# Patient Record
Sex: MALE | Race: WHITE | Employment: OTHER | URBAN - METROPOLITAN AREA
[De-identification: names, ages, dates, MRNs, and addresses within clinical notes are randomized per-mention and may not be internally consistent; named-entity substitution may affect disease eponyms.]

---

## 2017-01-05 ENCOUNTER — OFFICE VISIT (OUTPATIENT)
Dept: PAIN MANAGEMENT | Age: 62
End: 2017-01-05

## 2017-01-05 VITALS
HEART RATE: 95 BPM | WEIGHT: 290 LBS | SYSTOLIC BLOOD PRESSURE: 126 MMHG | BODY MASS INDEX: 37.23 KG/M2 | DIASTOLIC BLOOD PRESSURE: 80 MMHG

## 2017-01-05 DIAGNOSIS — M22.41 CHONDROMALACIA OF PATELLOFEMORAL JOINT, RIGHT: ICD-10-CM

## 2017-01-05 DIAGNOSIS — F51.01 PRIMARY INSOMNIA: ICD-10-CM

## 2017-01-05 DIAGNOSIS — M96.1 FAILED BACK SURGICAL SYNDROME: ICD-10-CM

## 2017-01-05 DIAGNOSIS — G89.4 CHRONIC PAIN SYNDROME: ICD-10-CM

## 2017-01-05 PROCEDURE — 99213 OFFICE O/P EST LOW 20 MIN: CPT | Performed by: INTERNAL MEDICINE

## 2017-01-05 RX ORDER — OXYCODONE HYDROCHLORIDE 15 MG/1
1 TABLET, FILM COATED, EXTENDED RELEASE ORAL 2 TIMES DAILY
Qty: 56 TABLET | Refills: 0 | Status: SHIPPED | OUTPATIENT
Start: 2017-01-05 | End: 2017-02-02 | Stop reason: SDUPTHER

## 2017-01-09 ENCOUNTER — TELEPHONE (OUTPATIENT)
Dept: PAIN MANAGEMENT | Age: 62
End: 2017-01-09

## 2017-02-02 ENCOUNTER — OFFICE VISIT (OUTPATIENT)
Dept: PAIN MANAGEMENT | Age: 62
End: 2017-02-02

## 2017-02-02 VITALS
WEIGHT: 290 LBS | BODY MASS INDEX: 37.23 KG/M2 | HEART RATE: 99 BPM | DIASTOLIC BLOOD PRESSURE: 77 MMHG | SYSTOLIC BLOOD PRESSURE: 131 MMHG

## 2017-02-02 DIAGNOSIS — M96.1 FAILED BACK SURGICAL SYNDROME: ICD-10-CM

## 2017-02-02 DIAGNOSIS — M22.41 CHONDROMALACIA OF PATELLOFEMORAL JOINT, RIGHT: ICD-10-CM

## 2017-02-02 DIAGNOSIS — G89.4 CHRONIC PAIN SYNDROME: ICD-10-CM

## 2017-02-02 PROCEDURE — 1036F TOBACCO NON-USER: CPT | Performed by: INTERNAL MEDICINE

## 2017-02-02 PROCEDURE — 99213 OFFICE O/P EST LOW 20 MIN: CPT | Performed by: INTERNAL MEDICINE

## 2017-02-02 PROCEDURE — G8419 CALC BMI OUT NRM PARAM NOF/U: HCPCS | Performed by: INTERNAL MEDICINE

## 2017-02-02 PROCEDURE — G8427 DOCREV CUR MEDS BY ELIG CLIN: HCPCS | Performed by: INTERNAL MEDICINE

## 2017-02-02 PROCEDURE — 3017F COLORECTAL CA SCREEN DOC REV: CPT | Performed by: INTERNAL MEDICINE

## 2017-02-02 PROCEDURE — G8484 FLU IMMUNIZE NO ADMIN: HCPCS | Performed by: INTERNAL MEDICINE

## 2017-02-02 RX ORDER — OXYCODONE HYDROCHLORIDE 15 MG/1
1 TABLET, FILM COATED, EXTENDED RELEASE ORAL 2 TIMES DAILY
Qty: 56 TABLET | Refills: 0 | Status: SHIPPED | OUTPATIENT
Start: 2017-02-02 | End: 2017-03-02 | Stop reason: SDUPTHER

## 2017-02-02 RX ORDER — ESCITALOPRAM OXALATE 20 MG/1
TABLET ORAL
Qty: 90 TABLET | Refills: 0 | Status: SHIPPED | OUTPATIENT
Start: 2017-02-02 | End: 2017-05-25 | Stop reason: SDUPTHER

## 2017-02-02 RX ORDER — METHOCARBAMOL 750 MG/1
750 TABLET, FILM COATED ORAL 2 TIMES DAILY
Qty: 180 TABLET | Refills: 0 | Status: SHIPPED | OUTPATIENT
Start: 2017-02-02 | End: 2017-04-27 | Stop reason: SDUPTHER

## 2017-02-02 RX ORDER — LIDOCAINE 50 MG/G
2 PATCH TOPICAL DAILY
Qty: 60 PATCH | Refills: 0 | Status: SHIPPED | OUTPATIENT
Start: 2017-02-02 | End: 2017-03-30 | Stop reason: SDUPTHER

## 2017-02-06 ENCOUNTER — TELEPHONE (OUTPATIENT)
Dept: PAIN MANAGEMENT | Age: 62
End: 2017-02-06

## 2017-03-02 ENCOUNTER — OFFICE VISIT (OUTPATIENT)
Dept: PAIN MANAGEMENT | Age: 62
End: 2017-03-02

## 2017-03-02 VITALS
SYSTOLIC BLOOD PRESSURE: 119 MMHG | WEIGHT: 295 LBS | DIASTOLIC BLOOD PRESSURE: 77 MMHG | HEART RATE: 101 BPM | BODY MASS INDEX: 37.88 KG/M2

## 2017-03-02 DIAGNOSIS — G89.4 CHRONIC PAIN SYNDROME: ICD-10-CM

## 2017-03-02 DIAGNOSIS — M22.41 CHONDROMALACIA OF PATELLOFEMORAL JOINT, RIGHT: ICD-10-CM

## 2017-03-02 DIAGNOSIS — M96.1 FAILED BACK SURGICAL SYNDROME: ICD-10-CM

## 2017-03-02 PROCEDURE — 99213 OFFICE O/P EST LOW 20 MIN: CPT | Performed by: INTERNAL MEDICINE

## 2017-03-02 PROCEDURE — G8427 DOCREV CUR MEDS BY ELIG CLIN: HCPCS | Performed by: INTERNAL MEDICINE

## 2017-03-02 PROCEDURE — 3017F COLORECTAL CA SCREEN DOC REV: CPT | Performed by: INTERNAL MEDICINE

## 2017-03-02 PROCEDURE — 1036F TOBACCO NON-USER: CPT | Performed by: INTERNAL MEDICINE

## 2017-03-02 PROCEDURE — G8419 CALC BMI OUT NRM PARAM NOF/U: HCPCS | Performed by: INTERNAL MEDICINE

## 2017-03-02 PROCEDURE — G8484 FLU IMMUNIZE NO ADMIN: HCPCS | Performed by: INTERNAL MEDICINE

## 2017-03-02 RX ORDER — OXYCODONE HYDROCHLORIDE 15 MG/1
1 TABLET, FILM COATED, EXTENDED RELEASE ORAL 2 TIMES DAILY
Qty: 56 TABLET | Refills: 0 | Status: SHIPPED | OUTPATIENT
Start: 2017-03-02 | End: 2017-03-30 | Stop reason: SDUPTHER

## 2017-03-08 ENCOUNTER — HOSPITAL ENCOUNTER (OUTPATIENT)
Dept: OTHER | Age: 62
Discharge: HOME | End: 2017-03-08
Attending: NURSE PRACTITIONER | Admitting: NURSE PRACTITIONER

## 2017-03-08 ENCOUNTER — HOSPITAL ENCOUNTER (OUTPATIENT)
Dept: ENDOSCOPY | Age: 62
Discharge: HOME | End: 2017-03-08
Attending: INTERNAL MEDICINE | Admitting: INTERNAL MEDICINE

## 2017-03-08 LAB
A/G RATIO: 1.6 (ref 1.1–2.2)
ALBUMIN SERPL-MCNC: 4.2 G/DL (ref 3.4–5)
ALP BLD-CCNC: 49 U/L (ref 40–129)
ALT SERPL-CCNC: 33 U/L (ref 10–40)
ANION GAP SERPL CALCULATED.3IONS-SCNC: 16 MMOL/L (ref 3–16)
AST SERPL-CCNC: 37 U/L (ref 15–37)
BASOPHILS ABSOLUTE: 0.1 K/UL (ref 0–0.2)
BASOPHILS RELATIVE PERCENT: 0.5 %
BILIRUB SERPL-MCNC: 0.7 MG/DL (ref 0–1)
BILIRUBIN DIRECT: <0.2 MG/DL (ref 0–0.3)
BUN BLDV-MCNC: 15 MG/DL (ref 7–20)
CALCIUM SERPL-MCNC: 9.7 MG/DL (ref 8.3–10.6)
CHLORIDE BLD-SCNC: 98 MMOL/L (ref 99–110)
CHOLESTEROL, TOTAL: 243 MG/DL (ref 0–199)
CO2: 24 MMOL/L (ref 21–32)
CREAT SERPL-MCNC: 0.8 MG/DL (ref 0.8–1.3)
EOSINOPHILS ABSOLUTE: 0.2 K/UL (ref 0–0.6)
EOSINOPHILS RELATIVE PERCENT: 2.4 %
GFR AFRICAN AMERICAN: >60
GFR NON-AFRICAN AMERICAN: >60
GLOBULIN: 2.7 G/DL
GLUCOSE BLD-MCNC: 162 MG/DL (ref 70–99)
HCT VFR BLD CALC: 45.9 % (ref 40.5–52.5)
HDLC SERPL-MCNC: 27 MG/DL (ref 40–60)
HEMOGLOBIN: 15 G/DL (ref 13.5–17.5)
LDL CHOLESTEROL CALCULATED: ABNORMAL MG/DL
LDL CHOLESTEROL DIRECT: 158 MG/DL
LYMPHOCYTES ABSOLUTE: 2.3 K/UL (ref 1–5.1)
LYMPHOCYTES RELATIVE PERCENT: 23 %
MAGNESIUM: 1.8 MG/DL (ref 1.8–2.4)
MCH RBC QN AUTO: 31.5 PG (ref 26–34)
MCHC RBC AUTO-ENTMCNC: 32.8 G/DL (ref 31–36)
MCV RBC AUTO: 96.2 FL (ref 80–100)
MONOCYTES ABSOLUTE: 0.8 K/UL (ref 0–1.3)
MONOCYTES RELATIVE PERCENT: 7.9 %
NEUTROPHILS ABSOLUTE: 6.7 K/UL (ref 1.7–7.7)
NEUTROPHILS RELATIVE PERCENT: 66.2 %
PDW BLD-RTO: 13.7 % (ref 12.4–15.4)
PLATELET # BLD: 254 K/UL (ref 135–450)
PMV BLD AUTO: 8.8 FL (ref 5–10.5)
POTASSIUM SERPL-SCNC: 3.5 MMOL/L (ref 3.5–5.1)
RBC # BLD: 4.77 M/UL (ref 4.2–5.9)
SODIUM BLD-SCNC: 138 MMOL/L (ref 136–145)
T4 FREE: 1.2 NG/DL (ref 0.9–1.8)
TOTAL PROTEIN: 6.9 G/DL (ref 6.4–8.2)
TRIGL SERPL-MCNC: 445 MG/DL (ref 0–150)
TSH SERPL DL<=0.05 MIU/L-ACNC: 2.16 UIU/ML (ref 0.27–4.2)
VITAMIN B-12: 1422 PG/ML (ref 211–911)
VITAMIN D 25-HYDROXY: 23.2 NG/ML
VLDLC SERPL CALC-MCNC: ABNORMAL MG/DL
WBC # BLD: 10.1 K/UL (ref 4–11)

## 2017-03-08 RX ORDER — SODIUM CHLORIDE 9 MG/ML
INJECTION, SOLUTION INTRAVENOUS CONTINUOUS
Status: DISCONTINUED | OUTPATIENT
Start: 2017-03-08 | End: 2017-03-09 | Stop reason: HOSPADM

## 2017-03-08 RX ORDER — SODIUM CHLORIDE 0.9 % (FLUSH) 0.9 %
10 SYRINGE (ML) INJECTION EVERY 12 HOURS SCHEDULED
Status: DISCONTINUED | OUTPATIENT
Start: 2017-03-08 | End: 2017-03-09 | Stop reason: HOSPADM

## 2017-03-08 RX ORDER — SODIUM CHLORIDE 0.9 % (FLUSH) 0.9 %
10 SYRINGE (ML) INJECTION PRN
Status: DISCONTINUED | OUTPATIENT
Start: 2017-03-08 | End: 2017-03-09 | Stop reason: HOSPADM

## 2017-03-08 RX ORDER — LIDOCAINE HYDROCHLORIDE 10 MG/ML
1 INJECTION, SOLUTION EPIDURAL; INFILTRATION; INTRACAUDAL; PERINEURAL
Status: ACTIVE | OUTPATIENT
Start: 2017-03-08 | End: 2017-03-08

## 2017-03-09 LAB
PROSTATE SPECIFIC ANTIGEN FREE: 0.2 NG/ML
PROSTATE SPECIFIC ANTIGEN PERCENT FREE: 22 %
PROSTATE SPECIFIC ANTIGEN: 0.9 NG/ML (ref 0–4)

## 2017-03-30 ENCOUNTER — OFFICE VISIT (OUTPATIENT)
Dept: PAIN MANAGEMENT | Age: 62
End: 2017-03-30

## 2017-03-30 VITALS
WEIGHT: 312 LBS | SYSTOLIC BLOOD PRESSURE: 125 MMHG | BODY MASS INDEX: 39 KG/M2 | HEART RATE: 105 BPM | DIASTOLIC BLOOD PRESSURE: 76 MMHG

## 2017-03-30 DIAGNOSIS — M96.1 FAILED BACK SURGICAL SYNDROME: ICD-10-CM

## 2017-03-30 DIAGNOSIS — M22.41 CHONDROMALACIA OF PATELLOFEMORAL JOINT, RIGHT: ICD-10-CM

## 2017-03-30 DIAGNOSIS — S13.9XXA CERVICAL SPRAIN, INITIAL ENCOUNTER: ICD-10-CM

## 2017-03-30 DIAGNOSIS — G89.4 CHRONIC PAIN SYNDROME: ICD-10-CM

## 2017-03-30 PROCEDURE — G8427 DOCREV CUR MEDS BY ELIG CLIN: HCPCS | Performed by: INTERNAL MEDICINE

## 2017-03-30 PROCEDURE — 99213 OFFICE O/P EST LOW 20 MIN: CPT | Performed by: INTERNAL MEDICINE

## 2017-03-30 PROCEDURE — 1036F TOBACCO NON-USER: CPT | Performed by: INTERNAL MEDICINE

## 2017-03-30 PROCEDURE — 3017F COLORECTAL CA SCREEN DOC REV: CPT | Performed by: INTERNAL MEDICINE

## 2017-03-30 PROCEDURE — G8484 FLU IMMUNIZE NO ADMIN: HCPCS | Performed by: INTERNAL MEDICINE

## 2017-03-30 PROCEDURE — G8419 CALC BMI OUT NRM PARAM NOF/U: HCPCS | Performed by: INTERNAL MEDICINE

## 2017-03-30 RX ORDER — LIDOCAINE 50 MG/G
2 PATCH TOPICAL DAILY
Qty: 60 PATCH | Refills: 0 | Status: SHIPPED | OUTPATIENT
Start: 2017-03-30 | End: 2017-03-30

## 2017-03-30 RX ORDER — OXYCODONE HYDROCHLORIDE 15 MG/1
1 TABLET, FILM COATED, EXTENDED RELEASE ORAL 2 TIMES DAILY
Qty: 56 TABLET | Refills: 0 | Status: SHIPPED | OUTPATIENT
Start: 2017-03-30 | End: 2017-04-27 | Stop reason: SDUPTHER

## 2017-04-18 ENCOUNTER — HOSPITAL ENCOUNTER (OUTPATIENT)
Dept: OTHER | Age: 62
Discharge: HOME | End: 2017-04-18
Attending: NURSE PRACTITIONER | Admitting: NURSE PRACTITIONER

## 2017-04-18 DIAGNOSIS — R05.9 COUGH: ICD-10-CM

## 2017-04-18 LAB
ALBUMIN SERPL-MCNC: 4.5 G/DL (ref 3.4–5)
ALP BLD-CCNC: 54 U/L (ref 40–129)
ALT SERPL-CCNC: 30 U/L (ref 10–40)
ANION GAP SERPL CALCULATED.3IONS-SCNC: 18 MMOL/L (ref 3–16)
AST SERPL-CCNC: 30 U/L (ref 15–37)
BASOPHILS ABSOLUTE: 0 K/UL (ref 0–0.2)
BASOPHILS RELATIVE PERCENT: 0.3 %
BILIRUB SERPL-MCNC: 0.5 MG/DL (ref 0–1)
BILIRUBIN DIRECT: <0.2 MG/DL (ref 0–0.3)
BILIRUBIN, INDIRECT: NORMAL MG/DL (ref 0–1)
BUN BLDV-MCNC: 21 MG/DL (ref 7–20)
CALCIUM SERPL-MCNC: 10.2 MG/DL (ref 8.3–10.6)
CHLORIDE BLD-SCNC: 95 MMOL/L (ref 99–110)
CO2: 23 MMOL/L (ref 21–32)
CREAT SERPL-MCNC: 0.8 MG/DL (ref 0.8–1.3)
EOSINOPHILS ABSOLUTE: 0.1 K/UL (ref 0–0.6)
EOSINOPHILS RELATIVE PERCENT: 0.5 %
GFR AFRICAN AMERICAN: >60
GFR NON-AFRICAN AMERICAN: >60
GLUCOSE BLD-MCNC: 152 MG/DL (ref 70–99)
HCT VFR BLD CALC: 48.4 % (ref 40.5–52.5)
HEMOGLOBIN: 16.1 G/DL (ref 13.5–17.5)
LYMPHOCYTES ABSOLUTE: 3.2 K/UL (ref 1–5.1)
LYMPHOCYTES RELATIVE PERCENT: 23.9 %
MCH RBC QN AUTO: 31.9 PG (ref 26–34)
MCHC RBC AUTO-ENTMCNC: 33.3 G/DL (ref 31–36)
MCV RBC AUTO: 95.7 FL (ref 80–100)
MONOCYTES ABSOLUTE: 0.9 K/UL (ref 0–1.3)
MONOCYTES RELATIVE PERCENT: 6.6 %
NEUTROPHILS ABSOLUTE: 9.3 K/UL (ref 1.7–7.7)
NEUTROPHILS RELATIVE PERCENT: 68.7 %
PDW BLD-RTO: 13.6 % (ref 12.4–15.4)
PLATELET # BLD: 306 K/UL (ref 135–450)
PMV BLD AUTO: 9.3 FL (ref 5–10.5)
POTASSIUM SERPL-SCNC: 3.4 MMOL/L (ref 3.5–5.1)
RBC # BLD: 5.06 M/UL (ref 4.2–5.9)
SODIUM BLD-SCNC: 136 MMOL/L (ref 136–145)
TOTAL PROTEIN: 7.4 G/DL (ref 6.4–8.2)
WBC # BLD: 13.5 K/UL (ref 4–11)

## 2017-04-20 LAB
CHLAMYDIA PNEUMONIAE IGG ANTIBODY: NORMAL
CHLAMYDIA PNEUMONIAE IGM ANTIBODY: NORMAL
CHLAMYDIA PSITTACI IGG ANTIBODY: NORMAL
CHLAMYDIA PSITTACI IGM ANTIBODY: NORMAL
CHLAMYDIA TRACHOMATIS IGG ANTIBODY: NORMAL
CHLAMYDIA TRACHOMATIS IGM ANTIBODY: NORMAL
MYCOPLASMA PNEUMONIAE IGG: 0.55 U/L
MYCOPLASMA PNEUMONIAE IGM: 0.02 U/L

## 2017-04-21 LAB
Lab: NORMAL
MISCELLANEOUS LAB TEST ORDER: NORMAL

## 2017-04-27 ENCOUNTER — OFFICE VISIT (OUTPATIENT)
Dept: PAIN MANAGEMENT | Age: 62
End: 2017-04-27

## 2017-04-27 VITALS
DIASTOLIC BLOOD PRESSURE: 66 MMHG | BODY MASS INDEX: 39 KG/M2 | SYSTOLIC BLOOD PRESSURE: 99 MMHG | WEIGHT: 312 LBS | HEART RATE: 89 BPM

## 2017-04-27 DIAGNOSIS — M96.1 FAILED BACK SURGICAL SYNDROME: ICD-10-CM

## 2017-04-27 DIAGNOSIS — G89.4 CHRONIC PAIN SYNDROME: ICD-10-CM

## 2017-04-27 DIAGNOSIS — F51.01 PRIMARY INSOMNIA: ICD-10-CM

## 2017-04-27 DIAGNOSIS — M22.41 CHONDROMALACIA OF PATELLOFEMORAL JOINT, RIGHT: ICD-10-CM

## 2017-04-27 PROCEDURE — G8427 DOCREV CUR MEDS BY ELIG CLIN: HCPCS | Performed by: INTERNAL MEDICINE

## 2017-04-27 PROCEDURE — 99213 OFFICE O/P EST LOW 20 MIN: CPT | Performed by: INTERNAL MEDICINE

## 2017-04-27 PROCEDURE — 1036F TOBACCO NON-USER: CPT | Performed by: INTERNAL MEDICINE

## 2017-04-27 PROCEDURE — G8419 CALC BMI OUT NRM PARAM NOF/U: HCPCS | Performed by: INTERNAL MEDICINE

## 2017-04-27 PROCEDURE — 3017F COLORECTAL CA SCREEN DOC REV: CPT | Performed by: INTERNAL MEDICINE

## 2017-04-27 RX ORDER — METHOCARBAMOL 750 MG/1
750 TABLET, FILM COATED ORAL 2 TIMES DAILY
Qty: 180 TABLET | Refills: 0 | Status: SHIPPED | OUTPATIENT
Start: 2017-04-27 | End: 2017-05-25 | Stop reason: SDUPTHER

## 2017-04-27 RX ORDER — OXYCODONE HYDROCHLORIDE 15 MG/1
1 TABLET, FILM COATED, EXTENDED RELEASE ORAL 2 TIMES DAILY
Qty: 56 TABLET | Refills: 0 | Status: SHIPPED | OUTPATIENT
Start: 2017-04-27 | End: 2017-05-25 | Stop reason: SDUPTHER

## 2017-05-25 ENCOUNTER — OFFICE VISIT (OUTPATIENT)
Dept: PAIN MANAGEMENT | Age: 62
End: 2017-05-25

## 2017-05-25 VITALS
WEIGHT: 312 LBS | DIASTOLIC BLOOD PRESSURE: 85 MMHG | HEART RATE: 89 BPM | SYSTOLIC BLOOD PRESSURE: 151 MMHG | BODY MASS INDEX: 39 KG/M2

## 2017-05-25 DIAGNOSIS — M22.41 CHONDROMALACIA OF PATELLOFEMORAL JOINT, RIGHT: ICD-10-CM

## 2017-05-25 DIAGNOSIS — G89.4 CHRONIC PAIN SYNDROME: ICD-10-CM

## 2017-05-25 DIAGNOSIS — M96.1 FAILED BACK SURGICAL SYNDROME: ICD-10-CM

## 2017-05-25 PROCEDURE — G8427 DOCREV CUR MEDS BY ELIG CLIN: HCPCS | Performed by: INTERNAL MEDICINE

## 2017-05-25 PROCEDURE — 3017F COLORECTAL CA SCREEN DOC REV: CPT | Performed by: INTERNAL MEDICINE

## 2017-05-25 PROCEDURE — 99213 OFFICE O/P EST LOW 20 MIN: CPT | Performed by: INTERNAL MEDICINE

## 2017-05-25 PROCEDURE — G8419 CALC BMI OUT NRM PARAM NOF/U: HCPCS | Performed by: INTERNAL MEDICINE

## 2017-05-25 PROCEDURE — 1036F TOBACCO NON-USER: CPT | Performed by: INTERNAL MEDICINE

## 2017-05-25 PROCEDURE — 20553 NJX 1/MLT TRIGGER POINTS 3/>: CPT | Performed by: INTERNAL MEDICINE

## 2017-05-25 RX ORDER — OXYCODONE HYDROCHLORIDE 15 MG/1
1 TABLET, FILM COATED, EXTENDED RELEASE ORAL 2 TIMES DAILY
Qty: 60 TABLET | Refills: 0 | Status: SHIPPED | OUTPATIENT
Start: 2017-05-25 | End: 2017-06-26 | Stop reason: SDUPTHER

## 2017-05-25 RX ORDER — ESCITALOPRAM OXALATE 20 MG/1
TABLET ORAL
Qty: 90 TABLET | Refills: 0 | Status: SHIPPED | OUTPATIENT
Start: 2017-05-25 | End: 2017-05-25 | Stop reason: CLARIF

## 2017-05-25 RX ORDER — TRIAMCINOLONE ACETONIDE 40 MG/ML
40 INJECTION, SUSPENSION INTRA-ARTICULAR; INTRAMUSCULAR ONCE
Status: COMPLETED | OUTPATIENT
Start: 2017-05-25 | End: 2017-05-25

## 2017-05-25 RX ORDER — METHOCARBAMOL 750 MG/1
750 TABLET, FILM COATED ORAL 2 TIMES DAILY
Qty: 180 TABLET | Refills: 0 | Status: SHIPPED | OUTPATIENT
Start: 2017-05-25 | End: 2017-06-26 | Stop reason: SDUPTHER

## 2017-05-25 RX ADMIN — TRIAMCINOLONE ACETONIDE 40 MG: 40 INJECTION, SUSPENSION INTRA-ARTICULAR; INTRAMUSCULAR at 12:03

## 2017-05-25 RX ADMIN — TRIAMCINOLONE ACETONIDE 40 MG: 40 INJECTION, SUSPENSION INTRA-ARTICULAR; INTRAMUSCULAR at 09:29

## 2017-05-30 ENCOUNTER — HOSPITAL ENCOUNTER (OUTPATIENT)
Dept: OTHER | Age: 62
Discharge: HOME | End: 2017-05-30
Attending: FAMILY MEDICINE | Admitting: FAMILY MEDICINE

## 2017-05-30 LAB
ALBUMIN SERPL-MCNC: 3.9 G/DL (ref 3.4–5)
ALP BLD-CCNC: 39 U/L (ref 40–129)
ALT SERPL-CCNC: 27 U/L (ref 10–40)
ANION GAP SERPL CALCULATED.3IONS-SCNC: 12 MMOL/L (ref 3–16)
AST SERPL-CCNC: 32 U/L (ref 15–37)
BASOPHILS ABSOLUTE: 0 K/UL (ref 0–0.2)
BASOPHILS RELATIVE PERCENT: 0.4 %
BILIRUB SERPL-MCNC: 0.4 MG/DL (ref 0–1)
BILIRUBIN DIRECT: <0.2 MG/DL (ref 0–0.3)
BILIRUBIN, INDIRECT: ABNORMAL MG/DL (ref 0–1)
BUN BLDV-MCNC: 22 MG/DL (ref 7–20)
CALCIUM SERPL-MCNC: 9.4 MG/DL (ref 8.3–10.6)
CHLORIDE BLD-SCNC: 101 MMOL/L (ref 99–110)
CHOLESTEROL, TOTAL: 254 MG/DL (ref 0–199)
CO2: 30 MMOL/L (ref 21–32)
CREAT SERPL-MCNC: 0.8 MG/DL (ref 0.8–1.3)
EOSINOPHILS ABSOLUTE: 0.3 K/UL (ref 0–0.6)
EOSINOPHILS RELATIVE PERCENT: 3.2 %
GFR AFRICAN AMERICAN: >60
GFR NON-AFRICAN AMERICAN: >60
GLUCOSE BLD-MCNC: 143 MG/DL (ref 70–99)
HCT VFR BLD CALC: 43.8 % (ref 40.5–52.5)
HDLC SERPL-MCNC: 33 MG/DL (ref 40–60)
HEMOGLOBIN: 14.4 G/DL (ref 13.5–17.5)
LDL CHOLESTEROL CALCULATED: 161 MG/DL
LYMPHOCYTES ABSOLUTE: 2.3 K/UL (ref 1–5.1)
LYMPHOCYTES RELATIVE PERCENT: 27.8 %
MCH RBC QN AUTO: 31.8 PG (ref 26–34)
MCHC RBC AUTO-ENTMCNC: 33 G/DL (ref 31–36)
MCV RBC AUTO: 96.3 FL (ref 80–100)
MONOCYTES ABSOLUTE: 0.7 K/UL (ref 0–1.3)
MONOCYTES RELATIVE PERCENT: 8 %
NEUTROPHILS ABSOLUTE: 5.1 K/UL (ref 1.7–7.7)
NEUTROPHILS RELATIVE PERCENT: 60.6 %
PDW BLD-RTO: 13.8 % (ref 12.4–15.4)
PLATELET # BLD: 231 K/UL (ref 135–450)
PMV BLD AUTO: 9.2 FL (ref 5–10.5)
POTASSIUM SERPL-SCNC: 3.9 MMOL/L (ref 3.5–5.1)
RBC # BLD: 4.54 M/UL (ref 4.2–5.9)
SODIUM BLD-SCNC: 143 MMOL/L (ref 136–145)
TOTAL PROTEIN: 7.1 G/DL (ref 6.4–8.2)
TRIGL SERPL-MCNC: 299 MG/DL (ref 0–150)
VLDLC SERPL CALC-MCNC: 60 MG/DL
WBC # BLD: 8.3 K/UL (ref 4–11)

## 2017-06-26 ENCOUNTER — HOSPITAL ENCOUNTER (OUTPATIENT)
Dept: OTHER | Age: 62
Discharge: HOME | End: 2017-06-26
Attending: INTERNAL MEDICINE | Admitting: INTERNAL MEDICINE

## 2017-06-26 ENCOUNTER — OFFICE VISIT (OUTPATIENT)
Dept: PAIN MANAGEMENT | Age: 62
End: 2017-06-26

## 2017-06-26 VITALS
BODY MASS INDEX: 37.5 KG/M2 | WEIGHT: 300 LBS | SYSTOLIC BLOOD PRESSURE: 134 MMHG | DIASTOLIC BLOOD PRESSURE: 84 MMHG | HEART RATE: 114 BPM

## 2017-06-26 DIAGNOSIS — F51.01 PRIMARY INSOMNIA: ICD-10-CM

## 2017-06-26 DIAGNOSIS — M54.12 CERVICAL RADICULITIS: ICD-10-CM

## 2017-06-26 DIAGNOSIS — G89.4 CHRONIC PAIN SYNDROME: ICD-10-CM

## 2017-06-26 DIAGNOSIS — M79.7 FIBROMYALGIA: ICD-10-CM

## 2017-06-26 DIAGNOSIS — M96.1 FAILED BACK SURGICAL SYNDROME: ICD-10-CM

## 2017-06-26 PROCEDURE — G8427 DOCREV CUR MEDS BY ELIG CLIN: HCPCS | Performed by: INTERNAL MEDICINE

## 2017-06-26 PROCEDURE — 99214 OFFICE O/P EST MOD 30 MIN: CPT | Performed by: INTERNAL MEDICINE

## 2017-06-26 PROCEDURE — G8419 CALC BMI OUT NRM PARAM NOF/U: HCPCS | Performed by: INTERNAL MEDICINE

## 2017-06-26 PROCEDURE — 3017F COLORECTAL CA SCREEN DOC REV: CPT | Performed by: INTERNAL MEDICINE

## 2017-06-26 PROCEDURE — 1036F TOBACCO NON-USER: CPT | Performed by: INTERNAL MEDICINE

## 2017-06-26 RX ORDER — METHYLPREDNISOLONE 4 MG/1
TABLET ORAL
Qty: 1 KIT | Refills: 0 | Status: SHIPPED | OUTPATIENT
Start: 2017-06-26 | End: 2017-07-24

## 2017-06-26 RX ORDER — GABAPENTIN 400 MG/1
400 CAPSULE ORAL 4 TIMES DAILY
Qty: 90 CAPSULE | Refills: 0 | Status: SHIPPED | OUTPATIENT
Start: 2017-06-26 | End: 2017-07-24

## 2017-06-26 RX ORDER — METHOCARBAMOL 750 MG/1
750 TABLET, FILM COATED ORAL 2 TIMES DAILY
Qty: 180 TABLET | Refills: 0 | Status: SHIPPED | OUTPATIENT
Start: 2017-06-26 | End: 2017-07-24 | Stop reason: SDUPTHER

## 2017-06-26 RX ORDER — OXYCODONE HYDROCHLORIDE 15 MG/1
1 TABLET, FILM COATED, EXTENDED RELEASE ORAL 2 TIMES DAILY
Qty: 60 TABLET | Refills: 0 | Status: SHIPPED | OUTPATIENT
Start: 2017-06-26 | End: 2017-07-24 | Stop reason: SDUPTHER

## 2017-07-24 ENCOUNTER — OFFICE VISIT (OUTPATIENT)
Dept: PAIN MANAGEMENT | Age: 62
End: 2017-07-24

## 2017-07-24 VITALS
HEART RATE: 100 BPM | WEIGHT: 300 LBS | DIASTOLIC BLOOD PRESSURE: 78 MMHG | SYSTOLIC BLOOD PRESSURE: 128 MMHG | BODY MASS INDEX: 37.5 KG/M2

## 2017-07-24 DIAGNOSIS — M54.12 CERVICAL RADICULITIS: ICD-10-CM

## 2017-07-24 DIAGNOSIS — M96.1 FAILED BACK SURGICAL SYNDROME: ICD-10-CM

## 2017-07-24 DIAGNOSIS — M79.7 FIBROMYALGIA: ICD-10-CM

## 2017-07-24 DIAGNOSIS — G89.4 CHRONIC PAIN SYNDROME: ICD-10-CM

## 2017-07-24 PROCEDURE — 1036F TOBACCO NON-USER: CPT | Performed by: INTERNAL MEDICINE

## 2017-07-24 PROCEDURE — G8419 CALC BMI OUT NRM PARAM NOF/U: HCPCS | Performed by: INTERNAL MEDICINE

## 2017-07-24 PROCEDURE — 3017F COLORECTAL CA SCREEN DOC REV: CPT | Performed by: INTERNAL MEDICINE

## 2017-07-24 PROCEDURE — G8427 DOCREV CUR MEDS BY ELIG CLIN: HCPCS | Performed by: INTERNAL MEDICINE

## 2017-07-24 PROCEDURE — 99213 OFFICE O/P EST LOW 20 MIN: CPT | Performed by: INTERNAL MEDICINE

## 2017-07-24 RX ORDER — OXYCODONE HYDROCHLORIDE 15 MG/1
1 TABLET, FILM COATED, EXTENDED RELEASE ORAL 2 TIMES DAILY
Qty: 60 TABLET | Refills: 0 | Status: SHIPPED | OUTPATIENT
Start: 2017-07-24 | End: 2017-08-25 | Stop reason: SDUPTHER

## 2017-07-24 RX ORDER — METHOCARBAMOL 750 MG/1
750 TABLET, FILM COATED ORAL 2 TIMES DAILY
Qty: 180 TABLET | Refills: 0 | Status: SHIPPED | OUTPATIENT
Start: 2017-07-24 | End: 2017-08-25 | Stop reason: SDUPTHER

## 2017-08-25 ENCOUNTER — OFFICE VISIT (OUTPATIENT)
Dept: PAIN MANAGEMENT | Age: 62
End: 2017-08-25

## 2017-08-25 VITALS
SYSTOLIC BLOOD PRESSURE: 123 MMHG | DIASTOLIC BLOOD PRESSURE: 85 MMHG | BODY MASS INDEX: 37.32 KG/M2 | WEIGHT: 298.6 LBS | HEART RATE: 112 BPM

## 2017-08-25 DIAGNOSIS — M79.7 FIBROMYALGIA: ICD-10-CM

## 2017-08-25 DIAGNOSIS — M96.1 FAILED BACK SURGICAL SYNDROME: ICD-10-CM

## 2017-08-25 DIAGNOSIS — G89.4 CHRONIC PAIN SYNDROME: ICD-10-CM

## 2017-08-25 DIAGNOSIS — M54.12 CERVICAL RADICULITIS: ICD-10-CM

## 2017-08-25 PROCEDURE — 3017F COLORECTAL CA SCREEN DOC REV: CPT | Performed by: INTERNAL MEDICINE

## 2017-08-25 PROCEDURE — G8419 CALC BMI OUT NRM PARAM NOF/U: HCPCS | Performed by: INTERNAL MEDICINE

## 2017-08-25 PROCEDURE — 1036F TOBACCO NON-USER: CPT | Performed by: INTERNAL MEDICINE

## 2017-08-25 PROCEDURE — G8427 DOCREV CUR MEDS BY ELIG CLIN: HCPCS | Performed by: INTERNAL MEDICINE

## 2017-08-25 PROCEDURE — 99213 OFFICE O/P EST LOW 20 MIN: CPT | Performed by: INTERNAL MEDICINE

## 2017-08-25 RX ORDER — OXYCODONE HYDROCHLORIDE 15 MG/1
1 TABLET, FILM COATED, EXTENDED RELEASE ORAL 2 TIMES DAILY
Qty: 56 TABLET | Refills: 0 | Status: SHIPPED | OUTPATIENT
Start: 2017-08-25 | End: 2017-09-22 | Stop reason: SDUPTHER

## 2017-08-25 RX ORDER — METHOCARBAMOL 750 MG/1
750 TABLET, FILM COATED ORAL 2 TIMES DAILY
Qty: 180 TABLET | Refills: 0 | Status: SHIPPED | OUTPATIENT
Start: 2017-08-25 | End: 2017-09-22 | Stop reason: SDUPTHER

## 2017-08-30 LAB
6-ACETYLMORPHINE: NOT DETECTED
7-AMINOCLONAZEPAM: NOT DETECTED
ALPHA-OH-ALPRAZOLAM: NOT DETECTED
ALPRAZOLAM: NOT DETECTED
AMPHETAMINE: NOT DETECTED
BARBITURATES: NOT DETECTED
BENZOYLECGONINE: NOT DETECTED
BUPRENORPHINE: NOT DETECTED
CARISOPRODOL: NOT DETECTED
CLONAZEPAM: NOT DETECTED
CODEINE: NOT DETECTED
CREATININE URINE: 291.8 MG/DL (ref 20–400)
DIAZEPAM: NOT DETECTED
DRUGS EXPECTED: NORMAL
EER PAIN MGT DRUG PANEL, HIGH RES/EMIT U: NORMAL
ETHYL GLUCURONIDE: PRESENT
FENTANYL: NOT DETECTED
HYDROCODONE: NOT DETECTED
HYDROMORPHONE: NOT DETECTED
LORAZEPAM: NOT DETECTED
MARIJUANA METABOLITE: NOT DETECTED
MDA: NOT DETECTED
MDEA: NOT DETECTED
MDMA URINE: NOT DETECTED
MEPERIDINE: NOT DETECTED
METHADONE: NOT DETECTED
METHAMPHETAMINE: NOT DETECTED
METHYLPHENIDATE: NOT DETECTED
MIDAZOLAM: NOT DETECTED
MORPHINE: NOT DETECTED
NORBUPRENORPHINE, FREE: NOT DETECTED
NORDIAZEPAM: NOT DETECTED
NORFENTANYL: NOT DETECTED
NORHYDROCODONE, URINE: NOT DETECTED
NOROXYCODONE: PRESENT
NOROXYMORPHONE, URINE: NOT DETECTED
OXAZEPAM: NOT DETECTED
OXYCODONE: PRESENT
OXYMORPHONE: PRESENT
PAIN MANAGEMENT DRUG PANEL: NORMAL
PAIN MANAGEMENT DRUG PANEL: NORMAL
PCP: NOT DETECTED
PHENTERMINE: NOT DETECTED
PROPOXYPHENE: NOT DETECTED
TAPENTADOL, URINE: PRESENT
TAPENTADOL-O-SULFATE, URINE: PRESENT
TEMAZEPAM: NOT DETECTED
TRAMADOL: NOT DETECTED
ZOLPIDEM: NOT DETECTED

## 2017-09-22 ENCOUNTER — OFFICE VISIT (OUTPATIENT)
Dept: PAIN MANAGEMENT | Age: 62
End: 2017-09-22

## 2017-09-22 VITALS
SYSTOLIC BLOOD PRESSURE: 114 MMHG | WEIGHT: 296 LBS | DIASTOLIC BLOOD PRESSURE: 81 MMHG | HEART RATE: 116 BPM | BODY MASS INDEX: 37 KG/M2

## 2017-09-22 DIAGNOSIS — M96.1 FAILED BACK SURGICAL SYNDROME: ICD-10-CM

## 2017-09-22 DIAGNOSIS — G89.4 CHRONIC PAIN SYNDROME: ICD-10-CM

## 2017-09-22 DIAGNOSIS — M54.12 CERVICAL RADICULITIS: ICD-10-CM

## 2017-09-22 DIAGNOSIS — M79.7 FIBROMYALGIA: ICD-10-CM

## 2017-09-22 PROCEDURE — G8419 CALC BMI OUT NRM PARAM NOF/U: HCPCS | Performed by: INTERNAL MEDICINE

## 2017-09-22 PROCEDURE — 99213 OFFICE O/P EST LOW 20 MIN: CPT | Performed by: INTERNAL MEDICINE

## 2017-09-22 PROCEDURE — G8427 DOCREV CUR MEDS BY ELIG CLIN: HCPCS | Performed by: INTERNAL MEDICINE

## 2017-09-22 PROCEDURE — 3017F COLORECTAL CA SCREEN DOC REV: CPT | Performed by: INTERNAL MEDICINE

## 2017-09-22 PROCEDURE — 1036F TOBACCO NON-USER: CPT | Performed by: INTERNAL MEDICINE

## 2017-09-22 RX ORDER — ESCITALOPRAM OXALATE 20 MG/1
TABLET ORAL
Qty: 90 TABLET | Refills: 0 | Status: SHIPPED | OUTPATIENT
Start: 2017-09-22 | End: 2017-10-20 | Stop reason: SDUPTHER

## 2017-09-22 RX ORDER — OXYCODONE HYDROCHLORIDE 15 MG/1
1 TABLET, FILM COATED, EXTENDED RELEASE ORAL 2 TIMES DAILY
Qty: 58 TABLET | Refills: 0 | Status: SHIPPED | OUTPATIENT
Start: 2017-09-22 | End: 2017-10-20 | Stop reason: SDUPTHER

## 2017-09-22 RX ORDER — METHOCARBAMOL 750 MG/1
750 TABLET, FILM COATED ORAL 2 TIMES DAILY
Qty: 180 TABLET | Refills: 0 | Status: SHIPPED | OUTPATIENT
Start: 2017-09-22 | End: 2017-10-20 | Stop reason: SDUPTHER

## 2017-10-02 ENCOUNTER — HOSPITAL ENCOUNTER (OUTPATIENT)
Dept: OTHER | Age: 62
Discharge: HOME | End: 2017-10-02
Attending: NURSE PRACTITIONER | Admitting: NURSE PRACTITIONER

## 2017-10-02 LAB
ALBUMIN SERPL-MCNC: 4.1 G/DL (ref 3.4–5)
ALP BLD-CCNC: 50 U/L (ref 40–129)
ALT SERPL-CCNC: 31 U/L (ref 10–40)
ANION GAP SERPL CALCULATED.3IONS-SCNC: 19 MMOL/L (ref 3–16)
AST SERPL-CCNC: 41 U/L (ref 15–37)
BASOPHILS ABSOLUTE: 0.2 K/UL (ref 0–0.2)
BASOPHILS RELATIVE PERCENT: 2.3 %
BILIRUB SERPL-MCNC: 0.6 MG/DL (ref 0–1)
BILIRUBIN DIRECT: <0.2 MG/DL (ref 0–0.3)
BILIRUBIN, INDIRECT: ABNORMAL MG/DL (ref 0–1)
BUN BLDV-MCNC: 26 MG/DL (ref 7–20)
CALCIUM SERPL-MCNC: 10 MG/DL (ref 8.3–10.6)
CHLORIDE BLD-SCNC: 95 MMOL/L (ref 99–110)
CHOLESTEROL, TOTAL: 260 MG/DL (ref 0–199)
CO2: 27 MMOL/L (ref 21–32)
CREAT SERPL-MCNC: 1 MG/DL (ref 0.8–1.3)
EKG ATRIAL RATE: 100 BPM
EKG DIAGNOSIS: NORMAL
EKG P AXIS: 61 DEGREES
EKG P-R INTERVAL: 176 MS
EKG Q-T INTERVAL: 376 MS
EKG QRS DURATION: 92 MS
EKG QTC CALCULATION (BAZETT): 485 MS
EKG R AXIS: 57 DEGREES
EKG T AXIS: 61 DEGREES
EKG VENTRICULAR RATE: 100 BPM
EOSINOPHILS ABSOLUTE: 0.1 K/UL (ref 0–0.6)
EOSINOPHILS RELATIVE PERCENT: 1.7 %
ESTIMATED AVERAGE GLUCOSE: 157.1 MG/DL
FOLATE: >20 NG/ML (ref 4.78–24.2)
GFR AFRICAN AMERICAN: >60
GFR NON-AFRICAN AMERICAN: >60
GLUCOSE BLD-MCNC: 156 MG/DL (ref 70–99)
HBA1C MFR BLD: 7.1 %
HCT VFR BLD CALC: 47.8 % (ref 40.5–52.5)
HDLC SERPL-MCNC: 42 MG/DL (ref 40–60)
HEMOGLOBIN: 16.5 G/DL (ref 13.5–17.5)
LDL CHOLESTEROL CALCULATED: 166 MG/DL
LYMPHOCYTES ABSOLUTE: 2.2 K/UL (ref 1–5.1)
LYMPHOCYTES RELATIVE PERCENT: 26.1 %
MAGNESIUM: 1.9 MG/DL (ref 1.8–2.4)
MCH RBC QN AUTO: 33.1 PG (ref 26–34)
MCHC RBC AUTO-ENTMCNC: 34.5 G/DL (ref 31–36)
MCV RBC AUTO: 95.9 FL (ref 80–100)
MONOCYTES ABSOLUTE: 0.7 K/UL (ref 0–1.3)
MONOCYTES RELATIVE PERCENT: 8.3 %
NEUTROPHILS ABSOLUTE: 5.2 K/UL (ref 1.7–7.7)
NEUTROPHILS RELATIVE PERCENT: 61.6 %
PDW BLD-RTO: 14.8 % (ref 12.4–15.4)
PLATELET # BLD: 260 K/UL (ref 135–450)
PMV BLD AUTO: 8.9 FL (ref 5–10.5)
POTASSIUM SERPL-SCNC: 2.9 MMOL/L (ref 3.5–5.1)
RBC # BLD: 4.98 M/UL (ref 4.2–5.9)
SODIUM BLD-SCNC: 141 MMOL/L (ref 136–145)
T4 FREE: 1.2 NG/DL (ref 0.9–1.8)
TOTAL PROTEIN: 7.1 G/DL (ref 6.4–8.2)
TRIGL SERPL-MCNC: 259 MG/DL (ref 0–150)
TSH SERPL DL<=0.05 MIU/L-ACNC: 2.33 UIU/ML (ref 0.27–4.2)
VITAMIN B-12: 587 PG/ML (ref 211–911)
VLDLC SERPL CALC-MCNC: 52 MG/DL
WBC # BLD: 8.4 K/UL (ref 4–11)

## 2017-10-02 PROCEDURE — 93010 ELECTROCARDIOGRAM REPORT: CPT | Performed by: INTERNAL MEDICINE

## 2017-10-03 ENCOUNTER — HOSPITAL ENCOUNTER (OUTPATIENT)
Dept: OTHER | Age: 62
Discharge: HOME | End: 2017-10-03
Attending: PHYSICIAN ASSISTANT | Admitting: PHYSICIAN ASSISTANT

## 2017-10-03 LAB
ANION GAP SERPL CALCULATED.3IONS-SCNC: 15 MMOL/L (ref 3–16)
BUN BLDV-MCNC: 33 MG/DL (ref 7–20)
CALCIUM SERPL-MCNC: 9.6 MG/DL (ref 8.3–10.6)
CHLORIDE BLD-SCNC: 100 MMOL/L (ref 99–110)
CO2: 24 MMOL/L (ref 21–32)
CREAT SERPL-MCNC: 1 MG/DL (ref 0.8–1.3)
GFR AFRICAN AMERICAN: >60
GFR NON-AFRICAN AMERICAN: >60
GLUCOSE BLD-MCNC: 127 MG/DL (ref 70–99)
POTASSIUM SERPL-SCNC: 3.2 MMOL/L (ref 3.5–5.1)
SODIUM BLD-SCNC: 139 MMOL/L (ref 136–145)

## 2017-10-20 ENCOUNTER — OFFICE VISIT (OUTPATIENT)
Dept: PAIN MANAGEMENT | Age: 62
End: 2017-10-20

## 2017-10-20 ENCOUNTER — HOSPITAL ENCOUNTER (OUTPATIENT)
Dept: OTHER | Age: 62
Discharge: HOME | End: 2017-10-20
Attending: NURSE PRACTITIONER | Admitting: NURSE PRACTITIONER

## 2017-10-20 VITALS
SYSTOLIC BLOOD PRESSURE: 118 MMHG | BODY MASS INDEX: 36 KG/M2 | DIASTOLIC BLOOD PRESSURE: 76 MMHG | HEART RATE: 106 BPM | WEIGHT: 288 LBS

## 2017-10-20 DIAGNOSIS — G89.4 CHRONIC PAIN SYNDROME: ICD-10-CM

## 2017-10-20 DIAGNOSIS — M96.1 FAILED BACK SURGICAL SYNDROME: ICD-10-CM

## 2017-10-20 DIAGNOSIS — M79.7 FIBROMYALGIA: ICD-10-CM

## 2017-10-20 DIAGNOSIS — M54.12 CERVICAL RADICULITIS: ICD-10-CM

## 2017-10-20 LAB
ANION GAP SERPL CALCULATED.3IONS-SCNC: 16 MMOL/L (ref 3–16)
BUN BLDV-MCNC: 23 MG/DL (ref 7–20)
CALCIUM SERPL-MCNC: 10.4 MG/DL (ref 8.3–10.6)
CHLORIDE BLD-SCNC: 95 MMOL/L (ref 99–110)
CO2: 26 MMOL/L (ref 21–32)
CREAT SERPL-MCNC: 1.3 MG/DL (ref 0.8–1.3)
CREATININE URINE: 220.3 MG/DL (ref 39–259)
GFR AFRICAN AMERICAN: >60
GFR NON-AFRICAN AMERICAN: 56
GLUCOSE BLD-MCNC: 122 MG/DL (ref 70–99)
MICROALBUMIN UR-MCNC: 4.1 MG/DL
MICROALBUMIN/CREAT UR-RTO: 18.6 MG/G (ref 0–30)
POTASSIUM SERPL-SCNC: 5.4 MMOL/L (ref 3.5–5.1)
SODIUM BLD-SCNC: 137 MMOL/L (ref 136–145)

## 2017-10-20 PROCEDURE — G8484 FLU IMMUNIZE NO ADMIN: HCPCS | Performed by: INTERNAL MEDICINE

## 2017-10-20 PROCEDURE — G8417 CALC BMI ABV UP PARAM F/U: HCPCS | Performed by: INTERNAL MEDICINE

## 2017-10-20 PROCEDURE — 20553 NJX 1/MLT TRIGGER POINTS 3/>: CPT | Performed by: INTERNAL MEDICINE

## 2017-10-20 PROCEDURE — 99213 OFFICE O/P EST LOW 20 MIN: CPT | Performed by: INTERNAL MEDICINE

## 2017-10-20 PROCEDURE — 4004F PT TOBACCO SCREEN RCVD TLK: CPT | Performed by: INTERNAL MEDICINE

## 2017-10-20 PROCEDURE — 3017F COLORECTAL CA SCREEN DOC REV: CPT | Performed by: INTERNAL MEDICINE

## 2017-10-20 PROCEDURE — G8427 DOCREV CUR MEDS BY ELIG CLIN: HCPCS | Performed by: INTERNAL MEDICINE

## 2017-10-20 RX ORDER — METHOCARBAMOL 750 MG/1
750 TABLET, FILM COATED ORAL 2 TIMES DAILY
Qty: 180 TABLET | Refills: 0 | Status: SHIPPED | OUTPATIENT
Start: 2017-10-20 | End: 2017-11-20 | Stop reason: SDUPTHER

## 2017-10-20 RX ORDER — LIDOCAINE 50 MG/G
1 PATCH TOPICAL DAILY
Qty: 60 PATCH | Refills: 0 | Status: SHIPPED | OUTPATIENT
Start: 2017-10-20 | End: 2017-11-20 | Stop reason: SDUPTHER

## 2017-10-20 RX ORDER — OXYCODONE HYDROCHLORIDE 15 MG/1
1 TABLET, FILM COATED, EXTENDED RELEASE ORAL 2 TIMES DAILY
Qty: 60 TABLET | Refills: 0 | Status: SHIPPED | OUTPATIENT
Start: 2017-10-20 | End: 2017-11-20 | Stop reason: SDUPTHER

## 2017-10-20 RX ORDER — POTASSIUM CITRATE 10 MEQ/1
TABLET, EXTENDED RELEASE ORAL
COMMUNITY
End: 2018-03-05

## 2017-10-20 RX ORDER — OXYCODONE HYDROCHLORIDE 15 MG/1
1 TABLET, FILM COATED, EXTENDED RELEASE ORAL 2 TIMES DAILY
Qty: 56 TABLET | Refills: 0 | Status: SHIPPED | OUTPATIENT
Start: 2017-10-20 | End: 2017-10-20 | Stop reason: CLARIF

## 2017-10-20 RX ORDER — ESCITALOPRAM OXALATE 20 MG/1
TABLET ORAL
Qty: 90 TABLET | Refills: 0 | Status: SHIPPED | OUTPATIENT
Start: 2017-10-20 | End: 2017-11-20 | Stop reason: SDUPTHER

## 2017-10-20 RX ORDER — TRIAMCINOLONE ACETONIDE 40 MG/ML
40 INJECTION, SUSPENSION INTRA-ARTICULAR; INTRAMUSCULAR ONCE
Status: COMPLETED | OUTPATIENT
Start: 2017-10-20 | End: 2017-10-20

## 2017-10-20 RX ADMIN — TRIAMCINOLONE ACETONIDE 40 MG: 40 INJECTION, SUSPENSION INTRA-ARTICULAR; INTRAMUSCULAR at 09:05

## 2017-10-20 NOTE — PROGRESS NOTES
Hasmukh Morocho  1955  [id number]    HISTORY OF PRESENT ILLNESS:  Mr. Saima Ennis is a 59 y.o. male returns for a follow up visit for multiple medical problems. His current presenting problems are   1. Fibromyalgia    2. Chronic pain syndrome    3. Failed back surgical syndrome    4. Cervical radiculitis    . As per information/history obtained from the PADT(patient assessment and documentation tool) - He complains of pain in the neck, upper back, mid back and lower back with radiation to the arms Left, elbows Left, buttocks, hips Left, upper leg Left, knees Left, lower leg Left, ankles Left and feet Left He rates the pain 7/10 and describes it as sharp, aching. Pain is made worse by: movement, walking, standing, sitting, bending, lifting. Current treatment regimen has helped relieve about 30% of the pain. He denies side effects from the current pain regimen. Patient reports that since the last follow up visit the physical functioning is unchanged, family/social relationships are unchanged, mood is unchanged and sleep patterns are unchanged, and that the overall functioning is unchanged. Patient denies neurological bowel or bladder. Patient denies misusing/abusing his narcotic pain medications or using any illegal drugs. There are No indicators for possible drug abuse, addiction or diversion problems. Upon obtaining the medical history from Mr. Saima Ennis regarding today's office visit for his presenting problems, Patient complains his pain has been worse,but tolerable with the medications. He says he was diagnosed with Type 2 diabetes, and is on medication for it. He states he is having increase pain in the upper back on left side, and is back with vengeance. He mentions the pain is going into the left upper arm and forearm.  He reports he has had Xrays and injections in the past. He states his Potassium was low     ALLERGIES: Patients list of allergies were reviewed     MEDICATIONS: Mr. Saima Ennis list of medications were reviewed. His current medications are   Outpatient Medications Prior to Visit   Medication Sig Dispense Refill    tapentadol (NUCYNTA) 75 MG TABS Take 1 tablet by mouth every 6 hours  Max 2 per day. 56 tablet 0    oxyCODONE (OXYCONTIN) 15 MG T12A extended release tablet Take 1 tablet by mouth 2 times daily . 58 tablet 0    methocarbamol (ROBAXIN) 750 MG tablet Take 1 tablet by mouth 2 times daily  tablet 0    escitalopram (LEXAPRO) 20 MG tablet TAKE ONE TABLET BY MOUTH ONCE DAILY 90 tablet 0    amLODIPine (NORVASC) 10 MG tablet Take 10 mg by mouth daily      atorvastatin (LIPITOR) 40 MG tablet Take 40 mg by mouth daily      lisinopril-hydrochlorothiazide (PRINZIDE;ZESTORETIC) 10-12.5 MG per tablet Take 1 tablet by mouth daily      pantoprazole (PROTONIX) 20 MG tablet Take 20 mg by mouth daily      clobetasol (TEMOVATE) 0.05 % cream Apply topically 2 times daily to affected areas as needed for psoriasis. 60 g 3    fenofibrate (TRIGLIDE) 160 MG tablet Take 160 mg by mouth daily.  Multiple Vitamin (MULTIVITAMIN PO) Take 1 tablet by mouth daily.  cetirizine (ZYRTEC) 10 MG tablet Take 10 mg by mouth daily.  potassium chloride (KLOR-CON M) 10 MEQ extended release tablet Take 1 tablet by mouth daily for 7 days 7 tablet 0     No facility-administered medications prior to visit. SOCIAL/FAMILY/PAST MEDICAL HISTORY: Mr. Jennifer Galicia, family and past medical history was reviewed. REVIEW OF SYSTEMS:    Respiratory: Negative for apnea, chest tightness and shortness of breath or change in baseline breathing. Gastrointestinal: Negative for nausea, vomiting, abdominal pain, diarrhea, constipation, blood in stool and abdominal distention. PHYSICAL EXAM:   Nursing note and vitals reviewed. /76 (Site: Left Arm, Position: Sitting)   Pulse 106   Wt 288 lb (130.6 kg)   BMI 36.00 kg/m²   Constitutional: He appears well-developed and well-nourished.  No acute methocarbamol (ROBAXIN) 750 MG tablet Take 1 tablet by mouth 2 times daily  tablet 0    escitalopram (LEXAPRO) 20 MG tablet TAKE ONE TABLET BY MOUTH ONCE DAILY 90 tablet 0    amLODIPine (NORVASC) 10 MG tablet Take 10 mg by mouth daily      atorvastatin (LIPITOR) 40 MG tablet Take 40 mg by mouth daily      lisinopril-hydrochlorothiazide (PRINZIDE;ZESTORETIC) 10-12.5 MG per tablet Take 1 tablet by mouth daily      pantoprazole (PROTONIX) 20 MG tablet Take 20 mg by mouth daily      clobetasol (TEMOVATE) 0.05 % cream Apply topically 2 times daily to affected areas as needed for psoriasis. 60 g 3    fenofibrate (TRIGLIDE) 160 MG tablet Take 160 mg by mouth daily.  Multiple Vitamin (MULTIVITAMIN PO) Take 1 tablet by mouth daily.  cetirizine (ZYRTEC) 10 MG tablet Take 10 mg by mouth daily.  potassium chloride (KLOR-CON M) 10 MEQ extended release tablet Take 1 tablet by mouth daily for 7 days 7 tablet 0     No current facility-administered medications for this visit. I will continue his current medication regimen  which is part of the above treatment schedule. It has been helping with Mr. Osbaldo Lim chronic  medical problems which for this visit include:   Diagnoses of Fibromyalgia, Chronic pain syndrome, Failed back surgical syndrome, and Cervical radiculitis were pertinent to this visit. Risks and benefits of the medications and other alternative treatments  including no treatment were discussed with the patient. The common side effects of these medications were also explained to the patient. Informed verbal consent was obtained. Goals of current treatment regimen include improvement in pain, restoration of functioning- with focus on improvement in physical performance, general activity, work or disability,emotional distress, health care utilization and  decreased medication consumption.  Will continue to monitor progress towards achieving/maintaining therapeutic goals with special

## 2017-10-30 ENCOUNTER — HOSPITAL ENCOUNTER (OUTPATIENT)
Dept: DIABETES SERVICES | Age: 62
Discharge: HOME | End: 2017-10-31
Attending: NURSE PRACTITIONER | Admitting: NURSE PRACTITIONER

## 2017-10-30 DIAGNOSIS — E11.65 TYPE 2 DIABETES MELLITUS WITH HYPERGLYCEMIA (HCC): ICD-10-CM

## 2017-10-30 ASSESSMENT — PATIENT HEALTH QUESTIONNAIRE - PHQ9
1. LITTLE INTEREST OR PLEASURE IN DOING THINGS: 0
2. FEELING DOWN, DEPRESSED OR HOPELESS: 0
SUM OF ALL RESPONSES TO PHQ9 QUESTIONS 1 & 2: 0
SUM OF ALL RESPONSES TO PHQ QUESTIONS 1-9: 0

## 2017-10-30 NOTE — PROGRESS NOTES
Individual Comprehensive DSMT Assessment:  Health Literacy:   [x] adequate   [] limited  Pre-Test Score: 6/8  Sleep Screen:Patient reports using C-PAP or Bi-PAP daily.   PHQ9: 0      Initial instruction included:  [x] carb counting  [] activity/exercise  [x] label reading  [x] monitoring   [] medications  [] hypoglycemia prevention/treatment  [] insulin management  [] other:    Education and Support Plan: Return for group classes    Referring Provider: Ester Cho MD

## 2017-11-01 ENCOUNTER — TELEPHONE (OUTPATIENT)
Dept: PAIN MANAGEMENT | Age: 62
End: 2017-11-01

## 2017-11-01 ENCOUNTER — HOSPITAL ENCOUNTER (OUTPATIENT)
Dept: OTHER | Age: 62
Discharge: HOME | End: 2017-11-30
Attending: NURSE PRACTITIONER | Admitting: NURSE PRACTITIONER

## 2017-11-01 DIAGNOSIS — G89.4 CHRONIC PAIN SYNDROME: ICD-10-CM

## 2017-11-01 NOTE — TELEPHONE ENCOUNTER
Per RSM ok for patient to start PT, was ordered. Called patient to advise, phone was breaking up, no message left.

## 2017-11-01 NOTE — TELEPHONE ENCOUNTER
Pt calling, states injection he got in the OV did not help. He is asking for an order for PT do at [facility] and/or a referral for chiropractics.  pls advise

## 2017-11-02 NOTE — TELEPHONE ENCOUNTER
Called patient to advise him per [facility]SM okay to give referral. Patient did not answer, left a voice message for patient to call back

## 2017-11-07 ENCOUNTER — HOSPITAL ENCOUNTER (OUTPATIENT)
Dept: DIABETES SERVICES | Age: 62
Discharge: HOME | End: 2017-11-07
Admitting: NURSE PRACTITIONER

## 2017-11-07 DIAGNOSIS — E11.65 TYPE 2 DIABETES MELLITUS WITH HYPERGLYCEMIA (HCC): ICD-10-CM

## 2017-11-08 NOTE — PROGRESS NOTES
Patient attended RD1 Group Class.   Reviewed and patient demonstrated understanding of the following:  Appropriate timing of meals and snacks  Food sources of carbohydrate  Using Nutrition Facts Labels  Serving sizes  Carb Counting  MyPlate  Meal planning, including individualized meal pattern  Guidelines of alcohol use    Goal setting    REFERRING PROVIDER: uLly Montgomery MD      Total participants in Group:11

## 2017-11-20 ENCOUNTER — CLINICAL DOCUMENTATION (OUTPATIENT)
Dept: PHARMACY | Facility: CLINIC | Age: 62
End: 2017-11-20

## 2017-11-20 ENCOUNTER — HOSPITAL ENCOUNTER (OUTPATIENT)
Dept: OTHER | Age: 62
Discharge: HOME | End: 2017-11-20
Attending: NURSE PRACTITIONER | Admitting: NURSE PRACTITIONER

## 2017-11-20 ENCOUNTER — OFFICE VISIT (OUTPATIENT)
Dept: PAIN MANAGEMENT | Age: 62
End: 2017-11-20

## 2017-11-20 VITALS
WEIGHT: 284.9 LBS | SYSTOLIC BLOOD PRESSURE: 112 MMHG | BODY MASS INDEX: 35.61 KG/M2 | DIASTOLIC BLOOD PRESSURE: 73 MMHG | HEART RATE: 99 BPM

## 2017-11-20 DIAGNOSIS — M54.12 CERVICAL RADICULITIS: ICD-10-CM

## 2017-11-20 DIAGNOSIS — M79.7 FIBROMYALGIA: ICD-10-CM

## 2017-11-20 DIAGNOSIS — G89.4 CHRONIC PAIN SYNDROME: ICD-10-CM

## 2017-11-20 DIAGNOSIS — M22.41 CHONDROMALACIA OF PATELLOFEMORAL JOINT, RIGHT: ICD-10-CM

## 2017-11-20 DIAGNOSIS — M96.1 FAILED BACK SURGICAL SYNDROME: ICD-10-CM

## 2017-11-20 LAB
ALBUMIN SERPL-MCNC: 4.1 G/DL (ref 3.4–5)
ALP BLD-CCNC: 36 U/L (ref 40–129)
ALT SERPL-CCNC: 19 U/L (ref 10–40)
ANION GAP SERPL CALCULATED.3IONS-SCNC: 16 MMOL/L (ref 3–16)
AST SERPL-CCNC: 25 U/L (ref 15–37)
BASOPHILS ABSOLUTE: 0 K/UL (ref 0–0.2)
BASOPHILS RELATIVE PERCENT: 0.5 %
BILIRUB SERPL-MCNC: 0.5 MG/DL (ref 0–1)
BILIRUBIN DIRECT: <0.2 MG/DL (ref 0–0.3)
BILIRUBIN, INDIRECT: ABNORMAL MG/DL (ref 0–1)
BUN BLDV-MCNC: 24 MG/DL (ref 7–20)
CALCIUM SERPL-MCNC: 9.7 MG/DL (ref 8.3–10.6)
CHLORIDE BLD-SCNC: 98 MMOL/L (ref 99–110)
CHOLESTEROL, TOTAL: 200 MG/DL (ref 0–199)
CO2: 27 MMOL/L (ref 21–32)
CREAT SERPL-MCNC: 1.1 MG/DL (ref 0.8–1.3)
EOSINOPHILS ABSOLUTE: 0.3 K/UL (ref 0–0.6)
EOSINOPHILS RELATIVE PERCENT: 2.6 %
ESTIMATED AVERAGE GLUCOSE: 131.2 MG/DL
FOLATE: >20 NG/ML (ref 4.78–24.2)
GFR AFRICAN AMERICAN: >60
GFR NON-AFRICAN AMERICAN: >60
GLUCOSE BLD-MCNC: 114 MG/DL (ref 70–99)
HBA1C MFR BLD: 6.2 %
HCT VFR BLD CALC: 45.4 % (ref 40.5–52.5)
HDLC SERPL-MCNC: 38 MG/DL (ref 40–60)
HEMOGLOBIN: 15 G/DL (ref 13.5–17.5)
LDL CHOLESTEROL CALCULATED: 119 MG/DL
LYMPHOCYTES ABSOLUTE: 3.1 K/UL (ref 1–5.1)
LYMPHOCYTES RELATIVE PERCENT: 31.5 %
MAGNESIUM: 1.9 MG/DL (ref 1.8–2.4)
MCH RBC QN AUTO: 32.8 PG (ref 26–34)
MCHC RBC AUTO-ENTMCNC: 33 G/DL (ref 31–36)
MCV RBC AUTO: 99.3 FL (ref 80–100)
MONOCYTES ABSOLUTE: 0.8 K/UL (ref 0–1.3)
MONOCYTES RELATIVE PERCENT: 7.9 %
NEUTROPHILS ABSOLUTE: 5.6 K/UL (ref 1.7–7.7)
NEUTROPHILS RELATIVE PERCENT: 57.5 %
PDW BLD-RTO: 13.8 % (ref 12.4–15.4)
PLATELET # BLD: 307 K/UL (ref 135–450)
PMV BLD AUTO: 9.6 FL (ref 5–10.5)
POTASSIUM SERPL-SCNC: 4 MMOL/L (ref 3.5–5.1)
RBC # BLD: 4.57 M/UL (ref 4.2–5.9)
SODIUM BLD-SCNC: 141 MMOL/L (ref 136–145)
TOTAL PROTEIN: 6.8 G/DL (ref 6.4–8.2)
TRIGL SERPL-MCNC: 215 MG/DL (ref 0–150)
VITAMIN B-12: 316 PG/ML (ref 211–911)
VLDLC SERPL CALC-MCNC: 43 MG/DL
WBC # BLD: 9.7 K/UL (ref 4–11)

## 2017-11-20 PROCEDURE — 4004F PT TOBACCO SCREEN RCVD TLK: CPT | Performed by: INTERNAL MEDICINE

## 2017-11-20 PROCEDURE — G8417 CALC BMI ABV UP PARAM F/U: HCPCS | Performed by: INTERNAL MEDICINE

## 2017-11-20 PROCEDURE — G8427 DOCREV CUR MEDS BY ELIG CLIN: HCPCS | Performed by: INTERNAL MEDICINE

## 2017-11-20 PROCEDURE — G8484 FLU IMMUNIZE NO ADMIN: HCPCS | Performed by: INTERNAL MEDICINE

## 2017-11-20 PROCEDURE — 99213 OFFICE O/P EST LOW 20 MIN: CPT | Performed by: INTERNAL MEDICINE

## 2017-11-20 PROCEDURE — 3017F COLORECTAL CA SCREEN DOC REV: CPT | Performed by: INTERNAL MEDICINE

## 2017-11-20 RX ORDER — METHOCARBAMOL 750 MG/1
750 TABLET, FILM COATED ORAL 2 TIMES DAILY
Qty: 180 TABLET | Refills: 0 | Status: SHIPPED | OUTPATIENT
Start: 2017-11-20 | End: 2018-01-15 | Stop reason: SDUPTHER

## 2017-11-20 RX ORDER — OXYCODONE HYDROCHLORIDE 15 MG/1
1 TABLET, FILM COATED, EXTENDED RELEASE ORAL 2 TIMES DAILY
Qty: 56 TABLET | Refills: 0 | Status: SHIPPED | OUTPATIENT
Start: 2017-11-20 | End: 2017-12-18 | Stop reason: SDUPTHER

## 2017-11-20 RX ORDER — LIDOCAINE 50 MG/G
1 PATCH TOPICAL DAILY
Qty: 60 PATCH | Refills: 0 | Status: SHIPPED | OUTPATIENT
Start: 2017-11-20 | End: 2018-02-12 | Stop reason: SDUPTHER

## 2017-11-20 RX ORDER — ESCITALOPRAM OXALATE 20 MG/1
TABLET ORAL
Qty: 90 TABLET | Refills: 0 | Status: SHIPPED | OUTPATIENT
Start: 2017-11-20 | End: 2017-12-23 | Stop reason: SDUPTHER

## 2017-11-20 NOTE — PROGRESS NOTES
Pharmacy Pop Care Documentation:      The application for Karishma Pierre for enrollment into the diabetes management program has been reviewed and accepted on 11/20/17.     Hulen Cockayne

## 2017-11-20 NOTE — LETTER
? Visit with your physician (Second yearly visit)  ? Second A1c  ? Flu vaccination   ? Requirements if A1c is greater than 8 percent:  ? Engage with a [facility] ([facility]) diabetes educator at one of our hospital locations or an ambulatory care coordinator by phone, if your A1c is greater than 8 percent. We will be reviewing your [employer] account and sending you reminders for needed actions. Please remember if you dont have a [address], you will need to submit documentation to [email or web address] or by fax to [phone]. As a reminder, if programs requirements are not met, your benefit may be terminated and you will not be eligible to participate in the program next year. Thank you for participating in the program and taking steps to improve your health.

## 2017-11-20 NOTE — PROGRESS NOTES
Pharmacy Pop Care Documentation:   Patient application received. Patient missing First provider visit for DM and MyChart account creation. Letter sent.

## 2017-11-20 NOTE — PROGRESS NOTES
facility-administered medications prior to visit. SOCIAL/FAMILY/PAST MEDICAL HISTORY: Mr. Ramses Forde, family and past medical history was reviewed. REVIEW OF SYSTEMS:    Respiratory: Negative for apnea, chest tightness and shortness of breath or change in baseline breathing. Gastrointestinal: Negative for nausea, vomiting, abdominal pain, diarrhea, constipation, blood in stool and abdominal distention. PHYSICAL EXAM:   Nursing note and vitals reviewed. /73 (Site: Left Arm, Position: Sitting)   Pulse 99   Wt 284 lb 14.4 oz (129.2 kg)   BMI 35.61 kg/m²   Constitutional: He appears well-developed and well-nourished. No acute distress. Skin: Skin is warm and dry, good turgor. No rash noted. He is not diaphoretic. Cardiovascular: Normal rate, regular rhythm, normal heart sounds, and does not have murmur. Skipped beats    Pulmonary/Chest: Effort normal. No respiratory distress. He does not have wheezes in the lung fields. He has no rales. decrease air exchange  Neurological/Psychiatric:He is alert and oriented to person, place, and time. Coordination is  normal. His mood isAppropriate and affect is Flat/blunted and Anxious . IMPRESSION:   1. Failed back surgical syndrome    2. Chronic pain syndrome    3. Cervical radiculitis    4. Fibromyalgia        PLAN:  Informed verbal consent was obtained  -Continue with current regimen  -ROM/Stretching exercises advised  -He was advised weight reduction, diet changes- 800-1200 melanie diet, diet diary, exercising, nutritional  consult increased physical activity as tolerated  -[place] record was obtained and reviewed  for the last one year and no indicators of drug misuse  were found. Any other controlled substance prescriptions  seen on the record have been accounted for, I am aware of the patient receiving these medications. Alexicaro Old  [place] record will be rechecked as part of office protocol.   -Had labs done today, will be seeing his PCP, monitor potassium leve  -Having skipped heart beats   Current Outpatient Prescriptions   Medication Sig Dispense Refill    metFORMIN (GLUCOPHAGE) 500 MG tablet Take 500 mg by mouth 2 times daily (with meals)      potassium citrate (UROCIT-K) 10 MEQ (1080 MG) extended release tablet Take by mouth 3 times daily (with meals)      methocarbamol (ROBAXIN) 750 MG tablet Take 1 tablet by mouth 2 times daily  tablet 0    escitalopram (LEXAPRO) 20 MG tablet TAKE ONE TABLET BY MOUTH ONCE DAILY 90 tablet 0    lidocaine (LIDODERM) 5 % Place 1 patch onto the skin daily 12 hours on, 12 hours off. 60 patch 0    tapentadol (NUCYNTA) 75 MG TABS Take 1 tablet by mouth every 6 hours  Max 2 per day. 60 tablet 0    oxyCODONE (OXYCONTIN) 15 MG T12A extended release tablet Take 1 tablet by mouth 2 times daily . 60 tablet 0    amLODIPine (NORVASC) 10 MG tablet Take 10 mg by mouth daily      atorvastatin (LIPITOR) 40 MG tablet Take 40 mg by mouth daily      lisinopril-hydrochlorothiazide (PRINZIDE;ZESTORETIC) 10-12.5 MG per tablet Take 1 tablet by mouth daily      pantoprazole (PROTONIX) 20 MG tablet Take 20 mg by mouth daily      clobetasol (TEMOVATE) 0.05 % cream Apply topically 2 times daily to affected areas as needed for psoriasis. 60 g 3    fenofibrate (TRIGLIDE) 160 MG tablet Take 160 mg by mouth daily.  Multiple Vitamin (MULTIVITAMIN PO) Take 1 tablet by mouth daily.  cetirizine (ZYRTEC) 10 MG tablet Take 10 mg by mouth daily.  potassium chloride (KLOR-CON M) 10 MEQ extended release tablet Take 1 tablet by mouth daily for 7 days 7 tablet 0     No current facility-administered medications for this visit. I will continue his current medication regimen  which is part of the above treatment schedule.  It has been helping with Mr. Shanel Frazier chronic  medical problems which for this visit include:   Diagnoses of Failed back surgical syndrome, Chronic pain syndrome, Cervical radiculitis, and Fibromyalgia were pertinent to this visit. Risks and benefits of the medications and other alternative treatments  including no treatment were discussed with the patient. The common side effects of these medications were also explained to the patient. Informed verbal consent was obtained. Goals of current treatment regimen include improvement in pain, restoration of functioning- with focus on improvement in physical performance, general activity, work or disability,emotional distress, health care utilization and  decreased medication consumption. Will continue to monitor progress towards achieving/maintaining therapeutic goals with special emphasis on  1. Improvement in perceived interfernce  of pain with ADL's. Ability to do home exercises independently. Ability to do household chores indoor and/or outdoor work and social and leisure activities. Improve psychosocial and physical functioning. - he is not showing any significant progress/or showing regression  towards this goal and reassessment and adjustment of goals/treatment have been made. He was advised against drinking alcohol with the narcotic pain medicines, advised against driving or handling machinery while adjusting the dose of medicines or if having cognitive  issues related to the current medications. Risk of overdose and death, if medicines not taken as prescribed, were also discussed. If the patient develops new symptoms or if the symptoms worsen, the patient should call the office. While transcribing every attempt was made to maintain the accuracy of the note in terms of it's contents,there may have been some errors made inadvertently. Thank you for allowing me to participate in the care of this patient. Alvaro De La Torre MD.    Cc: Asha Mitchell MD    I, Val Lisa, scribing for in the presence  of Dr. Alvaro De La Torre. 11/20/17  9:19 AM  Ruslan Meyer Assistant  I, Dr. Alvaro De La Torre, personally performed the services described in

## 2017-12-01 ENCOUNTER — HOSPITAL ENCOUNTER (OUTPATIENT)
Dept: OTHER | Age: 62
Discharge: HOME | End: 2017-12-31
Attending: NURSE PRACTITIONER | Admitting: NURSE PRACTITIONER

## 2017-12-18 ENCOUNTER — OFFICE VISIT (OUTPATIENT)
Dept: PAIN MANAGEMENT | Age: 62
End: 2017-12-18

## 2017-12-18 VITALS
HEART RATE: 96 BPM | WEIGHT: 284 LBS | BODY MASS INDEX: 35.5 KG/M2 | DIASTOLIC BLOOD PRESSURE: 85 MMHG | SYSTOLIC BLOOD PRESSURE: 136 MMHG

## 2017-12-18 DIAGNOSIS — M96.1 FAILED BACK SURGICAL SYNDROME: ICD-10-CM

## 2017-12-18 DIAGNOSIS — M79.7 FIBROMYALGIA: ICD-10-CM

## 2017-12-18 DIAGNOSIS — F51.01 PRIMARY INSOMNIA: ICD-10-CM

## 2017-12-18 DIAGNOSIS — F33.9 RECURRENT MAJOR DEPRESSIVE DISORDER, REMISSION STATUS UNSPECIFIED (HCC): ICD-10-CM

## 2017-12-18 DIAGNOSIS — M50.30 DDD (DEGENERATIVE DISC DISEASE), CERVICAL: ICD-10-CM

## 2017-12-18 DIAGNOSIS — G89.4 CHRONIC PAIN SYNDROME: ICD-10-CM

## 2017-12-18 DIAGNOSIS — M54.12 CERVICAL RADICULITIS: ICD-10-CM

## 2017-12-18 PROCEDURE — 3017F COLORECTAL CA SCREEN DOC REV: CPT | Performed by: INTERNAL MEDICINE

## 2017-12-18 PROCEDURE — G8427 DOCREV CUR MEDS BY ELIG CLIN: HCPCS | Performed by: INTERNAL MEDICINE

## 2017-12-18 PROCEDURE — 99214 OFFICE O/P EST MOD 30 MIN: CPT | Performed by: INTERNAL MEDICINE

## 2017-12-18 PROCEDURE — G8484 FLU IMMUNIZE NO ADMIN: HCPCS | Performed by: INTERNAL MEDICINE

## 2017-12-18 PROCEDURE — 4004F PT TOBACCO SCREEN RCVD TLK: CPT | Performed by: INTERNAL MEDICINE

## 2017-12-18 PROCEDURE — G8417 CALC BMI ABV UP PARAM F/U: HCPCS | Performed by: INTERNAL MEDICINE

## 2017-12-18 RX ORDER — OXYCODONE HYDROCHLORIDE 15 MG/1
1 TABLET, FILM COATED, EXTENDED RELEASE ORAL 2 TIMES DAILY
Qty: 56 TABLET | Refills: 0 | Status: SHIPPED | OUTPATIENT
Start: 2017-12-18 | End: 2018-01-15 | Stop reason: SDUPTHER

## 2017-12-18 NOTE — PROGRESS NOTES
Kiley Pepper  1955  [id number]    HISTORY OF PRESENT ILLNESS:  Mr. Yusef Babb is a 57 y.o. male returns for a follow up visit for multiple medical problems. His current presenting problems are   1. Cervical radiculitis    2. DDD (degenerative disc disease), cervical    3. Chronic pain syndrome    4. Failed back surgical syndrome    5. Fibromyalgia    6. Recurrent major depressive disorder, remission status unspecified ([facility]    7. Primary insomnia    . As per information/history obtained from the PADT(patient assessment and documentation tool) - He complains of pain in the upper back, mid back and lower back with radiation to the buttocks, hips Bilateral, knees Right, lower leg Right and feet Bilateral He rates the pain 6/10 and describes it as sharp, aching, burning, numbness. Pain is made worse by: movement, walking, standing, sitting, bending, lifting. Current treatment regimen has helped relieve about 40% of the pain. He denies side effects from the current pain regimen. Patient reports that since the last follow up visit the physical functioning is unchanged, family/social relationships are unchanged, mood is unchanged and sleep patterns are unchanged, and that the overall functioning is unchanged. Patient denies neurological bowel or bladder. Patient denies misusing/abusing his narcotic pain medications or using any illegal drugs. There are No indicators for possible drug abuse, addiction or diversion problems. Upon obtaining the medical history from Mr. Yusef Babb regarding today's office visit for his presenting problems, Patient states he has increased pain in the neck going into the left upper back and left arm, gets worse at times. He states he has been having an tingling and aching pain, going on for a few months on and off. Mr. Yusef Babb mentions he has been seeing a Chiropractor, but not much help. Sleep is poor,  poor sleep latency, averages 2-3 hours of sleep at night.  Intermittent, non restorative. Does not feel rested in AM. Complains of feeling sleepy  during the day. He mentions using the CPAP. Patient's  subjective report of his mood is fair. he describes occasional symptoms of depression, occasional  irritability and some mood swings. Describes his mood as being neutral and reports some pleasure in his daily activities. Reports  fair  appetite, energy and concentration. Able to function well in different aspects of his daily activities. Denies suicidal or homicidal ideation. Denies any complaints of increased tension, does   Worry sometimes and occasional  irritability  he denies any c/o increased anxiety, No c/o panic attacks or symptoms of PTSD. He mentions his back pain is about the same going into the hips. Patient reports using all of his medications as before. ALLERGIES/PAST MED/FAM/SOC HISTORY: Mr. Imani Bedolla allergies, past medical, family and social history were reviewed in the chart and pertinent information also listed below.   Social History     Social History    Marital status:      Spouse name: N/A    Number of children: N/A    Years of education: N/A     Occupational History    disability      Social History Main Topics    Smoking status: Current Every Day Smoker     Packs/day: 1.00     Years: 15.00     Last attempt to quit: 3/6/2013    Smokeless tobacco: Never Used    Alcohol use 0.6 oz/week     1 Standard drinks or equivalent per week      Comment: less than weekly    Drug use: No    Sexual activity: No     Other Topics Concern    Not on file     Social History Narrative    No narrative on file      Mr. Imani Bedolla current medications are   Outpatient Medications Prior to Visit   Medication Sig Dispense Refill    methocarbamol (ROBAXIN) 750 MG tablet Take 1 tablet by mouth 2 times daily  tablet 0    escitalopram (LEXAPRO) 20 MG tablet TAKE ONE TABLET BY MOUTH ONCE DAILY 90 tablet 0    lidocaine (LIDODERM) 5 % Place 1 patch onto the skin daily 12 hours on, 12 hours off. 60 patch 0    diclofenac sodium 1 % GEL Apply to the affected area TID 5 Tube 0    metFORMIN (GLUCOPHAGE) 500 MG tablet Take 500 mg by mouth 2 times daily (with meals)      potassium citrate (UROCIT-K) 10 MEQ (1080 MG) extended release tablet Take by mouth 3 times daily (with meals)      amLODIPine (NORVASC) 10 MG tablet Take 10 mg by mouth daily      atorvastatin (LIPITOR) 40 MG tablet Take 40 mg by mouth daily      lisinopril-hydrochlorothiazide (PRINZIDE;ZESTORETIC) 10-12.5 MG per tablet Take 1 tablet by mouth daily      pantoprazole (PROTONIX) 20 MG tablet Take 20 mg by mouth daily      clobetasol (TEMOVATE) 0.05 % cream Apply topically 2 times daily to affected areas as needed for psoriasis. 60 g 3    fenofibrate (TRIGLIDE) 160 MG tablet Take 160 mg by mouth daily.  Multiple Vitamin (MULTIVITAMIN PO) Take 1 tablet by mouth daily.  cetirizine (ZYRTEC) 10 MG tablet Take 10 mg by mouth daily.  tapentadol (NUCYNTA) 75 MG TABS Take 1 tablet by mouth every 6 hours  Max 2 per day. 56 tablet 0    oxyCODONE (OXYCONTIN) 15 MG T12A extended release tablet Take 1 tablet by mouth 2 times daily . 56 tablet 0    potassium chloride (KLOR-CON M) 10 MEQ extended release tablet Take 1 tablet by mouth daily for 7 days 7 tablet 0     No facility-administered medications prior to visit. REVIEW OF SYSTEMS:   Constitutional: Negative for fatigue and unexpected weight change. Eyes: Negative for visual disturbance. Respiratory: Negative for shortness of breath. Cardiovascular: Negative for chest pain, palpitations  Gastrointestinal: Negative for blood in stool, abdominal distention, nausea, vomiting, abdominal pain, diarrhea,constipation. Skin: Negative for color change or any abnormal bruising.    Neurological: Negative for speech difficulty, weakness and light-headedness, dizziness, tremors, sleepiness  Psychiatric/Behavioral: Negative for suicidal ideas, hallucinations, behavioral problems, self-injury, decreased concentration/cognition, agitation, confusion. PHYSICAL EXAM:   Nursing note and vitals reviewed. /85 (Site: Left Arm, Position: Sitting)   Pulse 96   Wt 284 lb (128.8 kg)   BMI 35.50 kg/m²   General Appearance: Patient is well nourished, well developed, well groomed and in no acute distress. Skin: Skin is warm and dry, good turgor . No rash or lesions noted. He is not diaphoretic. Pulmonary/Chest: Effort normal. No respiratory distress or use of accessory muscles. Auscultation revealing normal air entry. He does not have wheezes in the lung fields. He has no rales. Cardiovascular: Normal rate, regular rhythm, normal heart sounds, and does not have murmur. Exam reveals no gallop and no friction rub. Abdominal: Soft, bowel sounds are normal.  On inspection of abdomen is obese no distension and no mass. Musculoskeletal / Extremities: Range of motion is normal. Gait is normal, assistive devices use: none. He exhibits edema: none, and no tenderness. Neurological/Psychiatric:He is alert and oriented to person, place, and time. Coordination is  normal.   Judgement and Insight is normal  His mood is Appropriate and affect is Flat/blunted . His behavior is normal.   Thought content normal.    C spine: + taut bands with TPI's DTR's + 3 sensory normal -Parker's sign     IMPRESSION:     1. Cervical radiculitis  - WORSENING: treatment plan changed as below   2. Chronic pain syndrome  - WORSENING: treatment plan changed as below   3. Failed back surgical syndrome - STABLE: Continue current treatment plan   4. Fibromyalgia - STABLE: Continue current treatment plan   5. Recurrent major depressive disorder, remission status unspecified ([facility] - STABLE: Continue current treatment plan   6. Primary insomnia - STABLE: Continue current treatment plan   7.  DDD (degenerative disc disease), cervical  - WORSENING: treatment plan changed as below       PLAN:

## 2017-12-23 RX ORDER — ESCITALOPRAM OXALATE 20 MG/1
TABLET ORAL
Qty: 30 TABLET | Refills: 0
Start: 2017-12-23 | End: 2018-02-12 | Stop reason: SDUPTHER

## 2017-12-27 ENCOUNTER — HOSPITAL ENCOUNTER (OUTPATIENT)
Dept: MRI IMAGING | Age: 62
Discharge: HOME | End: 2017-12-27
Attending: INTERNAL MEDICINE | Admitting: INTERNAL MEDICINE

## 2017-12-27 DIAGNOSIS — G89.4 CHRONIC PAIN SYNDROME: ICD-10-CM

## 2017-12-27 DIAGNOSIS — M50.30 DDD (DEGENERATIVE DISC DISEASE), CERVICAL: ICD-10-CM

## 2017-12-27 DIAGNOSIS — M54.12 CERVICAL RADICULITIS: ICD-10-CM

## 2017-12-27 DIAGNOSIS — M54.12 RADICULOPATHY OF CERVICAL REGION: ICD-10-CM

## 2018-01-15 ENCOUNTER — OFFICE VISIT (OUTPATIENT)
Dept: PAIN MANAGEMENT | Age: 63
End: 2018-01-15

## 2018-01-15 VITALS
HEART RATE: 99 BPM | SYSTOLIC BLOOD PRESSURE: 136 MMHG | DIASTOLIC BLOOD PRESSURE: 82 MMHG | BODY MASS INDEX: 35.49 KG/M2 | WEIGHT: 283.9 LBS

## 2018-01-15 DIAGNOSIS — M96.1 FAILED BACK SURGICAL SYNDROME: ICD-10-CM

## 2018-01-15 DIAGNOSIS — M54.12 CERVICAL RADICULITIS: ICD-10-CM

## 2018-01-15 DIAGNOSIS — M50.30 DDD (DEGENERATIVE DISC DISEASE), CERVICAL: ICD-10-CM

## 2018-01-15 DIAGNOSIS — G89.4 CHRONIC PAIN SYNDROME: ICD-10-CM

## 2018-01-15 DIAGNOSIS — M79.7 FIBROMYALGIA: ICD-10-CM

## 2018-01-15 PROCEDURE — G8427 DOCREV CUR MEDS BY ELIG CLIN: HCPCS | Performed by: INTERNAL MEDICINE

## 2018-01-15 PROCEDURE — 4004F PT TOBACCO SCREEN RCVD TLK: CPT | Performed by: INTERNAL MEDICINE

## 2018-01-15 PROCEDURE — G8484 FLU IMMUNIZE NO ADMIN: HCPCS | Performed by: INTERNAL MEDICINE

## 2018-01-15 PROCEDURE — 3017F COLORECTAL CA SCREEN DOC REV: CPT | Performed by: INTERNAL MEDICINE

## 2018-01-15 PROCEDURE — 99213 OFFICE O/P EST LOW 20 MIN: CPT | Performed by: INTERNAL MEDICINE

## 2018-01-15 PROCEDURE — G8417 CALC BMI ABV UP PARAM F/U: HCPCS | Performed by: INTERNAL MEDICINE

## 2018-01-15 RX ORDER — METHOCARBAMOL 750 MG/1
750 TABLET, FILM COATED ORAL 2 TIMES DAILY
Qty: 180 TABLET | Refills: 0 | Status: SHIPPED | OUTPATIENT
Start: 2018-01-15 | End: 2018-04-09 | Stop reason: SDUPTHER

## 2018-01-15 RX ORDER — OXYCODONE HYDROCHLORIDE 15 MG/1
1 TABLET, FILM COATED, EXTENDED RELEASE ORAL 2 TIMES DAILY
Qty: 56 TABLET | Refills: 0 | Status: SHIPPED | OUTPATIENT
Start: 2018-01-15 | End: 2018-02-12 | Stop reason: SDUPTHER

## 2018-01-15 NOTE — PROGRESS NOTES
pleasure in his daily activities. Reports  fair  appetite, energy and concentration. Able to function well in different aspects of his daily activities. Denies suicidal or homicidal ideation. Denies any complaints of increased tension, does   Worry sometimes and occasional  irritability  he denies any c/o increased anxiety, No c/o panic attacks or symptoms of PTSD. He mentions his blood sugar has been less than 150 mostly. He denies any constipation symptoms. He states he is staying active at home. He denies any weight gain. ALLERGIES: Patients list of allergies were reviewed     MEDICATIONS: Mr. Yolanda Oliver list of medications were reviewed. His current medications are   Outpatient Medications Prior to Visit   Medication Sig Dispense Refill    escitalopram (LEXAPRO) 20 MG tablet TAKE ONE TABLET BY MOUTH ONCE DAILY 30 tablet 0    oxyCODONE (OXYCONTIN) 15 MG T12A extended release tablet Take 1 tablet by mouth 2 times daily . 56 tablet 0    tapentadol (NUCYNTA) 75 MG TABS Take 1 tablet by mouth every 6 hours  Max 2 per day. 56 tablet 0    methocarbamol (ROBAXIN) 750 MG tablet Take 1 tablet by mouth 2 times daily  tablet 0    lidocaine (LIDODERM) 5 % Place 1 patch onto the skin daily 12 hours on, 12 hours off. 60 patch 0    diclofenac sodium 1 % GEL Apply to the affected area TID 5 Tube 0    potassium citrate (UROCIT-K) 10 MEQ (1080 MG) extended release tablet Take by mouth 3 times daily (with meals)      amLODIPine (NORVASC) 10 MG tablet Take 10 mg by mouth daily      atorvastatin (LIPITOR) 40 MG tablet Take 40 mg by mouth daily      lisinopril-hydrochlorothiazide (PRINZIDE;ZESTORETIC) 10-12.5 MG per tablet Take 1 tablet by mouth daily      pantoprazole (PROTONIX) 20 MG tablet Take 20 mg by mouth daily      clobetasol (TEMOVATE) 0.05 % cream Apply topically 2 times daily to affected areas as needed for psoriasis. 60 g 3    fenofibrate (TRIGLIDE) 160 MG tablet Take 160 mg by mouth daily.         relax regularly and enjoy meals   - Adv Biofeedback, relaxation and meditation techniques. Referral to psychologist for CBT was also discussed with patient   -He was advised weight reduction, diet changes- 800-1200 melanie diet, diet diary, exercising, nutritional  consult increased physical activity as tolerated   Current Outpatient Prescriptions   Medication Sig Dispense Refill    escitalopram (LEXAPRO) 20 MG tablet TAKE ONE TABLET BY MOUTH ONCE DAILY 30 tablet 0    oxyCODONE (OXYCONTIN) 15 MG T12A extended release tablet Take 1 tablet by mouth 2 times daily . 56 tablet 0    tapentadol (NUCYNTA) 75 MG TABS Take 1 tablet by mouth every 6 hours  Max 2 per day. 56 tablet 0    methocarbamol (ROBAXIN) 750 MG tablet Take 1 tablet by mouth 2 times daily  tablet 0    lidocaine (LIDODERM) 5 % Place 1 patch onto the skin daily 12 hours on, 12 hours off. 60 patch 0    diclofenac sodium 1 % GEL Apply to the affected area TID 5 Tube 0    potassium citrate (UROCIT-K) 10 MEQ (1080 MG) extended release tablet Take by mouth 3 times daily (with meals)      amLODIPine (NORVASC) 10 MG tablet Take 10 mg by mouth daily      atorvastatin (LIPITOR) 40 MG tablet Take 40 mg by mouth daily      lisinopril-hydrochlorothiazide (PRINZIDE;ZESTORETIC) 10-12.5 MG per tablet Take 1 tablet by mouth daily      pantoprazole (PROTONIX) 20 MG tablet Take 20 mg by mouth daily      clobetasol (TEMOVATE) 0.05 % cream Apply topically 2 times daily to affected areas as needed for psoriasis. 60 g 3    fenofibrate (TRIGLIDE) 160 MG tablet Take 160 mg by mouth daily.  Multiple Vitamin (MULTIVITAMIN PO) Take 1 tablet by mouth daily.  cetirizine (ZYRTEC) 10 MG tablet Take 10 mg by mouth daily.  potassium chloride (KLOR-CON M) 10 MEQ extended release tablet Take 1 tablet by mouth daily for 7 days 7 tablet 0     No current facility-administered medications for this visit.       I will continue his current medication regimen which is part of the above treatment schedule. It has been helping with Mr. Eliz Shoemaker chronic  medical problems which for this visit include:   Diagnoses of Chronic pain syndrome, Failed back surgical syndrome, Fibromyalgia, Recurrent major depressive disorder, remission status unspecified ([address] Primary insomnia, Gastroesophageal reflux disease without esophagitis, Cervical radiculitis, and DDD (degenerative disc disease), cervical were pertinent to this visit. Risks and benefits of the medications and other alternative treatments  including no treatment were discussed with the patient. The common side effects of these medications were also explained to the patient. Informed verbal consent was obtained. Goals of current treatment regimen include improvement in pain, restoration of functioning- with focus on improvement in physical performance, general activity, work or disability,emotional distress, health care utilization and  decreased medication consumption. Will continue to monitor progress towards achieving/maintaining therapeutic goals with special emphasis on  1. Improvement in perceived interfernce  of pain with ADL's. Ability to do home exercises independently. Ability to do household chores indoor and/or outdoor work and social and leisure activities. Improve psychosocial and physical functioning. - he is showing progression towards this treatment goal with the current regimen. He was advised against drinking alcohol with the narcotic pain medicines, advised against driving or handling machinery while adjusting the dose of medicines or if having cognitive  issues related to the current medications. Risk of overdose and death, if medicines not taken as prescribed, were also discussed. If the patient develops new symptoms or if the symptoms worsen, the patient should call the office.     While transcribing every attempt was made to maintain the accuracy of the note in terms of it's contents,there may have been some errors made inadvertently. Thank you for allowing me to participate in the care of this patient.     Cherilynn Holstein, MD.    Cc: Dg Treviño MD    I, Gabo Elizondo, am scribing for and in the presence of Dr. Cherilynn Holstein.   01/15/18  9:15 AM  Gabo Elizondo MA     I, Dr. Cherilynn Holstein, personally performed the services described in this documentation as scribed by   Gabo Elizondo MA in my presence and it is both accurate and complete

## 2018-02-01 ENCOUNTER — TELEPHONE (OUTPATIENT)
Dept: PHARMACY | Facility: CLINIC | Age: 63
End: 2018-02-01

## 2018-02-12 ENCOUNTER — OFFICE VISIT (OUTPATIENT)
Dept: PAIN MANAGEMENT | Age: 63
End: 2018-02-12

## 2018-02-12 VITALS
SYSTOLIC BLOOD PRESSURE: 132 MMHG | BODY MASS INDEX: 34.75 KG/M2 | HEART RATE: 114 BPM | WEIGHT: 278 LBS | DIASTOLIC BLOOD PRESSURE: 83 MMHG

## 2018-02-12 DIAGNOSIS — M22.41 CHONDROMALACIA OF PATELLOFEMORAL JOINT, RIGHT: ICD-10-CM

## 2018-02-12 DIAGNOSIS — F51.01 PRIMARY INSOMNIA: ICD-10-CM

## 2018-02-12 DIAGNOSIS — M54.12 CERVICAL RADICULITIS: ICD-10-CM

## 2018-02-12 DIAGNOSIS — M96.1 FAILED BACK SURGICAL SYNDROME: ICD-10-CM

## 2018-02-12 DIAGNOSIS — M50.30 DDD (DEGENERATIVE DISC DISEASE), CERVICAL: ICD-10-CM

## 2018-02-12 DIAGNOSIS — F33.1 MODERATE EPISODE OF RECURRENT MAJOR DEPRESSIVE DISORDER (HCC): ICD-10-CM

## 2018-02-12 DIAGNOSIS — G89.4 CHRONIC PAIN SYNDROME: ICD-10-CM

## 2018-02-12 DIAGNOSIS — M79.7 FIBROMYALGIA: ICD-10-CM

## 2018-02-12 DIAGNOSIS — F41.1 GAD (GENERALIZED ANXIETY DISORDER): ICD-10-CM

## 2018-02-12 PROCEDURE — G8484 FLU IMMUNIZE NO ADMIN: HCPCS | Performed by: INTERNAL MEDICINE

## 2018-02-12 PROCEDURE — G8427 DOCREV CUR MEDS BY ELIG CLIN: HCPCS | Performed by: INTERNAL MEDICINE

## 2018-02-12 PROCEDURE — 4004F PT TOBACCO SCREEN RCVD TLK: CPT | Performed by: INTERNAL MEDICINE

## 2018-02-12 PROCEDURE — 99214 OFFICE O/P EST MOD 30 MIN: CPT | Performed by: INTERNAL MEDICINE

## 2018-02-12 PROCEDURE — G8417 CALC BMI ABV UP PARAM F/U: HCPCS | Performed by: INTERNAL MEDICINE

## 2018-02-12 PROCEDURE — 3017F COLORECTAL CA SCREEN DOC REV: CPT | Performed by: INTERNAL MEDICINE

## 2018-02-12 RX ORDER — OXYCODONE HYDROCHLORIDE 15 MG/1
1 TABLET, FILM COATED, EXTENDED RELEASE ORAL 2 TIMES DAILY
Qty: 56 TABLET | Refills: 0 | Status: SHIPPED | OUTPATIENT
Start: 2018-02-12 | End: 2018-03-12 | Stop reason: SDUPTHER

## 2018-02-12 RX ORDER — HYDROXYZINE HYDROCHLORIDE 25 MG/1
25 TABLET, FILM COATED ORAL 2 TIMES DAILY PRN
Qty: 60 TABLET | Refills: 0 | Status: SHIPPED | OUTPATIENT
Start: 2018-02-12 | End: 2018-02-12 | Stop reason: CLARIF

## 2018-02-12 RX ORDER — LIDOCAINE 50 MG/G
1 PATCH TOPICAL DAILY
Qty: 60 PATCH | Refills: 0
Start: 2018-02-12 | End: 2018-05-04 | Stop reason: SDUPTHER

## 2018-02-12 RX ORDER — ESCITALOPRAM OXALATE 20 MG/1
TABLET ORAL
Qty: 30 TABLET | Refills: 0
Start: 2018-02-12 | End: 2018-07-02 | Stop reason: SDUPTHER

## 2018-02-12 RX ORDER — HYDROXYZINE HYDROCHLORIDE 25 MG/1
25 TABLET, FILM COATED ORAL 2 TIMES DAILY PRN
Qty: 60 TABLET | Refills: 0 | Status: SHIPPED | OUTPATIENT
Start: 2018-02-12 | End: 2018-03-14

## 2018-02-12 NOTE — PROGRESS NOTES
Wesley Barth  1955  [id number]    HISTORY OF PRESENT ILLNESS:  Mr. Concepcion Mohan is a 57 y.o. male returns for a follow up visit for multiple medical problems. His current presenting problems are   1. Moderate episode of recurrent major depressive disorder ([address]    2. ISMAEL (generalized anxiety disorder)    3. Chronic pain syndrome    4. Failed back surgical syndrome    5. Fibromyalgia    6. Primary insomnia    7. Cervical radiculitis    8. DDD (degenerative disc disease), cervical    .    As per information/history obtained from the PADT(patient assessment and documentation tool) - He complains of pain in the abdomen, upper back, mid back and lower back with radiation to the buttocks, hips Bilateral, upper leg Right, knees Right and feet Right He rates the pain 6/10 and describes it as sharp, aching, burning. Pain is made worse by: movement, walking, standing, sitting, bending, lifting. Current treatment regimen has helped relieve about 40% of the pain. He denies side effects from the current pain regimen. Patient reports that since the last follow up visit the physical functioning is unchanged, family/social relationships are unchanged, mood is worse and sleep patterns are unchanged, and that the overall functioning is unchanged. Patient denies neurological bowel or bladder. Patient denies misusing/abusing his narcotic pain medications or using any illegal drugs. There are No indicators for possible drug abuse, addiction or diversion problems. Upon obtaining the medical history from Mr. Concepcion Mohan regarding today's office visit for his presenting problems, He states that his moods have been depressed, tearful, labile with c/o loss of energy, having occasional crying spells. Denies being suicidal or homicidal. He complains of excessive worry, agitation, labile mood,nervousness, restlessness and difficulty with concentration. Patient states he has been having increased anxiety and mood issues.  He complains of some -Adv Biofeedback, relaxation and meditation techniques. Referral to psychologist for CBT was also discussed with patient  -Continue with Lexapro 20 mg   -Start Atarax 25 mg BID PRN for anxiety   -If symptoms continue may consider starting another SSRI/SNRI  -He was advised weight reduction, diet changes- 800-1200 melanie diet, diet diary, exercising, nutritional  consult increased physical activity as tolerated  -He was advised to increase fluids ( 5-7  glasses of fluid daily), limit caffeine, avoid cheese products, increase dietary fiber, increase activity and exercise as tolerated and relax regularly and enjoy meals  -Continue with Voltaren gel and Lidoderm patch  -he was advised proper sleep hygiene-told to avoid:use of caffeine or other stimulants after noon, alcohol use near bedtime, long or frequent naps during the day, erratic sleep schedule, heavy meals near bedtime, vigorous exercise near bedtime and use of electronic devices near bedtime    Nimesh Anguiano was seen today for follow-up. Diagnoses and all orders for this visit:    Moderate episode of recurrent major depressive disorder (HCC)  -     escitalopram (LEXAPRO) 20 MG tablet; TAKE ONE TABLET BY MOUTH ONCE DAILY  -     hydrOXYzine (ATARAX) 25 MG tablet; Take 1 tablet by mouth 2 times daily as needed for Anxiety    ISMAEL (generalized anxiety disorder)  -     Discontinue: hydrOXYzine (ATARAX) 25 MG tablet; Take 1 tablet by mouth 2 times daily as needed for Anxiety  -     hydrOXYzine (ATARAX) 25 MG tablet; Take 1 tablet by mouth 2 times daily as needed for Anxiety    Chronic pain syndrome  -     tapentadol (NUCYNTA) 75 MG TABS; Take 1 tablet by mouth every 6 hours for 28 days Max 2 per day. -     oxyCODONE (OXYCONTIN) 15 MG T12A extended release tablet; Take 1 tablet by mouth 2 times daily for 28 days.   -     escitalopram (LEXAPRO) 20 MG tablet; TAKE ONE TABLET BY MOUTH ONCE DAILY  -     lidocaine (LIDODERM) 5 %; Place 1 patch onto the skin daily 12 hours on, 12

## 2018-02-26 ENCOUNTER — NURSE TRIAGE (OUTPATIENT)
Dept: OTHER | Facility: CLINIC | Age: 63
End: 2018-02-26

## 2018-03-05 ENCOUNTER — NURSE TRIAGE (OUTPATIENT)
Dept: OTHER | Facility: CLINIC | Age: 63
End: 2018-03-05

## 2018-03-05 ENCOUNTER — TELEPHONE (OUTPATIENT)
Dept: PHARMACY | Facility: CLINIC | Age: 63
End: 2018-03-05

## 2018-03-05 DIAGNOSIS — Z71.89 ENCOUNTER FOR MEDICATION REVIEW AND COUNSELING: Primary | ICD-10-CM

## 2018-03-05 RX ORDER — POTASSIUM CHLORIDE 750 MG/1
10 TABLET, EXTENDED RELEASE ORAL 2 TIMES DAILY
Qty: 1 TABLET | Refills: 0 | COMMUNITY
Start: 2018-03-05 | End: 2019-01-07

## 2018-03-05 NOTE — TELEPHONE ENCOUNTER
[facility]) Employee Diabetes Program  =================================================================  Stewart Powell is a 57 y.o. male enrolled in the [address] [place] [address] with pt's wife per request (OK per HIPAA)    Medications:  Medication Sig    tapentadol (NUCYNTA) 75 MG TABS Take 1 tablet by mouth every 6 hours for 28 days Max 2 per day.  oxyCODONE (OXYCONTIN) 15 MG T12A extended release tablet Take 1 tablet by mouth 2 times daily for 28 days.  escitalopram (LEXAPRO) 20 MG tablet TAKE ONE TABLET BY MOUTH ONCE DAILY    lidocaine (LIDODERM) 5 % Place 1 patch onto the skin daily 12 hours on, 12 hours off.  diclofenac sodium 1 % GEL Apply to the affected area TID    hydrOXYzine (ATARAX) 25 MG tablet Take 1 tablet by mouth 2 times daily as needed for Anxiety    methocarbamol (ROBAXIN) 750 MG tablet Take 1 tablet by mouth 2 times daily PRN    SITagliptin Phosphate (JANUVIA PO) Take by mouth    potassium citrate (UROCIT-K) 10 MEQ (1080 MG) extended release tablet Take by mouth 3 times daily (with meals)    potassium chloride (KLOR-CON M) 10 MEQ extended release tablet Take 1 tablet by mouth daily for 7 days    amLODIPine (NORVASC) 10 MG tablet Take 10 mg by mouth daily    atorvastatin (LIPITOR) 40 MG tablet Take 40 mg by mouth daily    lisinopril-hydrochlorothiazide (PRINZIDE;ZESTORETIC) 10-12.5 MG per tablet Take 1 tablet by mouth daily    pantoprazole (PROTONIX) 20 MG tablet Take 20 mg by mouth daily    clobetasol (TEMOVATE) 0.05 % cream Apply topically 2 times daily to affected areas as needed for psoriasis.  fenofibrate (TRIGLIDE) 160 MG tablet Take 160 mg by mouth daily.  Multiple Vitamin (MULTIVITAMIN PO) Take 1 tablet by mouth daily.  cetirizine (ZYRTEC) 10 MG tablet Take 10 mg by mouth daily. Potassium chloride ER 10mEq 1 tab BID    Testing supplies/frequency: [employer]  Pharmacy: [facility]     Allergies:   Allergies   Allergen Reactions    before, will have office send records   [] Influenza vaccination for 9478-7840  [] Medication adherence over 70%    Formulary Medication Review:  Non-formulary or medications with cost-effective alternatives: none reported/identified. Current medications eligible for copay waiver, up to $600, through [facility]) (mail order) Pharmacy:  - atorvastatin, lisinopril-hydrochlorothiazide, Januvia  - [employer] meter and supplies       Other Considerations:  - Glycemic Goal: <7.0%. Is at blood glucose goal..   - Blood Pressure Goal: BP less than 140/90 mmHg due to history of DM: Is at blood pressure goal   - Lipids: Patient has a 10-yr ASCVD risk of >7.5% with DM and is therefore a candidate for high-intensity statin therapy based on updated guidelines. Is on high intensity statin. - Smoking status: current every day smoker, unable to discuss further today    PLAN:  - DM program gaps identified:   · Initial requirements: OV with provider for DM (1st) and A1c (1st)   · Ongoing requirements: OV with provider for DM (2nd), ACC/diabetes educator visit (if A1c over 8%), A1c (2nd), Lipid panel, urine protein, Pneumococcal vaccination: up to date, Influenza vaccination for [id number] and Medication adherence over 70%  - Follow up: PCP for identified gaps or as scheduled below  - Upcoming appointments:   Future Appointments  Date Time Provider Amanda Nicole   3/12/2018 8:40 AM MD PEDRO Galeana KW SULAIMAN Witt, PharmD, R [facility] Pharmacist  Direct: [phone]  Dept: [phone] (toll free [phone], option 7)     CLINICAL PHARMACY CONSULT: MED RECONCILIATION/REVIEW ADDENDUM    For Pharmacy Admin Tracking Only    PHSO: Yes  Total # of Interventions Recommended: 2  - Updated Order #: 1 Updated Order Reason(s):  Other  Total Interventions Accepted: 1  Time Spent (min): 30

## 2018-03-07 ENCOUNTER — HOSPITAL ENCOUNTER (OUTPATIENT)
Dept: PSYCHIATRY | Age: 63
Discharge: HOME | End: 2018-03-31
Attending: PREVENTIVE MEDICINE | Admitting: PREVENTIVE MEDICINE

## 2018-03-07 PROBLEM — Z71.6 TOBACCO ABUSE COUNSELING: Status: ACTIVE | Noted: 2018-03-07

## 2018-03-07 PROBLEM — Z92.89 S/P ALCOHOL DETOXIFICATION: Status: ACTIVE | Noted: 2018-03-07

## 2018-03-12 ENCOUNTER — OFFICE VISIT (OUTPATIENT)
Dept: PAIN MANAGEMENT | Age: 63
End: 2018-03-12

## 2018-03-12 VITALS
HEART RATE: 112 BPM | WEIGHT: 280 LBS | BODY MASS INDEX: 35 KG/M2 | SYSTOLIC BLOOD PRESSURE: 130 MMHG | DIASTOLIC BLOOD PRESSURE: 83 MMHG

## 2018-03-12 DIAGNOSIS — F41.1 GAD (GENERALIZED ANXIETY DISORDER): ICD-10-CM

## 2018-03-12 DIAGNOSIS — K21.9 GASTROESOPHAGEAL REFLUX DISEASE WITHOUT ESOPHAGITIS: ICD-10-CM

## 2018-03-12 DIAGNOSIS — M79.7 FIBROMYALGIA: ICD-10-CM

## 2018-03-12 DIAGNOSIS — M22.41 CHONDROMALACIA OF PATELLOFEMORAL JOINT, RIGHT: ICD-10-CM

## 2018-03-12 DIAGNOSIS — M96.1 FAILED BACK SURGICAL SYNDROME: ICD-10-CM

## 2018-03-12 DIAGNOSIS — M54.12 CERVICAL RADICULITIS: ICD-10-CM

## 2018-03-12 DIAGNOSIS — F51.01 PRIMARY INSOMNIA: ICD-10-CM

## 2018-03-12 DIAGNOSIS — F33.1 MODERATE EPISODE OF RECURRENT MAJOR DEPRESSIVE DISORDER (HCC): ICD-10-CM

## 2018-03-12 DIAGNOSIS — G89.4 CHRONIC PAIN SYNDROME: ICD-10-CM

## 2018-03-12 DIAGNOSIS — M50.30 DDD (DEGENERATIVE DISC DISEASE), CERVICAL: ICD-10-CM

## 2018-03-12 PROCEDURE — 3017F COLORECTAL CA SCREEN DOC REV: CPT | Performed by: INTERNAL MEDICINE

## 2018-03-12 PROCEDURE — G8428 CUR MEDS NOT DOCUMENT: HCPCS | Performed by: INTERNAL MEDICINE

## 2018-03-12 PROCEDURE — G8484 FLU IMMUNIZE NO ADMIN: HCPCS | Performed by: INTERNAL MEDICINE

## 2018-03-12 PROCEDURE — 4004F PT TOBACCO SCREEN RCVD TLK: CPT | Performed by: INTERNAL MEDICINE

## 2018-03-12 PROCEDURE — G8417 CALC BMI ABV UP PARAM F/U: HCPCS | Performed by: INTERNAL MEDICINE

## 2018-03-12 PROCEDURE — 99214 OFFICE O/P EST MOD 30 MIN: CPT | Performed by: INTERNAL MEDICINE

## 2018-03-12 RX ORDER — BUPROPION HYDROCHLORIDE 150 MG/1
150 TABLET ORAL EVERY MORNING
Qty: 30 TABLET | Refills: 0 | Status: SHIPPED | OUTPATIENT
Start: 2018-03-12 | End: 2018-04-09 | Stop reason: SDUPTHER

## 2018-03-12 RX ORDER — OXYCODONE HYDROCHLORIDE 15 MG/1
1 TABLET, FILM COATED, EXTENDED RELEASE ORAL 2 TIMES DAILY
Qty: 56 TABLET | Refills: 0 | Status: SHIPPED | OUTPATIENT
Start: 2018-03-12 | End: 2018-04-09 | Stop reason: SDUPTHER

## 2018-03-12 RX ORDER — QUETIAPINE FUMARATE 25 MG/1
25-50 TABLET, FILM COATED ORAL NIGHTLY
Qty: 60 TABLET | Refills: 0 | Status: SHIPPED | OUTPATIENT
Start: 2018-03-12 | End: 2018-04-09 | Stop reason: ALTCHOICE

## 2018-03-12 NOTE — PROGRESS NOTES
for counseling also. He complains of some social stressors. Sleep is poor,  poor sleep latency, averages 2-3 hours of sleep at night. Intermittent, non restorative. Does not feel rested in AM. Complains of feeling sleepy  during the day. He mentions he is on Ativan taper for detox. Patient denies misusing OxyContin or Nucynta. He denies any weight gain. He states his pain is worse in the back, back is worse than the legs. ALLERGIES/PAST MED/FAM/SOC HISTORY: Mr. Ese Severino allergies, past medical, family and social history were reviewed in the chart and also listed below. Social History     Social History    Marital status:      Spouse name: N/A    Number of children: N/A    Years of education: N/A     Occupational History    disability      Social History Main Topics    Smoking status: Current Every Day Smoker     Packs/day: 0.50     Years: 15.00    Smokeless tobacco: Never Used    Alcohol use 0.6 oz/week     1 Standard drinks or equivalent per week      Comment: less than weekly    Drug use: No    Sexual activity: No     Other Topics Concern    Not on file     Social History Narrative    No narrative on file       Mr. Ese Severino current medications are   Outpatient Medications Prior to Visit   Medication Sig Dispense Refill    potassium chloride (KLOR-CON M) 10 MEQ extended release tablet Take 1 tablet by mouth 2 times daily 1 tablet 0    tapentadol (NUCYNTA) 75 MG TABS Take 1 tablet by mouth every 6 hours for 28 days Max 2 per day. 56 tablet 0    oxyCODONE (OXYCONTIN) 15 MG T12A extended release tablet Take 1 tablet by mouth 2 times daily for 28 days. 56 tablet 0    escitalopram (LEXAPRO) 20 MG tablet TAKE ONE TABLET BY MOUTH ONCE DAILY 30 tablet 0    lidocaine (LIDODERM) 5 % Place 1 patch onto the skin daily 12 hours on, 12 hours off.  60 patch 0    diclofenac sodium 1 % GEL Apply to the affected area TID 5 Tube 0    hydrOXYzine (ATARAX) 25 MG tablet Take 1 tablet by mouth 2 times daily as

## 2018-03-29 ENCOUNTER — HOSPITAL ENCOUNTER (OUTPATIENT)
Dept: OTHER | Age: 63
Discharge: HOME | End: 2018-03-29
Attending: FAMILY MEDICINE | Admitting: FAMILY MEDICINE

## 2018-03-29 LAB
ALBUMIN SERPL-MCNC: 4.1 G/DL (ref 3.4–5)
ALP BLD-CCNC: 49 U/L (ref 40–129)
ALT SERPL-CCNC: 18 U/L (ref 10–40)
ANION GAP SERPL CALCULATED.3IONS-SCNC: 11 MMOL/L (ref 3–16)
AST SERPL-CCNC: 17 U/L (ref 15–37)
BASOPHILS ABSOLUTE: 0.1 K/UL (ref 0–0.2)
BASOPHILS RELATIVE PERCENT: 0.7 %
BILIRUB SERPL-MCNC: 0.4 MG/DL (ref 0–1)
BILIRUBIN DIRECT: <0.2 MG/DL (ref 0–0.3)
BILIRUBIN, INDIRECT: NORMAL MG/DL (ref 0–1)
BUN BLDV-MCNC: 21 MG/DL (ref 7–20)
CALCIUM SERPL-MCNC: 9.6 MG/DL (ref 8.3–10.6)
CHLORIDE BLD-SCNC: 99 MMOL/L (ref 99–110)
CHOLESTEROL, TOTAL: 172 MG/DL (ref 0–199)
CO2: 28 MMOL/L (ref 21–32)
CREAT SERPL-MCNC: 1 MG/DL (ref 0.8–1.3)
EOSINOPHILS ABSOLUTE: 0.3 K/UL (ref 0–0.6)
EOSINOPHILS RELATIVE PERCENT: 3.1 %
ESTIMATED AVERAGE GLUCOSE: 125.5 MG/DL
FOLATE: >20 NG/ML (ref 4.78–24.2)
GFR AFRICAN AMERICAN: >60
GFR NON-AFRICAN AMERICAN: >60
GLUCOSE BLD-MCNC: 97 MG/DL (ref 70–99)
HBA1C MFR BLD: 6 %
HCT VFR BLD CALC: 44.2 % (ref 40.5–52.5)
HDLC SERPL-MCNC: 28 MG/DL (ref 40–60)
HEMOGLOBIN: 14.7 G/DL (ref 13.5–17.5)
LDL CHOLESTEROL CALCULATED: 106 MG/DL
LYMPHOCYTES ABSOLUTE: 3.1 K/UL (ref 1–5.1)
LYMPHOCYTES RELATIVE PERCENT: 29.4 %
MAGNESIUM: 2.1 MG/DL (ref 1.8–2.4)
MCH RBC QN AUTO: 32.1 PG (ref 26–34)
MCHC RBC AUTO-ENTMCNC: 33.2 G/DL (ref 31–36)
MCV RBC AUTO: 96.8 FL (ref 80–100)
MONOCYTES ABSOLUTE: 0.8 K/UL (ref 0–1.3)
MONOCYTES RELATIVE PERCENT: 7.3 %
NEUTROPHILS ABSOLUTE: 6.3 K/UL (ref 1.7–7.7)
NEUTROPHILS RELATIVE PERCENT: 59.5 %
PDW BLD-RTO: 13.6 % (ref 12.4–15.4)
PLATELET # BLD: 327 K/UL (ref 135–450)
PMV BLD AUTO: 9.9 FL (ref 5–10.5)
POTASSIUM SERPL-SCNC: 3.7 MMOL/L (ref 3.5–5.1)
RBC # BLD: 4.57 M/UL (ref 4.2–5.9)
SODIUM BLD-SCNC: 138 MMOL/L (ref 136–145)
T4 FREE: 1 NG/DL (ref 0.9–1.8)
TOTAL PROTEIN: 6.8 G/DL (ref 6.4–8.2)
TRIGL SERPL-MCNC: 189 MG/DL (ref 0–150)
TSH SERPL DL<=0.05 MIU/L-ACNC: 2.12 UIU/ML (ref 0.27–4.2)
VITAMIN B-12: 585 PG/ML (ref 211–911)
VLDLC SERPL CALC-MCNC: 38 MG/DL
WBC # BLD: 10.6 K/UL (ref 4–11)

## 2018-04-01 ENCOUNTER — HOSPITAL ENCOUNTER (OUTPATIENT)
Dept: OTHER | Age: 63
Discharge: HOME | End: 2018-04-30
Attending: PREVENTIVE MEDICINE | Admitting: PREVENTIVE MEDICINE

## 2018-04-02 LAB
PROSTATE SPECIFIC ANTIGEN FREE: 0.1 UG/L
PROSTATE SPECIFIC ANTIGEN PERCENT FREE: 20 %
PROSTATE SPECIFIC ANTIGEN: 0.5 UG/L (ref 0–4)

## 2018-04-09 ENCOUNTER — OFFICE VISIT (OUTPATIENT)
Dept: PAIN MANAGEMENT | Age: 63
End: 2018-04-09

## 2018-04-09 VITALS
BODY MASS INDEX: 34.25 KG/M2 | DIASTOLIC BLOOD PRESSURE: 74 MMHG | WEIGHT: 274 LBS | SYSTOLIC BLOOD PRESSURE: 111 MMHG | HEART RATE: 96 BPM

## 2018-04-09 DIAGNOSIS — M79.7 FIBROMYALGIA: ICD-10-CM

## 2018-04-09 DIAGNOSIS — F33.1 MODERATE EPISODE OF RECURRENT MAJOR DEPRESSIVE DISORDER (HCC): ICD-10-CM

## 2018-04-09 DIAGNOSIS — K21.9 GASTROESOPHAGEAL REFLUX DISEASE WITHOUT ESOPHAGITIS: ICD-10-CM

## 2018-04-09 DIAGNOSIS — M22.41 CHONDROMALACIA OF PATELLOFEMORAL JOINT, RIGHT: ICD-10-CM

## 2018-04-09 DIAGNOSIS — M54.12 CERVICAL RADICULITIS: ICD-10-CM

## 2018-04-09 DIAGNOSIS — F51.01 PRIMARY INSOMNIA: ICD-10-CM

## 2018-04-09 DIAGNOSIS — M96.1 FAILED BACK SURGICAL SYNDROME: ICD-10-CM

## 2018-04-09 DIAGNOSIS — F41.1 GAD (GENERALIZED ANXIETY DISORDER): ICD-10-CM

## 2018-04-09 DIAGNOSIS — M50.30 DDD (DEGENERATIVE DISC DISEASE), CERVICAL: ICD-10-CM

## 2018-04-09 DIAGNOSIS — G89.4 CHRONIC PAIN SYNDROME: ICD-10-CM

## 2018-04-09 PROCEDURE — 99214 OFFICE O/P EST MOD 30 MIN: CPT | Performed by: INTERNAL MEDICINE

## 2018-04-09 PROCEDURE — G8427 DOCREV CUR MEDS BY ELIG CLIN: HCPCS | Performed by: INTERNAL MEDICINE

## 2018-04-09 PROCEDURE — 3017F COLORECTAL CA SCREEN DOC REV: CPT | Performed by: INTERNAL MEDICINE

## 2018-04-09 PROCEDURE — 4004F PT TOBACCO SCREEN RCVD TLK: CPT | Performed by: INTERNAL MEDICINE

## 2018-04-09 PROCEDURE — G8417 CALC BMI ABV UP PARAM F/U: HCPCS | Performed by: INTERNAL MEDICINE

## 2018-04-09 RX ORDER — METHOCARBAMOL 750 MG/1
750 TABLET, FILM COATED ORAL 2 TIMES DAILY
Qty: 180 TABLET | Refills: 0 | Status: SHIPPED | OUTPATIENT
Start: 2018-04-09 | End: 2018-05-04 | Stop reason: SDUPTHER

## 2018-04-09 RX ORDER — QUETIAPINE FUMARATE 50 MG/1
50-100 TABLET, EXTENDED RELEASE ORAL NIGHTLY
Qty: 60 TABLET | Refills: 0 | Status: SHIPPED | OUTPATIENT
Start: 2018-04-09 | End: 2018-05-04 | Stop reason: SDUPTHER

## 2018-04-09 RX ORDER — BUPROPION HYDROCHLORIDE 150 MG/1
150 TABLET ORAL EVERY MORNING
Qty: 30 TABLET | Refills: 0 | Status: SHIPPED | OUTPATIENT
Start: 2018-04-09 | End: 2018-05-04 | Stop reason: SDUPTHER

## 2018-04-09 RX ORDER — OXYCODONE HYDROCHLORIDE 15 MG/1
1 TABLET, FILM COATED, EXTENDED RELEASE ORAL 2 TIMES DAILY
Qty: 56 TABLET | Refills: 0 | Status: SHIPPED | OUTPATIENT
Start: 2018-04-09 | End: 2018-05-04 | Stop reason: SDUPTHER

## 2018-04-30 DIAGNOSIS — F33.1 MODERATE EPISODE OF RECURRENT MAJOR DEPRESSIVE DISORDER (HCC): ICD-10-CM

## 2018-04-30 DIAGNOSIS — F41.1 GAD (GENERALIZED ANXIETY DISORDER): ICD-10-CM

## 2018-04-30 DIAGNOSIS — G89.4 CHRONIC PAIN SYNDROME: ICD-10-CM

## 2018-05-02 RX ORDER — BUPROPION HYDROCHLORIDE 150 MG/1
TABLET ORAL
Qty: 30 TABLET | Refills: 0 | OUTPATIENT
Start: 2018-05-02

## 2018-05-04 ENCOUNTER — OFFICE VISIT (OUTPATIENT)
Dept: PAIN MANAGEMENT | Age: 63
End: 2018-05-04

## 2018-05-04 VITALS
DIASTOLIC BLOOD PRESSURE: 65 MMHG | HEART RATE: 84 BPM | BODY MASS INDEX: 34.25 KG/M2 | WEIGHT: 274 LBS | SYSTOLIC BLOOD PRESSURE: 114 MMHG

## 2018-05-04 DIAGNOSIS — M96.1 FAILED BACK SURGICAL SYNDROME: ICD-10-CM

## 2018-05-04 DIAGNOSIS — M79.7 FIBROMYALGIA: ICD-10-CM

## 2018-05-04 DIAGNOSIS — M22.41 CHONDROMALACIA OF PATELLOFEMORAL JOINT, RIGHT: ICD-10-CM

## 2018-05-04 DIAGNOSIS — F33.1 MODERATE EPISODE OF RECURRENT MAJOR DEPRESSIVE DISORDER (HCC): ICD-10-CM

## 2018-05-04 DIAGNOSIS — M50.30 DDD (DEGENERATIVE DISC DISEASE), CERVICAL: ICD-10-CM

## 2018-05-04 DIAGNOSIS — M54.12 CERVICAL RADICULITIS: ICD-10-CM

## 2018-05-04 DIAGNOSIS — F41.1 GAD (GENERALIZED ANXIETY DISORDER): ICD-10-CM

## 2018-05-04 DIAGNOSIS — G89.4 CHRONIC PAIN SYNDROME: ICD-10-CM

## 2018-05-04 DIAGNOSIS — F51.01 PRIMARY INSOMNIA: ICD-10-CM

## 2018-05-04 PROCEDURE — G8427 DOCREV CUR MEDS BY ELIG CLIN: HCPCS | Performed by: INTERNAL MEDICINE

## 2018-05-04 PROCEDURE — 99213 OFFICE O/P EST LOW 20 MIN: CPT | Performed by: INTERNAL MEDICINE

## 2018-05-04 PROCEDURE — G8417 CALC BMI ABV UP PARAM F/U: HCPCS | Performed by: INTERNAL MEDICINE

## 2018-05-04 PROCEDURE — 3017F COLORECTAL CA SCREEN DOC REV: CPT | Performed by: INTERNAL MEDICINE

## 2018-05-04 PROCEDURE — 4004F PT TOBACCO SCREEN RCVD TLK: CPT | Performed by: INTERNAL MEDICINE

## 2018-05-04 RX ORDER — QUETIAPINE FUMARATE 50 MG/1
50-100 TABLET, EXTENDED RELEASE ORAL NIGHTLY
Qty: 180 TABLET | Refills: 0 | Status: SHIPPED | OUTPATIENT
Start: 2018-05-04 | End: 2018-07-30 | Stop reason: SDUPTHER

## 2018-05-04 RX ORDER — LIDOCAINE 50 MG/G
1 PATCH TOPICAL DAILY
Qty: 60 PATCH | Refills: 0 | Status: SHIPPED | OUTPATIENT
Start: 2018-05-04 | End: 2018-07-30

## 2018-05-04 RX ORDER — OXYCODONE HYDROCHLORIDE 15 MG/1
1 TABLET, FILM COATED, EXTENDED RELEASE ORAL 2 TIMES DAILY
Qty: 56 TABLET | Refills: 0 | Status: SHIPPED | OUTPATIENT
Start: 2018-05-04 | End: 2018-06-01 | Stop reason: SDUPTHER

## 2018-05-04 RX ORDER — BUPROPION HYDROCHLORIDE 150 MG/1
150 TABLET ORAL EVERY MORNING
Qty: 90 TABLET | Refills: 0 | Status: SHIPPED | OUTPATIENT
Start: 2018-05-04 | End: 2018-08-20 | Stop reason: SDUPTHER

## 2018-05-04 RX ORDER — METHOCARBAMOL 750 MG/1
750 TABLET, FILM COATED ORAL 2 TIMES DAILY
Qty: 180 TABLET | Refills: 0 | Status: SHIPPED | OUTPATIENT
Start: 2018-05-04 | End: 2018-07-02 | Stop reason: SDUPTHER

## 2018-06-01 ENCOUNTER — OFFICE VISIT (OUTPATIENT)
Dept: PAIN MANAGEMENT | Age: 63
End: 2018-06-01

## 2018-06-01 VITALS — DIASTOLIC BLOOD PRESSURE: 66 MMHG | SYSTOLIC BLOOD PRESSURE: 102 MMHG | HEART RATE: 87 BPM

## 2018-06-01 DIAGNOSIS — M50.30 DDD (DEGENERATIVE DISC DISEASE), CERVICAL: ICD-10-CM

## 2018-06-01 DIAGNOSIS — M79.7 FIBROMYALGIA: ICD-10-CM

## 2018-06-01 DIAGNOSIS — G89.4 CHRONIC PAIN SYNDROME: ICD-10-CM

## 2018-06-01 DIAGNOSIS — M96.1 FAILED BACK SURGICAL SYNDROME: ICD-10-CM

## 2018-06-01 DIAGNOSIS — M22.41 CHONDROMALACIA OF PATELLOFEMORAL JOINT, RIGHT: ICD-10-CM

## 2018-06-01 DIAGNOSIS — M54.12 CERVICAL RADICULITIS: ICD-10-CM

## 2018-06-01 PROCEDURE — G8417 CALC BMI ABV UP PARAM F/U: HCPCS | Performed by: INTERNAL MEDICINE

## 2018-06-01 PROCEDURE — 4004F PT TOBACCO SCREEN RCVD TLK: CPT | Performed by: INTERNAL MEDICINE

## 2018-06-01 PROCEDURE — 99213 OFFICE O/P EST LOW 20 MIN: CPT | Performed by: INTERNAL MEDICINE

## 2018-06-01 PROCEDURE — 3017F COLORECTAL CA SCREEN DOC REV: CPT | Performed by: INTERNAL MEDICINE

## 2018-06-01 PROCEDURE — G8427 DOCREV CUR MEDS BY ELIG CLIN: HCPCS | Performed by: INTERNAL MEDICINE

## 2018-06-01 RX ORDER — OXYCODONE HYDROCHLORIDE 15 MG/1
1 TABLET, FILM COATED, EXTENDED RELEASE ORAL 2 TIMES DAILY
Qty: 60 TABLET | Refills: 0 | Status: SHIPPED | OUTPATIENT
Start: 2018-06-01 | End: 2018-07-02 | Stop reason: SDUPTHER

## 2018-06-13 DIAGNOSIS — G89.4 CHRONIC PAIN SYNDROME: ICD-10-CM

## 2018-06-13 DIAGNOSIS — F41.1 GAD (GENERALIZED ANXIETY DISORDER): ICD-10-CM

## 2018-06-14 RX ORDER — HYDROXYZINE HYDROCHLORIDE 25 MG/1
TABLET, FILM COATED ORAL
Qty: 60 TABLET | Refills: 0 | Status: SHIPPED | OUTPATIENT
Start: 2018-06-14 | End: 2018-07-24 | Stop reason: SDUPTHER

## 2018-06-25 ENCOUNTER — TELEPHONE (OUTPATIENT)
Dept: PHARMACY | Facility: CLINIC | Age: 63
End: 2018-06-25

## 2018-06-26 ENCOUNTER — CLINICAL DOCUMENTATION (OUTPATIENT)
Dept: PHARMACY | Facility: CLINIC | Age: 63
End: 2018-06-26

## 2018-07-02 ENCOUNTER — OFFICE VISIT (OUTPATIENT)
Dept: PAIN MANAGEMENT | Age: 63
End: 2018-07-02

## 2018-07-02 VITALS — DIASTOLIC BLOOD PRESSURE: 77 MMHG | HEART RATE: 89 BPM | SYSTOLIC BLOOD PRESSURE: 121 MMHG

## 2018-07-02 DIAGNOSIS — F33.1 MODERATE EPISODE OF RECURRENT MAJOR DEPRESSIVE DISORDER (HCC): ICD-10-CM

## 2018-07-02 DIAGNOSIS — M54.12 CERVICAL RADICULITIS: ICD-10-CM

## 2018-07-02 DIAGNOSIS — M79.7 FIBROMYALGIA: ICD-10-CM

## 2018-07-02 DIAGNOSIS — M96.1 FAILED BACK SURGICAL SYNDROME: ICD-10-CM

## 2018-07-02 DIAGNOSIS — G89.4 CHRONIC PAIN SYNDROME: ICD-10-CM

## 2018-07-02 DIAGNOSIS — F41.1 GAD (GENERALIZED ANXIETY DISORDER): ICD-10-CM

## 2018-07-02 DIAGNOSIS — M22.41 CHONDROMALACIA OF PATELLOFEMORAL JOINT, RIGHT: ICD-10-CM

## 2018-07-02 DIAGNOSIS — M50.30 DDD (DEGENERATIVE DISC DISEASE), CERVICAL: ICD-10-CM

## 2018-07-02 PROCEDURE — 3017F COLORECTAL CA SCREEN DOC REV: CPT | Performed by: INTERNAL MEDICINE

## 2018-07-02 PROCEDURE — 99213 OFFICE O/P EST LOW 20 MIN: CPT | Performed by: INTERNAL MEDICINE

## 2018-07-02 PROCEDURE — 4004F PT TOBACCO SCREEN RCVD TLK: CPT | Performed by: INTERNAL MEDICINE

## 2018-07-02 PROCEDURE — G8427 DOCREV CUR MEDS BY ELIG CLIN: HCPCS | Performed by: INTERNAL MEDICINE

## 2018-07-02 PROCEDURE — G8417 CALC BMI ABV UP PARAM F/U: HCPCS | Performed by: INTERNAL MEDICINE

## 2018-07-02 RX ORDER — OXYCODONE HYDROCHLORIDE 15 MG/1
1 TABLET, FILM COATED, EXTENDED RELEASE ORAL 2 TIMES DAILY
Qty: 56 TABLET | Refills: 0 | Status: SHIPPED | OUTPATIENT
Start: 2018-07-02 | End: 2018-07-30 | Stop reason: SDUPTHER

## 2018-07-02 RX ORDER — ESCITALOPRAM OXALATE 20 MG/1
TABLET ORAL
Qty: 30 TABLET | Refills: 0 | Status: SHIPPED | OUTPATIENT
Start: 2018-07-02 | End: 2018-08-28 | Stop reason: SDUPTHER

## 2018-07-02 RX ORDER — METHOCARBAMOL 750 MG/1
750 TABLET, FILM COATED ORAL 2 TIMES DAILY
Qty: 180 TABLET | Refills: 0 | Status: SHIPPED | OUTPATIENT
Start: 2018-07-02 | End: 2018-09-25 | Stop reason: SDUPTHER

## 2018-07-02 NOTE — PROGRESS NOTES
allergies were reviewed     MEDICATIONS: Mr. Esteban Mistry list of medications were reviewed. His current medications are   Outpatient Medications Prior to Visit   Medication Sig Dispense Refill    hydrOXYzine (ATARAX) 25 MG tablet TAKE 1 TABLET BY MOUTH TWO TIMES A DAY AS NEEDED FOR ANXIETY 60 tablet 0    buPROPion (WELLBUTRIN XL) 150 MG extended release tablet Take 1 tablet by mouth every morning 90 tablet 0    diclofenac sodium 1 % GEL Apply to the affected area TID 5 Tube 0    methocarbamol (ROBAXIN) 750 MG tablet Take 1 tablet by mouth 2 times daily  tablet 0    QUEtiapine (SEROQUEL XR) 50 MG extended release tablet Take 1-2 tablets by mouth nightly 180 tablet 0    lidocaine (LIDODERM) 5 % Place 1 patch onto the skin daily 12 hours on, 12 hours off. 60 patch 0    amLODIPine (NORVASC) 10 MG tablet Take 10 mg by mouth daily      atorvastatin (LIPITOR) 80 MG tablet Take 80 mg by mouth daily      lisinopril-hydrochlorothiazide (PRINZIDE;ZESTORETIC) 10-12.5 MG per tablet Take 1 tablet by mouth daily      fenofibrate 160 MG tablet Take 160 mg by mouth daily      potassium chloride (KLOR-CON M) 10 MEQ extended release tablet Take 10 mEq by mouth 2 times daily      SITagliptin (JANUVIA) 100 MG tablet Take 100 mg by mouth daily      pantoprazole (PROTONIX) 40 MG injection Infuse 40 mg intravenously daily      cetirizine (ZYRTEC) 10 MG tablet Take 10 mg by mouth daily      lidocaine (LIDODERM) 5 % Place 2 patches onto the skin daily 12 hours on, 12 hours off.       diclofenac sodium 1 % GEL Apply 2 g topically 3 times daily      potassium chloride (KLOR-CON M) 10 MEQ extended release tablet Take 1 tablet by mouth 2 times daily 1 tablet 0    escitalopram (LEXAPRO) 20 MG tablet TAKE ONE TABLET BY MOUTH ONCE DAILY 30 tablet 0    SITagliptin (JANUVIA) 100 MG tablet Take 1 tablet by mouth daily       amLODIPine (NORVASC) 10 MG tablet Take 10 mg by mouth daily      atorvastatin (LIPITOR) 80 MG tablet Take 80 30 tablet 0    SITagliptin (JANUVIA) 100 MG tablet Take 1 tablet by mouth daily       amLODIPine (NORVASC) 10 MG tablet Take 10 mg by mouth daily      atorvastatin (LIPITOR) 80 MG tablet Take 80 mg by mouth daily       lisinopril-hydrochlorothiazide (PRINZIDE;ZESTORETIC) 10-12.5 MG per tablet Take 1 tablet by mouth daily      pantoprazole (PROTONIX) 40 MG tablet Take 40 mg by mouth daily       clobetasol (TEMOVATE) 0.05 % cream Apply topically 2 times daily to affected areas as needed for psoriasis. 60 g 3    fenofibrate (TRIGLIDE) 160 MG tablet Take 160 mg by mouth daily.  Multiple Vitamin (MULTIVITAMIN PO) Take 1 tablet by mouth daily.  cetirizine (ZYRTEC) 10 MG tablet Take 10 mg by mouth daily. No current facility-administered medications for this visit. I will continue his current medication regimen  which is part of the above treatment schedule. It has been helping with Mr. Chandana Stinson chronic  medical problems which for this visit include:   Diagnoses of Chronic pain syndrome, Failed back surgical syndrome, Fibromyalgia, and DDD (degenerative disc disease), cervical were pertinent to this visit. Risks and benefits of the medications and other alternative treatments  including no treatment were discussed with the patient. The common side effects of these medications were also explained to the patient. Informed verbal consent was obtained. Goals of current treatment regimen include improvement in pain, restoration of functioning- with focus on improvement in physical performance, general activity, work or disability,emotional distress, health care utilization and  decreased medication consumption. Will continue to monitor progress towards achieving/maintaining therapeutic goals with special emphasis on  1. Improvement in perceived interfernce  of pain with ADL's. Ability to do home exercises independently.  Ability to do household chores indoor and/or outdoor work and social and

## 2018-07-24 DIAGNOSIS — F41.1 GAD (GENERALIZED ANXIETY DISORDER): ICD-10-CM

## 2018-07-24 DIAGNOSIS — G89.4 CHRONIC PAIN SYNDROME: ICD-10-CM

## 2018-07-26 RX ORDER — HYDROXYZINE HYDROCHLORIDE 25 MG/1
TABLET, FILM COATED ORAL
Qty: 60 TABLET | Refills: 0 | Status: SHIPPED | OUTPATIENT
Start: 2018-07-26 | End: 2018-08-28

## 2018-07-30 ENCOUNTER — OFFICE VISIT (OUTPATIENT)
Dept: PAIN MANAGEMENT | Age: 63
End: 2018-07-30

## 2018-07-30 VITALS
HEART RATE: 105 BPM | WEIGHT: 275 LBS | DIASTOLIC BLOOD PRESSURE: 84 MMHG | BODY MASS INDEX: 35.31 KG/M2 | SYSTOLIC BLOOD PRESSURE: 134 MMHG

## 2018-07-30 DIAGNOSIS — M50.30 DDD (DEGENERATIVE DISC DISEASE), CERVICAL: ICD-10-CM

## 2018-07-30 DIAGNOSIS — M22.41 CHONDROMALACIA OF PATELLOFEMORAL JOINT, RIGHT: ICD-10-CM

## 2018-07-30 DIAGNOSIS — M96.1 FAILED BACK SURGICAL SYNDROME: ICD-10-CM

## 2018-07-30 DIAGNOSIS — M54.12 CERVICAL RADICULITIS: ICD-10-CM

## 2018-07-30 DIAGNOSIS — F51.01 PRIMARY INSOMNIA: ICD-10-CM

## 2018-07-30 DIAGNOSIS — G89.4 CHRONIC PAIN SYNDROME: ICD-10-CM

## 2018-07-30 DIAGNOSIS — M79.7 FIBROMYALGIA: ICD-10-CM

## 2018-07-30 PROCEDURE — 3017F COLORECTAL CA SCREEN DOC REV: CPT | Performed by: INTERNAL MEDICINE

## 2018-07-30 PROCEDURE — 4004F PT TOBACCO SCREEN RCVD TLK: CPT | Performed by: INTERNAL MEDICINE

## 2018-07-30 PROCEDURE — G8427 DOCREV CUR MEDS BY ELIG CLIN: HCPCS | Performed by: INTERNAL MEDICINE

## 2018-07-30 PROCEDURE — 99213 OFFICE O/P EST LOW 20 MIN: CPT | Performed by: INTERNAL MEDICINE

## 2018-07-30 PROCEDURE — G8417 CALC BMI ABV UP PARAM F/U: HCPCS | Performed by: INTERNAL MEDICINE

## 2018-07-30 RX ORDER — QUETIAPINE FUMARATE 50 MG/1
50-100 TABLET, EXTENDED RELEASE ORAL NIGHTLY
Qty: 180 TABLET | Refills: 1 | Status: SHIPPED | OUTPATIENT
Start: 2018-07-30 | End: 2018-09-25 | Stop reason: SDUPTHER

## 2018-07-30 RX ORDER — LEVOFLOXACIN 500 MG/1
500 TABLET, FILM COATED ORAL DAILY
COMMUNITY
End: 2019-01-07

## 2018-07-30 RX ORDER — OXYCODONE HYDROCHLORIDE 15 MG/1
1 TABLET, FILM COATED, EXTENDED RELEASE ORAL 2 TIMES DAILY
Qty: 60 TABLET | Refills: 0 | Status: SHIPPED | OUTPATIENT
Start: 2018-07-30 | End: 2018-08-28 | Stop reason: SDUPTHER

## 2018-07-30 NOTE — PROGRESS NOTES
Little Milder  1955  [id number]    HISTORY OF PRESENT ILLNESS:  Mr. Tori Carrel is a 57 y.o. male returns for a follow up visit for multiple medical problems. His current presenting problems are   1. Chronic pain syndrome    2. Failed back surgical syndrome    3. Fibromyalgia    4. Moderate episode of recurrent major depressive disorder ([facility] [address]    5. Primary insomnia    6. Gastroesophageal reflux disease without esophagitis    7. Cervical radiculitis    8. DDD (degenerative disc disease), cervical    9. ISMAEL (generalized anxiety disorder)    . As per information/history obtained from the PADT(patient assessment and documentation tool) - He complains of pain in the neck, upper back, mid back and lower back with radiation to the shoulders Bilateral, buttocks, hips Bilateral, upper leg Bilateral, knees Bilateral, lower leg Bilateral, ankles Bilateral and feet Bilateral He rates the pain 6/10 and describes it as aching, numbness, pins and needles. Pain is made worse by: movement, walking, standing, sitting, bending, lifting. Current treatment regimen has helped relieve about 40% of the pain. He denies side effects from the current pain regimen. Patient reports that since the last follow up visit the physical functioning is unchanged, family/social relationships are unchanged, mood is unchanged and sleep patterns are unchanged, and that the overall functioning is unchanged. Patient denies neurological bowel or bladder. Patient denies misusing/abusing his narcotic pain medications or using any illegal drugs. There are No indicators for possible drug abuse, addiction or diversion problems. Upon obtaining the medical history from Mr. Tori Carrel regarding today's office visit for his presenting problems, Patient complains he has been sick with sinuses. He says he has been complaint with his medication. He mentions his pain has been about the same, has good and bad days. He states he is on Antibiotics at this time.  He reports he is managing his ADLs, and house chores. ALLERGIES: Patients list of allergies were reviewed     MEDICATIONS: Mr. Aldo Simpson list of medications were reviewed. His current medications are   Outpatient Medications Prior to Visit   Medication Sig Dispense Refill    hydrOXYzine (ATARAX) 25 MG tablet TAKE ONE TABLET BY MOUTH TWICE A DAY AS NEEDED FOR ANXIETY 60 tablet 0    methocarbamol (ROBAXIN) 750 MG tablet Take 1 tablet by mouth 2 times daily  tablet 0    escitalopram (LEXAPRO) 20 MG tablet TAKE ONE TABLET BY MOUTH ONCE DAILY 30 tablet 0    oxyCODONE (OXYCONTIN) 15 MG T12A extended release tablet Take 1 tablet by mouth 2 times daily for 28 days. . 56 tablet 0    tapentadol (NUCYNTA) 75 MG TABS Take 1 tablet by mouth every 6 hours for 28 days. Max 2 per day. 56 tablet 0    buPROPion (WELLBUTRIN XL) 150 MG extended release tablet Take 1 tablet by mouth every morning 90 tablet 0    QUEtiapine (SEROQUEL XR) 50 MG extended release tablet Take 1-2 tablets by mouth nightly 180 tablet 0    lisinopril-hydrochlorothiazide (PRINZIDE;ZESTORETIC) 10-12.5 MG per tablet Take 1 tablet by mouth daily      fenofibrate 160 MG tablet Take 160 mg by mouth daily      potassium chloride (KLOR-CON M) 10 MEQ extended release tablet Take 10 mEq by mouth 2 times daily      SITagliptin (JANUVIA) 100 MG tablet Take 100 mg by mouth daily      pantoprazole (PROTONIX) 40 MG injection Infuse 40 mg intravenously daily      lidocaine (LIDODERM) 5 % Place 2 patches onto the skin daily 12 hours on, 12 hours off.       diclofenac sodium 1 % GEL Apply 2 g topically 3 times daily      potassium chloride (KLOR-CON M) 10 MEQ extended release tablet Take 1 tablet by mouth 2 times daily 1 tablet 0    SITagliptin (JANUVIA) 100 MG tablet Take 1 tablet by mouth daily       amLODIPine (NORVASC) 10 MG tablet Take 10 mg by mouth daily      atorvastatin (LIPITOR) 80 MG tablet Take 80 mg by mouth daily       Take 1 tablet by mouth daily      fenofibrate 160 MG tablet Take 160 mg by mouth daily      potassium chloride (KLOR-CON M) 10 MEQ extended release tablet Take 10 mEq by mouth 2 times daily      SITagliptin (JANUVIA) 100 MG tablet Take 100 mg by mouth daily      pantoprazole (PROTONIX) 40 MG injection Infuse 40 mg intravenously daily      lidocaine (LIDODERM) 5 % Place 2 patches onto the skin daily 12 hours on, 12 hours off.  diclofenac sodium 1 % GEL Apply 2 g topically 3 times daily      potassium chloride (KLOR-CON M) 10 MEQ extended release tablet Take 1 tablet by mouth 2 times daily 1 tablet 0    SITagliptin (JANUVIA) 100 MG tablet Take 1 tablet by mouth daily       amLODIPine (NORVASC) 10 MG tablet Take 10 mg by mouth daily      atorvastatin (LIPITOR) 80 MG tablet Take 80 mg by mouth daily       lisinopril-hydrochlorothiazide (PRINZIDE;ZESTORETIC) 10-12.5 MG per tablet Take 1 tablet by mouth daily      pantoprazole (PROTONIX) 40 MG tablet Take 40 mg by mouth daily       clobetasol (TEMOVATE) 0.05 % cream Apply topically 2 times daily to affected areas as needed for psoriasis. 60 g 3    fenofibrate (TRIGLIDE) 160 MG tablet Take 160 mg by mouth daily.  Multiple Vitamin (MULTIVITAMIN PO) Take 1 tablet by mouth daily.  cetirizine (ZYRTEC) 10 MG tablet Take 10 mg by mouth daily. No current facility-administered medications for this visit. I will continue his current medication regimen  which is part of the above treatment schedule. It has been helping with Mr. Janelle Draper chronic  medical problems which for this visit include:   Diagnoses of Chronic pain syndrome, Failed back surgical syndrome, Fibromyalgia, Moderate episode of recurrent major depressive disorder ([facility] [address] Primary insomnia, Gastroesophageal reflux disease without esophagitis, Cervical radiculitis, DDD (degenerative disc disease), cervical, and ISMAEL (generalized anxiety disorder) were pertinent to this visit. Risks and benefits of the medications and other alternative treatments  including no treatment were discussed with the patient. The common side effects of these medications were also explained to the patient. Informed verbal consent was obtained. Goals of current treatment regimen include improvement in pain, restoration of functioning- with focus on improvement in physical performance, general activity, work or disability,emotional distress, health care utilization and  decreased medication consumption. Will continue to monitor progress towards achieving/maintaining therapeutic goals with special emphasis on  1. Improvement in perceived interfernce  of pain with ADL's. Ability to do home exercises independently. Ability to do household chores indoor and/or outdoor work and social and leisure activities. Improve psychosocial and physical functioning. - he is showing progression towards this treatment goal with the current regimen. He was advised against drinking alcohol with the narcotic pain medicines, advised against driving or handling machinery while adjusting the dose of medicines or if having cognitive  issues related to the current medications. Risk of overdose and death, if medicines not taken as prescribed, were also discussed. If the patient develops new symptoms or if the symptoms worsen, the patient should call the office. While transcribing every attempt was made to maintain the accuracy of the note in terms of it's contents,there may have been some errors made inadvertently. Thank you for allowing me to participate in the care of this patient.     Volodymyr Barron MD.    Cc: MD TRELL Bhatia, Eugenia Rai, am scribing for and in the presence of Dr. Volodymyr Barron.   07/30/18  9:16 AM  PRICE Luna, Dr. Volodymyr Barron, personally performed the services described in this documentation as scribed by   Eugenia Rai MA in my presence and it is both accurate and complete

## 2018-08-20 DIAGNOSIS — G89.4 CHRONIC PAIN SYNDROME: ICD-10-CM

## 2018-08-20 DIAGNOSIS — F33.1 MODERATE EPISODE OF RECURRENT MAJOR DEPRESSIVE DISORDER (HCC): ICD-10-CM

## 2018-08-20 DIAGNOSIS — F41.1 GAD (GENERALIZED ANXIETY DISORDER): ICD-10-CM

## 2018-08-20 RX ORDER — BUPROPION HYDROCHLORIDE 150 MG/1
150 TABLET ORAL EVERY MORNING
Qty: 90 TABLET | Refills: 0 | Status: SHIPPED | OUTPATIENT
Start: 2018-08-20 | End: 2018-08-28 | Stop reason: SDUPTHER

## 2018-08-28 ENCOUNTER — OFFICE VISIT (OUTPATIENT)
Dept: PAIN MANAGEMENT | Age: 63
End: 2018-08-28

## 2018-08-28 VITALS
RESPIRATION RATE: 16 BRPM | SYSTOLIC BLOOD PRESSURE: 130 MMHG | WEIGHT: 289 LBS | BODY MASS INDEX: 37.09 KG/M2 | OXYGEN SATURATION: 97 % | HEIGHT: 74 IN | DIASTOLIC BLOOD PRESSURE: 79 MMHG | HEART RATE: 85 BPM

## 2018-08-28 DIAGNOSIS — M22.41 CHONDROMALACIA OF PATELLOFEMORAL JOINT, RIGHT: ICD-10-CM

## 2018-08-28 DIAGNOSIS — M79.7 FIBROMYALGIA: ICD-10-CM

## 2018-08-28 DIAGNOSIS — M50.30 DDD (DEGENERATIVE DISC DISEASE), CERVICAL: ICD-10-CM

## 2018-08-28 DIAGNOSIS — G89.4 CHRONIC PAIN SYNDROME: ICD-10-CM

## 2018-08-28 DIAGNOSIS — M96.1 FAILED BACK SURGICAL SYNDROME: ICD-10-CM

## 2018-08-28 DIAGNOSIS — F41.1 GAD (GENERALIZED ANXIETY DISORDER): ICD-10-CM

## 2018-08-28 DIAGNOSIS — F33.1 MODERATE EPISODE OF RECURRENT MAJOR DEPRESSIVE DISORDER (HCC): ICD-10-CM

## 2018-08-28 DIAGNOSIS — M54.12 CERVICAL RADICULITIS: ICD-10-CM

## 2018-08-28 PROCEDURE — G8427 DOCREV CUR MEDS BY ELIG CLIN: HCPCS | Performed by: INTERNAL MEDICINE

## 2018-08-28 PROCEDURE — 3017F COLORECTAL CA SCREEN DOC REV: CPT | Performed by: INTERNAL MEDICINE

## 2018-08-28 PROCEDURE — 99213 OFFICE O/P EST LOW 20 MIN: CPT | Performed by: INTERNAL MEDICINE

## 2018-08-28 PROCEDURE — G8417 CALC BMI ABV UP PARAM F/U: HCPCS | Performed by: INTERNAL MEDICINE

## 2018-08-28 PROCEDURE — 4004F PT TOBACCO SCREEN RCVD TLK: CPT | Performed by: INTERNAL MEDICINE

## 2018-08-28 RX ORDER — BUPROPION HYDROCHLORIDE 150 MG/1
150 TABLET ORAL EVERY MORNING
Qty: 90 TABLET | Refills: 0 | Status: SHIPPED | OUTPATIENT
Start: 2018-08-28 | End: 2018-09-25 | Stop reason: SDUPTHER

## 2018-08-28 RX ORDER — OXYCODONE HYDROCHLORIDE 15 MG/1
1 TABLET, FILM COATED, EXTENDED RELEASE ORAL 2 TIMES DAILY
Qty: 56 TABLET | Refills: 0 | Status: SHIPPED | OUTPATIENT
Start: 2018-08-28 | End: 2018-09-25 | Stop reason: SDUPTHER

## 2018-08-28 RX ORDER — ESCITALOPRAM OXALATE 20 MG/1
TABLET ORAL
Qty: 30 TABLET | Refills: 0 | Status: SHIPPED | OUTPATIENT
Start: 2018-08-28 | End: 2018-09-25 | Stop reason: SDUPTHER

## 2018-08-29 DIAGNOSIS — G89.4 CHRONIC PAIN SYNDROME: ICD-10-CM

## 2018-08-29 DIAGNOSIS — M96.1 FAILED BACK SURGICAL SYNDROME: ICD-10-CM

## 2018-08-29 DIAGNOSIS — M22.41 CHONDROMALACIA OF PATELLOFEMORAL JOINT, RIGHT: ICD-10-CM

## 2018-09-25 ENCOUNTER — OFFICE VISIT (OUTPATIENT)
Dept: PAIN MANAGEMENT | Age: 63
End: 2018-09-25
Payer: COMMERCIAL

## 2018-09-25 VITALS
WEIGHT: 289 LBS | HEART RATE: 83 BPM | SYSTOLIC BLOOD PRESSURE: 124 MMHG | DIASTOLIC BLOOD PRESSURE: 81 MMHG | BODY MASS INDEX: 37.11 KG/M2

## 2018-09-25 DIAGNOSIS — F41.1 GAD (GENERALIZED ANXIETY DISORDER): ICD-10-CM

## 2018-09-25 DIAGNOSIS — G89.4 CHRONIC PAIN SYNDROME: ICD-10-CM

## 2018-09-25 DIAGNOSIS — M96.1 FAILED BACK SURGICAL SYNDROME: ICD-10-CM

## 2018-09-25 DIAGNOSIS — F33.1 MODERATE EPISODE OF RECURRENT MAJOR DEPRESSIVE DISORDER (HCC): ICD-10-CM

## 2018-09-25 DIAGNOSIS — M54.12 CERVICAL RADICULITIS: ICD-10-CM

## 2018-09-25 DIAGNOSIS — M79.7 FIBROMYALGIA: ICD-10-CM

## 2018-09-25 DIAGNOSIS — M22.41 CHONDROMALACIA OF PATELLOFEMORAL JOINT, RIGHT: ICD-10-CM

## 2018-09-25 DIAGNOSIS — F51.01 PRIMARY INSOMNIA: ICD-10-CM

## 2018-09-25 DIAGNOSIS — M50.30 DDD (DEGENERATIVE DISC DISEASE), CERVICAL: ICD-10-CM

## 2018-09-25 PROCEDURE — G8417 CALC BMI ABV UP PARAM F/U: HCPCS | Performed by: INTERNAL MEDICINE

## 2018-09-25 PROCEDURE — 3017F COLORECTAL CA SCREEN DOC REV: CPT | Performed by: INTERNAL MEDICINE

## 2018-09-25 PROCEDURE — 99213 OFFICE O/P EST LOW 20 MIN: CPT | Performed by: INTERNAL MEDICINE

## 2018-09-25 PROCEDURE — G8427 DOCREV CUR MEDS BY ELIG CLIN: HCPCS | Performed by: INTERNAL MEDICINE

## 2018-09-25 PROCEDURE — 4004F PT TOBACCO SCREEN RCVD TLK: CPT | Performed by: INTERNAL MEDICINE

## 2018-09-25 RX ORDER — BUPROPION HYDROCHLORIDE 150 MG/1
150 TABLET ORAL EVERY MORNING
Qty: 90 TABLET | Refills: 0 | Status: SHIPPED | OUTPATIENT
Start: 2018-09-25 | End: 2018-10-29 | Stop reason: SDUPTHER

## 2018-09-25 RX ORDER — OXYCODONE HYDROCHLORIDE 15 MG/1
1 TABLET, FILM COATED, EXTENDED RELEASE ORAL 2 TIMES DAILY
Qty: 62 TABLET | Refills: 0 | Status: SHIPPED | OUTPATIENT
Start: 2018-09-25 | End: 2018-10-29 | Stop reason: SDUPTHER

## 2018-09-25 RX ORDER — METHOCARBAMOL 750 MG/1
750 TABLET, FILM COATED ORAL 2 TIMES DAILY
Qty: 180 TABLET | Refills: 1 | Status: SHIPPED | OUTPATIENT
Start: 2018-09-25 | End: 2018-10-29 | Stop reason: SDUPTHER

## 2018-09-25 RX ORDER — QUETIAPINE FUMARATE 50 MG/1
50-100 TABLET, EXTENDED RELEASE ORAL NIGHTLY
Qty: 180 TABLET | Refills: 1 | Status: SHIPPED | OUTPATIENT
Start: 2018-09-25 | End: 2018-10-29 | Stop reason: SDUPTHER

## 2018-09-25 RX ORDER — ESCITALOPRAM OXALATE 20 MG/1
TABLET ORAL
Qty: 30 TABLET | Refills: 1 | Status: SHIPPED | OUTPATIENT
Start: 2018-09-25 | End: 2018-10-29 | Stop reason: SDUPTHER

## 2018-09-25 NOTE — PROGRESS NOTES
Anders list of medications were reviewed. His current medications are   Outpatient Medications Prior to Visit   Medication Sig Dispense Refill    diclofenac sodium 1 % GEL APPLY TO THE AFFECTED AREA(S) THREE TIMES A  g 0    oxyCODONE (OXYCONTIN) 15 MG T12A extended release tablet Take 1 tablet by mouth 2 times daily for 28 days. . 56 tablet 0    tapentadol (NUCYNTA) 75 MG TABS Take 1 tablet by mouth every 6 hours for 28 days. Max 2 per day. 56 tablet 0    buPROPion (WELLBUTRIN XL) 150 MG extended release tablet Take 1 tablet by mouth every morning 90 tablet 0    escitalopram (LEXAPRO) 20 MG tablet TAKE ONE TABLET BY MOUTH ONCE DAILY 30 tablet 0    levofloxacin (LEVAQUIN) 500 MG tablet Take 500 mg by mouth daily      QUEtiapine (SEROQUEL XR) 50 MG extended release tablet Take 1-2 tablets by mouth nightly 180 tablet 1    methocarbamol (ROBAXIN) 750 MG tablet Take 1 tablet by mouth 2 times daily  tablet 0    lisinopril-hydrochlorothiazide (PRINZIDE;ZESTORETIC) 10-12.5 MG per tablet Take 1 tablet by mouth daily      fenofibrate 160 MG tablet Take 160 mg by mouth daily      potassium chloride (KLOR-CON M) 10 MEQ extended release tablet Take 10 mEq by mouth 2 times daily      SITagliptin (JANUVIA) 100 MG tablet Take 100 mg by mouth daily      pantoprazole (PROTONIX) 40 MG injection Infuse 40 mg intravenously daily      lidocaine (LIDODERM) 5 % Place 2 patches onto the skin daily 12 hours on, 12 hours off.       diclofenac sodium 1 % GEL Apply 2 g topically 3 times daily      potassium chloride (KLOR-CON M) 10 MEQ extended release tablet Take 1 tablet by mouth 2 times daily 1 tablet 0    SITagliptin (JANUVIA) 100 MG tablet Take 1 tablet by mouth daily       amLODIPine (NORVASC) 10 MG tablet Take 10 mg by mouth daily      atorvastatin (LIPITOR) 80 MG tablet Take 80 mg by mouth daily       lisinopril-hydrochlorothiazide (PRINZIDE;ZESTORETIC) 10-12.5 MG per tablet Take 1 tablet by mouth daily  pantoprazole (PROTONIX) 40 MG tablet Take 40 mg by mouth daily       clobetasol (TEMOVATE) 0.05 % cream Apply topically 2 times daily to affected areas as needed for psoriasis. 60 g 3    fenofibrate (TRIGLIDE) 160 MG tablet Take 160 mg by mouth daily.  Multiple Vitamin (MULTIVITAMIN PO) Take 1 tablet by mouth daily.  cetirizine (ZYRTEC) 10 MG tablet Take 10 mg by mouth daily. No facility-administered medications prior to visit. SOCIAL/FAMILY/PAST MEDICAL HISTORY: Mr. Tesfaye Bedoya, family and past medical history was reviewed. REVIEW OF SYSTEMS:    Respiratory: Negative for apnea, chest tightness and shortness of breath or change in baseline breathing. Gastrointestinal: Negative for nausea, vomiting, abdominal pain, diarrhea, constipation, blood in stool and abdominal distention. PHYSICAL EXAM:   Nursing note and vitals reviewed. /81   Pulse 83   Wt 289 lb (131.1 kg)   BMI 37.11 kg/m²   Constitutional: He appears well-developed and well-nourished. No acute distress. Skin: Skin is warm and dry, good turgor. No rash noted. He is not diaphoretic. Cardiovascular: Normal rate, regular rhythm, normal heart sounds, and does not have murmur. Pulmonary/Chest: Effort normal. No respiratory distress. He does not have wheezes in the lung fields. He has no rales. Decreased air exchange. Neurological/Psychiatric:He is alert and oriented to person, place, and time. Coordination is  normal. His mood isAppropriate and affect is Neutral/Euthymic(normal) . IMPRESSION:   1. Chronic pain syndrome    2. Failed back surgical syndrome    3. Fibromyalgia    4. Cervical radiculitis    5. DDD (degenerative disc disease), cervical        PLAN:  Informed verbal consent was obtained  -[place] record was obtained and reviewed  for the last one year and no indicators of drug misuse  were found.  Any other controlled substance prescriptions  seen on the record have been accounted for, I am aware of the patient receiving these medications. Angel Cifuentes [place] record will be rechecked as part of office protocol.   -Continue with current regimen  -ROM/Stretching exercises as advised  -he was advised proper sleep hygiene-told to avoid:use of caffeine or other stimulants after noon, alcohol use near bedtime, long or frequent naps during the day, erratic sleep schedule, heavy meals near bedtime, vigorous exercise near bedtime and use of electronic devices near bedtime, continue with Seroquel  -Adv Biofeedback, relaxation and meditation techniques. Referral to psychologist for CBT was also discussed with patient  -continue with OxyContin along with Nucynta PRN  Current Outpatient Prescriptions   Medication Sig Dispense Refill    diclofenac sodium 1 % GEL APPLY TO THE AFFECTED AREA(S) THREE TIMES A  g 0    oxyCODONE (OXYCONTIN) 15 MG T12A extended release tablet Take 1 tablet by mouth 2 times daily for 28 days. . 56 tablet 0    tapentadol (NUCYNTA) 75 MG TABS Take 1 tablet by mouth every 6 hours for 28 days. Max 2 per day.  56 tablet 0    buPROPion (WELLBUTRIN XL) 150 MG extended release tablet Take 1 tablet by mouth every morning 90 tablet 0    escitalopram (LEXAPRO) 20 MG tablet TAKE ONE TABLET BY MOUTH ONCE DAILY 30 tablet 0    levofloxacin (LEVAQUIN) 500 MG tablet Take 500 mg by mouth daily      QUEtiapine (SEROQUEL XR) 50 MG extended release tablet Take 1-2 tablets by mouth nightly 180 tablet 1    methocarbamol (ROBAXIN) 750 MG tablet Take 1 tablet by mouth 2 times daily  tablet 0    lisinopril-hydrochlorothiazide (PRINZIDE;ZESTORETIC) 10-12.5 MG per tablet Take 1 tablet by mouth daily      fenofibrate 160 MG tablet Take 160 mg by mouth daily      potassium chloride (KLOR-CON M) 10 MEQ extended release tablet Take 10 mEq by mouth 2 times daily      SITagliptin (JANUVIA) 100 MG tablet Take 100 mg by mouth daily      pantoprazole (PROTONIX) 40 MG injection Infuse 40 mg intravenously

## 2018-10-29 ENCOUNTER — OFFICE VISIT (OUTPATIENT)
Dept: PAIN MANAGEMENT | Age: 63
End: 2018-10-29
Payer: COMMERCIAL

## 2018-10-29 VITALS
WEIGHT: 285 LBS | HEART RATE: 86 BPM | BODY MASS INDEX: 36.59 KG/M2 | SYSTOLIC BLOOD PRESSURE: 137 MMHG | DIASTOLIC BLOOD PRESSURE: 84 MMHG

## 2018-10-29 DIAGNOSIS — F33.1 MODERATE EPISODE OF RECURRENT MAJOR DEPRESSIVE DISORDER (HCC): ICD-10-CM

## 2018-10-29 DIAGNOSIS — M22.41 CHONDROMALACIA OF PATELLOFEMORAL JOINT, RIGHT: ICD-10-CM

## 2018-10-29 DIAGNOSIS — M79.7 FIBROMYALGIA: ICD-10-CM

## 2018-10-29 DIAGNOSIS — F51.01 PRIMARY INSOMNIA: ICD-10-CM

## 2018-10-29 DIAGNOSIS — M65.351 TRIGGER LITTLE FINGER OF RIGHT HAND: ICD-10-CM

## 2018-10-29 DIAGNOSIS — G89.4 CHRONIC PAIN SYNDROME: ICD-10-CM

## 2018-10-29 DIAGNOSIS — M54.12 CERVICAL RADICULITIS: ICD-10-CM

## 2018-10-29 DIAGNOSIS — M50.30 DDD (DEGENERATIVE DISC DISEASE), CERVICAL: ICD-10-CM

## 2018-10-29 DIAGNOSIS — M96.1 FAILED BACK SURGICAL SYNDROME: ICD-10-CM

## 2018-10-29 DIAGNOSIS — F41.1 GAD (GENERALIZED ANXIETY DISORDER): ICD-10-CM

## 2018-10-29 PROCEDURE — G8484 FLU IMMUNIZE NO ADMIN: HCPCS | Performed by: INTERNAL MEDICINE

## 2018-10-29 PROCEDURE — 3017F COLORECTAL CA SCREEN DOC REV: CPT | Performed by: INTERNAL MEDICINE

## 2018-10-29 PROCEDURE — 20550 NJX 1 TENDON SHEATH/LIGAMENT: CPT | Performed by: INTERNAL MEDICINE

## 2018-10-29 PROCEDURE — 4004F PT TOBACCO SCREEN RCVD TLK: CPT | Performed by: INTERNAL MEDICINE

## 2018-10-29 PROCEDURE — 99214 OFFICE O/P EST MOD 30 MIN: CPT | Performed by: INTERNAL MEDICINE

## 2018-10-29 PROCEDURE — G8427 DOCREV CUR MEDS BY ELIG CLIN: HCPCS | Performed by: INTERNAL MEDICINE

## 2018-10-29 PROCEDURE — G8417 CALC BMI ABV UP PARAM F/U: HCPCS | Performed by: INTERNAL MEDICINE

## 2018-10-29 RX ORDER — BUPROPION HYDROCHLORIDE 150 MG/1
150 TABLET ORAL EVERY MORNING
Qty: 90 TABLET | Refills: 0 | Status: SHIPPED | OUTPATIENT
Start: 2018-10-29 | End: 2018-12-21

## 2018-10-29 RX ORDER — QUETIAPINE FUMARATE 50 MG/1
50-100 TABLET, EXTENDED RELEASE ORAL NIGHTLY
Qty: 180 TABLET | Refills: 0 | Status: SHIPPED | OUTPATIENT
Start: 2018-10-29 | End: 2018-12-21 | Stop reason: SDUPTHER

## 2018-10-29 RX ORDER — ESCITALOPRAM OXALATE 20 MG/1
TABLET ORAL
Qty: 30 TABLET | Refills: 1 | Status: SHIPPED | OUTPATIENT
Start: 2018-10-29 | End: 2018-12-21 | Stop reason: SDUPTHER

## 2018-10-29 RX ORDER — METHOCARBAMOL 750 MG/1
750 TABLET, FILM COATED ORAL 2 TIMES DAILY
Qty: 180 TABLET | Refills: 0 | Status: SHIPPED | OUTPATIENT
Start: 2018-10-29 | End: 2018-12-21 | Stop reason: SDUPTHER

## 2018-10-29 RX ORDER — TRIAMCINOLONE ACETONIDE 40 MG/ML
40 INJECTION, SUSPENSION INTRA-ARTICULAR; INTRAMUSCULAR ONCE
Status: COMPLETED | OUTPATIENT
Start: 2018-10-29 | End: 2018-10-29

## 2018-10-29 RX ORDER — OXYCODONE HYDROCHLORIDE 15 MG/1
1 TABLET, FILM COATED, EXTENDED RELEASE ORAL 2 TIMES DAILY
Qty: 56 TABLET | Refills: 0 | Status: SHIPPED | OUTPATIENT
Start: 2018-10-29 | End: 2018-11-26 | Stop reason: SDUPTHER

## 2018-10-29 RX ADMIN — TRIAMCINOLONE ACETONIDE 40 MG: 40 INJECTION, SUSPENSION INTRA-ARTICULAR; INTRAMUSCULAR at 14:20

## 2018-10-29 NOTE — PROGRESS NOTES
 fenofibrate 160 MG tablet Take 160 mg by mouth daily      potassium chloride (KLOR-CON M) 10 MEQ extended release tablet Take 10 mEq by mouth 2 times daily      SITagliptin (JANUVIA) 100 MG tablet Take 100 mg by mouth daily      pantoprazole (PROTONIX) 40 MG injection Infuse 40 mg intravenously daily      lidocaine (LIDODERM) 5 % Place 2 patches onto the skin daily 12 hours on, 12 hours off.  diclofenac sodium 1 % GEL Apply 2 g topically 3 times daily      potassium chloride (KLOR-CON M) 10 MEQ extended release tablet Take 1 tablet by mouth 2 times daily 1 tablet 0    SITagliptin (JANUVIA) 100 MG tablet Take 1 tablet by mouth daily       amLODIPine (NORVASC) 10 MG tablet Take 10 mg by mouth daily      atorvastatin (LIPITOR) 80 MG tablet Take 80 mg by mouth daily       lisinopril-hydrochlorothiazide (PRINZIDE;ZESTORETIC) 10-12.5 MG per tablet Take 1 tablet by mouth daily      pantoprazole (PROTONIX) 40 MG tablet Take 40 mg by mouth daily       clobetasol (TEMOVATE) 0.05 % cream Apply topically 2 times daily to affected areas as needed for psoriasis. 60 g 3    fenofibrate (TRIGLIDE) 160 MG tablet Take 160 mg by mouth daily.  Multiple Vitamin (MULTIVITAMIN PO) Take 1 tablet by mouth daily.  cetirizine (ZYRTEC) 10 MG tablet Take 10 mg by mouth daily.  diclofenac sodium 1 % GEL APPLY TO THE AFFECTED AREA(S) THREE TIMES A DAY 5 Tube 1    oxyCODONE (OXYCONTIN) 15 MG T12A extended release tablet Take 1 tablet by mouth 2 times daily for 35 days. . 62 tablet 0    tapentadol (NUCYNTA) 75 MG TABS Take 1 tablet by mouth every 6 hours for 35 days. . 62 tablet 0    buPROPion (WELLBUTRIN XL) 150 MG extended release tablet Take 1 tablet by mouth every morning 90 tablet 0    escitalopram (LEXAPRO) 20 MG tablet TAKE ONE TABLET BY MOUTH ONCE DAILY 30 tablet 1    QUEtiapine (SEROQUEL XR) 50 MG extended release tablet Take 1-2 tablets by mouth nightly 180 tablet 1    methocarbamol (ROBAXIN) normal  His mood is Appropriate and affect is Flat/blunted and Anxious . His behavior is normal.   thought content normal.   Other: Trigger finger  Right hand little finger        IMPRESSION:     1. Trigger little finger of right hand - NEW PROBLEM: see treatment plan below    2. Chronic pain syndrome - STABLE: Continue current treatment plan    3. Failed back surgical syndrome. .- STABLE: Continue current treatment plan    4. Moderate episode of recurrent major depressive disorder ([address] - STABLE: Continue current treatment plan    5. Primary insomnia - STABLE: Continue current treatment plan    6. Fibromyalgia ,- STABLE: Continue current treatment plan    7. Cervical radiculitis - STABLE: Continue current treatment plan    8. DDD (degenerative disc disease), cervical - STABLE: Continue current treatment plan    9. ISMAEL (generalized anxiety disorder) - STABLE: Continue current treatment plan    10. Chondromalacia of patellofemoral joint, right .- STABLE: Continue current treatment plan       PLAN:  Informed verbal consent was obtained.  -Procedure:  right Small Finger Trigger Finger Injection  [first Injection]: After full discussion of the nature of this process and outlining a treatment plan with Mr. Willy Washington, we discussed the complications, limitations, expectations, alternatives, and risks of injection of the flexor tendon sheath. He understood this information well and verbally consented to this treatment. The skin of the symptomatic digit was prepped with Isopropyl Alcohol and under aseptic conditions the flexor tendon sheath was injected with a combination of 1/2 ml of 0.25% Bupivacaine without Epinephrine and 20 mg of Triamcinolone (40 mg/ml). Good filling of the flexor sheath was noted. A dry sterile bandage was applied and the patient tolerated the injection without difficulty. I advised the patient of the expected response, possible reactions and the instructions for care of the hand. (JANUVIA) 100 MG tablet Take 1 tablet by mouth daily       amLODIPine (NORVASC) 10 MG tablet Take 10 mg by mouth daily      atorvastatin (LIPITOR) 80 MG tablet Take 80 mg by mouth daily       lisinopril-hydrochlorothiazide (PRINZIDE;ZESTORETIC) 10-12.5 MG per tablet Take 1 tablet by mouth daily      pantoprazole (PROTONIX) 40 MG tablet Take 40 mg by mouth daily       clobetasol (TEMOVATE) 0.05 % cream Apply topically 2 times daily to affected areas as needed for psoriasis. 60 g 3    fenofibrate (TRIGLIDE) 160 MG tablet Take 160 mg by mouth daily.  Multiple Vitamin (MULTIVITAMIN PO) Take 1 tablet by mouth daily.  cetirizine (ZYRTEC) 10 MG tablet Take 10 mg by mouth daily. No current facility-administered medications for this visit. Goals of current treatment regimen include improvement in pain, restoration of functioning- with focus on improvement in physical performance, general activity, work or disability,emotional distress, health care utilization and  decreased medication consumption. Will continue to monitor progress towards achieving/maintaining therapeutic goals with special emphasis on  1. Improvement in perceived interfernce  of pain with ADL's. Ability to do home exercises independently. Ability to do household chores indoor and/or outdoor work and social and leisure activities. To increase flexibility/ROM, strength and endurance. Improve psychosocial and physical functioning.- he is not showing any significant progress/or showing regression  towards this goal and reassessment and adjustment of goals/treatment have been made. 2. Improving sleep to 6-7 hours a night. Improve mood/ anxiety and depression symptoms such as crying spells, low energy, problems with concentration, motivation.- he is showing progression towards this treatment goal with the current regimen. 3. Reduction of reliance on opioid analgesia/more appropriate opioid use. - he is showing progression

## 2018-11-26 ENCOUNTER — OFFICE VISIT (OUTPATIENT)
Dept: PAIN MANAGEMENT | Age: 63
End: 2018-11-26
Payer: COMMERCIAL

## 2018-11-26 VITALS
HEART RATE: 85 BPM | SYSTOLIC BLOOD PRESSURE: 152 MMHG | DIASTOLIC BLOOD PRESSURE: 91 MMHG | WEIGHT: 285 LBS | BODY MASS INDEX: 36.59 KG/M2

## 2018-11-26 DIAGNOSIS — M50.30 DDD (DEGENERATIVE DISC DISEASE), CERVICAL: ICD-10-CM

## 2018-11-26 DIAGNOSIS — M79.7 FIBROMYALGIA: ICD-10-CM

## 2018-11-26 DIAGNOSIS — G89.4 CHRONIC PAIN SYNDROME: ICD-10-CM

## 2018-11-26 DIAGNOSIS — M96.1 FAILED BACK SURGICAL SYNDROME: ICD-10-CM

## 2018-11-26 DIAGNOSIS — M54.12 CERVICAL RADICULITIS: ICD-10-CM

## 2018-11-26 PROCEDURE — 1123F ACP DISCUSS/DSCN MKR DOCD: CPT | Performed by: INTERNAL MEDICINE

## 2018-11-26 PROCEDURE — G8427 DOCREV CUR MEDS BY ELIG CLIN: HCPCS | Performed by: INTERNAL MEDICINE

## 2018-11-26 PROCEDURE — G8484 FLU IMMUNIZE NO ADMIN: HCPCS | Performed by: INTERNAL MEDICINE

## 2018-11-26 PROCEDURE — 99213 OFFICE O/P EST LOW 20 MIN: CPT | Performed by: INTERNAL MEDICINE

## 2018-11-26 PROCEDURE — 3017F COLORECTAL CA SCREEN DOC REV: CPT | Performed by: INTERNAL MEDICINE

## 2018-11-26 PROCEDURE — 1101F PT FALLS ASSESS-DOCD LE1/YR: CPT | Performed by: INTERNAL MEDICINE

## 2018-11-26 PROCEDURE — 4004F PT TOBACCO SCREEN RCVD TLK: CPT | Performed by: INTERNAL MEDICINE

## 2018-11-26 PROCEDURE — 4040F PNEUMOC VAC/ADMIN/RCVD: CPT | Performed by: INTERNAL MEDICINE

## 2018-11-26 PROCEDURE — G8417 CALC BMI ABV UP PARAM F/U: HCPCS | Performed by: INTERNAL MEDICINE

## 2018-11-26 RX ORDER — OXYCODONE HYDROCHLORIDE 15 MG/1
1 TABLET, FILM COATED, EXTENDED RELEASE ORAL 2 TIMES DAILY
Qty: 56 TABLET | Refills: 0 | Status: SHIPPED | OUTPATIENT
Start: 2018-11-26 | End: 2018-12-21 | Stop reason: SDUPTHER

## 2018-11-26 NOTE — PROGRESS NOTES
affected areas as needed for psoriasis. 60 g 3    fenofibrate (TRIGLIDE) 160 MG tablet Take 160 mg by mouth daily.  Multiple Vitamin (MULTIVITAMIN PO) Take 1 tablet by mouth daily.  cetirizine (ZYRTEC) 10 MG tablet Take 10 mg by mouth daily. No current facility-administered medications for this visit. I will continue his current medication regimen  which is part of the above treatment schedule. It has been helping with Mr. Henry Mcnamara chronic  medical problems which for this visit include:   Diagnoses of Chronic pain syndrome, Failed back surgical syndrome, Moderate episode of recurrent major depressive disorder ([facility] [address] Primary insomnia, Fibromyalgia, Cervical radiculitis, DDD (degenerative disc disease), cervical, ISMAEL (generalized anxiety disorder), and Trigger little finger of right hand were pertinent to this visit. Risks and benefits of the medications and other alternative treatments  including no treatment were discussed with the patient. The common side effects of these medications were also explained to the patient. Informed verbal consent was obtained. Goals of current treatment regimen include improvement in pain, restoration of functioning- with focus on improvement in physical performance, general activity, work or disability,emotional distress, health care utilization and  decreased medication consumption. Will continue to monitor progress towards achieving/maintaining therapeutic goals with special emphasis on  1. Improvement in perceived interfernce  of pain with ADL's. Ability to do home exercises independently. Ability to do household chores indoor and/or outdoor work and social and leisure activities. Improve psychosocial and physical functioning. - he is showing progression towards this treatment goal with the current regimen    He was advised against drinking alcohol with the narcotic pain medicines, advised against driving or handling machinery while adjusting the dose

## 2018-12-08 ENCOUNTER — TELEPHONE (OUTPATIENT)
Dept: PHARMACY | Facility: CLINIC | Age: 63
End: 2018-12-08

## 2018-12-11 ENCOUNTER — HOSPITAL ENCOUNTER (OUTPATIENT)
Age: 63
Discharge: HOME | End: 2018-12-11
Payer: COMMERCIAL

## 2018-12-11 LAB
ALBUMIN SERPL-MCNC: 4.3 G/DL (ref 3.4–5)
ALP BLD-CCNC: 57 U/L (ref 40–129)
ALT SERPL-CCNC: 14 U/L (ref 10–40)
ANION GAP SERPL CALCULATED.3IONS-SCNC: 12 MMOL/L (ref 3–16)
AST SERPL-CCNC: 14 U/L (ref 15–37)
BASOPHILS ABSOLUTE: 0.1 K/UL (ref 0–0.2)
BASOPHILS RELATIVE PERCENT: 1 %
BILIRUB SERPL-MCNC: 0.3 MG/DL (ref 0–1)
BILIRUBIN DIRECT: <0.2 MG/DL (ref 0–0.3)
BILIRUBIN, INDIRECT: ABNORMAL MG/DL (ref 0–1)
BUN BLDV-MCNC: 18 MG/DL (ref 7–20)
CALCIUM SERPL-MCNC: 10 MG/DL (ref 8.3–10.6)
CHLORIDE BLD-SCNC: 103 MMOL/L (ref 99–110)
CHOLESTEROL, TOTAL: 211 MG/DL (ref 0–199)
CO2: 27 MMOL/L (ref 21–32)
CREAT SERPL-MCNC: 1 MG/DL (ref 0.8–1.3)
CREATININE URINE: 161.8 MG/DL (ref 39–259)
EOSINOPHILS ABSOLUTE: 0.5 K/UL (ref 0–0.6)
EOSINOPHILS RELATIVE PERCENT: 5.7 %
ESTIMATED AVERAGE GLUCOSE: 145.6 MG/DL
FOLATE: 14.76 NG/ML (ref 4.78–24.2)
GFR AFRICAN AMERICAN: >60
GFR NON-AFRICAN AMERICAN: >60
GLUCOSE BLD-MCNC: 103 MG/DL (ref 70–99)
HBA1C MFR BLD: 6.7 %
HCT VFR BLD CALC: 45.4 % (ref 40.5–52.5)
HDLC SERPL-MCNC: 30 MG/DL (ref 40–60)
HEMOGLOBIN: 15.1 G/DL (ref 13.5–17.5)
LDL CHOLESTEROL CALCULATED: 153 MG/DL
LYMPHOCYTES ABSOLUTE: 2.5 K/UL (ref 1–5.1)
LYMPHOCYTES RELATIVE PERCENT: 27.3 %
MAGNESIUM: 1.9 MG/DL (ref 1.8–2.4)
MCH RBC QN AUTO: 30.3 PG (ref 26–34)
MCHC RBC AUTO-ENTMCNC: 33.2 G/DL (ref 31–36)
MCV RBC AUTO: 91.2 FL (ref 80–100)
MICROALBUMIN UR-MCNC: <1.2 MG/DL
MICROALBUMIN/CREAT UR-RTO: NORMAL MG/G (ref 0–30)
MONOCYTES ABSOLUTE: 0.8 K/UL (ref 0–1.3)
MONOCYTES RELATIVE PERCENT: 9.1 %
NEUTROPHILS ABSOLUTE: 5.2 K/UL (ref 1.7–7.7)
NEUTROPHILS RELATIVE PERCENT: 56.9 %
PDW BLD-RTO: 14.6 % (ref 12.4–15.4)
PLATELET # BLD: 249 K/UL (ref 135–450)
PMV BLD AUTO: 9.1 FL (ref 5–10.5)
POTASSIUM SERPL-SCNC: 4.5 MMOL/L (ref 3.5–5.1)
RBC # BLD: 4.97 M/UL (ref 4.2–5.9)
SODIUM BLD-SCNC: 142 MMOL/L (ref 136–145)
T4 FREE: 1 NG/DL (ref 0.9–1.8)
TOTAL PROTEIN: 6.8 G/DL (ref 6.4–8.2)
TRIGL SERPL-MCNC: 142 MG/DL (ref 0–150)
TSH SERPL DL<=0.05 MIU/L-ACNC: 1.95 UIU/ML (ref 0.27–4.2)
VITAMIN B-12: 404 PG/ML (ref 211–911)
VLDLC SERPL CALC-MCNC: 28 MG/DL
WBC # BLD: 9.1 K/UL (ref 4–11)

## 2018-12-11 PROCEDURE — 80061 LIPID PANEL: CPT

## 2018-12-11 PROCEDURE — 82607 VITAMIN B-12: CPT

## 2018-12-11 PROCEDURE — 83735 ASSAY OF MAGNESIUM: CPT

## 2018-12-11 PROCEDURE — 82043 UR ALBUMIN QUANTITATIVE: CPT

## 2018-12-11 PROCEDURE — 36415 COLL VENOUS BLD VENIPUNCTURE: CPT

## 2018-12-11 PROCEDURE — 80076 HEPATIC FUNCTION PANEL: CPT

## 2018-12-11 PROCEDURE — 84439 ASSAY OF FREE THYROXINE: CPT

## 2018-12-11 PROCEDURE — 83036 HEMOGLOBIN GLYCOSYLATED A1C: CPT

## 2018-12-11 PROCEDURE — 85025 COMPLETE CBC W/AUTO DIFF WBC: CPT

## 2018-12-11 PROCEDURE — 80048 BASIC METABOLIC PNL TOTAL CA: CPT

## 2018-12-11 PROCEDURE — 82570 ASSAY OF URINE CREATININE: CPT

## 2018-12-11 PROCEDURE — 84443 ASSAY THYROID STIM HORMONE: CPT

## 2018-12-11 PROCEDURE — 82746 ASSAY OF FOLIC ACID SERUM: CPT

## 2018-12-12 LAB — DIABETIC RETINOPATHY: NEGATIVE

## 2018-12-15 LAB — DIABETIC RETINOPATHY: NEGATIVE

## 2018-12-21 ENCOUNTER — OFFICE VISIT (OUTPATIENT)
Dept: PAIN MANAGEMENT | Age: 63
End: 2018-12-21
Payer: COMMERCIAL

## 2018-12-21 VITALS
HEART RATE: 86 BPM | WEIGHT: 285 LBS | DIASTOLIC BLOOD PRESSURE: 69 MMHG | SYSTOLIC BLOOD PRESSURE: 158 MMHG | BODY MASS INDEX: 36.59 KG/M2

## 2018-12-21 DIAGNOSIS — G89.4 CHRONIC PAIN SYNDROME: ICD-10-CM

## 2018-12-21 DIAGNOSIS — M54.12 CERVICAL RADICULITIS: ICD-10-CM

## 2018-12-21 DIAGNOSIS — M96.1 FAILED BACK SURGICAL SYNDROME: ICD-10-CM

## 2018-12-21 DIAGNOSIS — M50.30 DDD (DEGENERATIVE DISC DISEASE), CERVICAL: ICD-10-CM

## 2018-12-21 DIAGNOSIS — M79.7 FIBROMYALGIA: ICD-10-CM

## 2018-12-21 PROCEDURE — 3017F COLORECTAL CA SCREEN DOC REV: CPT | Performed by: INTERNAL MEDICINE

## 2018-12-21 PROCEDURE — 4040F PNEUMOC VAC/ADMIN/RCVD: CPT | Performed by: INTERNAL MEDICINE

## 2018-12-21 PROCEDURE — 1123F ACP DISCUSS/DSCN MKR DOCD: CPT | Performed by: INTERNAL MEDICINE

## 2018-12-21 PROCEDURE — 4004F PT TOBACCO SCREEN RCVD TLK: CPT | Performed by: INTERNAL MEDICINE

## 2018-12-21 PROCEDURE — G8484 FLU IMMUNIZE NO ADMIN: HCPCS | Performed by: INTERNAL MEDICINE

## 2018-12-21 PROCEDURE — G8427 DOCREV CUR MEDS BY ELIG CLIN: HCPCS | Performed by: INTERNAL MEDICINE

## 2018-12-21 PROCEDURE — G8417 CALC BMI ABV UP PARAM F/U: HCPCS | Performed by: INTERNAL MEDICINE

## 2018-12-21 PROCEDURE — 99213 OFFICE O/P EST LOW 20 MIN: CPT | Performed by: INTERNAL MEDICINE

## 2018-12-21 PROCEDURE — 1101F PT FALLS ASSESS-DOCD LE1/YR: CPT | Performed by: INTERNAL MEDICINE

## 2018-12-21 RX ORDER — OXYCODONE HYDROCHLORIDE 15 MG/1
1 TABLET, FILM COATED, EXTENDED RELEASE ORAL 2 TIMES DAILY
Qty: 56 TABLET | Refills: 0 | Status: SHIPPED | OUTPATIENT
Start: 2018-12-21 | End: 2019-01-18 | Stop reason: SDUPTHER

## 2018-12-21 RX ORDER — METHOCARBAMOL 750 MG/1
750 TABLET, FILM COATED ORAL 2 TIMES DAILY
Qty: 180 TABLET | Refills: 0 | Status: SHIPPED | OUTPATIENT
Start: 2018-12-21 | End: 2019-01-18 | Stop reason: SDUPTHER

## 2018-12-21 RX ORDER — ESCITALOPRAM OXALATE 20 MG/1
TABLET ORAL
Qty: 30 TABLET | Refills: 1 | Status: SHIPPED | OUTPATIENT
Start: 2018-12-21 | End: 2019-01-18 | Stop reason: SDUPTHER

## 2018-12-21 RX ORDER — QUETIAPINE FUMARATE 50 MG/1
50-100 TABLET, EXTENDED RELEASE ORAL NIGHTLY
Qty: 180 TABLET | Refills: 0 | Status: SHIPPED | OUTPATIENT
Start: 2018-12-21 | End: 2019-01-18 | Stop reason: SDUPTHER

## 2018-12-21 NOTE — PROGRESS NOTES
[facility]  1955  [id number]      HISTORY OF PRESENT ILLNESS:  Mr. Darylene Blanc is a 70 y.o. male returns for a follow up visit for pain management  He has a diagnosis of   1. Chronic pain syndrome    2. Failed back surgical syndrome    3. Moderate episode of recurrent major depressive disorder ([facility] [address]    4. Primary insomnia    5. Gastroesophageal reflux disease without esophagitis    6. Fibromyalgia    7. Cervical radiculitis    8. DDD (degenerative disc disease), cervical    9. ISMAEL (generalized anxiety disorder)    . He complains of pain in the both buttocks, both foot/feet: entire area, bilateral lower back, bilateral mid-back, neck and bilateral upper back He rates the pain 6/10 and describes it as aching, throbbing, numbness. Current treatment regimen has helped relieve about 40% of the pain. He denies any side effects from the current pain regimen. Patient reports that since the last follow up visit the physical functioning is unchanged, family/social relationships are unchanged, mood is unchanged sleep patterns are unchanged, and that the overall functioning is unchanged. Patient denies misusing/abusing his narcotic pain medications or using any illegal drugs. There are No indicators for possible drug abuse, addiction or diversion problems. Patient states he has been doing fair, pain comes/goes but his neck pain is better. Mr. Darylene Blanc says his back is still hurting. Patient has been complaint with the medications. He denies any weight gain. He says he has been staying active around the house. Patient denies any constipation symptoms. ALLERGIES: Patients list of allergies were reviewed     MEDICATIONS: Mr. Darylene Blanc list of medications were reviewed. His current medications are   Outpatient Medications Prior to Visit   Medication Sig Dispense Refill    diclofenac sodium 1 % GEL APPLY TO THE AFFECTED AREA(S) THREE TIMES A DAY 5 Tube 1    oxyCODONE (OXYCONTIN) 15 MG T12A extended release tablet Take 1 buPROPion (WELLBUTRIN XL) 150 MG extended release tablet Take 1 tablet by mouth every morning 90 tablet 0     No facility-administered medications prior to visit. SOCIAL/FAMILY/PAST MEDICAL HISTORY: Mr. Qian Sarabia, family and past medical history was reviewed. REVIEW OF SYSTEMS:    Respiratory: Negative for apnea, chest tightness and shortness of breath or change in baseline breathing. Gastrointestinal: Negative for nausea, vomiting, abdominal pain, diarrhea, constipation, blood in stool and abdominal distention. PHYSICAL EXAM:   Nursing note and vitals reviewed. BP (!) 158/69   Pulse 86   Wt 285 lb (129.3 kg)   BMI 36.59 kg/m²   Constitutional: He appears well-developed and well-nourished. No acute distress. Skin: Skin is warm and dry, good turgor. No rash noted. He is not diaphoretic. Cardiovascular: Normal rate, regular rhythm, normal heart sounds, and does not have murmur. Pulmonary/Chest: Effort normal. No respiratory distress. He does not have wheezes in the lung fields. He has no rales. Neurological/Psychiatric:He is alert and oriented to person, place, and time. Coordination is  normal. His mood isAppropriate and affect is Anxious . IMPRESSION:   1. Chronic pain syndrome    2. Failed back surgical syndrome    3. Fibromyalgia    4. Cervical radiculitis    5.  DDD (degenerative disc disease), cervical        PLAN:  Informed verbal consent was obtained  -He was advised weight reduction, diet changes- 800-1200 melanie diet, diet diary, exercising, nutritional  consult increased physical activity as tolerated  -continue with current opioid regimen   -he was advised proper sleep hygiene-told to avoid:use of caffeine or other stimulants after noon, alcohol use near bedtime, long or frequent naps during the day, erratic sleep schedule, heavy meals near bedtime, vigorous exercise near bedtime and use of electronic devices near bedtime   -walking/stretching exercises as advised

## 2019-01-07 ENCOUNTER — TELEPHONE (OUTPATIENT)
Dept: PHARMACY | Facility: CLINIC | Age: 64
End: 2019-01-07

## 2019-01-07 RX ORDER — TRAZODONE HYDROCHLORIDE 50 MG/1
50 TABLET ORAL NIGHTLY
COMMUNITY
End: 2020-08-25

## 2019-01-07 RX ORDER — CHOLECALCIFEROL (VITAMIN D3) 125 MCG
500 CAPSULE ORAL DAILY
COMMUNITY
End: 2019-04-11

## 2019-01-18 ENCOUNTER — OFFICE VISIT (OUTPATIENT)
Dept: PAIN MANAGEMENT | Age: 64
End: 2019-01-18
Payer: COMMERCIAL

## 2019-01-18 VITALS — HEART RATE: 92 BPM | SYSTOLIC BLOOD PRESSURE: 132 MMHG | DIASTOLIC BLOOD PRESSURE: 85 MMHG

## 2019-01-18 DIAGNOSIS — G89.4 CHRONIC PAIN SYNDROME: ICD-10-CM

## 2019-01-18 DIAGNOSIS — F41.1 GAD (GENERALIZED ANXIETY DISORDER): ICD-10-CM

## 2019-01-18 DIAGNOSIS — F33.1 MODERATE EPISODE OF RECURRENT MAJOR DEPRESSIVE DISORDER (HCC): ICD-10-CM

## 2019-01-18 DIAGNOSIS — M50.30 DDD (DEGENERATIVE DISC DISEASE), CERVICAL: ICD-10-CM

## 2019-01-18 DIAGNOSIS — F51.01 PRIMARY INSOMNIA: ICD-10-CM

## 2019-01-18 DIAGNOSIS — M96.1 FAILED BACK SURGICAL SYNDROME: ICD-10-CM

## 2019-01-18 DIAGNOSIS — Z91.89 AT RISK FOR RESPIRATORY DEPRESSION DUE TO OPIOID: ICD-10-CM

## 2019-01-18 DIAGNOSIS — M79.7 FIBROMYALGIA: ICD-10-CM

## 2019-01-18 DIAGNOSIS — M54.12 CERVICAL RADICULITIS: ICD-10-CM

## 2019-01-18 PROCEDURE — 3017F COLORECTAL CA SCREEN DOC REV: CPT | Performed by: INTERNAL MEDICINE

## 2019-01-18 PROCEDURE — G8427 DOCREV CUR MEDS BY ELIG CLIN: HCPCS | Performed by: INTERNAL MEDICINE

## 2019-01-18 PROCEDURE — 4040F PNEUMOC VAC/ADMIN/RCVD: CPT | Performed by: INTERNAL MEDICINE

## 2019-01-18 PROCEDURE — 99214 OFFICE O/P EST MOD 30 MIN: CPT | Performed by: INTERNAL MEDICINE

## 2019-01-18 PROCEDURE — 1101F PT FALLS ASSESS-DOCD LE1/YR: CPT | Performed by: INTERNAL MEDICINE

## 2019-01-18 PROCEDURE — 4004F PT TOBACCO SCREEN RCVD TLK: CPT | Performed by: INTERNAL MEDICINE

## 2019-01-18 PROCEDURE — G8482 FLU IMMUNIZE ORDER/ADMIN: HCPCS | Performed by: INTERNAL MEDICINE

## 2019-01-18 PROCEDURE — G8417 CALC BMI ABV UP PARAM F/U: HCPCS | Performed by: INTERNAL MEDICINE

## 2019-01-18 PROCEDURE — 1123F ACP DISCUSS/DSCN MKR DOCD: CPT | Performed by: INTERNAL MEDICINE

## 2019-01-18 RX ORDER — NALOXONE HYDROCHLORIDE 2 MG/.4ML
INJECTION, SOLUTION INTRAMUSCULAR; SUBCUTANEOUS
Qty: 1 PACKAGE | Refills: 0 | Status: SHIPPED | OUTPATIENT
Start: 2019-01-18 | End: 2019-04-11

## 2019-01-18 RX ORDER — QUETIAPINE FUMARATE 50 MG/1
50-100 TABLET, EXTENDED RELEASE ORAL NIGHTLY
Qty: 180 TABLET | Refills: 0 | Status: SHIPPED | OUTPATIENT
Start: 2019-01-18 | End: 2019-03-15

## 2019-01-18 RX ORDER — OXYCODONE HYDROCHLORIDE 15 MG/1
1 TABLET, FILM COATED, EXTENDED RELEASE ORAL 2 TIMES DAILY
Qty: 56 TABLET | Refills: 0 | Status: SHIPPED | OUTPATIENT
Start: 2019-01-18 | End: 2019-02-15 | Stop reason: SDUPTHER

## 2019-01-18 RX ORDER — ESCITALOPRAM OXALATE 20 MG/1
TABLET ORAL
Qty: 30 TABLET | Refills: 1 | Status: SHIPPED | OUTPATIENT
Start: 2019-01-18 | End: 2019-03-15 | Stop reason: SDUPTHER

## 2019-01-18 RX ORDER — METHOCARBAMOL 750 MG/1
750 TABLET, FILM COATED ORAL 2 TIMES DAILY
Qty: 180 TABLET | Refills: 0 | Status: SHIPPED | OUTPATIENT
Start: 2019-01-18 | End: 2019-03-15

## 2019-02-15 ENCOUNTER — OFFICE VISIT (OUTPATIENT)
Dept: PAIN MANAGEMENT | Age: 64
End: 2019-02-15
Payer: COMMERCIAL

## 2019-02-15 VITALS
DIASTOLIC BLOOD PRESSURE: 85 MMHG | BODY MASS INDEX: 39.29 KG/M2 | HEART RATE: 96 BPM | SYSTOLIC BLOOD PRESSURE: 137 MMHG | WEIGHT: 306 LBS

## 2019-02-15 DIAGNOSIS — M79.7 FIBROMYALGIA: ICD-10-CM

## 2019-02-15 DIAGNOSIS — G89.4 CHRONIC PAIN SYNDROME: ICD-10-CM

## 2019-02-15 DIAGNOSIS — M96.1 FAILED BACK SURGICAL SYNDROME: ICD-10-CM

## 2019-02-15 DIAGNOSIS — M50.30 DDD (DEGENERATIVE DISC DISEASE), CERVICAL: ICD-10-CM

## 2019-02-15 DIAGNOSIS — M54.12 CERVICAL RADICULITIS: ICD-10-CM

## 2019-02-15 PROCEDURE — G8427 DOCREV CUR MEDS BY ELIG CLIN: HCPCS | Performed by: INTERNAL MEDICINE

## 2019-02-15 PROCEDURE — 4040F PNEUMOC VAC/ADMIN/RCVD: CPT | Performed by: INTERNAL MEDICINE

## 2019-02-15 PROCEDURE — 1123F ACP DISCUSS/DSCN MKR DOCD: CPT | Performed by: INTERNAL MEDICINE

## 2019-02-15 PROCEDURE — 4004F PT TOBACCO SCREEN RCVD TLK: CPT | Performed by: INTERNAL MEDICINE

## 2019-02-15 PROCEDURE — G8417 CALC BMI ABV UP PARAM F/U: HCPCS | Performed by: INTERNAL MEDICINE

## 2019-02-15 PROCEDURE — 99213 OFFICE O/P EST LOW 20 MIN: CPT | Performed by: INTERNAL MEDICINE

## 2019-02-15 PROCEDURE — 1101F PT FALLS ASSESS-DOCD LE1/YR: CPT | Performed by: INTERNAL MEDICINE

## 2019-02-15 PROCEDURE — 3017F COLORECTAL CA SCREEN DOC REV: CPT | Performed by: INTERNAL MEDICINE

## 2019-02-15 PROCEDURE — G8482 FLU IMMUNIZE ORDER/ADMIN: HCPCS | Performed by: INTERNAL MEDICINE

## 2019-02-15 RX ORDER — OXYCODONE HYDROCHLORIDE 15 MG/1
1 TABLET, FILM COATED, EXTENDED RELEASE ORAL 2 TIMES DAILY
Qty: 56 TABLET | Refills: 0 | Status: SHIPPED | OUTPATIENT
Start: 2019-02-15 | End: 2019-03-15 | Stop reason: SDUPTHER

## 2019-03-15 ENCOUNTER — OFFICE VISIT (OUTPATIENT)
Dept: PAIN MANAGEMENT | Age: 64
End: 2019-03-15
Payer: COMMERCIAL

## 2019-03-15 VITALS
SYSTOLIC BLOOD PRESSURE: 134 MMHG | BODY MASS INDEX: 39.29 KG/M2 | WEIGHT: 306 LBS | DIASTOLIC BLOOD PRESSURE: 85 MMHG | HEART RATE: 88 BPM

## 2019-03-15 DIAGNOSIS — F41.1 GAD (GENERALIZED ANXIETY DISORDER): ICD-10-CM

## 2019-03-15 DIAGNOSIS — M50.30 DDD (DEGENERATIVE DISC DISEASE), CERVICAL: ICD-10-CM

## 2019-03-15 DIAGNOSIS — F51.01 PRIMARY INSOMNIA: ICD-10-CM

## 2019-03-15 DIAGNOSIS — M96.1 FAILED BACK SURGICAL SYNDROME: ICD-10-CM

## 2019-03-15 DIAGNOSIS — F33.1 MODERATE EPISODE OF RECURRENT MAJOR DEPRESSIVE DISORDER (HCC): ICD-10-CM

## 2019-03-15 DIAGNOSIS — K21.9 GASTROESOPHAGEAL REFLUX DISEASE WITHOUT ESOPHAGITIS: ICD-10-CM

## 2019-03-15 DIAGNOSIS — G89.4 CHRONIC PAIN SYNDROME: ICD-10-CM

## 2019-03-15 DIAGNOSIS — M79.7 FIBROMYALGIA: ICD-10-CM

## 2019-03-15 DIAGNOSIS — M54.12 CERVICAL RADICULITIS: ICD-10-CM

## 2019-03-15 PROCEDURE — G8427 DOCREV CUR MEDS BY ELIG CLIN: HCPCS | Performed by: INTERNAL MEDICINE

## 2019-03-15 PROCEDURE — G8482 FLU IMMUNIZE ORDER/ADMIN: HCPCS | Performed by: INTERNAL MEDICINE

## 2019-03-15 PROCEDURE — 3017F COLORECTAL CA SCREEN DOC REV: CPT | Performed by: INTERNAL MEDICINE

## 2019-03-15 PROCEDURE — G8417 CALC BMI ABV UP PARAM F/U: HCPCS | Performed by: INTERNAL MEDICINE

## 2019-03-15 PROCEDURE — 99214 OFFICE O/P EST MOD 30 MIN: CPT | Performed by: INTERNAL MEDICINE

## 2019-03-15 PROCEDURE — 1101F PT FALLS ASSESS-DOCD LE1/YR: CPT | Performed by: INTERNAL MEDICINE

## 2019-03-15 PROCEDURE — 20553 NJX 1/MLT TRIGGER POINTS 3/>: CPT | Performed by: INTERNAL MEDICINE

## 2019-03-15 PROCEDURE — 1123F ACP DISCUSS/DSCN MKR DOCD: CPT | Performed by: INTERNAL MEDICINE

## 2019-03-15 PROCEDURE — 4040F PNEUMOC VAC/ADMIN/RCVD: CPT | Performed by: INTERNAL MEDICINE

## 2019-03-15 PROCEDURE — 4004F PT TOBACCO SCREEN RCVD TLK: CPT | Performed by: INTERNAL MEDICINE

## 2019-03-15 RX ORDER — TIZANIDINE 4 MG/1
TABLET ORAL
Qty: 60 TABLET | Refills: 0 | Status: SHIPPED | OUTPATIENT
Start: 2019-03-15 | End: 2019-04-12 | Stop reason: SDUPTHER

## 2019-03-15 RX ORDER — CELECOXIB 200 MG/1
200 CAPSULE ORAL DAILY PRN
Qty: 15 CAPSULE | Refills: 1 | Status: SHIPPED | OUTPATIENT
Start: 2019-03-15 | End: 2019-04-11

## 2019-03-15 RX ORDER — TRIAMCINOLONE ACETONIDE 40 MG/ML
40 INJECTION, SUSPENSION INTRA-ARTICULAR; INTRAMUSCULAR ONCE
Status: COMPLETED | OUTPATIENT
Start: 2019-03-15 | End: 2019-03-15

## 2019-03-15 RX ORDER — ESCITALOPRAM OXALATE 20 MG/1
TABLET ORAL
Qty: 30 TABLET | Refills: 1 | Status: SHIPPED | OUTPATIENT
Start: 2019-03-15 | End: 2019-04-12 | Stop reason: SDUPTHER

## 2019-03-15 RX ORDER — OXYCODONE HYDROCHLORIDE 15 MG/1
1 TABLET, FILM COATED, EXTENDED RELEASE ORAL 2 TIMES DAILY
Qty: 56 TABLET | Refills: 0 | Status: SHIPPED | OUTPATIENT
Start: 2019-03-15 | End: 2019-04-12 | Stop reason: SDUPTHER

## 2019-03-15 RX ADMIN — TRIAMCINOLONE ACETONIDE 40 MG: 40 INJECTION, SUSPENSION INTRA-ARTICULAR; INTRAMUSCULAR at 09:11

## 2019-03-18 ENCOUNTER — TELEPHONE (OUTPATIENT)
Dept: PAIN MANAGEMENT | Age: 64
End: 2019-03-18

## 2019-03-18 DIAGNOSIS — M54.12 CERVICAL RADICULOPATHY: ICD-10-CM

## 2019-03-18 DIAGNOSIS — M50.30 DDD (DEGENERATIVE DISC DISEASE), CERVICAL: ICD-10-CM

## 2019-03-18 DIAGNOSIS — G89.4 CHRONIC PAIN SYNDROME: Primary | ICD-10-CM

## 2019-03-18 DIAGNOSIS — M54.12 CERVICAL RADICULITIS: ICD-10-CM

## 2019-03-24 ENCOUNTER — NURSE TRIAGE (OUTPATIENT)
Dept: OTHER | Facility: CLINIC | Age: 64
End: 2019-03-24

## 2019-03-24 ENCOUNTER — HOSPITAL ENCOUNTER (EMERGENCY)
Age: 64
Discharge: HOME | End: 2019-03-25
Attending: EMERGENCY MEDICINE
Payer: COMMERCIAL

## 2019-03-24 DIAGNOSIS — R73.9 HYPERGLYCEMIA: Primary | ICD-10-CM

## 2019-03-24 LAB
A/G RATIO: 1.4 (ref 1.1–2.2)
ALBUMIN SERPL-MCNC: 3.9 G/DL (ref 3.4–5)
ALP BLD-CCNC: 81 U/L (ref 40–129)
ALT SERPL-CCNC: 20 U/L (ref 10–40)
ANION GAP SERPL CALCULATED.3IONS-SCNC: 13 MMOL/L (ref 3–16)
AST SERPL-CCNC: 16 U/L (ref 15–37)
BASE EXCESS VENOUS: -1.1 MMOL/L (ref -3–3)
BASOPHILS ABSOLUTE: 0.1 K/UL (ref 0–0.2)
BASOPHILS RELATIVE PERCENT: 0.7 %
BETA-HYDROXYBUTYRATE: 0.5 MMOL/L (ref 0–0.27)
BILIRUB SERPL-MCNC: 0.4 MG/DL (ref 0–1)
BILIRUBIN URINE: NEGATIVE
BLOOD, URINE: NEGATIVE
BUN BLDV-MCNC: 53 MG/DL (ref 7–20)
CALCIUM SERPL-MCNC: 9.9 MG/DL (ref 8.3–10.6)
CARBOXYHEMOGLOBIN: 11.1 % (ref 0–1.5)
CHLORIDE BLD-SCNC: 93 MMOL/L (ref 99–110)
CLARITY: CLEAR
CO2: 21 MMOL/L (ref 21–32)
COLOR: YELLOW
CREAT SERPL-MCNC: 1.3 MG/DL (ref 0.8–1.3)
EOSINOPHILS ABSOLUTE: 0.2 K/UL (ref 0–0.6)
EOSINOPHILS RELATIVE PERCENT: 1.7 %
GFR AFRICAN AMERICAN: >60
GFR NON-AFRICAN AMERICAN: 55
GLOBULIN: 2.7 G/DL
GLUCOSE BLD-MCNC: 385 MG/DL (ref 70–99)
GLUCOSE BLD-MCNC: 461 MG/DL (ref 70–99)
GLUCOSE BLD-MCNC: 462 MG/DL (ref 70–99)
GLUCOSE URINE: >=1000 MG/DL
HCO3 VENOUS: 18.9 MMOL/L (ref 23–29)
HCT VFR BLD CALC: 45.1 % (ref 40.5–52.5)
HEMOGLOBIN: 14.9 G/DL (ref 13.5–17.5)
KETONES, URINE: NEGATIVE MG/DL
LACTIC ACID: 1 MMOL/L (ref 0.4–2)
LEUKOCYTE ESTERASE, URINE: NEGATIVE
LYMPHOCYTES ABSOLUTE: 2.9 K/UL (ref 1–5.1)
LYMPHOCYTES RELATIVE PERCENT: 24.5 %
MCH RBC QN AUTO: 30.1 PG (ref 26–34)
MCHC RBC AUTO-ENTMCNC: 33.1 G/DL (ref 31–36)
MCV RBC AUTO: 90.8 FL (ref 80–100)
METHEMOGLOBIN VENOUS: 0.1 %
MICROSCOPIC EXAMINATION: ABNORMAL
MONOCYTES ABSOLUTE: 1 K/UL (ref 0–1.3)
MONOCYTES RELATIVE PERCENT: 8.6 %
NEUTROPHILS ABSOLUTE: 7.7 K/UL (ref 1.7–7.7)
NEUTROPHILS RELATIVE PERCENT: 64.5 %
NITRITE, URINE: NEGATIVE
O2 CONTENT, VEN: 19 VOL %
O2 SAT, VEN: 100 %
O2 THERAPY: ABNORMAL
PCO2, VEN: 21.5 MMHG (ref 40–50)
PDW BLD-RTO: 13.8 % (ref 12.4–15.4)
PERFORMED ON: ABNORMAL
PERFORMED ON: ABNORMAL
PH UA: 5.5 (ref 5–8)
PH VENOUS: 7.55 (ref 7.35–7.45)
PLATELET # BLD: 237 K/UL (ref 135–450)
PMV BLD AUTO: 10.3 FL (ref 5–10.5)
PO2, VEN: 153 MMHG (ref 25–40)
POTASSIUM SERPL-SCNC: 4.4 MMOL/L (ref 3.5–5.1)
PROTEIN UA: NEGATIVE MG/DL
RBC # BLD: 4.97 M/UL (ref 4.2–5.9)
SODIUM BLD-SCNC: 127 MMOL/L (ref 136–145)
SPECIFIC GRAVITY UA: 1.03 (ref 1–1.03)
TCO2 CALC VENOUS: 44 MMOL/L
TOTAL PROTEIN: 6.6 G/DL (ref 6.4–8.2)
URINE REFLEX TO CULTURE: ABNORMAL
URINE TYPE: ABNORMAL
UROBILINOGEN, URINE: 0.2 E.U./DL
WBC # BLD: 12 K/UL (ref 4–11)

## 2019-03-24 PROCEDURE — 2580000003 HC RX 258: Performed by: PHYSICIAN ASSISTANT

## 2019-03-24 PROCEDURE — 82010 KETONE BODYS QUAN: CPT

## 2019-03-24 PROCEDURE — 85025 COMPLETE CBC W/AUTO DIFF WBC: CPT

## 2019-03-24 PROCEDURE — 80053 COMPREHEN METABOLIC PANEL: CPT

## 2019-03-24 PROCEDURE — 82803 BLOOD GASES ANY COMBINATION: CPT

## 2019-03-24 PROCEDURE — 99283 EMERGENCY DEPT VISIT LOW MDM: CPT

## 2019-03-24 PROCEDURE — 83605 ASSAY OF LACTIC ACID: CPT

## 2019-03-24 PROCEDURE — 2580000003 HC RX 258

## 2019-03-24 PROCEDURE — 81003 URINALYSIS AUTO W/O SCOPE: CPT

## 2019-03-24 PROCEDURE — 96361 HYDRATE IV INFUSION ADD-ON: CPT

## 2019-03-24 PROCEDURE — 96365 THER/PROPH/DIAG IV INF INIT: CPT

## 2019-03-24 RX ORDER — SODIUM CHLORIDE, SODIUM LACTATE, POTASSIUM CHLORIDE, AND CALCIUM CHLORIDE .6; .31; .03; .02 G/100ML; G/100ML; G/100ML; G/100ML
1000 INJECTION, SOLUTION INTRAVENOUS ONCE
Status: COMPLETED | OUTPATIENT
Start: 2019-03-24 | End: 2019-03-25

## 2019-03-24 RX ORDER — 0.9 % SODIUM CHLORIDE 0.9 %
1000 INTRAVENOUS SOLUTION INTRAVENOUS ONCE
Status: COMPLETED | OUTPATIENT
Start: 2019-03-24 | End: 2019-03-24

## 2019-03-24 RX ORDER — SODIUM CHLORIDE, SODIUM LACTATE, POTASSIUM CHLORIDE, CALCIUM CHLORIDE 600; 310; 30; 20 MG/100ML; MG/100ML; MG/100ML; MG/100ML
INJECTION, SOLUTION INTRAVENOUS
Status: COMPLETED
Start: 2019-03-24 | End: 2019-03-25

## 2019-03-24 RX ADMIN — SODIUM CHLORIDE, SODIUM LACTATE, POTASSIUM CHLORIDE, AND CALCIUM CHLORIDE 1000 ML: .6; .31; .03; .02 INJECTION, SOLUTION INTRAVENOUS at 23:21

## 2019-03-24 RX ADMIN — SODIUM CHLORIDE 1000 ML: 9 INJECTION, SOLUTION INTRAVENOUS at 22:12

## 2019-03-24 RX ADMIN — SODIUM CHLORIDE, POTASSIUM CHLORIDE, SODIUM LACTATE AND CALCIUM CHLORIDE 1000 ML: 600; 310; 30; 20 INJECTION, SOLUTION INTRAVENOUS at 23:21

## 2019-03-25 VITALS
WEIGHT: 286 LBS | OXYGEN SATURATION: 96 % | HEART RATE: 83 BPM | RESPIRATION RATE: 16 BRPM | DIASTOLIC BLOOD PRESSURE: 82 MMHG | TEMPERATURE: 98.2 F | BODY MASS INDEX: 36.7 KG/M2 | HEIGHT: 74 IN | SYSTOLIC BLOOD PRESSURE: 156 MMHG

## 2019-03-25 LAB
GLUCOSE BLD-MCNC: 354 MG/DL (ref 70–99)
PERFORMED ON: ABNORMAL

## 2019-03-25 PROCEDURE — 96366 THER/PROPH/DIAG IV INF ADDON: CPT

## 2019-03-28 ENCOUNTER — HOSPITAL ENCOUNTER (OUTPATIENT)
Age: 64
Discharge: HOME | End: 2019-03-28
Payer: COMMERCIAL

## 2019-03-28 PROCEDURE — 81256 HFE GENE: CPT

## 2019-04-02 LAB
C282Y HEMOCHROMATOSIS MUT: NEGATIVE
H63D HEMOCHROMATOSIS MUT: NORMAL
HEMOCHROMATOSIS GENE ANALYSIS: NORMAL
HFE PCR SPECIMEN: NORMAL
S65C HEMOCHROMATOSIS MUT: NEGATIVE

## 2019-04-11 ENCOUNTER — OFFICE VISIT (OUTPATIENT)
Dept: ENDOCRINOLOGY | Age: 64
End: 2019-04-11
Payer: COMMERCIAL

## 2019-04-11 VITALS
WEIGHT: 295 LBS | OXYGEN SATURATION: 96 % | HEIGHT: 74 IN | DIASTOLIC BLOOD PRESSURE: 68 MMHG | SYSTOLIC BLOOD PRESSURE: 110 MMHG | HEART RATE: 92 BPM | BODY MASS INDEX: 37.86 KG/M2

## 2019-04-11 DIAGNOSIS — Z79.4 UNCONTROLLED TYPE 2 DIABETES MELLITUS WITH HYPERGLYCEMIA, WITH LONG-TERM CURRENT USE OF INSULIN (HCC): Primary | ICD-10-CM

## 2019-04-11 DIAGNOSIS — E78.5 DYSLIPIDEMIA ASSOCIATED WITH TYPE 2 DIABETES MELLITUS (HCC): ICD-10-CM

## 2019-04-11 DIAGNOSIS — E66.01 MORBID OBESITY DUE TO EXCESS CALORIES (HCC): ICD-10-CM

## 2019-04-11 DIAGNOSIS — F17.200 SMOKER: ICD-10-CM

## 2019-04-11 DIAGNOSIS — E11.65 UNCONTROLLED TYPE 2 DIABETES MELLITUS WITH HYPERGLYCEMIA, WITH LONG-TERM CURRENT USE OF INSULIN (HCC): Primary | ICD-10-CM

## 2019-04-11 DIAGNOSIS — I10 ESSENTIAL HYPERTENSION: ICD-10-CM

## 2019-04-11 DIAGNOSIS — E11.69 DYSLIPIDEMIA ASSOCIATED WITH TYPE 2 DIABETES MELLITUS (HCC): ICD-10-CM

## 2019-04-11 PROCEDURE — G8417 CALC BMI ABV UP PARAM F/U: HCPCS | Performed by: INTERNAL MEDICINE

## 2019-04-11 PROCEDURE — 3046F HEMOGLOBIN A1C LEVEL >9.0%: CPT | Performed by: INTERNAL MEDICINE

## 2019-04-11 PROCEDURE — 3017F COLORECTAL CA SCREEN DOC REV: CPT | Performed by: INTERNAL MEDICINE

## 2019-04-11 PROCEDURE — 4040F PNEUMOC VAC/ADMIN/RCVD: CPT | Performed by: INTERNAL MEDICINE

## 2019-04-11 PROCEDURE — 2022F DILAT RTA XM EVC RTNOPTHY: CPT | Performed by: INTERNAL MEDICINE

## 2019-04-11 PROCEDURE — 99204 OFFICE O/P NEW MOD 45 MIN: CPT | Performed by: INTERNAL MEDICINE

## 2019-04-11 PROCEDURE — 4004F PT TOBACCO SCREEN RCVD TLK: CPT | Performed by: INTERNAL MEDICINE

## 2019-04-11 PROCEDURE — 1123F ACP DISCUSS/DSCN MKR DOCD: CPT | Performed by: INTERNAL MEDICINE

## 2019-04-11 PROCEDURE — G8427 DOCREV CUR MEDS BY ELIG CLIN: HCPCS | Performed by: INTERNAL MEDICINE

## 2019-04-11 NOTE — PROGRESS NOTES
Patient ID:   Lizzie Franks is a 70 y.o. male    Chief Complaint:   Lizzie Franks presents for an initial evaluation of Type 2 Diabetes Mellitus , Hyperlipidemia and hypertension. Subjective:   Type 2 Diabetes Mellitus diagnosed in 2017  On insulin since April 2019   Metformin caused muscle soreness     Januvia 100mg daily in am   Lantus 26 units daily at night (for last 10 days)    Checks blood sugars 3 times per day. Reviewed/Reported  AM: 144-306  Lunch:  Supper: 190-300  HS: 132-369    Hypoglycemias: None     Meals: Three, may be protein shake for breakfast. Dinner is bigger. Late night snack (cup of mixed nuts). Soda once a day, diet. No juices. Exercise: None because of back problems     Denies chest pain, exertional dyspnea. Family history of CAD: Dad at age 64. Mother had stroke in her 66's. Smokes 1/2 PPD. Counselled for smoking cessation. Currently not on ASA, recommend baby aspirin if okay with gastroenterologist or PCP      The following portions of the patient's history were reviewed and updated as appropriate:       Family History   Problem Relation Age of Onset    Stroke Mother     Heart Disease Father          Social History     Socioeconomic History    Marital status:      Spouse name: Not on file    Number of children: Not on file    Years of education: Not on file    Highest education level: Not on file   Occupational History    Occupation: disability   Social Needs    Financial resource strain: Not on file    Food insecurity:     Worry: Not on file     Inability: Not on file    Transportation needs:     Medical: Not on file     Non-medical: Not on file   Tobacco Use    Smoking status: Current Every Day Smoker     Packs/day: 0.50     Years: 15.00     Pack years: 7.50     Types: Cigarettes    Smokeless tobacco: Never Used   Substance and Sexual Activity    Alcohol use:  Yes     Alcohol/week: 0.6 oz     Types: 1 Standard drinks or equivalent per week Comment: less than weekly    Drug use: No     Comment: states only prescribed narcotics    Sexual activity: Never     Partners: Female   Lifestyle    Physical activity:     Days per week: Not on file     Minutes per session: Not on file    Stress: Not on file   Relationships    Social connections:     Talks on phone: Not on file     Gets together: Not on file     Attends Sikhism service: Not on file     Active member of club or organization: Not on file     Attends meetings of clubs or organizations: Not on file     Relationship status: Not on file    Intimate partner violence:     Fear of current or ex partner: Not on file     Emotionally abused: Not on file     Physically abused: Not on file     Forced sexual activity: Not on file   Other Topics Concern    Not on file   Social History Narrative    ** Merged History Encounter **            Past Medical History:   Diagnosis Date    Abdominal pain     Blood transfusion     30 yrs ago    Chronic back pain     Chronic pain syndrome     Depression     Depression     Diabetes mellitus ([facility] [address]     Failed back surgical syndrome     FH: colonic polyps     History of duodenal ulcer     Hyperlipidemia     Hypertension     Insomnia     Obesity     Psychiatric problem     depression and anxiety    Sleep apnea     USES C-PAP       Past Surgical History:   Procedure Laterality Date    BACK SURGERY       X 2    COLONOSCOPY      INGUINAL HERNIA REPAIR      RIGHT X 2    KNEE ARTHROSCOPY  04/25/2012    LEFT; PARTIAL MEDIAL MENISCECTOMY WITH CHONDROPLASTY    OTHER SURGICAL HISTORY  1963    fatty tumor removal under right arm    UPPER GASTROINTESTINAL ENDOSCOPY           Allergies   Allergen Reactions    Cymbalta [Duloxetine Hcl] Diarrhea    Cymbalta [Duloxetine Hcl] Diarrhea    Shellfish-Derived Products Nausea And Vomiting    Shellfish-Derived Products Nausea And Vomiting         Current Outpatient Medications:     insulin glargine (LANTUS SOLOSTAR) congestion, ear pain, rhinorrhea,  sore throat and trouble swallowing. Eyes: Negative for photophobia, redness, itching. Respiratory: Negative for cough, shortness of breath and sputum. Cardiovascular: Negative for chest pain, palpitations and leg swelling. Gastrointestinal: has heartburn. Negative for nausea, vomiting, abdominal pain, diarrhea, constipation. Endocrine: Negative for cold intolerance, heat intolerance, polydipsia, polyphagia and polyuria. Genitourinary: Negative for dysuria, urgency, frequency, hematuria and flank pain. Musculoskeletal: neck pain due to DJD , pain radiate to neck, arms. Negative for myalgias, back pain, arthralgias. Skin/Nail: Negative for rash, itching. Normal nails. Neurological: Negative for seizures, weakness, light-headedness, numbness and headaches. Hematological/ Lymph nodes: Negative for adenopathy. Does not bruise/bleed easily. Psychiatric/Behavioral: Negative for suicidal ideas, depression, anxiety, sleep disturbance and decreased concentration. Objective:   Physical Exam:  /68 (Site: Left Upper Arm, Position: Sitting, Cuff Size: Large Adult)   Pulse 92   Ht 6' 2\" (1.88 m)   Wt 295 lb (133.8 kg)   SpO2 96%   BMI 37.88 kg/m²   Constitutional: Patient is oriented to person, place, and time. Patient appears well-developed and well-nourished. HENT:    Head: Normocephalic and atraumatic. Eyes: Conjunctivae and EOM are normal. Pupils are equal, round, and reactive to light. Neck: Normal range of motion. Cardiovascular: Normal rate, regular rhythm and normal heart sounds. Pulmonary/Chest: Effort normal and breath sounds normal.   Abdominal: Soft. Bowel sounds are normal.   Musculoskeletal: Normal range of motion. Neurological: Patient is alert and oriented to person, place, and time. Patient has normal reflexes. Skin: Skin is warm and dry. Psychiatric: Patient has a normal mood and affect.  Patient behavior is normal. Lab Review:    Office Visit on 04/11/2019   Component Date Value Ref Range Status    Diabetic Retinopathy 12/12/2018 Negative   Final   Orders Only on 04/02/2019   Component Date Value Ref Range Status    Ferritin 04/02/2019 310.5* 15.0 - 150.0 ng/mL Final   Orders Only on 04/02/2019   Component Date Value Ref Range Status    Iron 04/02/2019 137  37 - 145 ug/dL Final    TIBC 04/02/2019 382  260 - 445 ug/dL Final    Iron Saturation 04/02/2019 36  15 - 50 % Final   Orders Only on 04/02/2019   Component Date Value Ref Range Status    Total Protein 04/02/2019 6.9  6.4 - 8.2 g/dL Final    Alb 04/02/2019 4.3  3.4 - 5.0 g/dL Final    Alkaline Phosphatase 04/02/2019 62  40 - 129 U/L Final    ALT 04/02/2019 24  10 - 40 U/L Final    AST 04/02/2019 20  15 - 37 U/L Final    Total Bilirubin 04/02/2019 0.5  0.0 - 1.0 mg/dL Final    Bilirubin, Direct 04/02/2019 <0.2  0.0 - 0.3 mg/dL Final    Bilirubin, Indirect 04/02/2019 see below  0.0 - 1.0 mg/dL Final   Orders Only on 04/02/2019   Component Date Value Ref Range Status    Sodium 04/02/2019 133* 136 - 145 mmol/L Final    Potassium 04/02/2019 4.3  3.5 - 5.1 mmol/L Final    Chloride 04/02/2019 97* 99 - 110 mmol/L Final    CO2 04/02/2019 22  21 - 32 mmol/L Final    Anion Gap 04/02/2019 14  3 - 16 Final    Glucose 04/02/2019 246* 70 - 99 mg/dL Final    BUN 04/02/2019 26* 7 - 20 mg/dL Final    CREATININE 04/02/2019 0.9  0.6 - 1.2 mg/dL Final    GFR Non- 04/02/2019 >60  >60 Final    GFR  04/02/2019 >60  >60 Final    Calcium 04/02/2019 10.1  8.3 - 10.6 mg/dL Final   Orders Only on 04/02/2019   Component Date Value Ref Range Status    WBC 04/02/2019 8.8  4.0 - 11.0 K/uL Final    RBC 04/02/2019 5.12  4.00 - 5.20 M/uL Final    Hemoglobin 04/02/2019 15.3  12.0 - 16.0 g/dL Final    Hematocrit 04/02/2019 45.6  36.0 - 48.0 % Final    MCV 04/02/2019 89.2  80.0 - 100.0 fL Final    MCH 04/02/2019 29.9  26.0 - 34.0 pg Final    MCHC 04/02/2019 33.5  31.0 - 36.0 g/dL Final    RDW 04/02/2019 14.1  12.4 - 15.4 % Final    Platelets 00/92/7618 253  135 - 450 K/uL Final    MPV 04/02/2019 10.5  5.0 - 10.5 fL Final    Neutrophils % 04/02/2019 54.9  % Final    Lymphocytes % 04/02/2019 31.3  % Final    Monocytes % 04/02/2019 10.2  % Final    Eosinophils % 04/02/2019 3.3  % Final    Basophils % 04/02/2019 0.3  % Final    Neutrophils # 04/02/2019 4.9  1.7 - 7.7 K/uL Final    Lymphocytes # 04/02/2019 2.8  1.0 - 5.1 K/uL Final    Monocytes # 04/02/2019 0.9  0.0 - 1.3 K/uL Final    Eosinophils # 04/02/2019 0.3  0.0 - 0.6 K/uL Final    Basophils # 04/02/2019 0.0  0.0 - 0.2 K/uL Final   Orders Only on 04/02/2019   Component Date Value Ref Range Status    MICROALBUMIN MCG/MIN 04/02/2019 17.5  0.0 - 20.0 mcg/min Final    Microalb, 24H Ur 04/02/2019 25.2  0.0 - 30.0 mg/day Final    24hr Urine Volume (ml) 04/02/2019 2100  mL Final   Hospital Outpatient Visit on 03/28/2019   Component Date Value Ref Range Status    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   Final    C282Y Hemochrom Mut 03/28/2019 Negative   Final    Hemochromatosis Gene 03/28/2019 See Note   Final    S65C Hemochrom Mut 03/28/2019 Negative   Final    HFE PCR Specimen 03/28/2019 Whole Blood   Final   Orders Only on 03/25/2019   Component Date Value Ref Range Status    Urine Culture, Routine 03/25/2019 No growth at 18-36 hours   Final   Orders Only on 03/25/2019   Component Date Value Ref Range Status    Total Protein 03/25/2019 7.0  6.4 - 8.2 g/dL Final    Alb 03/25/2019 4.4  3.4 - 5.0 g/dL Final    Alkaline Phosphatase 03/25/2019 75  40 - 129 U/L Final    ALT 03/25/2019 22  10 - 40 U/L Final    AST 03/25/2019 19  15 - 37 U/L Final    Total Bilirubin 03/25/2019 0.7  0.0 - 1.0 mg/dL Final    Bilirubin, Direct 03/25/2019 <0.2  0.0 - 0.3 mg/dL Final    Bilirubin, Indirect 03/25/2019 see below  0.0 - 1.0 mg/dL Final   There may be more visits with results that are not included. Assessment and Plan     Madelin Montiel was seen today for consultation and diabetes. Diagnoses and all orders for this visit:    Uncontrolled type 2 diabetes mellitus with hyperglycemia, with long-term current use of insulin (HCC)  -     Dulaglutide (TRULICITY) 8.81 KJ/2.7AH SOPN; Inject 0.75 mg into the skin once a week  -     Ambulatory referral to [facility] DIABETES FOOT EXAM    Dyslipidemia associated with type 2 diabetes mellitus (HCC)  -     Dulaglutide (TRULICITY) 3.62 FK/4.1IJ SOPN; Inject 0.75 mg into the skin once a week  -     Ambulatory referral to [facility] DIABETES FOOT EXAM    Essential hypertension  -     Dulaglutide (TRULICITY) 3.06 BC/6.8DK SOPN; Inject 0.75 mg into the skin once a week  -     Ambulatory referral to [facility] DIABETES FOOT EXAM    Morbid obesity due to excess calories (HCC)  -     Dulaglutide (TRULICITY) 1.60 VY/8.2MX SOPN; Inject 0.75 mg into the skin once a week  -     Ambulatory referral to [facility] DIABETES FOOT EXAM    Smoker          1: Type 2 DM complicated with hyperglycemia, neuropathy   Uncontrolled A1C 11.2 March 22nd 2019   A1C of <8 would be acceptable because of microvascular and macrovascular complications and recurrent hypoglycemias/ or history of severe hypoglycemias or seizure disorder. CDE referral done     No history of pancreatitis or family history of thyroid cancer  If tolerates then stop Januvia. Start Trulicity 5.20AB weekly     Lantus 26 units at bedtime   Use Treat to target basal insulin. If pre-breakfast sugar is above 130 on 2 consecutive days increase Lantus by 2 units. If pre-breakfast sugar is below 90 on one day decrease Lantus by 2 units. All instructions provided in written. Check Blood sugars 3 times per day. Log them along with insulin and send them every 2 weeks. Call for blood sugars less than 60 or more than 400.      Eye exam: Last exam in Dec 2018, denies retinopathy. Foot exam:  April 2019   Deformity/amputation: absent  Skin lesions/pre-ulcerative calluses: present   Edema: right- negative, left- negative  Sensory exam: Monofilament sensation: normal  Pulses: normal, Vibration (128 Hz): mildly decreased     Renal screen: 24 hour microalbumin normal April 2019     Smoker: Counselled for smoking cessation   TSH screen: March 2019     2: HTN   Controlled     3: Hyperlipidemia   LDL: 204 , HDL: 29 , TGs: 506 March 2019    Atorvastatin 80mg daily Fenofibrate 160mg daily   Denies missing doses     4: Morbid obesity   Need to work on diet, exercise and lose weight     RTC in 2 weeks with logs and see CDE   If he is tolerating Jardiance 10mg will go up on the dose   If tolerating Trulicity 5.66 weekly will go up on the dose     Then in 6-8 weeks with A1C, lipids, CMP    EDUCATION:   Greater than 50% of this follow-up visit was spent in general counseling regarding   obesity, diet, exercise, importance of adherence to insulin regime, recognition and treatment of hypo and hyperglycemia,  glucose logging, proper diabetes management, diabetic complications with poor management and the importance of glycemic control in order to avoid the complications of diabetes. Risks and potential complications of diabetes were reviewed with the patient. Diabetes health maintenance plan and follow-up were discussed and understood by the patient. We reviewed the importance of medication compliance and regular follow-up. Aggressive lifestyle modification was encouraged. Exercise Counselling: This patient is a candidate for regular physical exercise. Instructions to perform the following types of exercise:  Swimming or water aerobic exercise  Brisk walking  Playing tennis  Stationary bicycle or elliptical indoor  Low impact aerobic exercise    Instructions given to exercise for the following duration:  30 minutes a day for five-seven days per week.     Following instructions for being active throughout the day in addition to formal exercise:  Walk instead of drive whenever possible  Take the stairs instead of the elevator  Work in the garden  Park to the far end of the parking lot to add more walking steps to destination      Electronically signed by Kelsy Rahman MD on 4/11/2019 at 11:42 AM

## 2019-04-11 NOTE — PATIENT INSTRUCTIONS

## 2019-04-12 ENCOUNTER — OFFICE VISIT (OUTPATIENT)
Dept: PAIN MANAGEMENT | Age: 64
End: 2019-04-12
Payer: COMMERCIAL

## 2019-04-12 VITALS
WEIGHT: 286 LBS | BODY MASS INDEX: 36.72 KG/M2 | HEART RATE: 108 BPM | SYSTOLIC BLOOD PRESSURE: 116 MMHG | DIASTOLIC BLOOD PRESSURE: 74 MMHG

## 2019-04-12 DIAGNOSIS — M96.1 FAILED BACK SURGICAL SYNDROME: ICD-10-CM

## 2019-04-12 DIAGNOSIS — G89.4 CHRONIC PAIN SYNDROME: ICD-10-CM

## 2019-04-12 DIAGNOSIS — M50.30 DDD (DEGENERATIVE DISC DISEASE), CERVICAL: ICD-10-CM

## 2019-04-12 DIAGNOSIS — M54.12 CERVICAL RADICULITIS: ICD-10-CM

## 2019-04-12 DIAGNOSIS — M79.7 FIBROMYALGIA: ICD-10-CM

## 2019-04-12 DIAGNOSIS — M54.12 CERVICAL RADICULOPATHY: ICD-10-CM

## 2019-04-12 PROCEDURE — 1123F ACP DISCUSS/DSCN MKR DOCD: CPT | Performed by: INTERNAL MEDICINE

## 2019-04-12 PROCEDURE — 99213 OFFICE O/P EST LOW 20 MIN: CPT | Performed by: INTERNAL MEDICINE

## 2019-04-12 PROCEDURE — G8417 CALC BMI ABV UP PARAM F/U: HCPCS | Performed by: INTERNAL MEDICINE

## 2019-04-12 PROCEDURE — 4004F PT TOBACCO SCREEN RCVD TLK: CPT | Performed by: INTERNAL MEDICINE

## 2019-04-12 PROCEDURE — G8427 DOCREV CUR MEDS BY ELIG CLIN: HCPCS | Performed by: INTERNAL MEDICINE

## 2019-04-12 PROCEDURE — 4040F PNEUMOC VAC/ADMIN/RCVD: CPT | Performed by: INTERNAL MEDICINE

## 2019-04-12 PROCEDURE — 3017F COLORECTAL CA SCREEN DOC REV: CPT | Performed by: INTERNAL MEDICINE

## 2019-04-12 RX ORDER — ESCITALOPRAM OXALATE 20 MG/1
TABLET ORAL
Qty: 30 TABLET | Refills: 1 | Status: SHIPPED | OUTPATIENT
Start: 2019-04-12 | End: 2019-12-06 | Stop reason: ALTCHOICE

## 2019-04-12 RX ORDER — OXYCODONE HYDROCHLORIDE 15 MG/1
1 TABLET, FILM COATED, EXTENDED RELEASE ORAL 2 TIMES DAILY
Qty: 56 TABLET | Refills: 0 | Status: SHIPPED | OUTPATIENT
Start: 2019-04-12 | End: 2019-05-09 | Stop reason: SDUPTHER

## 2019-04-12 RX ORDER — TIZANIDINE 4 MG/1
TABLET ORAL
Qty: 60 TABLET | Refills: 0 | Status: SHIPPED | OUTPATIENT
Start: 2019-04-12 | End: 2019-05-09 | Stop reason: SDUPTHER

## 2019-04-12 NOTE — PROGRESS NOTES
Patrice Medel  1955  [id number]    HISTORY OF PRESENT ILLNESS:  Mr. Madhav Tejeda is a 70 y.o. male returns for a follow up visit for multiple medical problems. His current presenting problems are   1. Chronic pain syndrome    2. Cervical radiculopathy    3. Cervical radiculitis    4. DDD (degenerative disc disease), cervical    5. Fibromyalgia    6. Failed back surgical syndrome    7. ISMAEL (generalized anxiety disorder)    8. Moderate episode of recurrent major depressive disorder ([facility] [address]    9. Primary insomnia    10. Gastroesophageal reflux disease without esophagitis    . As per information/history obtained from the PADT(patient assessment and documentation tool) - He complains of pain in the neck, shoulders Left and arms Left with radiation to the mid back and lower back He rates the pain 7/10 and describes it as sharp, aching. Pain is made worse by: nothing, movement, walking, standing, sitting, bending, lifting. Current treatment regimen has helped relieve about 30% of the pain. He denies side effects from the current pain regimen. Patient reports that since the last follow up visit the physical functioning is worse, family/social relationships are unchanged, mood is worse and sleep patterns are unchanged, and that the overall functioning is unchanged. Patient denies neurological bowel or bladder. Patient denies misusing/abusing his narcotic pain medications or using any illegal drugs. There are No indicators for possible drug abuse, addiction or diversion problems. Upon obtaining the medical history from Mr. Madhav Tejeda regarding today's office visit for his presenting problems, patient states that pain has been worse. Mr. Madhav Tejeda reports that his neck and upper back has been hurting a lot. He states that he could not go for ZINA due to increased blood sugar. He reports that his neck has been hurting. Patient mentions that his weight has been stable and he has lost some weight with surgery.   He is seeing Him that if he wants to use MM then will taper off opioids this month and then start MM  [place] profile was reviewed, on the report there are some controlled substances prescribed by other physicians that I was not aware of. I will monitor him more closely. Chronic opioid treatment protocol was discussed with the patient again including taking medications only as prescribed , using one pharmacy and getting all controlled substances from one physician unless okayed with me. Proper safeguarding and disposal of medications were also discussed with the patient. [place] profile will be checked as part of the level II protocol. Current Outpatient Medications   Medication Sig Dispense Refill    empagliflozin (JARDIANCE) 10 MG tablet Lot # [id number] ex 10/2020 21 tablet 0    insulin glargine (LANTUS SOLOSTAR) 100 UNIT/ML injection pen Inject 26 Units into the skin nightly      Dulaglutide (TRULICITY) 8.94 OF/2.4QI SOPN Inject 0.75 mg into the skin once a week 4 pen 1    oxyCODONE (OXYCONTIN) 15 MG T12A extended release tablet Take 1 tablet by mouth 2 times daily for 28 days. 56 tablet 0    tapentadol (NUCYNTA) 75 MG TABS Take 1 tablet by mouth every 6 hours for 28 days. Max 2 per day 56 tablet 0    diclofenac sodium 1 % GEL APPLY TO THE AFFECTED AREA(S) THREE TIMES A DAY 5 Tube 1    escitalopram (LEXAPRO) 20 MG tablet TAKE ONE TABLET BY MOUTH ONCE DAILY 30 tablet 1    tiZANidine (ZANAFLEX) 4 MG tablet Take a 1/2 tablet in the morning and one tablet at night 60 tablet 0    traZODone (DESYREL) 50 MG tablet Take 50 mg by mouth nightly      fenofibrate 160 MG tablet Take 160 mg by mouth daily      lidocaine (LIDODERM) 5 % Place 2 patches onto the skin daily as needed 12 hours on, 12 hours off.        SITagliptin (JANUVIA) 100 MG tablet Take 1 tablet by mouth daily       amLODIPine (NORVASC) 10 MG tablet Take 5 mg by mouth daily       atorvastatin (LIPITOR) 80 MG tablet Take 80 mg by mouth daily       functioning. - he is not showing any significant progress/or showing regression  towards this goal and reassessment and adjustment of goals/treatment have been made. He was advised against drinking alcohol with the narcotic pain medicines, advised against driving or handling machinery while adjusting the dose of medicines or if having cognitive  issues related to the current medications. Risk of overdose and death, if medicines not taken as prescribed, were also discussed. If the patient develops new symptoms or if the symptoms worsen, the patient should call the office. While transcribing every attempt was made to maintain the accuracy of the note in terms of it's contents,there may have been some errors made inadvertently. Thank you for allowing me to participate in the care of this patient.     Pepe Perdue MD.    Cc: Jitendra Barcenas MD    I, Carlos Alberto Solares, am scribing for and in the presence of Dr. Pepe Perdue.   04/12/19  9:22 AM  Carlos Alberto Solares MA   I, Dr. Pepe Perdue, personally performed the services described in this documentation as scribed by    Carlos Alberto Solares MA in my presence and it is both accurate and complete

## 2019-04-15 ENCOUNTER — TELEPHONE (OUTPATIENT)
Dept: ENDOCRINOLOGY | Age: 64
End: 2019-04-15

## 2019-04-15 DIAGNOSIS — Z79.4 UNCONTROLLED TYPE 2 DIABETES MELLITUS WITH HYPERGLYCEMIA, WITH LONG-TERM CURRENT USE OF INSULIN (HCC): Primary | ICD-10-CM

## 2019-04-15 DIAGNOSIS — E11.65 UNCONTROLLED TYPE 2 DIABETES MELLITUS WITH HYPERGLYCEMIA, WITH LONG-TERM CURRENT USE OF INSULIN (HCC): Primary | ICD-10-CM

## 2019-04-15 NOTE — TELEPHONE ENCOUNTER
Dr. Eagle Allen: patient is currently enrolled in [address]   · Pt is able to get Lantus pen, pen needles, Jardiance, and Trulicity free through the [facility] DM program (as long as they are filled through [facility] mail order pharmacy ([facility]. [facility])  · OK to send Rxs for Lantus pen (pt states he is currently using 28 units) and pen needles so pt can utilize benefit? · Will await Rxs for Jardiance and Trulicity due to possible dose increases at upcoming appt     Orders for Lantus pen and pen needles pending for provider convenience, please review/modify & sign if in agreement.     Thank you!   Pebbles Schaefer, PharmD, [address] Pharmacist  Direct: [phone]  Dept: [phone] (toll free [phone], option 7)

## 2019-04-15 NOTE — TELEPHONE ENCOUNTER
[facility]) Employee Diabetes Program  =================================================================  Jaz Joy is a 70 y.o. male enrolled in the [address] [place] [address] Diabetes Program.    Patient identified as being non-adherent to oral diabetes medication(s) per report. Patient needs to have adherence over 70% to remain eligible in the program.    Also noted to have no claims for statin or ACE/ARB in 2019. · Per med list, Rxed atorvastatin 80 mg tab - 1 daily, lisinopril-HCTZ 10-12.5 mg tab - 1 daily  · Per [facility]: atorvastatin LF 8/20/18, 12/14/18 x 90 ds; lisinopril-HCTZ LF 8/2/18, 12/14/18 x 90 ds - is overdue for refills of both meds    Per 1/7/19 [facility] visit:  - Noted gap in refill hx (per [facility]) for atorvastatin, Januvia, lisinopril-HCTZ @Sept-Dec. Patient reports \"I was lazy in scheduling my provider f/u, so ran out of medication for refills\"    Current diabetes regimen & refill history per claims data:   PDC: 63%   Januvia 100 mg tab - 1 daily  o Refill history: 3/1/18, 4/13/18, 5/18/18, 6/25/18, 8/14/18, 9/23/18, 12/14/18, 2/8/19 x 30 ds  o Also filled 3/22/19 x 30 ds  o Appears has not been filled at appropriate times    Established care with endocrinology (Dr. Vinnie Moss) on 4/11/19. Is starting Trulicity 9.86 mg - if tolerates then stop Januvia. Is also on Lantus (no fill history in [place]). Also Jardiance 10 mg daily (no fill history in [place] - per med list received samples). Next OV with Dr. Vinnie Moss 4/23/19; CDE 4/24/19. Noted comment on potential to increase Jardiance and Trulicity if tolerating.     A1c results:  Component      Latest Ref Rng & Units 3/22/2019 12/11/2018 3/29/2018          11:34 AM  7:10 AM  6:30 AM   Hemoglobin A1C      See comment % 11.2 6.7 6.0     BP Readings from Last 3 Encounters:   04/12/19 116/74   04/11/19 110/68   03/25/19 (!) 156/82      Lab Results   Component Value Date    K 4.3 04/02/2019      Lab Results   Component Value Date    CREATININE 0.9 Lantus pen and pen needles so he can get for free through DM program  - PharmD will follow up with pt after 4/23 endo appt to see if can assist with getting Rxs for Trulicity and/or Jardiance (or if pt needs any further assistance with medications at that time)  - pt can use Prodigy lancets in [employer] lancing device    Pt states can call or text regarding Rxs sent, ok to LM with PHI.     Nate Garcia, PharmD, [facility] Pharmacist  Direct: [phone]  Dept: [phone] (toll free [phone], option 7)

## 2019-04-17 NOTE — TELEPHONE ENCOUNTER
Provider response noted, thank you! Appears Lantus and pen needles in process/sold. Trulicity still listed in adjudication - needing prior authorization. Working with pharmacy to get PA/override - per review of notes unable to tolerate metformin. Has high ASCVD risk so would benefit from SGLT2i and GLP1 over trial of TZD or sulfonylurea. Also noted very high A1c, also on Lantus. Appears is now in process. Lab Results   Component Value Date    LABA1C 11.2 03/22/2019     The 10-year ASCVD risk score (Alicia Mcgregor, et al., 2013) is: 49.5%    Values used to calculate the score:      Age: 70 years      Sex: Male      Is Non- : No      Diabetic: Yes      Tobacco smoker: Yes      Systolic Blood Pressure: 778 mmHg      Is BP treated: Yes      HDL Cholesterol: 29 mg/dL      Total Cholesterol: 300 mg/dL     Notified pt Rxs for Lantus and pen needles sent to pharmacy - appears already shipped out. Advised Trulicity currently in process - he should hear from pharmacy or receive within a few days. He will call back if any issues getting Trulicity.     Lovenia Meigs, PharmD, [facility] Pharmacist  Direct: [phone]  Dept: [phone] (toll free [phone], option 7)     For Pharmacy Admin Tracking Only    PHSO: Yes  Total # of Interventions Recommended: 3  - Increased Dose #: 1  - Refills Provided #: 2  Recommended intervention potential cost savings: 1  Total Interventions Accepted: 3  Time Spent (min): 30

## 2019-04-23 ENCOUNTER — OFFICE VISIT (OUTPATIENT)
Dept: ENDOCRINOLOGY | Age: 64
End: 2019-04-23
Payer: COMMERCIAL

## 2019-04-23 VITALS
BODY MASS INDEX: 35.43 KG/M2 | OXYGEN SATURATION: 96 % | HEIGHT: 75 IN | WEIGHT: 285 LBS | SYSTOLIC BLOOD PRESSURE: 107 MMHG | HEART RATE: 102 BPM | DIASTOLIC BLOOD PRESSURE: 76 MMHG

## 2019-04-23 DIAGNOSIS — E11.69 DYSLIPIDEMIA ASSOCIATED WITH TYPE 2 DIABETES MELLITUS (HCC): ICD-10-CM

## 2019-04-23 DIAGNOSIS — E66.01 MORBID OBESITY DUE TO EXCESS CALORIES (HCC): ICD-10-CM

## 2019-04-23 DIAGNOSIS — I10 ESSENTIAL HYPERTENSION: ICD-10-CM

## 2019-04-23 DIAGNOSIS — F17.200 SMOKER: ICD-10-CM

## 2019-04-23 DIAGNOSIS — E78.5 DYSLIPIDEMIA ASSOCIATED WITH TYPE 2 DIABETES MELLITUS (HCC): ICD-10-CM

## 2019-04-23 DIAGNOSIS — E11.65 UNCONTROLLED TYPE 2 DIABETES MELLITUS WITH HYPERGLYCEMIA, WITH LONG-TERM CURRENT USE OF INSULIN (HCC): Primary | ICD-10-CM

## 2019-04-23 DIAGNOSIS — Z79.4 UNCONTROLLED TYPE 2 DIABETES MELLITUS WITH HYPERGLYCEMIA, WITH LONG-TERM CURRENT USE OF INSULIN (HCC): Primary | ICD-10-CM

## 2019-04-23 PROCEDURE — 3046F HEMOGLOBIN A1C LEVEL >9.0%: CPT | Performed by: INTERNAL MEDICINE

## 2019-04-23 PROCEDURE — 2022F DILAT RTA XM EVC RTNOPTHY: CPT | Performed by: INTERNAL MEDICINE

## 2019-04-23 PROCEDURE — G8417 CALC BMI ABV UP PARAM F/U: HCPCS | Performed by: INTERNAL MEDICINE

## 2019-04-23 PROCEDURE — G8427 DOCREV CUR MEDS BY ELIG CLIN: HCPCS | Performed by: INTERNAL MEDICINE

## 2019-04-23 PROCEDURE — 1123F ACP DISCUSS/DSCN MKR DOCD: CPT | Performed by: INTERNAL MEDICINE

## 2019-04-23 PROCEDURE — 3017F COLORECTAL CA SCREEN DOC REV: CPT | Performed by: INTERNAL MEDICINE

## 2019-04-23 PROCEDURE — 99214 OFFICE O/P EST MOD 30 MIN: CPT | Performed by: INTERNAL MEDICINE

## 2019-04-23 PROCEDURE — 4040F PNEUMOC VAC/ADMIN/RCVD: CPT | Performed by: INTERNAL MEDICINE

## 2019-04-23 PROCEDURE — 4004F PT TOBACCO SCREEN RCVD TLK: CPT | Performed by: INTERNAL MEDICINE

## 2019-04-23 NOTE — PATIENT INSTRUCTIONS
rubber tips on it. If you use crutches, clean the bottoms of them regularly with an abrasive pad, such as steel wool. · Keep your house well lit, especially [place], and outside walkways. Use night-lights in areas such as hallways and bathrooms. Add extra light switches or use remote switches (such as switches that go on or off when you clap your hands) to make it easier to turn lights on if you have to get up during the night. · Install sturdy handrails on stairways. Put grab bars near your shower, bathtub, and toilet. · Store household items on low shelves so that you do not have to climb or reach high. Or use a reaching device that you can get at a medical supply store. If you have to climb for something, use a step stool with handrails, or ask someone to get it for you. · Keep a cordless phone and a flashlight with new batteries by your bed. If possible, put a phone in each of the main rooms of your house, or carry a cell phone in case you fall and cannot reach a phone. Or you can wear a device around your neck or wrist. You push a button that sends a signal for help. · Wear low-heeled shoes that fit well and give your feet good support. Use footwear with nonskid soles. Check the heels and soles of your shoes for wear. Repair or replace worn heels or soles. · Do not wear socks without shoes on wood floors. · Walk on the grass when the sidewalks are slippery. If you live in an area that gets snow and ice in the winter, sprinkle salt on slippery steps and sidewalks. Where can you learn more? Go to [email or web address] and sign in to your [employer] account. Enter A202 in the [employer] box to learn more about \"Diabetes and Preventing Falls: Care Instructions. \"     If you do not have an account, please click on the \"Sign Up Now\" link. Current as of: March 15, 2018  Content Version: 11.9  © 0709-4443 [employer].  Care instructions adapted under license by [facility] ([facility]). If you have questions about a medical condition or this instruction, always ask your healthcare professional. [place] [address] any warranty or liability for your use of this information.

## 2019-04-23 NOTE — PROGRESS NOTES
Patient ID:   Corazon Alexandra is a 70 y.o. male    Chief Complaint:   Corazon Alexandra presents for an evaluation of Type 2 Diabetes Mellitus , Hyperlipidemia and hypertension. Subjective:   Type 2 Diabetes Mellitus diagnosed in 2017  On insulin since April 2019   Metformin caused muscle soreness     Finished Januvia today     Lantus 26 units daily at night (for last 10 days)     Jardiance 10mg daily     Checks blood sugars 3 times per day. Reviewed/Reported  AM: 101-158  Lunch:  Supper:   HS: 113-252    Hypoglycemias: None     Meals: Three, may be protein shake for breakfast. Dinner is bigger. Late night snack (cup of mixed nuts). Soda once a day, diet. No juices. Exercise: None because of back problems     Denies chest pain, exertional dyspnea. Family history of CAD: Dad at age 64. Mother had stroke in her 66's. Smokes 1/2 PPD. Counselled for smoking cessation. Currently not on ASA, recommend baby aspirin if okay with gastroenterologist or PCP      The following portions of the patient's history were reviewed and updated as appropriate:       Family History   Problem Relation Age of Onset    Stroke Mother     Heart Disease Father          Social History     Socioeconomic History    Marital status:      Spouse name: Not on file    Number of children: Not on file    Years of education: Not on file    Highest education level: Not on file   Occupational History    Occupation: disability   Social Needs    Financial resource strain: Not on file    Food insecurity:     Worry: Not on file     Inability: Not on file    Transportation needs:     Medical: Not on file     Non-medical: Not on file   Tobacco Use    Smoking status: Current Every Day Smoker     Packs/day: 0.50     Years: 15.00     Pack years: 7.50     Types: Cigarettes    Smokeless tobacco: Never Used   Substance and Sexual Activity    Alcohol use:  Yes     Alcohol/week: 0.6 oz     Types: 1 Standard drinks or equivalent per week     Comment: less than weekly    Drug use: No     Comment: states only prescribed narcotics    Sexual activity: Never     Partners: Female   Lifestyle    Physical activity:     Days per week: Not on file     Minutes per session: Not on file    Stress: Not on file   Relationships    Social connections:     Talks on phone: Not on file     Gets together: Not on file     Attends Caodaism service: Not on file     Active member of club or organization: Not on file     Attends meetings of clubs or organizations: Not on file     Relationship status: Not on file    Intimate partner violence:     Fear of current or ex partner: Not on file     Emotionally abused: Not on file     Physically abused: Not on file     Forced sexual activity: Not on file   Other Topics Concern    Not on file   Social History Narrative    ** Merged History Encounter **            Past Medical History:   Diagnosis Date    Abdominal pain     Blood transfusion     30 yrs ago    Chronic back pain     Chronic pain syndrome     Depression     Depression     Diabetes mellitus ([facility] [address]     Failed back surgical syndrome     FH: colonic polyps     History of duodenal ulcer     Hyperlipidemia     Hypertension     Insomnia     Obesity     Psychiatric problem     depression and anxiety    Sleep apnea     USES C-PAP       Past Surgical History:   Procedure Laterality Date    BACK SURGERY       X 2    COLONOSCOPY      INGUINAL HERNIA REPAIR      RIGHT X 2    KNEE ARTHROSCOPY  04/25/2012    LEFT; PARTIAL MEDIAL MENISCECTOMY WITH CHONDROPLASTY    OTHER SURGICAL HISTORY  1963    fatty tumor removal under right arm    UPPER GASTROINTESTINAL ENDOSCOPY           Allergies   Allergen Reactions    Cymbalta [Duloxetine Hcl] Diarrhea    Cymbalta [Duloxetine Hcl] Diarrhea    Shellfish-Derived Products Nausea And Vomiting    Shellfish-Derived Products Nausea And Vomiting         Current Outpatient Medications:     empagliflozin (Rosemary Sarah) 25 MG tablet, Take 1 tablet by mouth daily, Disp: 90 tablet, Rfl: 1    insulin glargine (LANTUS SOLOSTAR) 100 UNIT/ML injection pen, Inject 28 Units into the skin nightly (Patient taking differently: Inject 36 Units into the skin nightly ), Disp: 10 pen, Rfl: 1    Insulin Pen Needle 32G X 4 MM MISC, Use to inject Lantus one time daily, Disp: 100 each, Rfl: 3    empagliflozin (JARDIANCE) 10 MG tablet, Lot # [id number] ex 10/2020 (Patient taking differently: Take 10 mg by mouth daily ), Disp: 21 tablet, Rfl: 0    oxyCODONE (OXYCONTIN) 15 MG T12A extended release tablet, Take 1 tablet by mouth 2 times daily for 28 days. , Disp: 56 tablet, Rfl: 0    tapentadol (NUCYNTA) 75 MG TABS, Take 1 tablet by mouth every 6 hours for 28 days.  Max 2 per day, Disp: 56 tablet, Rfl: 0    diclofenac sodium 1 % GEL, APPLY TO THE AFFECTED AREA(S) THREE TIMES A DAY, Disp: 5 Tube, Rfl: 1    escitalopram (LEXAPRO) 20 MG tablet, TAKE ONE TABLET BY MOUTH ONCE DAILY, Disp: 30 tablet, Rfl: 1    tiZANidine (ZANAFLEX) 4 MG tablet, Take a 1/2 tablet in the morning and one tablet at night, Disp: 60 tablet, Rfl: 0    traZODone (DESYREL) 50 MG tablet, Take 50 mg by mouth nightly, Disp: , Rfl:     fenofibrate 160 MG tablet, Take 160 mg by mouth daily, Disp: , Rfl:     lidocaine (LIDODERM) 5 %, Place 2 patches onto the skin daily as needed 12 hours on, 12 hours off. , Disp: , Rfl:     SITagliptin (JANUVIA) 100 MG tablet, Take 1 tablet by mouth daily , Disp: , Rfl:     amLODIPine (NORVASC) 10 MG tablet, Take 5 mg by mouth daily , Disp: , Rfl:     atorvastatin (LIPITOR) 80 MG tablet, Take 80 mg by mouth daily , Disp: , Rfl:     lisinopril-hydrochlorothiazide (PRINZIDE;ZESTORETIC) 10-12.5 MG per tablet, Take 1 tablet by mouth daily, Disp: , Rfl:     pantoprazole (PROTONIX) 40 MG tablet, Take 40 mg by mouth daily as needed , Disp: , Rfl:     clobetasol (TEMOVATE) 0.05 % cream, Apply topically 2 times daily to affected areas as needed for motion. Cardiovascular: Normal rate, regular rhythm and normal heart sounds. Pulmonary/Chest: Effort normal and breath sounds normal.   Abdominal: Soft. Bowel sounds are normal.   Musculoskeletal: Normal range of motion. Neurological: Patient is alert and oriented to person, place, and time. Patient has normal reflexes. Skin: Skin is warm and dry. Psychiatric: Patient has a normal mood and affect.  Patient behavior is normal.     Lab Review:    Office Visit on 04/11/2019   Component Date Value Ref Range Status    Diabetic Retinopathy 12/12/2018 Negative   Final   Orders Only on 04/02/2019   Component Date Value Ref Range Status    Ferritin 04/02/2019 310.5* 15.0 - 150.0 ng/mL Final   Orders Only on 04/02/2019   Component Date Value Ref Range Status    Iron 04/02/2019 137  37 - 145 ug/dL Final    TIBC 04/02/2019 382  260 - 445 ug/dL Final    Iron Saturation 04/02/2019 36  15 - 50 % Final   Orders Only on 04/02/2019   Component Date Value Ref Range Status    Total Protein 04/02/2019 6.9  6.4 - 8.2 g/dL Final    Alb 04/02/2019 4.3  3.4 - 5.0 g/dL Final    Alkaline Phosphatase 04/02/2019 62  40 - 129 U/L Final    ALT 04/02/2019 24  10 - 40 U/L Final    AST 04/02/2019 20  15 - 37 U/L Final    Total Bilirubin 04/02/2019 0.5  0.0 - 1.0 mg/dL Final    Bilirubin, Direct 04/02/2019 <0.2  0.0 - 0.3 mg/dL Final    Bilirubin, Indirect 04/02/2019 see below  0.0 - 1.0 mg/dL Final   Orders Only on 04/02/2019   Component Date Value Ref Range Status    Sodium 04/02/2019 133* 136 - 145 mmol/L Final    Potassium 04/02/2019 4.3  3.5 - 5.1 mmol/L Final    Chloride 04/02/2019 97* 99 - 110 mmol/L Final    CO2 04/02/2019 22  21 - 32 mmol/L Final    Anion Gap 04/02/2019 14  3 - 16 Final    Glucose 04/02/2019 246* 70 - 99 mg/dL Final    BUN 04/02/2019 26* 7 - 20 mg/dL Final    CREATININE 04/02/2019 0.9  0.6 - 1.2 mg/dL Final    GFR Non- 04/02/2019 >60  >60 Final    GFR  04/02/2019 >60  >60 Final    Calcium 04/02/2019 10.1  8.3 - 10.6 mg/dL Final   Orders Only on 04/02/2019   Component Date Value Ref Range Status    WBC 04/02/2019 8.8  4.0 - 11.0 K/uL Final    RBC 04/02/2019 5.12  4.00 - 5.20 M/uL Final    Hemoglobin 04/02/2019 15.3  12.0 - 16.0 g/dL Final    Hematocrit 04/02/2019 45.6  36.0 - 48.0 % Final    MCV 04/02/2019 89.2  80.0 - 100.0 fL Final    MCH 04/02/2019 29.9  26.0 - 34.0 pg Final    MCHC 04/02/2019 33.5  31.0 - 36.0 g/dL Final    RDW 04/02/2019 14.1  12.4 - 15.4 % Final    Platelets 57/98/6454 253  135 - 450 K/uL Final    MPV 04/02/2019 10.5  5.0 - 10.5 fL Final    Neutrophils % 04/02/2019 54.9  % Final    Lymphocytes % 04/02/2019 31.3  % Final    Monocytes % 04/02/2019 10.2  % Final    Eosinophils % 04/02/2019 3.3  % Final    Basophils % 04/02/2019 0.3  % Final    Neutrophils # 04/02/2019 4.9  1.7 - 7.7 K/uL Final    Lymphocytes # 04/02/2019 2.8  1.0 - 5.1 K/uL Final    Monocytes # 04/02/2019 0.9  0.0 - 1.3 K/uL Final    Eosinophils # 04/02/2019 0.3  0.0 - 0.6 K/uL Final    Basophils # 04/02/2019 0.0  0.0 - 0.2 K/uL Final   Orders Only on 04/02/2019   Component Date Value Ref Range Status    MICROALBUMIN MCG/MIN 04/02/2019 17.5  0.0 - 20.0 mcg/min Final    Microalb, 24H Ur 04/02/2019 25.2  0.0 - 30.0 mg/day Final    24hr Urine Volume (ml) 04/02/2019 2100  mL Final   Hospital Outpatient Visit on 03/28/2019   Component Date Value Ref Range Status    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   Final    C282Y Hemochrom Mut 03/28/2019 Negative   Final    Hemochromatosis Gene 03/28/2019 See Note   Final    S65C Hemochrom Mut 03/28/2019 Negative   Final    HFE PCR Specimen 03/28/2019 Whole Blood   Final   Orders Only on 03/25/2019   Component Date Value Ref Range Status    Urine Culture, Routine 03/25/2019 No growth at 18-36 hours   Final   Orders Only on 03/25/2019   Component Date Value Ref Range Status    Total Protein 03/25/2019 7.0  6.4 - 8.2 g/dL Final    Alb 03/25/2019 4.4  3.4 - 5.0 g/dL Final    Alkaline Phosphatase 03/25/2019 75  40 - 129 U/L Final    ALT 03/25/2019 22  10 - 40 U/L Final    AST 03/25/2019 19  15 - 37 U/L Final    Total Bilirubin 03/25/2019 0.7  0.0 - 1.0 mg/dL Final    Bilirubin, Direct 03/25/2019 <0.2  0.0 - 0.3 mg/dL Final    Bilirubin, Indirect 03/25/2019 see below  0.0 - 1.0 mg/dL Final   There may be more visits with results that are not included. Assessment and Plan     Kathy Galvez was seen today for diabetes. Diagnoses and all orders for this visit:    Uncontrolled type 2 diabetes mellitus with hyperglycemia, with long-term current use of insulin (HCC)  -     empagliflozin (JARDIANCE) 25 MG tablet; Take 1 tablet by mouth daily  -     Hemoglobin A1C; Future  -     Lipid, Fasting; Future  -     Comprehensive Metabolic Panel; Future    Dyslipidemia associated with type 2 diabetes mellitus ([facility] [address]    Essential hypertension    Morbid obesity due to excess calories ([facility] [address]    Smoker          1: Type 2 DM complicated with hyperglycemia, neuropathy   Uncontrolled A1C 11.2 March 22nd 2019   A1C of <8 would be acceptable because of microvascular and macrovascular complications and recurrent hypoglycemias/ or history of severe hypoglycemias or seizure disorder. CDE visit tomorrow     Increase Jardiance to 25mg daily   Start Trulicity 8.33     Lantus 36 units at bedtime   Use Treat to target basal insulin. If pre-breakfast sugar is above 130 on 2 consecutive days increase Lantus by 2 units. If pre-breakfast sugar is below 90 on one day decrease Lantus by 2 units. All instructions provided in written. Check Blood sugars 3 times per day. Log them along with insulin and send them every 2 weeks. Call for blood sugars less than 60 or more than 400. Eye exam: Last exam in Dec 2018, denies retinopathy.    Foot exam:  April 2019   Deformity/amputation: absent  Skin lesions/pre-ulcerative calluses: present   Edema: right- negative, left- negative  Sensory exam: Monofilament sensation: normal  Pulses: normal, Vibration (128 Hz): mildly decreased     Renal screen: 24 hour microalbumin normal April 2019     Smoker: Counselled for smoking cessation   TSH screen: March 2019     2: HTN   Controlled     3: Hyperlipidemia   LDL: 204 , HDL: 29 , TGs: 506 March 2019    Atorvastatin 80mg daily and Fenofibrate 160mg daily   Denies missing doses     4: Morbid obesity   Need to work on diet, exercise and lose weight     If tolerating Trulicity 0.24 weekly will go up on the dose     RTC in 8 weeks with A1C, lipids, CMP    EDUCATION:   Greater than 50% of this follow-up visit was spent in general counseling regarding   obesity, diet, exercise, importance of adherence to insulin regime, recognition and treatment of hypo and hyperglycemia,  glucose logging, proper diabetes management, diabetic complications with poor management and the importance of glycemic control in order to avoid the complications of diabetes. Risks and potential complications of diabetes were reviewed with the patient. Diabetes health maintenance plan and follow-up were discussed and understood by the patient. We reviewed the importance of medication compliance and regular follow-up. Aggressive lifestyle modification was encouraged. Exercise Counselling: This patient is a candidate for regular physical exercise. Instructions to perform the following types of exercise:  Swimming or water aerobic exercise  Brisk walking  Playing tennis  Stationary bicycle or elliptical indoor  Low impact aerobic exercise    Instructions given to exercise for the following duration:  30 minutes a day for five-seven days per week.     Following instructions for being active throughout the day in addition to formal exercise:  Walk instead of drive whenever possible  Take the stairs instead of the elevator  Work in the garden  Park to the far end of the parking lot to add more walking

## 2019-04-24 ENCOUNTER — OFFICE VISIT (OUTPATIENT)
Dept: ENDOCRINOLOGY | Age: 64
End: 2019-04-24
Payer: COMMERCIAL

## 2019-04-24 DIAGNOSIS — E11.65 UNCONTROLLED TYPE 2 DIABETES MELLITUS WITH HYPERGLYCEMIA, WITH LONG-TERM CURRENT USE OF INSULIN (HCC): ICD-10-CM

## 2019-04-24 DIAGNOSIS — Z79.4 UNCONTROLLED TYPE 2 DIABETES MELLITUS WITH HYPERGLYCEMIA, WITH LONG-TERM CURRENT USE OF INSULIN (HCC): ICD-10-CM

## 2019-04-24 PROCEDURE — 97802 MEDICAL NUTRITION INDIV IN: CPT | Performed by: DIETITIAN, REGISTERED

## 2019-04-24 NOTE — PROGRESS NOTES
Medical Nutrition Therapy for Diabetes    Patrice Medel  April 24, 2019      Patient Care Team:  Gaby Youngblood MD as PCP - General (Family Medicine)  Joni Mccullough MD as Consulting Physician (Pain Management)  Loma Agreste, MD Corinne Saint, APRN - CNP    Reason for visit: Type 2 diabetes    ASSESSMENT/PLAN:   NUTRITION DIAGNOSIS    #1 Problem: Altered Nutrition-Related Laboratory Values (NC-2.2)  Related to: Endocrine/Diabetes   As Evidenced by: Elevated Plasma glucose and/or HgbA1c levels         #2 Problem: Overweight/Obesity (NC-3.3)  Related to: Excessive energy intake or physical inactivity  As Evidenced by: BMI more than normative standard for age and sex (BMI=35.62kg/m2)    #3 Problem: Altered Nutrition NI-5.8.1, NI-5.8.4                Inadequate and inconsistent carbohydrate intake    NUTRITION INTERVENTION  Nutrition Prescription: 45 grams per meal with protein and non-starch vegetables      Diabetes Education/Counseling included:  Carbohydrate Control, Activity/exercise, Label-reading, Monitoring and Insulin management    Interventions:  Control Carbohydrate Intake using Plate Guide, Control Carbohydrate Intake using Carb Counting and Increase activity level by walkingand chair activity      NUTRITION MONITORING AND EVALUATION  Indicator/Goal Criteria   #1  A1C  #1 recuded . 5-1%   #2  Weight loss #2 1-2 per per week loss   #3  45 grams carbohydrate per meal #3 review food recall            Patient Active Problem List   Diagnosis    Acute meniscal injury of knee    HTN (hypertension)    Depression    Sleep apnea    Chronic pain syndrome    Failed back surgical syndrome    Obesity    Chondromalacia of patellofemoral joint    Primary insomnia    Gastroesophageal reflux disease without esophagitis    Cervical sprain    Fibromyalgia    Cervical radiculitis    DDD (degenerative disc disease), cervical    ISMAEL (generalized anxiety disorder)    S/P alcohol detoxification    Tobacco abuse counseling    Trigger little finger of right hand    At risk for respiratory depression due to opioid    Other psoriasis    Uncontrolled type 2 diabetes mellitus with hyperglycemia, with long-term current use of insulin ([address]    Dyslipidemia associated with type 2 diabetes mellitus ([address]    Essential hypertension    Morbid obesity due to excess calories (HCC)    Smoker       Current Outpatient Medications   Medication Sig Dispense Refill    empagliflozin (JARDIANCE) 25 MG tablet Take 1 tablet by mouth daily 90 tablet 1    insulin glargine (LANTUS SOLOSTAR) 100 UNIT/ML injection pen Inject 28 Units into the skin nightly (Patient taking differently: Inject 36 Units into the skin nightly ) 10 pen 1    Insulin Pen Needle 32G X 4 MM MISC Use to inject Lantus one time daily 100 each 3    empagliflozin (JARDIANCE) 10 MG tablet Lot # [id number] ex 10/2020 (Patient taking differently: Take 10 mg by mouth daily ) 21 tablet 0    oxyCODONE (OXYCONTIN) 15 MG T12A extended release tablet Take 1 tablet by mouth 2 times daily for 28 days. 56 tablet 0    tapentadol (NUCYNTA) 75 MG TABS Take 1 tablet by mouth every 6 hours for 28 days. Max 2 per day 56 tablet 0    diclofenac sodium 1 % GEL APPLY TO THE AFFECTED AREA(S) THREE TIMES A DAY 5 Tube 1    escitalopram (LEXAPRO) 20 MG tablet TAKE ONE TABLET BY MOUTH ONCE DAILY 30 tablet 1    tiZANidine (ZANAFLEX) 4 MG tablet Take a 1/2 tablet in the morning and one tablet at night 60 tablet 0    Dulaglutide (TRULICITY) 4.08 UB/4.0VO SOPN Inject 0.75 mg into the skin once a week 4 pen 1    traZODone (DESYREL) 50 MG tablet Take 50 mg by mouth nightly      fenofibrate 160 MG tablet Take 160 mg by mouth daily      lidocaine (LIDODERM) 5 % Place 2 patches onto the skin daily as needed 12 hours on, 12 hours off.        SITagliptin (JANUVIA) 100 MG tablet Take 1 tablet by mouth daily       amLODIPine (NORVASC) 10 MG tablet Take 5 mg by mouth daily       atorvastatin (LIPITOR) 80 MG tablet Take 80 mg by mouth daily       lisinopril-hydrochlorothiazide (PRINZIDE;ZESTORETIC) 10-12.5 MG per tablet Take 1 tablet by mouth daily      pantoprazole (PROTONIX) 40 MG tablet Take 40 mg by mouth daily as needed       clobetasol (TEMOVATE) 0.05 % cream Apply topically 2 times daily to affected areas as needed for psoriasis. 60 g 3    Multiple Vitamin (MULTIVITAMIN PO) Take 1 tablet by mouth daily.  cetirizine (ZYRTEC) 10 MG tablet Take 10 mg by mouth daily as needed        No current facility-administered medications for this visit. NUTRITION ASSESSMENT    Biochemical Data:    Lab Results   Component Value Date    LABA1C 11.2 03/22/2019     Lab Results   Component Value Date    .7 03/22/2019       Lab Results   Component Value Date    CHOL 300 (H) 03/22/2019    CHOL 211 (H) 12/11/2018    CHOL 172 03/29/2018     Lab Results   Component Value Date    TRIG 506 (H) 03/22/2019    TRIG 142 12/11/2018    TRIG 189 (H) 03/29/2018     Lab Results   Component Value Date    HDL 29 (L) 03/22/2019    HDL 30 (L) 12/11/2018    HDL 28 (L) 03/29/2018     Lab Results   Component Value Date    LDLCALC see below 03/22/2019    LDLCALC 153 (H) 12/11/2018    LDLCALC 106 (H) 03/29/2018     Lab Results   Component Value Date    LABVLDL see below 03/22/2019    LABVLDL 28 12/11/2018    LABVLDL 38 03/29/2018     No results found for: [facility]    Lab Results   Component Value Date    WBC 8.8 04/02/2019    HGB 15.3 04/02/2019    HCT 45.6 04/02/2019    MCV 89.2 04/02/2019     04/02/2019       Lab Results   Component Value Date    CREATININE 0.9 04/02/2019    BUN 26 (H) 04/02/2019     (L) 04/02/2019    K 4.3 04/02/2019    CL 97 (L) 04/02/2019    CO2 22 04/02/2019         Anthropometric Measurements: Wt: 285 lbs.   Wt Readings from Last 3 Encounters:   04/23/19 285 lb (129.3 kg)   04/12/19 286 lb (129.7 kg)   04/11/19 295 lb (133.8 kg)      BMI: 35.62 kg/m2  BMI Readings from Last 3 Encounters:   04/23/19 35.62 kg/m²   04/12/19 36.72 kg/m²   04/11/19 37.88 kg/m²     Patient's stated goal weight:  7% Weight loss goal weight: 265 lbs. Nutrition-Focused Physical Findings:    General Appearance: well nourished   Affect: alert    Food and Nutrition History:   Usual Food consumption: 2-3 meals per day, irregular carb intake  Beverage consumption: water ~64 ozs., diet pop occasionally  Nutrition Awareness/Previous DSMES: 2 years ago  Food Availability: secure      Physical Activity History:   Physical activity: back pain is reducing frequency  Frequency of activity:   Duration of activity:   Obstacles to activity: neck and back pain      Diabetes Medications:     Knows name and dose of prescribed medications Yes  Knows prescribed schedule for medicationsYes  Recent change in medication type/dosage: Yes  Stores  medications properlyYes  Comments: Patient has a good handle on injectable medications at this time. Monitoring:   Has BG meter: Yes  Testing frequency: 2-3 daily   Recent results: fbs 107-113, bedtime 141, after lunch 2 xs per week  Low 100s    Barriers:   -none          Follow Up Plan: Will remain available as needed.     Referring Provider: Jalen Olson  Time spent with patient: 60 minutes

## 2019-05-08 ENCOUNTER — TELEPHONE (OUTPATIENT)
Dept: PHARMACY | Facility: CLINIC | Age: 64
End: 2019-05-08

## 2019-05-08 NOTE — TELEPHONE ENCOUNTER
[facility]) Employee Diabetes Program  =================================================================  Mauricio Mota is a 70 y.o. male enrolled in the [address] [place] [address] Diabetes Program.    Current antihyperglycemic medications:  Medication Sig    empagliflozin (JARDIANCE) 25 MG tablet Lot # [id number] Ex 3/2021    empagliflozin (JARDIANCE) 25 MG tablet Take 1 tablet by mouth daily  - confirms taking daily    insulin glargine (LANTUS SOLOSTAR) 100 UNIT/ML injection pen Inject 28 Units into the skin nightly (Patient taking differently: Inject 36 Units into the skin nightly )    Insulin Pen Needle 32G X 4 MM MISC Use to inject Lantus one time daily    empagliflozin (JARDIANCE) 10 MG tablet Lot # [id number] ex 10/2020 (Patient taking differently: Take 10 mg by mouth daily )    Dulaglutide (TRULICITY) 6.32 SK/8.1ZP SOPN Inject 0.75 mg into the skin once a week  - denies ADRs including stomach upset/nausea    SITagliptin (JANUVIA) 100 MG tablet Take 1 tablet by mouth daily   - no longer taking     AMBGs: 110-115 in AM, evening go up a little bit (135) but still generally in range. Outcomes:  - AMFBGs seem to be at goal, congratulated him for his successes  - No issues identified with medications at this time  - Encouraged pt to request refills in timely manner so he receives in time from mail order  - patient to follow up with endocrine at 6/25/19 appt as scheduled. Has labs ordered.   - removed Januvia from med list since therapy is now completed    Piper HerringD, Haresh Warner Pharmacist  Direct: [phone]  Dept: [phone] (toll free [phone], option 7)      For Pharmacy Admin Tracking Only    PHSO: Yes  Total # of Interventions Recommended: 0  Recommended intervention potential cost savings: 0  Total Interventions Accepted: 0  Time Spent (min): 10

## 2019-05-09 ENCOUNTER — OFFICE VISIT (OUTPATIENT)
Dept: PAIN MANAGEMENT | Age: 64
End: 2019-05-09
Payer: COMMERCIAL

## 2019-05-09 VITALS
SYSTOLIC BLOOD PRESSURE: 126 MMHG | BODY MASS INDEX: 35.62 KG/M2 | HEART RATE: 93 BPM | WEIGHT: 285 LBS | DIASTOLIC BLOOD PRESSURE: 74 MMHG

## 2019-05-09 DIAGNOSIS — G89.4 CHRONIC PAIN SYNDROME: ICD-10-CM

## 2019-05-09 DIAGNOSIS — M96.1 FAILED BACK SURGICAL SYNDROME: ICD-10-CM

## 2019-05-09 DIAGNOSIS — M79.7 FIBROMYALGIA: ICD-10-CM

## 2019-05-09 DIAGNOSIS — M50.30 DDD (DEGENERATIVE DISC DISEASE), CERVICAL: ICD-10-CM

## 2019-05-09 DIAGNOSIS — M54.12 CERVICAL RADICULOPATHY: ICD-10-CM

## 2019-05-09 DIAGNOSIS — M54.12 CERVICAL RADICULITIS: ICD-10-CM

## 2019-05-09 PROCEDURE — 3017F COLORECTAL CA SCREEN DOC REV: CPT | Performed by: INTERNAL MEDICINE

## 2019-05-09 PROCEDURE — G8427 DOCREV CUR MEDS BY ELIG CLIN: HCPCS | Performed by: INTERNAL MEDICINE

## 2019-05-09 PROCEDURE — 4004F PT TOBACCO SCREEN RCVD TLK: CPT | Performed by: INTERNAL MEDICINE

## 2019-05-09 PROCEDURE — 99213 OFFICE O/P EST LOW 20 MIN: CPT | Performed by: INTERNAL MEDICINE

## 2019-05-09 PROCEDURE — 1123F ACP DISCUSS/DSCN MKR DOCD: CPT | Performed by: INTERNAL MEDICINE

## 2019-05-09 PROCEDURE — 4040F PNEUMOC VAC/ADMIN/RCVD: CPT | Performed by: INTERNAL MEDICINE

## 2019-05-09 PROCEDURE — G8417 CALC BMI ABV UP PARAM F/U: HCPCS | Performed by: INTERNAL MEDICINE

## 2019-05-09 RX ORDER — TIZANIDINE 4 MG/1
TABLET ORAL
Qty: 60 TABLET | Refills: 0 | Status: SHIPPED | OUTPATIENT
Start: 2019-05-09 | End: 2021-02-25 | Stop reason: ALTCHOICE

## 2019-05-09 RX ORDER — OXYCODONE HYDROCHLORIDE 15 MG/1
1 TABLET, FILM COATED, EXTENDED RELEASE ORAL DAILY
Qty: 20 TABLET | Refills: 0 | Status: SHIPPED | OUTPATIENT
Start: 2019-05-09 | End: 2019-06-06

## 2019-05-09 RX ORDER — OXYCODONE HYDROCHLORIDE 15 MG/1
1 TABLET, FILM COATED, EXTENDED RELEASE ORAL 2 TIMES DAILY
Qty: 20 TABLET | Refills: 0 | Status: SHIPPED | OUTPATIENT
Start: 2019-05-09 | End: 2019-05-09 | Stop reason: SDUPTHER

## 2019-05-09 NOTE — PROGRESS NOTES
Ernie Galloway  1955  [id number]    HISTORY OF PRESENT ILLNESS:  Mr. Jose Maloney is a 70 y.o. male returns for a follow up visit for multiple medical problems. His current presenting problems are   1. Chronic pain syndrome    2. Cervical radiculopathy    3. Cervical radiculitis    4. DDD (degenerative disc disease), cervical    5. Fibromyalgia    6. Failed back surgical syndrome    7. ISMAEL (generalized anxiety disorder)    8. Moderate episode of recurrent major depressive disorder ([facility]    9. Primary insomnia    10. Gastroesophageal reflux disease without esophagitis    . As per information/history obtained from the PADT(patient assessment and documentation tool) - He complains of pain in the neck, shoulders Bilateral and upper back with radiation to the mid back and lower back He rates the pain 5/10 and describes it as aching. Pain is made worse by: movement, walking, standing, sitting, bending, lifting. Current treatment regimen has helped relieve about 50% of the pain. He denies side effects from the current pain regimen. Patient reports that since the last follow up visit the physical functioning is unchanged, family/social relationships are unchanged, mood is unchanged and sleep patterns are unchanged, and that the overall functioning is unchanged. Patient denies neurological bowel or bladder. Patient denies misusing/abusing his narcotic pain medications or using any illegal drugs. There are No indicators for possible drug abuse, addiction or diversion problems. Upon obtaining the medical history from Mr. Jose Maloney regarding today's office visit for his presenting problems, patient states  that he is doing fair and has been compliant with the regimen. Mr. Jose Maloney reports that he is using medical marijuana and started using it 2 weeks ago. He mentions that he has been cutting back on the opioids and is ff of the Nucynta. He has decreased the Oxycontin to 1 per day and has around 10 pills left.   Patient 10-12.5 MG per tablet Take 1 tablet by mouth daily      pantoprazole (PROTONIX) 40 MG tablet Take 40 mg by mouth daily as needed       clobetasol (TEMOVATE) 0.05 % cream Apply topically 2 times daily to affected areas as needed for psoriasis. 60 g 3    Multiple Vitamin (MULTIVITAMIN PO) Take 1 tablet by mouth daily.  cetirizine (ZYRTEC) 10 MG tablet Take 10 mg by mouth daily as needed        No facility-administered medications prior to visit. SOCIAL/FAMILY/PAST MEDICAL HISTORY: Mr. Figueroa Found, family and past medical history was reviewed. REVIEW OF SYSTEMS:    Respiratory: Negative for apnea, chest tightness and shortness of breath or change in baseline breathing. Gastrointestinal: Negative for nausea, vomiting, abdominal pain, diarrhea, constipation, blood in stool and abdominal distention. PHYSICAL EXAM:   Nursing note and vitals reviewed. /74   Pulse 93   Wt 285 lb (129.3 kg)   BMI 35.62 kg/m²   Constitutional: He appears well-developed and well-nourished. No acute distress. Skin: Skin is warm and dry, good turgor. No rash noted. He is not diaphoretic. Cardiovascular: Normal rate, regular rhythm, normal heart sounds, and does not have murmur. Pulmonary/Chest: Effort normal. No respiratory distress. He does not have wheezes in the lung fields. He has no rales. Neurological/Psychiatric:He is alert and oriented to person, place, and time. Coordination is  normal.  His mood isAppropriate and affect is Neutral/Euthymic(normal) . IMPRESSION:   1. Chronic pain syndrome    2. Cervical radiculopathy    3. Cervical radiculitis    4. DDD (degenerative disc disease), cervical    5. Fibromyalgia    6. Failed back surgical syndrome        PLAN:  Informed verbal consent was obtained  -[place] record was obtained and reviewed  for the last one year and no indicators of drug misuse  were found.  Any other controlled substance prescriptions  seen on the record have been accounted for, I am aware of the patient receiving these medications. Nataly Ramirez [place] record will be rechecked as part of office protocol.   -Will give a tapering dose of the Oxycontin over the next 3-4 weeks.    -Continue with medical marijuana  -He was advised to increase fluids ( 5-7  glasses of fluid daily), limit caffeine, avoid cheese products, increase dietary fiber, increase activity and exercise as tolerated and relax regularly and enjoy meals  -D/C Nucynta, off of it now.   -Labs reviewed, increased Glucose. Current Outpatient Medications   Medication Sig Dispense Refill    empagliflozin (JARDIANCE) 25 MG tablet Lot # [id number] Ex 3/2021 14 tablet 0    empagliflozin (JARDIANCE) 25 MG tablet Take 1 tablet by mouth daily 90 tablet 1    insulin glargine (LANTUS SOLOSTAR) 100 UNIT/ML injection pen Inject 28 Units into the skin nightly (Patient taking differently: Inject 36 Units into the skin nightly ) 10 pen 1    Insulin Pen Needle 32G X 4 MM MISC Use to inject Lantus one time daily 100 each 3    empagliflozin (JARDIANCE) 10 MG tablet Lot # [id number] ex 10/2020 (Patient taking differently: Take 10 mg by mouth daily ) 21 tablet 0    oxyCODONE (OXYCONTIN) 15 MG T12A extended release tablet Take 1 tablet by mouth 2 times daily for 28 days. 56 tablet 0    tapentadol (NUCYNTA) 75 MG TABS Take 1 tablet by mouth every 6 hours for 28 days.  Max 2 per day 56 tablet 0    diclofenac sodium 1 % GEL APPLY TO THE AFFECTED AREA(S) THREE TIMES A DAY 5 Tube 1    escitalopram (LEXAPRO) 20 MG tablet TAKE ONE TABLET BY MOUTH ONCE DAILY 30 tablet 1    tiZANidine (ZANAFLEX) 4 MG tablet Take a 1/2 tablet in the morning and one tablet at night 60 tablet 0    Dulaglutide (TRULICITY) 0.61 ZS/0.8CS SOPN Inject 0.75 mg into the skin once a week 4 pen 1    traZODone (DESYREL) 50 MG tablet Take 50 mg by mouth nightly      fenofibrate 160 MG tablet Take 160 mg by mouth daily      lidocaine (LIDODERM) 5 % Place 2 patches onto the skin ADL's. Ability to do home exercises independently. Ability to do household chores indoor and/or outdoor work and social and leisure activities. Improve psychosocial and physical functioning. - he is showing progression towards this treatment goal with the current regimen. He was advised against drinking alcohol with the narcotic pain medicines, advised against driving or handling machinery while adjusting the dose of medicines or if having cognitive  issues related to the current medications. Risk of overdose and death, if medicines not taken as prescribed, were also discussed. If the patient develops new symptoms or if the symptoms worsen, the patient should call the office. While transcribing every attempt was made to maintain the accuracy of the note in terms of it's contents,there may have been some errors made inadvertently. Thank you for allowing me to participate in the care of this patient. Zara Driver MD.    Cc: MD TRELL Dumont, Geovanny Wang am scribing for and in the presence of Dr. Zara Driver.    05/09/19  3:25 PM  PRICE Lopez, Dr. Zara Driver, personally performed the services described in this documentation as scribed by    Geovanny Wang MA in my presence and it is both accurate and complete

## 2019-05-10 ENCOUNTER — APPOINTMENT (OUTPATIENT)
Dept: CT IMAGING | Age: 64
DRG: 337 | End: 2019-05-10
Payer: COMMERCIAL

## 2019-05-10 ENCOUNTER — ANESTHESIA EVENT (OUTPATIENT)
Dept: OPERATING ROOM | Age: 64
DRG: 337 | End: 2019-05-10
Payer: COMMERCIAL

## 2019-05-10 ENCOUNTER — TELEPHONE (OUTPATIENT)
Dept: ENDOCRINOLOGY | Age: 64
End: 2019-05-10

## 2019-05-10 ENCOUNTER — ANESTHESIA (OUTPATIENT)
Dept: OPERATING ROOM | Age: 64
DRG: 337 | End: 2019-05-10
Payer: COMMERCIAL

## 2019-05-10 ENCOUNTER — HOSPITAL ENCOUNTER (INPATIENT)
Age: 64
LOS: 1 days | Discharge: HOME | DRG: 337 | End: 2019-05-11
Attending: EMERGENCY MEDICINE | Admitting: INTERNAL MEDICINE
Payer: COMMERCIAL

## 2019-05-10 ENCOUNTER — NURSE TRIAGE (OUTPATIENT)
Dept: OTHER | Facility: CLINIC | Age: 64
End: 2019-05-10

## 2019-05-10 VITALS
RESPIRATION RATE: 22 BRPM | OXYGEN SATURATION: 92 % | SYSTOLIC BLOOD PRESSURE: 151 MMHG | DIASTOLIC BLOOD PRESSURE: 75 MMHG | TEMPERATURE: 89.1 F

## 2019-05-10 DIAGNOSIS — K35.80 UNCOMPLICATED ACUTE APPENDICITIS: Primary | ICD-10-CM

## 2019-05-10 DIAGNOSIS — N28.89 LEFT RENAL MASS: ICD-10-CM

## 2019-05-10 LAB
A/G RATIO: 1.3 (ref 1.1–2.2)
ALBUMIN SERPL-MCNC: 4 G/DL (ref 3.4–5)
ALP BLD-CCNC: 51 U/L (ref 40–129)
ALT SERPL-CCNC: 14 U/L (ref 10–40)
AMYLASE: 44 U/L (ref 25–115)
ANION GAP SERPL CALCULATED.3IONS-SCNC: 13 MMOL/L (ref 3–16)
AST SERPL-CCNC: 15 U/L (ref 15–37)
BASOPHILS ABSOLUTE: 0.1 K/UL (ref 0–0.2)
BASOPHILS RELATIVE PERCENT: 0.5 %
BILIRUB SERPL-MCNC: 0.3 MG/DL (ref 0–1)
BILIRUBIN URINE: NEGATIVE
BLOOD, URINE: NEGATIVE
BUN BLDV-MCNC: 24 MG/DL (ref 7–20)
CALCIUM SERPL-MCNC: 10.4 MG/DL (ref 8.3–10.6)
CHLORIDE BLD-SCNC: 103 MMOL/L (ref 99–110)
CLARITY: CLEAR
CO2: 22 MMOL/L (ref 21–32)
COLOR: YELLOW
CREAT SERPL-MCNC: 1 MG/DL (ref 0.8–1.3)
EOSINOPHILS ABSOLUTE: 0.5 K/UL (ref 0–0.6)
EOSINOPHILS RELATIVE PERCENT: 4.6 %
GFR AFRICAN AMERICAN: >60
GFR NON-AFRICAN AMERICAN: >60
GLOBULIN: 3.1 G/DL
GLUCOSE BLD-MCNC: 129 MG/DL (ref 70–99)
GLUCOSE BLD-MCNC: 193 MG/DL (ref 70–99)
GLUCOSE BLD-MCNC: 90 MG/DL (ref 70–99)
GLUCOSE BLD-MCNC: 98 MG/DL (ref 70–99)
GLUCOSE URINE: >=1000 MG/DL
HCT VFR BLD CALC: 43.4 % (ref 40.5–52.5)
HEMOGLOBIN: 14.5 G/DL (ref 13.5–17.5)
KETONES, URINE: NEGATIVE MG/DL
LEUKOCYTE ESTERASE, URINE: NEGATIVE
LIPASE: 30 U/L (ref 13–60)
LYMPHOCYTES ABSOLUTE: 2.9 K/UL (ref 1–5.1)
LYMPHOCYTES RELATIVE PERCENT: 26.1 %
MAGNESIUM: 1.8 MG/DL (ref 1.8–2.4)
MCH RBC QN AUTO: 30.6 PG (ref 26–34)
MCHC RBC AUTO-ENTMCNC: 33.5 G/DL (ref 31–36)
MCV RBC AUTO: 91.5 FL (ref 80–100)
MICROSCOPIC EXAMINATION: ABNORMAL
MONOCYTES ABSOLUTE: 1.1 K/UL (ref 0–1.3)
MONOCYTES RELATIVE PERCENT: 9.6 %
NEUTROPHILS ABSOLUTE: 6.6 K/UL (ref 1.7–7.7)
NEUTROPHILS RELATIVE PERCENT: 59.2 %
NITRITE, URINE: NEGATIVE
PDW BLD-RTO: 14.8 % (ref 12.4–15.4)
PERFORMED ON: ABNORMAL
PERFORMED ON: ABNORMAL
PERFORMED ON: NORMAL
PH UA: 5.5 (ref 5–8)
PLATELET # BLD: 276 K/UL (ref 135–450)
PMV BLD AUTO: 9 FL (ref 5–10.5)
POTASSIUM SERPL-SCNC: 3.9 MMOL/L (ref 3.5–5.1)
PROTEIN UA: NEGATIVE MG/DL
RBC # BLD: 4.74 M/UL (ref 4.2–5.9)
SODIUM BLD-SCNC: 138 MMOL/L (ref 136–145)
SPECIFIC GRAVITY UA: 1.02 (ref 1–1.03)
TOTAL PROTEIN: 7.1 G/DL (ref 6.4–8.2)
URINE TYPE: ABNORMAL
UROBILINOGEN, URINE: 0.2 E.U./DL
WBC # BLD: 11.1 K/UL (ref 4–11)

## 2019-05-10 PROCEDURE — 6360000002 HC RX W HCPCS: Performed by: SURGERY

## 2019-05-10 PROCEDURE — 3600000004 HC SURGERY LEVEL 4 BASE: Performed by: SURGERY

## 2019-05-10 PROCEDURE — 85025 COMPLETE CBC W/AUTO DIFF WBC: CPT

## 2019-05-10 PROCEDURE — 96375 TX/PRO/DX INJ NEW DRUG ADDON: CPT

## 2019-05-10 PROCEDURE — 3700000000 HC ANESTHESIA ATTENDED CARE: Performed by: SURGERY

## 2019-05-10 PROCEDURE — 6360000002 HC RX W HCPCS: Performed by: ANESTHESIOLOGY

## 2019-05-10 PROCEDURE — G0378 HOSPITAL OBSERVATION PER HR: HCPCS

## 2019-05-10 PROCEDURE — 6360000002 HC RX W HCPCS: Performed by: NURSE ANESTHETIST, CERTIFIED REGISTERED

## 2019-05-10 PROCEDURE — 93005 ELECTROCARDIOGRAM TRACING: CPT | Performed by: ANESTHESIOLOGY

## 2019-05-10 PROCEDURE — 1200000000 HC SEMI PRIVATE

## 2019-05-10 PROCEDURE — 2500000003 HC RX 250 WO HCPCS: Performed by: SURGERY

## 2019-05-10 PROCEDURE — 2580000003 HC RX 258: Performed by: ANESTHESIOLOGY

## 2019-05-10 PROCEDURE — 3700000001 HC ADD 15 MINUTES (ANESTHESIA): Performed by: SURGERY

## 2019-05-10 PROCEDURE — 2500000003 HC RX 250 WO HCPCS: Performed by: ANESTHESIOLOGY

## 2019-05-10 PROCEDURE — 82150 ASSAY OF AMYLASE: CPT

## 2019-05-10 PROCEDURE — 88304 TISSUE EXAM BY PATHOLOGIST: CPT

## 2019-05-10 PROCEDURE — 6360000004 HC RX CONTRAST MEDICATION: Performed by: PHYSICIAN ASSISTANT

## 2019-05-10 PROCEDURE — 80053 COMPREHEN METABOLIC PANEL: CPT

## 2019-05-10 PROCEDURE — 2720000010 HC SURG SUPPLY STERILE: Performed by: SURGERY

## 2019-05-10 PROCEDURE — 44970 LAPAROSCOPY APPENDECTOMY: CPT | Performed by: SURGERY

## 2019-05-10 PROCEDURE — 0DTJ0ZZ RESECTION OF APPENDIX, OPEN APPROACH: ICD-10-PCS | Performed by: SURGERY

## 2019-05-10 PROCEDURE — 7100000000 HC PACU RECOVERY - FIRST 15 MIN: Performed by: SURGERY

## 2019-05-10 PROCEDURE — 83735 ASSAY OF MAGNESIUM: CPT

## 2019-05-10 PROCEDURE — 94760 N-INVAS EAR/PLS OXIMETRY 1: CPT

## 2019-05-10 PROCEDURE — 7100000001 HC PACU RECOVERY - ADDTL 15 MIN: Performed by: SURGERY

## 2019-05-10 PROCEDURE — 94660 CPAP INITIATION&MGMT: CPT

## 2019-05-10 PROCEDURE — 6370000000 HC RX 637 (ALT 250 FOR IP): Performed by: NURSE ANESTHETIST, CERTIFIED REGISTERED

## 2019-05-10 PROCEDURE — 2500000003 HC RX 250 WO HCPCS: Performed by: NURSE ANESTHETIST, CERTIFIED REGISTERED

## 2019-05-10 PROCEDURE — 36415 COLL VENOUS BLD VENIPUNCTURE: CPT

## 2019-05-10 PROCEDURE — 83690 ASSAY OF LIPASE: CPT

## 2019-05-10 PROCEDURE — 80074 ACUTE HEPATITIS PANEL: CPT

## 2019-05-10 PROCEDURE — 2580000003 HC RX 258: Performed by: SURGERY

## 2019-05-10 PROCEDURE — 99285 EMERGENCY DEPT VISIT HI MDM: CPT

## 2019-05-10 PROCEDURE — 74177 CT ABD & PELVIS W/CONTRAST: CPT

## 2019-05-10 PROCEDURE — 99222 1ST HOSP IP/OBS MODERATE 55: CPT | Performed by: SURGERY

## 2019-05-10 PROCEDURE — 0DNW0ZZ RELEASE PERITONEUM, OPEN APPROACH: ICD-10-PCS | Performed by: SURGERY

## 2019-05-10 PROCEDURE — 81003 URINALYSIS AUTO W/O SCOPE: CPT

## 2019-05-10 PROCEDURE — 3600000014 HC SURGERY LEVEL 4 ADDTL 15MIN: Performed by: SURGERY

## 2019-05-10 PROCEDURE — 6370000000 HC RX 637 (ALT 250 FOR IP): Performed by: SURGERY

## 2019-05-10 PROCEDURE — 96374 THER/PROPH/DIAG INJ IV PUSH: CPT

## 2019-05-10 PROCEDURE — 6360000002 HC RX W HCPCS: Performed by: PHYSICIAN ASSISTANT

## 2019-05-10 PROCEDURE — 2700000000 HC OXYGEN THERAPY PER DAY

## 2019-05-10 PROCEDURE — 2709999900 HC NON-CHARGEABLE SUPPLY: Performed by: SURGERY

## 2019-05-10 PROCEDURE — 6370000000 HC RX 637 (ALT 250 FOR IP)

## 2019-05-10 PROCEDURE — 6370000000 HC RX 637 (ALT 250 FOR IP): Performed by: NURSE PRACTITIONER

## 2019-05-10 RX ORDER — TIZANIDINE 4 MG/1
4 TABLET ORAL NIGHTLY
Status: DISCONTINUED | OUTPATIENT
Start: 2019-05-10 | End: 2019-05-11 | Stop reason: HOSPADM

## 2019-05-10 RX ORDER — MORPHINE SULFATE 4 MG/ML
4 INJECTION, SOLUTION INTRAMUSCULAR; INTRAVENOUS ONCE
Status: COMPLETED | OUTPATIENT
Start: 2019-05-10 | End: 2019-05-10

## 2019-05-10 RX ORDER — MORPHINE SULFATE 2 MG/ML
2 INJECTION, SOLUTION INTRAMUSCULAR; INTRAVENOUS
Status: DISCONTINUED | OUTPATIENT
Start: 2019-05-10 | End: 2019-05-11 | Stop reason: HOSPADM

## 2019-05-10 RX ORDER — VASOPRESSIN 20 U/ML
INJECTION PARENTERAL PRN
Status: DISCONTINUED | OUTPATIENT
Start: 2019-05-10 | End: 2019-05-10 | Stop reason: SDUPTHER

## 2019-05-10 RX ORDER — MORPHINE SULFATE 4 MG/ML
4 INJECTION, SOLUTION INTRAMUSCULAR; INTRAVENOUS
Status: DISCONTINUED | OUTPATIENT
Start: 2019-05-10 | End: 2019-05-11 | Stop reason: HOSPADM

## 2019-05-10 RX ORDER — PROPOFOL 10 MG/ML
INJECTION, EMULSION INTRAVENOUS PRN
Status: DISCONTINUED | OUTPATIENT
Start: 2019-05-10 | End: 2019-05-10 | Stop reason: SDUPTHER

## 2019-05-10 RX ORDER — NICOTINE POLACRILEX 4 MG
15 LOZENGE BUCCAL PRN
Status: DISCONTINUED | OUTPATIENT
Start: 2019-05-10 | End: 2019-05-11 | Stop reason: HOSPADM

## 2019-05-10 RX ORDER — HYDROMORPHONE HCL 110MG/55ML
0.5 PATIENT CONTROLLED ANALGESIA SYRINGE INTRAVENOUS EVERY 5 MIN PRN
Status: DISCONTINUED | OUTPATIENT
Start: 2019-05-10 | End: 2019-05-10 | Stop reason: HOSPADM

## 2019-05-10 RX ORDER — SODIUM CHLORIDE, SODIUM LACTATE, POTASSIUM CHLORIDE, CALCIUM CHLORIDE 600; 310; 30; 20 MG/100ML; MG/100ML; MG/100ML; MG/100ML
INJECTION, SOLUTION INTRAVENOUS CONTINUOUS
Status: DISCONTINUED | OUTPATIENT
Start: 2019-05-10 | End: 2019-05-10

## 2019-05-10 RX ORDER — SODIUM CHLORIDE 9 MG/ML
INJECTION, SOLUTION INTRAVENOUS CONTINUOUS
Status: DISCONTINUED | OUTPATIENT
Start: 2019-05-10 | End: 2019-05-10

## 2019-05-10 RX ORDER — DEXTROSE MONOHYDRATE 25 G/50ML
12.5 INJECTION, SOLUTION INTRAVENOUS PRN
Status: DISCONTINUED | OUTPATIENT
Start: 2019-05-10 | End: 2019-05-11 | Stop reason: HOSPADM

## 2019-05-10 RX ORDER — SUCCINYLCHOLINE/SOD CL,ISO/PF 100 MG/5ML
SYRINGE (ML) INTRAVENOUS PRN
Status: DISCONTINUED | OUTPATIENT
Start: 2019-05-10 | End: 2019-05-10 | Stop reason: SDUPTHER

## 2019-05-10 RX ORDER — BUPIVACAINE HYDROCHLORIDE AND EPINEPHRINE 5; 5 MG/ML; UG/ML
INJECTION, SOLUTION EPIDURAL; INTRACAUDAL; PERINEURAL
Status: COMPLETED | OUTPATIENT
Start: 2019-05-10 | End: 2019-05-10

## 2019-05-10 RX ORDER — ONDANSETRON 2 MG/ML
INJECTION INTRAMUSCULAR; INTRAVENOUS PRN
Status: DISCONTINUED | OUTPATIENT
Start: 2019-05-10 | End: 2019-05-10 | Stop reason: SDUPTHER

## 2019-05-10 RX ORDER — DEXTROSE MONOHYDRATE 50 MG/ML
100 INJECTION, SOLUTION INTRAVENOUS PRN
Status: DISCONTINUED | OUTPATIENT
Start: 2019-05-10 | End: 2019-05-11 | Stop reason: HOSPADM

## 2019-05-10 RX ORDER — LABETALOL HYDROCHLORIDE 5 MG/ML
5 INJECTION, SOLUTION INTRAVENOUS EVERY 10 MIN PRN
Status: DISCONTINUED | OUTPATIENT
Start: 2019-05-10 | End: 2019-05-10 | Stop reason: HOSPADM

## 2019-05-10 RX ORDER — SODIUM CHLORIDE 0.9 % (FLUSH) 0.9 %
10 SYRINGE (ML) INJECTION PRN
Status: DISCONTINUED | OUTPATIENT
Start: 2019-05-10 | End: 2019-05-11 | Stop reason: HOSPADM

## 2019-05-10 RX ORDER — FENTANYL CITRATE 50 UG/ML
INJECTION, SOLUTION INTRAMUSCULAR; INTRAVENOUS PRN
Status: DISCONTINUED | OUTPATIENT
Start: 2019-05-10 | End: 2019-05-10 | Stop reason: SDUPTHER

## 2019-05-10 RX ORDER — MAGNESIUM HYDROXIDE 1200 MG/15ML
LIQUID ORAL CONTINUOUS PRN
Status: COMPLETED | OUTPATIENT
Start: 2019-05-10 | End: 2019-05-10

## 2019-05-10 RX ORDER — KETOROLAC TROMETHAMINE 30 MG/ML
INJECTION, SOLUTION INTRAMUSCULAR; INTRAVENOUS PRN
Status: DISCONTINUED | OUTPATIENT
Start: 2019-05-10 | End: 2019-05-10 | Stop reason: SDUPTHER

## 2019-05-10 RX ORDER — TRAZODONE HYDROCHLORIDE 50 MG/1
50 TABLET ORAL NIGHTLY
Status: DISCONTINUED | OUTPATIENT
Start: 2019-05-10 | End: 2019-05-11 | Stop reason: HOSPADM

## 2019-05-10 RX ORDER — SODIUM CHLORIDE 9 MG/ML
INJECTION, SOLUTION INTRAVENOUS CONTINUOUS
Status: DISCONTINUED | OUTPATIENT
Start: 2019-05-10 | End: 2019-05-11

## 2019-05-10 RX ORDER — GLYCOPYRROLATE 0.2 MG/ML
INJECTION INTRAMUSCULAR; INTRAVENOUS PRN
Status: DISCONTINUED | OUTPATIENT
Start: 2019-05-10 | End: 2019-05-10 | Stop reason: SDUPTHER

## 2019-05-10 RX ORDER — SODIUM CHLORIDE 0.9 % (FLUSH) 0.9 %
10 SYRINGE (ML) INJECTION EVERY 12 HOURS SCHEDULED
Status: DISCONTINUED | OUTPATIENT
Start: 2019-05-10 | End: 2019-05-11 | Stop reason: HOSPADM

## 2019-05-10 RX ORDER — HYDROMORPHONE HCL 110MG/55ML
0.25 PATIENT CONTROLLED ANALGESIA SYRINGE INTRAVENOUS EVERY 5 MIN PRN
Status: DISCONTINUED | OUTPATIENT
Start: 2019-05-10 | End: 2019-05-10 | Stop reason: HOSPADM

## 2019-05-10 RX ORDER — ROCURONIUM BROMIDE 10 MG/ML
INJECTION, SOLUTION INTRAVENOUS PRN
Status: DISCONTINUED | OUTPATIENT
Start: 2019-05-10 | End: 2019-05-10 | Stop reason: SDUPTHER

## 2019-05-10 RX ORDER — MIDAZOLAM HYDROCHLORIDE 1 MG/ML
INJECTION INTRAMUSCULAR; INTRAVENOUS PRN
Status: DISCONTINUED | OUTPATIENT
Start: 2019-05-10 | End: 2019-05-10 | Stop reason: SDUPTHER

## 2019-05-10 RX ORDER — OXYCODONE HYDROCHLORIDE 5 MG/1
10 TABLET ORAL EVERY 4 HOURS PRN
Status: DISCONTINUED | OUTPATIENT
Start: 2019-05-10 | End: 2019-05-11 | Stop reason: HOSPADM

## 2019-05-10 RX ORDER — OXYCODONE HYDROCHLORIDE 5 MG/1
5 TABLET ORAL EVERY 4 HOURS PRN
Status: DISCONTINUED | OUTPATIENT
Start: 2019-05-10 | End: 2019-05-11 | Stop reason: HOSPADM

## 2019-05-10 RX ORDER — DEXAMETHASONE SODIUM PHOSPHATE 4 MG/ML
INJECTION, SOLUTION INTRA-ARTICULAR; INTRALESIONAL; INTRAMUSCULAR; INTRAVENOUS; SOFT TISSUE PRN
Status: DISCONTINUED | OUTPATIENT
Start: 2019-05-10 | End: 2019-05-10 | Stop reason: SDUPTHER

## 2019-05-10 RX ORDER — FENTANYL CITRATE 50 UG/ML
25 INJECTION, SOLUTION INTRAMUSCULAR; INTRAVENOUS EVERY 5 MIN PRN
Status: DISCONTINUED | OUTPATIENT
Start: 2019-05-10 | End: 2019-05-10 | Stop reason: HOSPADM

## 2019-05-10 RX ORDER — ONDANSETRON 2 MG/ML
4 INJECTION INTRAMUSCULAR; INTRAVENOUS ONCE
Status: COMPLETED | OUTPATIENT
Start: 2019-05-10 | End: 2019-05-10

## 2019-05-10 RX ORDER — SODIUM CHLORIDE 0.9 % (FLUSH) 0.9 %
10 SYRINGE (ML) INJECTION PRN
Status: DISCONTINUED | OUTPATIENT
Start: 2019-05-10 | End: 2019-05-10 | Stop reason: HOSPADM

## 2019-05-10 RX ORDER — SODIUM CHLORIDE 0.9 % (FLUSH) 0.9 %
10 SYRINGE (ML) INJECTION EVERY 12 HOURS SCHEDULED
Status: DISCONTINUED | OUTPATIENT
Start: 2019-05-10 | End: 2019-05-10 | Stop reason: HOSPADM

## 2019-05-10 RX ORDER — ACETAMINOPHEN 325 MG/1
650 TABLET ORAL EVERY 4 HOURS PRN
Status: DISCONTINUED | OUTPATIENT
Start: 2019-05-10 | End: 2019-05-11 | Stop reason: HOSPADM

## 2019-05-10 RX ORDER — KETOROLAC TROMETHAMINE 30 MG/ML
INJECTION, SOLUTION INTRAMUSCULAR; INTRAVENOUS PRN
Status: DISCONTINUED | OUTPATIENT
Start: 2019-05-10 | End: 2019-05-10

## 2019-05-10 RX ORDER — ALBUTEROL SULFATE 90 UG/1
AEROSOL, METERED RESPIRATORY (INHALATION) PRN
Status: DISCONTINUED | OUTPATIENT
Start: 2019-05-10 | End: 2019-05-10 | Stop reason: SDUPTHER

## 2019-05-10 RX ORDER — FENTANYL CITRATE 50 UG/ML
50 INJECTION, SOLUTION INTRAMUSCULAR; INTRAVENOUS EVERY 5 MIN PRN
Status: DISCONTINUED | OUTPATIENT
Start: 2019-05-10 | End: 2019-05-10 | Stop reason: HOSPADM

## 2019-05-10 RX ORDER — ONDANSETRON 2 MG/ML
4 INJECTION INTRAMUSCULAR; INTRAVENOUS
Status: DISCONTINUED | OUTPATIENT
Start: 2019-05-10 | End: 2019-05-10 | Stop reason: HOSPADM

## 2019-05-10 RX ORDER — LIDOCAINE HYDROCHLORIDE 20 MG/ML
INJECTION, SOLUTION INFILTRATION; PERINEURAL PRN
Status: DISCONTINUED | OUTPATIENT
Start: 2019-05-10 | End: 2019-05-10 | Stop reason: SDUPTHER

## 2019-05-10 RX ORDER — ONDANSETRON 2 MG/ML
4 INJECTION INTRAMUSCULAR; INTRAVENOUS EVERY 6 HOURS PRN
Status: DISCONTINUED | OUTPATIENT
Start: 2019-05-10 | End: 2019-05-11 | Stop reason: HOSPADM

## 2019-05-10 RX ADMIN — ROCURONIUM BROMIDE 5 MG: 10 INJECTION, SOLUTION INTRAVENOUS at 19:30

## 2019-05-10 RX ADMIN — FENTANYL CITRATE 150 MCG: 50 INJECTION, SOLUTION INTRAMUSCULAR; INTRAVENOUS at 18:53

## 2019-05-10 RX ADMIN — PHENYLEPHRINE HYDROCHLORIDE 100 MCG: 10 INJECTION INTRAVENOUS at 19:15

## 2019-05-10 RX ADMIN — PHENYLEPHRINE HYDROCHLORIDE 100 MCG: 10 INJECTION INTRAVENOUS at 19:07

## 2019-05-10 RX ADMIN — ROCURONIUM BROMIDE 10 MG: 10 INJECTION, SOLUTION INTRAVENOUS at 19:53

## 2019-05-10 RX ADMIN — VASOPRESSIN 2 UNITS: 20 INJECTION INTRAVENOUS at 19:07

## 2019-05-10 RX ADMIN — LIDOCAINE HYDROCHLORIDE 100 MG: 20 INJECTION, SOLUTION INFILTRATION; PERINEURAL at 18:41

## 2019-05-10 RX ADMIN — FENTANYL CITRATE 100 MCG: 50 INJECTION, SOLUTION INTRAMUSCULAR; INTRAVENOUS at 18:39

## 2019-05-10 RX ADMIN — PHENYLEPHRINE HYDROCHLORIDE 100 MCG: 10 INJECTION INTRAVENOUS at 19:26

## 2019-05-10 RX ADMIN — SODIUM CHLORIDE: 9 INJECTION, SOLUTION INTRAVENOUS at 18:20

## 2019-05-10 RX ADMIN — INSULIN GLARGINE 36 UNITS: 100 INJECTION, SOLUTION SUBCUTANEOUS at 23:48

## 2019-05-10 RX ADMIN — VASOPRESSIN 2 UNITS: 20 INJECTION INTRAVENOUS at 18:59

## 2019-05-10 RX ADMIN — VASOPRESSIN 2 UNITS: 20 INJECTION INTRAVENOUS at 19:25

## 2019-05-10 RX ADMIN — SODIUM CHLORIDE, POTASSIUM CHLORIDE, SODIUM LACTATE AND CALCIUM CHLORIDE: 600; 310; 30; 20 INJECTION, SOLUTION INTRAVENOUS at 18:34

## 2019-05-10 RX ADMIN — Medication 10 ML: at 22:56

## 2019-05-10 RX ADMIN — TRAZODONE HYDROCHLORIDE 50 MG: 50 TABLET ORAL at 23:00

## 2019-05-10 RX ADMIN — PHENYLEPHRINE HYDROCHLORIDE 100 MCG: 10 INJECTION INTRAVENOUS at 18:47

## 2019-05-10 RX ADMIN — MORPHINE SULFATE 2 MG: 2 INJECTION, SOLUTION INTRAMUSCULAR; INTRAVENOUS at 21:52

## 2019-05-10 RX ADMIN — ONDANSETRON 4 MG: 2 INJECTION INTRAMUSCULAR; INTRAVENOUS at 16:37

## 2019-05-10 RX ADMIN — SODIUM CHLORIDE: 9 INJECTION, SOLUTION INTRAVENOUS at 19:39

## 2019-05-10 RX ADMIN — FENTANYL CITRATE 50 MCG: 50 INJECTION, SOLUTION INTRAMUSCULAR; INTRAVENOUS at 20:49

## 2019-05-10 RX ADMIN — ROCURONIUM BROMIDE 35 MG: 10 INJECTION, SOLUTION INTRAVENOUS at 18:49

## 2019-05-10 RX ADMIN — IOPAMIDOL 75 ML: 755 INJECTION, SOLUTION INTRAVENOUS at 16:54

## 2019-05-10 RX ADMIN — ONDANSETRON 4 MG: 2 INJECTION INTRAMUSCULAR; INTRAVENOUS at 18:53

## 2019-05-10 RX ADMIN — MORPHINE SULFATE 4 MG: 4 INJECTION INTRAVENOUS at 16:37

## 2019-05-10 RX ADMIN — VASOPRESSIN 2 UNITS: 20 INJECTION INTRAVENOUS at 20:02

## 2019-05-10 RX ADMIN — SODIUM CHLORIDE: 9 INJECTION, SOLUTION INTRAVENOUS at 21:18

## 2019-05-10 RX ADMIN — TAZOBACTAM SODIUM AND PIPERACILLIN SODIUM 3.38 G: 375; 3 INJECTION, SOLUTION INTRAVENOUS at 17:42

## 2019-05-10 RX ADMIN — PHENYLEPHRINE HYDROCHLORIDE 200 MCG: 10 INJECTION INTRAVENOUS at 18:56

## 2019-05-10 RX ADMIN — PHENYLEPHRINE HYDROCHLORIDE 100 MCG: 10 INJECTION INTRAVENOUS at 20:19

## 2019-05-10 RX ADMIN — ROCURONIUM BROMIDE 10 MG: 10 INJECTION, SOLUTION INTRAVENOUS at 19:14

## 2019-05-10 RX ADMIN — ALBUTEROL SULFATE 8 PUFF: 90 AEROSOL, METERED RESPIRATORY (INHALATION) at 18:46

## 2019-05-10 RX ADMIN — Medication 160 MG: at 18:41

## 2019-05-10 RX ADMIN — MIDAZOLAM HYDROCHLORIDE 2 MG: 1 INJECTION, SOLUTION INTRAMUSCULAR; INTRAVENOUS at 18:34

## 2019-05-10 RX ADMIN — GLYCOPYRROLATE 0.2 MG: 0.2 INJECTION, SOLUTION INTRAMUSCULAR; INTRAVENOUS at 19:11

## 2019-05-10 RX ADMIN — TIZANIDINE 4 MG: 4 TABLET ORAL at 23:01

## 2019-05-10 RX ADMIN — DEXAMETHASONE SODIUM PHOSPHATE 10 MG: 4 INJECTION, SOLUTION INTRAMUSCULAR; INTRAVENOUS at 18:53

## 2019-05-10 RX ADMIN — OXYCODONE HYDROCHLORIDE 10 MG: 5 TABLET ORAL at 23:00

## 2019-05-10 RX ADMIN — PHENYLEPHRINE HYDROCHLORIDE 100 MCG: 10 INJECTION INTRAVENOUS at 18:51

## 2019-05-10 RX ADMIN — KETOROLAC TROMETHAMINE 15 MG: 60 INJECTION, SOLUTION INTRAMUSCULAR at 19:31

## 2019-05-10 RX ADMIN — SUGAMMADEX 400 MG: 100 INJECTION, SOLUTION INTRAVENOUS at 20:18

## 2019-05-10 RX ADMIN — PHENYLEPHRINE HYDROCHLORIDE 100 MCG: 10 INJECTION INTRAVENOUS at 20:01

## 2019-05-10 RX ADMIN — FENTANYL CITRATE 50 MCG: 50 INJECTION, SOLUTION INTRAMUSCULAR; INTRAVENOUS at 20:28

## 2019-05-10 RX ADMIN — PROPOFOL 200 MG: 10 INJECTION, EMULSION INTRAVENOUS at 18:41

## 2019-05-10 RX ADMIN — VASOPRESSIN 2 UNITS: 20 INJECTION INTRAVENOUS at 20:19

## 2019-05-10 ASSESSMENT — PULMONARY FUNCTION TESTS
PIF_VALUE: 22
PIF_VALUE: 4
PIF_VALUE: 33
PIF_VALUE: 20
PIF_VALUE: 31
PIF_VALUE: 22
PIF_VALUE: 33
PIF_VALUE: 32
PIF_VALUE: 33
PIF_VALUE: 32
PIF_VALUE: 6
PIF_VALUE: 32
PIF_VALUE: 32
PIF_VALUE: 33
PIF_VALUE: 6
PIF_VALUE: 22
PIF_VALUE: 32
PIF_VALUE: 33
PIF_VALUE: 31
PIF_VALUE: 31
PIF_VALUE: 34
PIF_VALUE: 31
PIF_VALUE: 33
PIF_VALUE: 2
PIF_VALUE: 33
PIF_VALUE: 20
PIF_VALUE: 21
PIF_VALUE: 25
PIF_VALUE: 33
PIF_VALUE: 23
PIF_VALUE: 23
PIF_VALUE: 33
PIF_VALUE: 19
PIF_VALUE: 2
PIF_VALUE: 32
PIF_VALUE: 33
PIF_VALUE: 20
PIF_VALUE: 11
PIF_VALUE: 17
PIF_VALUE: 20
PIF_VALUE: 23
PIF_VALUE: 21
PIF_VALUE: 31
PIF_VALUE: 33
PIF_VALUE: 33
PIF_VALUE: 22
PIF_VALUE: 20
PIF_VALUE: 32
PIF_VALUE: 28
PIF_VALUE: 32
PIF_VALUE: 32
PIF_VALUE: 6
PIF_VALUE: 33
PIF_VALUE: 33
PIF_VALUE: 32
PIF_VALUE: 33
PIF_VALUE: 31
PIF_VALUE: 33
PIF_VALUE: 33
PIF_VALUE: 2
PIF_VALUE: 20
PIF_VALUE: 32
PIF_VALUE: 20
PIF_VALUE: 33
PIF_VALUE: 4
PIF_VALUE: 34
PIF_VALUE: 32
PIF_VALUE: 21
PIF_VALUE: 33
PIF_VALUE: 4
PIF_VALUE: 34
PIF_VALUE: 32
PIF_VALUE: 32
PIF_VALUE: 4
PIF_VALUE: 33
PIF_VALUE: 8
PIF_VALUE: 33
PIF_VALUE: 23
PIF_VALUE: 23
PIF_VALUE: 31
PIF_VALUE: 26
PIF_VALUE: 28
PIF_VALUE: 31
PIF_VALUE: 32
PIF_VALUE: 20
PIF_VALUE: 8
PIF_VALUE: 33
PIF_VALUE: 33
PIF_VALUE: 26
PIF_VALUE: 32
PIF_VALUE: 2
PIF_VALUE: 13
PIF_VALUE: 16
PIF_VALUE: 32
PIF_VALUE: 19
PIF_VALUE: 31
PIF_VALUE: 21
PIF_VALUE: 32
PIF_VALUE: 2
PIF_VALUE: 32
PIF_VALUE: 33
PIF_VALUE: 28
PIF_VALUE: 15
PIF_VALUE: 32
PIF_VALUE: 33
PIF_VALUE: 34
PIF_VALUE: 33
PIF_VALUE: 22
PIF_VALUE: 32

## 2019-05-10 ASSESSMENT — PAIN SCALES - GENERAL
PAINLEVEL_OUTOF10: 7
PAINLEVEL_OUTOF10: 8
PAINLEVEL_OUTOF10: 7
PAINLEVEL_OUTOF10: 4
PAINLEVEL_OUTOF10: 3

## 2019-05-10 ASSESSMENT — ENCOUNTER SYMPTOMS
VOMITING: 0
BLOOD IN STOOL: 0
CONSTIPATION: 0
BACK PAIN: 0
SHORTNESS OF BREATH: 0
ABDOMINAL PAIN: 1
COUGH: 0
COLOR CHANGE: 0
ANAL BLEEDING: 0
DIARRHEA: 0
NAUSEA: 0
ABDOMINAL DISTENTION: 0

## 2019-05-10 ASSESSMENT — PAIN DESCRIPTION - LOCATION
LOCATION: BACK
LOCATION: BACK

## 2019-05-10 ASSESSMENT — PAIN DESCRIPTION - PAIN TYPE
TYPE: ACUTE PAIN
TYPE: ACUTE PAIN

## 2019-05-10 ASSESSMENT — LIFESTYLE VARIABLES: SMOKING_STATUS: 1

## 2019-05-10 NOTE — ANESTHESIA PRE PROCEDURE
Department of Anesthesiology  Preprocedure Note       Name:  Colleen Mackey   Age:  70 y.o.  :  1955                                          MRN:  [id number]         Date:  5/10/2019      Surgeon: Fermín Soria):  Krystyna Delgado MD    Procedure: APPENDECTOMY LAPAROSCOPIC (N/A Abdomen)    Medications prior to admission:   Prior to Admission medications    Medication Sig Start Date End Date Taking? Authorizing Provider   oxyCODONE (OXYCONTIN) 15 MG T12A extended release tablet Take 1 tablet by mouth daily for 28 days. 19  Laisha Aviles MD   tiZANidine (ZANAFLEX) 4 MG tablet Take a 1/2 tablet in the morning and one tablet at night 19   Laisha Aviles MD   empagliflozin (JARDIANCE) 25 MG tablet Lot # [id number] Ex 3/2021 4/24/19   Donovan Wyatt MD   empagliflozin (JARDIANCE) 25 MG tablet Take 1 tablet by mouth daily 19   Donovan Wyatt MD   insulin glargine (LANTUS SOLOSTAR) 100 UNIT/ML injection pen Inject 28 Units into the skin nightly  Patient taking differently: Inject 36 Units into the skin nightly  19   Donovan Wyatt MD   Insulin Pen Needle 32G X 4 MM MISC Use to inject Lantus one time daily 19   Donovan Wyatt MD   empagliflozin (JARDIANCE) 10 MG tablet Lot # [id number] ex 10/2020  Patient taking differently: Take 10 mg by mouth daily  19   Donovan Wyatt MD   tapentadol (NUCYNTA) 75 MG TABS Take 1 tablet by mouth every 6 hours for 28 days.  Max 2 per day 4/12/19 5/10/19  Laisha Aviles MD   diclofenac sodium 1 % GEL APPLY TO THE AFFECTED AREA(S) THREE TIMES A DAY 19   Laisha Aviles MD   escitalopram (LEXAPRO) 20 MG tablet TAKE ONE TABLET BY MOUTH ONCE DAILY 19   Laisha Aviles MD   Dulaglutide (TRULICITY) 0.94 KL/5.2VW SOPN Inject 0.75 mg into the skin once a week 19   Donovan Wyatt MD   traZODone (DESYREL) 50 MG tablet Take 50 mg by mouth nightly    Historical Provider, MD   fenofibrate 160 MG tablet Take 160 mg by mouth daily    Historical Provider, MD   lidocaine (LIDODERM) 5 % Place 2 patches onto the skin daily as needed 12 hours on, 12 hours off. Historical Provider, MD   amLODIPine (NORVASC) 10 MG tablet Take 5 mg by mouth daily     Historical Provider, MD   atorvastatin (LIPITOR) 80 MG tablet Take 80 mg by mouth daily     Historical Provider, MD   lisinopril-hydrochlorothiazide (PRINZIDE;ZESTORETIC) 10-12.5 MG per tablet Take 1 tablet by mouth daily    Historical Provider, MD   pantoprazole (PROTONIX) 40 MG tablet Take 40 mg by mouth daily as needed     Historical Provider, MD   clobetasol (TEMOVATE) 0.05 % cream Apply topically 2 times daily to affected areas as needed for psoriasis. 11/30/12   Nataly James MD   Multiple Vitamin (MULTIVITAMIN PO) Take 1 tablet by mouth daily. Historical Provider, MD   cetirizine (ZYRTEC) 10 MG tablet Take 10 mg by mouth daily as needed     Historical Provider, MD       Current medications:    Current Facility-Administered Medications   Medication Dose Route Frequency Provider Last Rate Last Dose    piperacillin-tazobactam (ZOSYN) 3.375 g in dextrose 50 mL IVPB (premix)  3.375 g Intravenous Q6H ADELAIDE Davison 100 mL/hr at 05/10/19 1742 3.375 g at 05/10/19 1742     Current Outpatient Medications   Medication Sig Dispense Refill    oxyCODONE (OXYCONTIN) 15 MG T12A extended release tablet Take 1 tablet by mouth daily for 28 days.  20 tablet 0    tiZANidine (ZANAFLEX) 4 MG tablet Take a 1/2 tablet in the morning and one tablet at night 60 tablet 0    empagliflozin (JARDIANCE) 25 MG tablet Lot # [id number] Ex 3/2021 14 tablet 0    empagliflozin (JARDIANCE) 25 MG tablet Take 1 tablet by mouth daily 90 tablet 1    insulin glargine (LANTUS SOLOSTAR) 100 UNIT/ML injection pen Inject 28 Units into the skin nightly (Patient taking differently: Inject 36 Units into the skin nightly ) 10 pen 1    Insulin Pen Needle 32G X 4 MM MISC Use to inject Lantus one time daily 100 each 3    empagliflozin (JARDIANCE) 10 MG tablet Lot # [id number] ex 10/2020 (Patient taking differently: Take 10 mg by mouth daily ) 21 tablet 0    tapentadol (NUCYNTA) 75 MG TABS Take 1 tablet by mouth every 6 hours for 28 days. Max 2 per day 56 tablet 0    diclofenac sodium 1 % GEL APPLY TO THE AFFECTED AREA(S) THREE TIMES A DAY 5 Tube 1    escitalopram (LEXAPRO) 20 MG tablet TAKE ONE TABLET BY MOUTH ONCE DAILY 30 tablet 1    Dulaglutide (TRULICITY) 1.36 EM/5.4PR SOPN Inject 0.75 mg into the skin once a week 4 pen 1    traZODone (DESYREL) 50 MG tablet Take 50 mg by mouth nightly      fenofibrate 160 MG tablet Take 160 mg by mouth daily      lidocaine (LIDODERM) 5 % Place 2 patches onto the skin daily as needed 12 hours on, 12 hours off.  amLODIPine (NORVASC) 10 MG tablet Take 5 mg by mouth daily       atorvastatin (LIPITOR) 80 MG tablet Take 80 mg by mouth daily       lisinopril-hydrochlorothiazide (PRINZIDE;ZESTORETIC) 10-12.5 MG per tablet Take 1 tablet by mouth daily      pantoprazole (PROTONIX) 40 MG tablet Take 40 mg by mouth daily as needed       clobetasol (TEMOVATE) 0.05 % cream Apply topically 2 times daily to affected areas as needed for psoriasis. 60 g 3    Multiple Vitamin (MULTIVITAMIN PO) Take 1 tablet by mouth daily. 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3 kg)   04/23/19 285 lb (129.3 kg)   04/12/19 286 lb (129.7 kg)     There is no height or weight on file to calculate BMI.    CBC:   Lab Results   Component Value Date    WBC 11.1 05/10/2019    RBC 4.74 05/10/2019    HGB 14.5 05/10/2019    HCT 43.4 05/10/2019    MCV 91.5 05/10/2019    RDW 14.8 05/10/2019     05/10/2019       CMP:   Lab Results   Component Value Date     05/10/2019    K 3.9 05/10/2019    K 3.7 03/09/2018     05/10/2019    CO2 22 05/10/2019    BUN 24 05/10/2019    CREATININE 1.0 05/10/2019    GFRAA >60 05/10/2019    GFRAA >60 11/29/2012    AGRATIO 1.3 05/10/2019    LABGLOM >60 05/10/2019    GLUCOSE 98 05/10/2019    PROT 7.1 05/10/2019    PROT 7.1 11/29/2012    CALCIUM 10.4 05/10/2019    BILITOT 0.3 05/10/2019    ALKPHOS 51 05/10/2019    AST 15 05/10/2019    ALT 14 05/10/2019       POC Tests: No results for input(s): POCGLU, POCNA, POCK, POCCL, POCBUN, POCHEMO, POCHCT in the last 72 hours.     Coags: No results found for: PROTIME, INR, APTT    HCG (If Applicable): No results found for: PREGTESTUR, PREGSERUM, HCG, HCGQUANT     ABGs: No results found for: PHART, PO2ART, AME1WYO, CVG0CBJ, BEART, F5UAXPRR     Type & Screen (If Applicable):  No results found for: LABABO, [address]    Anesthesia Evaluation  Patient summary reviewed and Nursing notes reviewed no history of anesthetic complications:   Airway: Mallampati: III        Dental: (+) caps      Pulmonary:normal exam    (+) sleep apnea:  current smoker                           Cardiovascular:  Exercise tolerance: good (>4 METS),   (+) hypertension:, hyperlipidemia      ECG reviewed  Rhythm: regular                      Neuro/Psych:   (+) psychiatric history:depression/anxiety             GI/Hepatic/Renal:   (+) GERD:, PUD, liver disease (LFT abnormal past):, morbid obesity          Endo/Other:    (+) Diabetes (suspect poor control)Type II DM, , electrolyte abnormalities, . ROS comment: H/o ETOH in detox. Also positive tox screen 2018 for Amphetamines. Dehydrated today by urine spec grav. Abdominal:   (+) obese,         Vascular:                                  Anesthesia Plan      general     ASA 3 - emergent       Induction: intravenous and rapid sequence. MIPS: Postoperative opioids intended, Prophylactic antiemetics administered and Postoperative trial extubation. Anesthetic plan and risks discussed with patient. Plan discussed with CRNA.                   Ham Taylor MD   5/10/2019

## 2019-05-10 NOTE — TELEPHONE ENCOUNTER
Mr. Mead Seats informed Dr Era Lewis will be in shortly. He stated his pain is about a 3 on scale 1-10. He is ok to wait for Dr. Era Lewis  to advise.

## 2019-05-10 NOTE — TELEPHONE ENCOUNTER
PT states he has pain on his lower right side, would appreciate a phn call from the nurse, [phone], he takes, Jardiance, Trulicity and Insulin pen

## 2019-05-10 NOTE — CONSULTS
5 % Place 2 patches onto the skin daily as needed 12 hours on, 12 hours off. Historical Provider, MD   amLODIPine (NORVASC) 10 MG tablet Take 5 mg by mouth daily     Historical Provider, MD   atorvastatin (LIPITOR) 80 MG tablet Take 80 mg by mouth daily     Historical Provider, MD   lisinopril-hydrochlorothiazide (PRINZIDE;ZESTORETIC) 10-12.5 MG per tablet Take 1 tablet by mouth daily    Historical Provider, MD   pantoprazole (PROTONIX) 40 MG tablet Take 40 mg by mouth daily as needed     Historical Provider, MD   clobetasol (TEMOVATE) 0.05 % cream Apply topically 2 times daily to affected areas as needed for psoriasis. 11/30/12   Estela Vega MD   Multiple Vitamin (MULTIVITAMIN PO) Take 1 tablet by mouth daily. Historical Provider, MD   cetirizine (ZYRTEC) 10 MG tablet Take 10 mg by mouth daily as needed     Historical Provider, MD        Allergies:  Cymbalta [duloxetine hcl]; Cymbalta [duloxetine hcl]; Shellfish-derived products; and Shellfish-derived products    Social History:   Social History     Socioeconomic History    Marital status:      Spouse name: None    Number of children: None    Years of education: None    Highest education level: None   Occupational History    Occupation: disability   Social Needs    Financial resource strain: None    Food insecurity:     Worry: None     Inability: None    Transportation needs:     Medical: None     Non-medical: None   Tobacco Use    Smoking status: Current Every Day Smoker     Packs/day: 0.50     Years: 15.00     Pack years: 7.50     Types: Cigarettes    Smokeless tobacco: Never Used   Substance and Sexual Activity    Alcohol use:  Yes     Alcohol/week: 0.6 oz     Types: 1 Standard drinks or equivalent per week     Comment: less than weekly    Drug use: No     Comment: Medical Marijuana    Sexual activity: Never     Partners: Female   Lifestyle    Physical activity:     Days per week: None     Minutes per session: None    Stress: None   Relationships    Social connections:     Talks on phone: None     Gets together: None     Attends Buddhism service: None     Active member of club or organization: None     Attends meetings of clubs or organizations: None     Relationship status: None    Intimate partner violence:     Fear of current or ex partner: None     Emotionally abused: None     Physically abused: None     Forced sexual activity: None   Other Topics Concern    None   Social History Narrative    ** Merged History Encounter **            Family History:    Family History   Problem Relation Age of Onset    Stroke Mother     Heart Disease Father        Review of Systems:  CONSTITUTIONAL:  Negative except as above  HEENT:  negative  RESPIRATORY:  negative  CARDIOVASCULAR:  negative  GASTROINTESTINAL:  negative except as above   GENITOURINARY:  negative  HEMATOLOGIC/LYMPHATIC:  negative  NEUROLOGICAL:  Negative      Vital Signs:  Patient Vitals for the past 24 hrs:   BP Temp Temp src Pulse Resp SpO2 Height Weight   05/10/19 1833 -- -- -- -- -- -- 6' 3\" (1.905 m) 288 lb (130.6 kg)   05/10/19 1813 116/71 97.2 °F (36.2 °C) Temporal 82 16 95 % -- --   05/10/19 1730 124/75 -- -- -- -- 95 % -- --   05/10/19 1715 109/64 -- -- -- -- 95 % -- --   05/10/19 1630 (!) 102/43 -- -- -- -- -- -- --   05/10/19 1615 118/65 -- -- -- -- 94 % -- --   05/10/19 1600 109/65 -- -- -- -- 95 % -- --   05/10/19 1545 103/64 -- -- -- -- 94 % -- --   05/10/19 1530 (!) 105/59 -- -- -- -- 95 % -- --   05/10/19 1515 116/61 -- -- -- -- -- -- --   05/10/19 1500 115/72 -- -- -- -- 95 % -- --   05/10/19 1445 108/69 -- -- -- -- 95 % -- --   05/10/19 1352 109/68 98.5 °F (36.9 °C) Oral 93 16 95 % -- --      TEMPERATURE HISTORY 24H: Temp (24hrs), Av.9 °F (36.6 °C), Min:97.2 °F (36.2 °C), Max:98.5 °F (36.9 °C)    BLOOD PRESSURE HISTORY: Systolic (18FHM), WXU:963 , Min:102 , YVK:768    Diastolic (13THZ), SAL:14, Min:43, Max:75    Admission Weight: 288 lb (130.6 kg)     No  Trigger little finger of right hand    At risk for respiratory depression due to opioid    Other psoriasis    Uncontrolled type 2 diabetes mellitus with hyperglycemia, with long-term current use of insulin (HCC)    Dyslipidemia associated with type 2 diabetes mellitus ([address]    Essential hypertension    Morbid obesity due to excess calories ([facility] [address]    Smoker     Acute appendicitis with focal peritonitis  Diabetes  Morbid obesity, BMI 36.1  Chronic back pain    Plan:  1. The patient has a history, exam, and CT confirming appendicitis. Specific risks discussed include bleeding, infection, damage to surrounding structures, conversion to open, possible requirement of additional procedures, as well as the perioperative risks associated with general anesthesia. Benefits include resolution of symptoms and prevention of future complications related to appendicitis. Alternatives include observation with medical management. Details of the procedure were discussed and all questions answered. The patient understands, agrees, and wishes to proceed. He will be taken to the OR this evening  2. NPO for procedure  3. IVF resuscitation  4. Antibiotics  5. Pain medication and antiemetics as needed  6. If non-perforated appendix encountered and patient's symptoms improve, could potentially be discharged tomorrow. Otherwise will need continued hospital admission for pain control and antibiotics  7. Defer management of anxiety, diabetes, medical conditions to primary hospitalist team  8. The patient is currently smoking. Counseled on the numerous risks associated with smoking. We discussed a number of smoking cessation options and patient will be provided with information related to these options  9. DVT prophylaxis with lovenox and SCD's to be started after surgery    This plan was discussed at length with the patient.  He was understanding and in agreement with the plan  Thank you for the consult and allowing me to participate

## 2019-05-10 NOTE — TELEPHONE ENCOUNTER
Pt called back and informed him again of what Dr. Randa Dykes said. Pt understood and he is not able to order a CT scan for the ER. The attending physician will have to order that.

## 2019-05-10 NOTE — TELEPHONE ENCOUNTER
Right lower abdomen painful, not red  Worried about Rafia gangrene   Recommend going to ER and blood work and CT scan done to rule out deep infection

## 2019-05-10 NOTE — ED NOTES
Patient with c/o RLQ abdominal pain. States he's had it 3-4 days. Tenderness and guarding upon palpation. Patient A&O x4, follows commands and answers questions appropriately.       Elan Valero RN  05/10/19 0219

## 2019-05-10 NOTE — ED PROVIDER NOTES
further worked up not emergently. Patient was informed of the finding and the need for follow-up. During the patient's ED course, the patient was given:  Medications   piperacillin-tazobactam (ZOSYN) 3.375 g in dextrose 50 mL IVPB (premix) (has no administration in time range)   morphine injection 4 mg (4 mg Intravenous Given 5/10/19 1637)   ondansetron (ZOFRAN) injection 4 mg (4 mg Intravenous Given 5/10/19 1637)   iopamidol (ISOVUE-370) 76 % injection 75 mL (75 mLs Intravenous Given 5/10/19 1654)        CLINICAL IMPRESSION  1. Acute appendicitis with generalized peritonitis without gangrene, unspecified whether abscess present, unspecified whether perforation present    2. Left renal mass        DISPOSITION  Isabell Noland was admitted in fair condition. I have discussed the findings of today's workup with the patient and addressed the patient's questions and concerns. The plan is to admit to the hospital at this time under the hospitalist service. The advanced practice provider spoke with hospitalist who accepted the patient and will take over the patient's care. The patient is agreeable with this plan. This chart was created using Dragon dictation software. Efforts were made by me to ensure accuracy, however some errors may be present due to limitations of this technology.             Yue Burrows MD  05/10/19 2284
by the  ED Provider with the below findings:    Interpretation per the Radiologist below, if available at the time of this note:    CT ABDOMEN PELVIS W IV CONTRAST Additional Contrast? None   Final Result   1. Uncomplicated acute appendicitis   2. 2.1 cm left renal hyperdense cyst versus mass. Recommend dedicated renal   CT or MRI for further evaluation           Ct Abdomen Pelvis W Iv Contrast Additional Contrast? None    Result Date: 5/10/2019  EXAMINATION: CT OF THE ABDOMEN AND PELVIS WITH CONTRAST 5/10/2019 4:53 pm TECHNIQUE: CT of the abdomen and pelvis was performed with the administration of intravenous contrast. Multiplanar reformatted images are provided for review. Dose modulation, iterative reconstruction, and/or weight based adjustment of the mA/kV was utilized to reduce the radiation dose to as low as reasonably achievable. COMPARISON: None. HISTORY: ORDERING SYSTEM PROVIDED HISTORY: JANUSZ slater TECHNOLOGIST PROVIDED HISTORY: If patient is on cardiac monitor and/or pulse ox, they may be taken off cardiac monitor and pulse ox, left on O2 if currently on. All monitors reattached when patient returns to room. Additional Contrast?->None Ordering Physician Provided Reason for Exam: JANUSZ slater Acuity: Acute Type of Exam: Initial Relevant Medical/Surgical History: Abdominal Pain (Pt states a medication he is on is concerning for ganegrene (jardiance). Called his PCP with concerns of abd pain for the past 4 days and his PCP told him to come here for CT and blood work.) FINDINGS: Lower Chest: Dependent atelectasis in the lung bases Organs: Left renal cysts measuring up to 3.1 cm.  2.1 cm left renal cortical lesion with density greater than simple fluid. Remaining solid organs and gallbladder unremarkable GI/Bowel: Appendix enlarged and fluid filled with periappendiceal inflammatory stranding. No focal fluid collection.   Diverticula of the colon with no diverticular inflammatory stranding Pelvis: Urinary

## 2019-05-11 VITALS
BODY MASS INDEX: 35.81 KG/M2 | DIASTOLIC BLOOD PRESSURE: 79 MMHG | TEMPERATURE: 97.9 F | OXYGEN SATURATION: 98 % | WEIGHT: 288 LBS | SYSTOLIC BLOOD PRESSURE: 119 MMHG | HEIGHT: 75 IN | HEART RATE: 73 BPM | RESPIRATION RATE: 18 BRPM

## 2019-05-11 LAB
ANION GAP SERPL CALCULATED.3IONS-SCNC: 10 MMOL/L (ref 3–16)
BASOPHILS ABSOLUTE: 0 K/UL (ref 0–0.2)
BASOPHILS RELATIVE PERCENT: 0.1 %
BUN BLDV-MCNC: 25 MG/DL (ref 7–20)
CALCIUM SERPL-MCNC: 9.3 MG/DL (ref 8.3–10.6)
CHLORIDE BLD-SCNC: 104 MMOL/L (ref 99–110)
CO2: 25 MMOL/L (ref 21–32)
CREAT SERPL-MCNC: 1.2 MG/DL (ref 0.8–1.3)
EKG ATRIAL RATE: 93 BPM
EKG DIAGNOSIS: NORMAL
EKG P AXIS: 8 DEGREES
EKG P-R INTERVAL: 190 MS
EKG Q-T INTERVAL: 408 MS
EKG QRS DURATION: 114 MS
EKG QTC CALCULATION (BAZETT): 507 MS
EKG R AXIS: 34 DEGREES
EKG T AXIS: 46 DEGREES
EKG VENTRICULAR RATE: 93 BPM
EOSINOPHILS ABSOLUTE: 0 K/UL (ref 0–0.6)
EOSINOPHILS RELATIVE PERCENT: 0.1 %
GFR AFRICAN AMERICAN: >60
GFR NON-AFRICAN AMERICAN: >60
GLUCOSE BLD-MCNC: 116 MG/DL (ref 70–99)
GLUCOSE BLD-MCNC: 117 MG/DL (ref 70–99)
GLUCOSE BLD-MCNC: 122 MG/DL (ref 70–99)
HAV IGM SER IA-ACNC: NORMAL
HCT VFR BLD CALC: 40.8 % (ref 40.5–52.5)
HEMOGLOBIN: 13.4 G/DL (ref 13.5–17.5)
HEPATITIS B CORE IGM ANTIBODY: NORMAL
HEPATITIS B SURFACE ANTIGEN INTERPRETATION: NORMAL
HEPATITIS C ANTIBODY INTERPRETATION: NORMAL
LYMPHOCYTES ABSOLUTE: 0.9 K/UL (ref 1–5.1)
LYMPHOCYTES RELATIVE PERCENT: 7.2 %
MAGNESIUM: 2 MG/DL (ref 1.8–2.4)
MCH RBC QN AUTO: 30.2 PG (ref 26–34)
MCHC RBC AUTO-ENTMCNC: 32.9 G/DL (ref 31–36)
MCV RBC AUTO: 92 FL (ref 80–100)
MONOCYTES ABSOLUTE: 0.6 K/UL (ref 0–1.3)
MONOCYTES RELATIVE PERCENT: 5.2 %
NEUTROPHILS ABSOLUTE: 10.4 K/UL (ref 1.7–7.7)
NEUTROPHILS RELATIVE PERCENT: 87.4 %
PDW BLD-RTO: 14.9 % (ref 12.4–15.4)
PERFORMED ON: ABNORMAL
PERFORMED ON: ABNORMAL
PHOSPHORUS: 3.5 MG/DL (ref 2.5–4.9)
PLATELET # BLD: 250 K/UL (ref 135–450)
PMV BLD AUTO: 8.8 FL (ref 5–10.5)
POTASSIUM SERPL-SCNC: 4.5 MMOL/L (ref 3.5–5.1)
RBC # BLD: 4.44 M/UL (ref 4.2–5.9)
SODIUM BLD-SCNC: 139 MMOL/L (ref 136–145)
WBC # BLD: 11.9 K/UL (ref 4–11)

## 2019-05-11 PROCEDURE — 80048 BASIC METABOLIC PNL TOTAL CA: CPT

## 2019-05-11 PROCEDURE — 85025 COMPLETE CBC W/AUTO DIFF WBC: CPT

## 2019-05-11 PROCEDURE — 36415 COLL VENOUS BLD VENIPUNCTURE: CPT

## 2019-05-11 PROCEDURE — 93010 ELECTROCARDIOGRAM REPORT: CPT | Performed by: INTERNAL MEDICINE

## 2019-05-11 PROCEDURE — 96366 THER/PROPH/DIAG IV INF ADDON: CPT

## 2019-05-11 PROCEDURE — 2580000003 HC RX 258

## 2019-05-11 PROCEDURE — 6370000000 HC RX 637 (ALT 250 FOR IP): Performed by: SURGERY

## 2019-05-11 PROCEDURE — 2580000003 HC RX 258: Performed by: SURGERY

## 2019-05-11 PROCEDURE — 2500000003 HC RX 250 WO HCPCS: Performed by: SURGERY

## 2019-05-11 PROCEDURE — G0378 HOSPITAL OBSERVATION PER HR: HCPCS

## 2019-05-11 PROCEDURE — 96372 THER/PROPH/DIAG INJ SC/IM: CPT

## 2019-05-11 PROCEDURE — 84100 ASSAY OF PHOSPHORUS: CPT

## 2019-05-11 PROCEDURE — APPNB30 APP NON BILLABLE TIME 0-30 MINS: Performed by: NURSE PRACTITIONER

## 2019-05-11 PROCEDURE — APPSS15 APP SPLIT SHARED TIME 0-15 MINUTES: Performed by: NURSE PRACTITIONER

## 2019-05-11 PROCEDURE — 83735 ASSAY OF MAGNESIUM: CPT

## 2019-05-11 PROCEDURE — 99024 POSTOP FOLLOW-UP VISIT: CPT | Performed by: SURGERY

## 2019-05-11 PROCEDURE — 96365 THER/PROPH/DIAG IV INF INIT: CPT

## 2019-05-11 PROCEDURE — 6360000002 HC RX W HCPCS: Performed by: SURGERY

## 2019-05-11 RX ORDER — SODIUM CHLORIDE 9 MG/ML
INJECTION, SOLUTION INTRAVENOUS
Status: COMPLETED
Start: 2019-05-11 | End: 2019-05-11

## 2019-05-11 RX ADMIN — TAZOBACTAM SODIUM AND PIPERACILLIN SODIUM 3.38 G: 375; 3 INJECTION, SOLUTION INTRAVENOUS at 10:52

## 2019-05-11 RX ADMIN — TAZOBACTAM SODIUM AND PIPERACILLIN SODIUM 3.38 G: 375; 3 INJECTION, SOLUTION INTRAVENOUS at 03:22

## 2019-05-11 RX ADMIN — OXYCODONE HYDROCHLORIDE 10 MG: 5 TABLET ORAL at 03:22

## 2019-05-11 RX ADMIN — SODIUM CHLORIDE: 9 INJECTION, SOLUTION INTRAVENOUS at 03:22

## 2019-05-11 RX ADMIN — SODIUM CHLORIDE 250 ML: 9 INJECTION, SOLUTION INTRAVENOUS at 03:23

## 2019-05-11 RX ADMIN — ENOXAPARIN SODIUM 40 MG: 40 INJECTION SUBCUTANEOUS at 10:52

## 2019-05-11 ASSESSMENT — PAIN SCALES - GENERAL
PAINLEVEL_OUTOF10: 3
PAINLEVEL_OUTOF10: 7
PAINLEVEL_OUTOF10: 4

## 2019-05-11 ASSESSMENT — PAIN DESCRIPTION - PAIN TYPE: TYPE: ACUTE PAIN

## 2019-05-11 ASSESSMENT — PAIN DESCRIPTION - LOCATION: LOCATION: BACK

## 2019-05-11 NOTE — PLAN OF CARE
Problem: Pain:  Goal: Pain level will decrease  Description  Pain level will decrease  Outcome: Ongoing  Note:   Pt pain is managed with pain medication when needed. Goal: Control of acute pain  Description  Control of acute pain  Outcome: Ongoing  Goal: Control of chronic pain  Description  Control of chronic pain  Outcome: Ongoing     Problem: Falls - Risk of:  Goal: Will remain free from falls  Description  Will remain free from falls  Outcome: Ongoing  Note:   No falls, appropriate fall precautions in place. Goal: Absence of physical injury  Description  Absence of physical injury  Outcome: Ongoing     Problem: Cardiac:  Goal: Ability to maintain vital signs within normal range will improve  Description  Ability to maintain vital signs within normal range will improve  Outcome: Ongoing  Note:   Pt vitals are within parameters. Problem: Serum Glucose Level - Abnormal:  Goal: Ability to maintain appropriate glucose levels will improve  Description  Ability to maintain appropriate glucose levels will improve  Outcome: Ongoing  Note:   Glucose controlled with diet and medication as needed.

## 2019-05-11 NOTE — DISCHARGE SUMMARY
of 5/11/2019 12:07 PM        Discharge Medication List as of 5/11/2019 12:07 PM        Discharge Medication List as of 5/11/2019 12:07 PM      CONTINUE these medications which have NOT CHANGED    Details   tiZANidine (ZANAFLEX) 4 MG tablet Take a 1/2 tablet in the morning and one tablet at night, Disp-60 tablet, R-0Normal      insulin glargine (LANTUS SOLOSTAR) 100 UNIT/ML injection pen Inject 28 Units into the skin nightly, Disp-10 pen, R-1Normal      !! empagliflozin (JARDIANCE) 10 MG tablet Lot # [id number] ex 10/2020, Disp-21 tablet, R-0Sample      diclofenac sodium 1 % GEL APPLY TO THE AFFECTED AREA(S) THREE TIMES A DAY, Disp-5 Tube, R-1, Normal      escitalopram (LEXAPRO) 20 MG tablet TAKE ONE TABLET BY MOUTH ONCE DAILY, Disp-30 tablet, R-1Normal      traZODone (DESYREL) 50 MG tablet Take 50 mg by mouth nightlyHistorical Med      fenofibrate 160 MG tablet Take 160 mg by mouth dailyHistorical Med      lidocaine (LIDODERM) 5 % Place 2 patches onto the skin daily as needed 12 hours on, 12 hours off. Historical Med      atorvastatin (LIPITOR) 80 MG tablet Take 80 mg by mouth nightly Historical Med      pantoprazole (PROTONIX) 40 MG tablet Take 40 mg by mouth daily as needed Historical Med      clobetasol (TEMOVATE) 0.05 % cream Apply topically 2 times daily to affected areas as needed for psoriasis. , Disp-60 g, R-3, Normal      Multiple Vitamin (MULTIVITAMIN PO) Take 1 tablet by mouth daily. cetirizine (ZYRTEC) 10 MG tablet Take 10 mg by mouth daily as needed Historical Med      oxyCODONE (OXYCONTIN) 15 MG T12A extended release tablet Take 1 tablet by mouth daily for 28 days. , Disp-20 tablet, R-0Normal      !! empagliflozin (JARDIANCE) 25 MG tablet Lot # [id number] Ex 3/2021, Disp-14 tablet, R-0Sample      !! empagliflozin (JARDIANCE) 25 MG tablet Take 1 tablet by mouth daily, Disp-90 tablet, R-1Normal      Insulin Pen Needle 32G X 4 MM MISC Disp-100 each, R-3, NormalUse to inject Lantus one time daily Dulaglutide (TRULICITY) 5.99 CK/5.3YD SOPN Inject 0.75 mg into the skin once a week, Disp-4 pen, R-1Normal       !! - Potential duplicate medications found. Please discuss with provider. Discharge Medication List as of 5/11/2019 12:07 PM      STOP taking these medications       amLODIPine (NORVASC) 10 MG tablet Comments:   Reason for Stopping:         lisinopril-hydrochlorothiazide (PRINZIDE;ZESTORETIC) 10-12.5 MG per tablet Comments:   Reason for Stopping:               Discharge Exam:    /79   Pulse 73   Temp 97.9 °F (36.6 °C) (Oral)   Resp 18   Ht 6' 3\" (1.905 m)   Wt 288 lb (130.6 kg)   SpO2 98%   BMI 36.00 kg/m²   General appearance:  Appears comfortable, obese, anxious, NAD. Well nourished  Eyes: Sclera clear, pupils equal  ENT: Moist mucus membranes, no thrush. Trachea midline. Cardiovascular: Regular rhythm, normal S1, S2. No murmur, gallop, rub. No edema in lower extremities  Respiratory: Clear to auscultation bilaterally, no wheeze, good inspiratory effort  Gastrointestinal: Abdomen soft, incisions w/o drainage,  appropriately tender, not distended, normal bowel sounds  Musculoskeletal: No cyanosis in digits, neck supple  Neurology: Grossly intact. Alert and oriented in time, place and person. No speech or motor deficits  Psychiatry: Appropriate affect. Not agitated  Skin: Warm, dry, normal turgor, no rash  Brisk capillary refill, peripheral pulses palpable         Labs:  For convenience and continuity at follow-up the following most recent labs are provided:    Lab Results   Component Value Date    WBC 11.9 05/11/2019    HGB 13.4 05/11/2019    HCT 40.8 05/11/2019    MCV 92.0 05/11/2019     05/11/2019     05/11/2019    K 4.5 05/11/2019    K 3.7 03/09/2018     05/11/2019    CO2 25 05/11/2019    BUN 25 05/11/2019    CREATININE 1.2 05/11/2019    CALCIUM 9.3 05/11/2019    PHOS 3.5 05/11/2019    ALKPHOS 51 05/10/2019    ALT 14 05/10/2019    AST 15 05/10/2019    BILITOT Disc space cages at L2-L3     1. Uncomplicated acute appendicitis 2. 2.1 cm left renal hyperdense cyst versus mass. Recommend dedicated renal CT or MRI for further evaluation       The patient was seen and examined on day of discharge and this discharge summary is in conjunction with any daily progress note from day of discharge. Time Spent on discharge is 45 minutes  in the examination, evaluation, counseling and review of medications and discharge plan. Note that more than 30 minutes was spent in preparing discharge papers, discussing discharge with patient, medication review, etc.       Signed:    Janelle Degroot MD   5/11/2019      Thank you Franklin Maxwell MD for the opportunity to be involved in this patient's care.  If you have any questions or concerns please feel free to contact me at [address] [place] [address]

## 2019-05-11 NOTE — BRIEF OP NOTE
[facility] and Laparoscopic [facility]  Brief Operative Note  Pt Name: Sukhdev Cabrera  CSN: [id number]  YOB: 1955    Date of Procedure: 5/10/2019    Pre-operative Diagnosis: Acute appendicitis    Post-operative Diagnosis: same     Procedure: Laparoscopic appendectomy, enterolysis 45 mins    Surgeon(s):  Noreen Baird MD     Anesthesia:  General anesthesia    Findings: Full note dictated, Dictation Job Number: [id number]    Estimated Blood Loss: less than 50  ml    Complications: None    Specimens: appendix  ID Type Source Tests Collected by Time Destination   A : A) APPENDIX Tissue Tissue SURGICAL PATHOLOGY Noreen Baird MD 5/10/2019 1905         Implants:  * No implants in log *    Drains: none   [REMOVED] Urethral Catheter Straight-tip 16 fr (Removed)       Condition: stable     Disposition and Post-operative plan: PACU, med/surg carroll , anticipate discharge tomorrow    Owen José MD, FACS  5/10/2019  8:35 PM

## 2019-05-11 NOTE — PROGRESS NOTES
fusion. Disc space cages at L2-L3     1. Uncomplicated acute appendicitis 2. 2.1 cm left renal hyperdense cyst versus mass. Recommend dedicated renal CT or MRI for further evaluation       Scheduled Meds:   sodium chloride flush  10 mL Intravenous 2 times per day    enoxaparin  40 mg Subcutaneous Daily    piperacillin-tazobactam  3.375 g Intravenous Q8H    tiZANidine  4 mg Oral Nightly    traZODone  50 mg Oral Nightly    insulin glargine  36 Units Subcutaneous Nightly    insulin lispro  0-6 Units Subcutaneous TID WC    insulin lispro  0-3 Units Subcutaneous Nightly     Continuous Infusions:   sodium chloride 125 mL/hr at 05/11/19 0322    dextrose       PRN Meds:.sodium chloride flush, ondansetron, morphine **OR** morphine, oxyCODONE **OR** oxyCODONE, acetaminophen, glucose, dextrose, glucagon (rDNA), dextrose      Assessment:  70 y.o. male admitted with   1. Uncomplicated acute appendicitis    2. Left renal mass        Status-post laparoscopic appendectomy on 5/10/2019 for acute nonperforated appendicitis      Plan:  1. General diet as tolerated  2. Stop IVF  3. Antibiotics, only while inpatient  4. Activity as tolerated, ambulate TID, up to chair for all meals  5. Pulmonary toilet, incentive spirometry  6. PRN analgesics and antiemetics--minimizing narcotics as tolerated  7. DVT prophylaxis with Lovenox  8. Management of medical comorbid etiologies per primary team and consulting services  9. Disposition: Okay for discharge home from a surgical perspective; follow up with Dr. Beny Zaidi in 2 weeks    EDUCATION:  Educated patient on plan of care and disease process--all questions answered. Plans discussed with patient and nursing. Reviewed and discussed with Dr. Beny Zaidi. Signed:  Brain Nissen, APRN - CNP  5/11/2019 8:13 AM    I have personally performed a face to face diagnostic evaluation on this patient. I have interviewed and examined the patient and I agree with the assessment above.  In summary, my findings and plan are the following:   Mr. Jesús Obrien is a 70 y.o. male who presents with   OR Date 5/10/19, laparoscopic appendectomy for acute nonperforated appendicitis with focal peritonitis  Diabetes  Morbid obesity, BMI 36.1  Chronic back pain    Plan:  1. Pain controlled  2. Tolerating diet  3. Ambulating  4. Antibiotics, stop at discharge  5. May discharge from surgical standpoint    Owen Martin MD, FACS  5/11/2019  10:18 AM

## 2019-05-11 NOTE — PROGRESS NOTES
Pt to floor from OR. Pt oriented to room and call light. Admission and assessment as charted. No needs at this time, POC discussed and all questions answered. Call light in reach, bed alarm on, will continue to monitor.

## 2019-05-12 ENCOUNTER — CARE COORDINATION (OUTPATIENT)
Dept: CASE MANAGEMENT | Age: 64
End: 2019-05-12

## 2019-05-12 NOTE — CARE COORDINATION
[facility] Transitions Initial Follow Up Call    Call within 2 business days of discharge: Yes    Patient: Darren Benson Patient : 1955   MRN: [id number]  Reason for Admission: Appendicitis   Discharge Date: 19 RARS: Readmission Risk Score: 17      Last Discharge [facility]       Complaint Diagnosis Description Type Department Provider    5/10/19 Abdominal Pain Uncomplicated acute appendicitis . .. ED to Hosp-Admission (Discharged) (ADMIT) [facility] Tank Eddy MD; Iris Oppenheim. .. Attempted to reach patient via phone for initial post hospital transition call, no answer. VM left stating purpose of call along with my contact information requesting a return call.           Follow Up  Future Appointments   Date Time Provider Amanda Nicole   2019  8:20 AM Jairo Lake MD [facility] PEDRO Venegas, RN
Appointments   Date Time Provider Amanda Nicole   6/25/2019  8:20 AM Te Martins MD [facility]       Paco Merino RN, BSN, [facility] [facility] Transitions Coordinator    [phone]

## 2019-05-14 ENCOUNTER — CARE COORDINATION (OUTPATIENT)
Dept: CASE MANAGEMENT | Age: 64
End: 2019-05-14

## 2019-05-14 NOTE — CARE COORDINATION
[facility] Transitions Follow Up Call    2019    Patient: Ruddy Long  Patient : 1955   MRN: [id number]  Reason for Admission: Appendicitis  Discharge Date: 19 RARS: Readmission Risk Score: 17         1st attempt to reach patient for Care Transitions. [facility] requesting return call. Contact information provided. [phone]    Care Transitions Subsequent and Final Call    Subsequent and Final Calls  Care Transitions Interventions  Other Interventions:             Follow Up  Future Appointments   Date Time Provider Department Center   2019  8:20 AM Mili Chavez MD [facility]       Hali Valenzuela RN

## 2019-05-22 ENCOUNTER — EMPLOYEE WELLNESS (OUTPATIENT)
Dept: OTHER | Age: 64
End: 2019-05-22

## 2019-05-22 LAB
CHOLESTEROL, TOTAL: 151 MG/DL (ref 0–199)
GLUCOSE BLD-MCNC: 94 MG/DL (ref 70–99)
HDLC SERPL-MCNC: 30 MG/DL (ref 40–60)
LDL CHOLESTEROL CALCULATED: 97 MG/DL
TRIGL SERPL-MCNC: 122 MG/DL (ref 0–150)

## 2019-05-23 LAB
ESTIMATED AVERAGE GLUCOSE: 191.5 MG/DL
HBA1C MFR BLD: 8.3 %

## 2019-05-24 ENCOUNTER — HOSPITAL ENCOUNTER (OUTPATIENT)
Age: 64
Discharge: HOME | End: 2019-05-24
Payer: COMMERCIAL

## 2019-05-24 ENCOUNTER — OFFICE VISIT (OUTPATIENT)
Dept: SURGERY | Age: 64
End: 2019-05-24

## 2019-05-24 ENCOUNTER — HOSPITAL ENCOUNTER (OUTPATIENT)
Dept: GENERAL RADIOLOGY | Age: 64
Discharge: HOME | End: 2019-05-24
Payer: COMMERCIAL

## 2019-05-24 VITALS — DIASTOLIC BLOOD PRESSURE: 70 MMHG | WEIGHT: 286 LBS | BODY MASS INDEX: 35.75 KG/M2 | SYSTOLIC BLOOD PRESSURE: 120 MMHG

## 2019-05-24 DIAGNOSIS — N28.89 LEFT RENAL MASS: ICD-10-CM

## 2019-05-24 DIAGNOSIS — Z48.89 ENCOUNTER FOR POSTOPERATIVE CARE: Primary | ICD-10-CM

## 2019-05-24 DIAGNOSIS — M54.2 NECK PAIN: ICD-10-CM

## 2019-05-24 PROCEDURE — 99024 POSTOP FOLLOW-UP VISIT: CPT | Performed by: SURGERY

## 2019-05-24 PROCEDURE — 72050 X-RAY EXAM NECK SPINE 4/5VWS: CPT

## 2019-05-24 NOTE — PATIENT INSTRUCTIONS
No heavy lifting over 20lbs for 6 weeks total postop  Diet and activity as tolerated otherwise  We'll refer to urology regarding workup and treatment for left renal mass   Patient request change and primary care physician and will refer to Dr. Manson Phoenix for establishment of care  Follow up with general surgery office as needed

## 2019-05-24 NOTE — PROGRESS NOTES
Smokeless tobacco: Never Used   Substance and Sexual Activity    Alcohol use: Not Currently     Alcohol/week: 0.6 oz     Types: 1 Standard drinks or equivalent per week    Drug use: No     Comment: Medical Marijuana    Sexual activity: Yes     Partners: Female   Lifestyle    Physical activity:     Days per week: Not on file     Minutes per session: Not on file    Stress: Not on file   Relationships    Social connections:     Talks on phone: Not on file     Gets together: Not on file     Attends Evangelical service: Not on file     Active member of club or organization: Not on file     Attends meetings of clubs or organizations: Not on file     Relationship status: Not on file    Intimate partner violence:     Fear of current or ex partner: Not on file     Emotionally abused: Not on file     Physically abused: Not on file     Forced sexual activity: Not on file   Other Topics Concern    Not on file   Social History Narrative    ** Merged History Encounter **           Family History   Problem Relation Age of Onset    Stroke Mother     Heart Disease Father      Current Outpatient Medications   Medication Sig Dispense Refill    oxyCODONE (OXYCONTIN) 15 MG T12A extended release tablet Take 1 tablet by mouth daily for 28 days.  (Patient taking differently: Take 1 tablet by mouth every 12 hours. ) 20 tablet 0    tiZANidine (ZANAFLEX) 4 MG tablet Take a 1/2 tablet in the morning and one tablet at night 60 tablet 0    empagliflozin (JARDIANCE) 25 MG tablet Lot # [id number] Ex 3/2021 14 tablet 0    empagliflozin (JARDIANCE) 25 MG tablet Take 1 tablet by mouth daily 90 tablet 1    insulin glargine (LANTUS SOLOSTAR) 100 UNIT/ML injection pen Inject 28 Units into the skin nightly (Patient taking differently: Inject 36 Units into the skin nightly ) 10 pen 1    Insulin Pen Needle 32G X 4 MM MISC Use to inject Lantus one time daily 100 each 3    empagliflozin (JARDIANCE) 10 MG tablet Lot # [id number] ex 10/2020 (Patient taking differently: Take 10 mg by mouth daily ) 21 tablet 0    diclofenac sodium 1 % GEL APPLY TO THE AFFECTED AREA(S) THREE TIMES A DAY 5 Tube 1    escitalopram (LEXAPRO) 20 MG tablet TAKE ONE TABLET BY MOUTH ONCE DAILY (Patient taking differently: nightly TAKE ONE TABLET BY MOUTH ONCE DAILY) 30 tablet 1    Dulaglutide (TRULICITY) 6.21 JH/5.7DC SOPN Inject 0.75 mg into the skin once a week 4 pen 1    traZODone (DESYREL) 50 MG tablet Take 50 mg by mouth nightly      fenofibrate 160 MG tablet Take 160 mg by mouth daily      lidocaine (LIDODERM) 5 % Place 2 patches onto the skin daily as needed 12 hours on, 12 hours off.  atorvastatin (LIPITOR) 80 MG tablet Take 80 mg by mouth nightly       pantoprazole (PROTONIX) 40 MG tablet Take 40 mg by mouth daily as needed       clobetasol (TEMOVATE) 0.05 % cream Apply topically 2 times daily to affected areas as needed for psoriasis. 60 g 3    Multiple Vitamin (MULTIVITAMIN PO) Take 1 tablet by mouth daily.  cetirizine (ZYRTEC) 10 MG tablet Take 10 mg by mouth daily as needed        No current facility-administered medications for this visit.        Allergies   Allergen Reactions    Cymbalta [Duloxetine Hcl] Diarrhea    Cymbalta [Duloxetine Hcl] Diarrhea    Shellfish-Derived Products Nausea And Vomiting    Shellfish-Derived Products Nausea And Vomiting        Review of Systems:  Review of systems performed and negative with the exception of the above findings    OBJECTIVE:  /70   Wt 286 lb (129.7 kg)   BMI 35.75 kg/m²      Physical Exam:  General appearance: alert, appears stated age, cooperative and no distress  Abdomen: soft, non-distended, appropriate incisional tenderness, incisions clean dry and intact    Orders Only on 05/22/2019   Component Date Value Ref Range Status    Hemoglobin A1C 05/22/2019 8.3  See comment % Final    Comment: Comment:  Diagnosis of Diabetes: > or = 6.5%  Increased risk of diabetes (Prediabetes): 5.7-6.4%  Glycemic Hematocrit 05/10/2019 43.4  40.5 - 52.5 % Final    MCV 05/10/2019 91.5  80.0 - 100.0 fL Final    MCH 05/10/2019 30.6  26.0 - 34.0 pg Final    MCHC 05/10/2019 33.5  31.0 - 36.0 g/dL Final    RDW 05/10/2019 14.8  12.4 - 15.4 % Final    Platelets 95/52/1915 276  135 - 450 K/uL Final    MPV 05/10/2019 9.0  5.0 - 10.5 fL Final    Neutrophils % 05/10/2019 59.2  % Final    Lymphocytes % 05/10/2019 26.1  % Final    Monocytes % 05/10/2019 9.6  % Final    Eosinophils % 05/10/2019 4.6  % Final    Basophils % 05/10/2019 0.5  % Final    Neutrophils # 05/10/2019 6.6  1.7 - 7.7 K/uL Final    Lymphocytes # 05/10/2019 2.9  1.0 - 5.1 K/uL Final    Monocytes # 05/10/2019 1.1  0.0 - 1.3 K/uL Final    Eosinophils # 05/10/2019 0.5  0.0 - 0.6 K/uL Final    Basophils # 05/10/2019 0.1  0.0 - 0.2 K/uL Final    Amylase 05/10/2019 44  25 - 115 U/L Final    Lipase 05/10/2019 30.0  13.0 - 60.0 U/L Final    Color, UA 05/10/2019 YELLOW  Straw/Yellow Final    Clarity, UA 05/10/2019 Clear  Clear Final    Glucose, Ur 05/10/2019 >=1000* Negative mg/dL Final    Bilirubin Urine 05/10/2019 Negative  Negative Final    Ketones, Urine 05/10/2019 Negative  Negative mg/dL Final    Specific Gravity, UA 05/10/2019 1.024  1.005 - 1.030 Final    Blood, Urine 05/10/2019 Negative  Negative Final    pH, UA 05/10/2019 5.5  5.0 - 8.0 Final    Protein, UA 05/10/2019 Negative  Negative mg/dL Final    Urobilinogen, Urine 05/10/2019 0.2  <2.0 E.U./dL Final    Nitrite, Urine 05/10/2019 Negative  Negative Final    Leukocyte Esterase, Urine 05/10/2019 Negative  Negative Final    Microscopic Examination 05/10/2019 Not Indicated   Final    Urine Type 05/10/2019 Not Specified   Final    POC Glucose 05/10/2019 90  70 - 99 mg/dl Final    Performed on 05/10/2019 ACCU-CHEK   Final    Ventricular Rate 05/10/2019 93  BPM Final    Atrial Rate 05/10/2019 93  BPM Final    P-R Interval 05/10/2019 190  ms Final    QRS Duration 05/10/2019 114  ms Final  Q-T Interval 05/10/2019 408  ms Final    QTc Calculation (Bazett) 05/10/2019 507  ms Final    P Axis 05/10/2019 8  degrees Final    R Axis 05/10/2019 34  degrees Final    T Axis 05/10/2019 46  degrees Final    Diagnosis 05/10/2019 Normal sinus rhythmProlonged QTAbnormal ECGWhen compared with ECG of 02-OCT-2017 19:34,Premature atrial complexes are no longer PresentNon-specific change in ST segment in Inferior leadsST no longer depressed in Anterior leadsConfirmed by Queta Hsu MD, Miguel Ramires ([id number]) on 5/11/2019 4:56:46 PM   Final    Hep A IgM 05/10/2019 Non-reactive  Non-reactive Final    Hep B Core Ab, IgM 05/10/2019 Non-reactive  Non-reactive Final    Hep B S Ag Interp 05/10/2019 Non-reactive  Non-reactive Final    Hep C Ab Interp 05/10/2019 Non-reactive  Non-reactive Final    POC Glucose 05/10/2019 193* 70 - 99 mg/dl Final    Performed on 05/10/2019 ACCU-CHEK   Final    Sodium 05/11/2019 139  136 - 145 mmol/L Final    Potassium 05/11/2019 4.5  3.5 - 5.1 mmol/L Final    Chloride 05/11/2019 104  99 - 110 mmol/L Final    CO2 05/11/2019 25  21 - 32 mmol/L Final    Anion Gap 05/11/2019 10  3 - 16 Final    Glucose 05/11/2019 117* 70 - 99 mg/dL Final    BUN 05/11/2019 25* 7 - 20 mg/dL Final    CREATININE 05/11/2019 1.2  0.8 - 1.3 mg/dL Final    GFR Non- 05/11/2019 >60  >60 Final    Comment: >60 mL/min/1.73m2 EGFR, calc. for ages 25 and older using the  MDRD formula (not corrected for weight), is valid for stable  renal function.  GFR  05/11/2019 >60  >60 Final    Comment: Chronic Kidney Disease: less than 60 ml/min/1.73 sq.m. Kidney Failure: less than 15 ml/min/1.73 sq.m. Results valid for patients 18 years and older.       Calcium 05/11/2019 9.3  8.3 - 10.6 mg/dL Final    WBC 05/11/2019 11.9* 4.0 - 11.0 K/uL Final    RBC 05/11/2019 4.44  4.20 - 5.90 M/uL Final    Hemoglobin 05/11/2019 13.4* 13.5 - 17.5 g/dL Final    Hematocrit 05/11/2019 40.8  40.5 - 52.5 % Final    MCV 05/11/2019 92.0  80.0 - 100.0 fL Final    MCH 05/11/2019 30.2  26.0 - 34.0 pg Final    MCHC 05/11/2019 32.9  31.0 - 36.0 g/dL Final    RDW 05/11/2019 14.9  12.4 - 15.4 % Final    Platelets 76/66/7506 250  135 - 450 K/uL Final    MPV 05/11/2019 8.8  5.0 - 10.5 fL Final    Neutrophils % 05/11/2019 87.4  % Final    Lymphocytes % 05/11/2019 7.2  % Final    Monocytes % 05/11/2019 5.2  % Final    Eosinophils % 05/11/2019 0.1  % Final    Basophils % 05/11/2019 0.1  % Final    Neutrophils # 05/11/2019 10.4* 1.7 - 7.7 K/uL Final    Lymphocytes # 05/11/2019 0.9* 1.0 - 5.1 K/uL Final    Monocytes # 05/11/2019 0.6  0.0 - 1.3 K/uL Final    Eosinophils # 05/11/2019 0.0  0.0 - 0.6 K/uL Final    Basophils # 05/11/2019 0.0  0.0 - 0.2 K/uL Final    Magnesium 05/11/2019 2.00  1.80 - 2.40 mg/dL Final    Phosphorus 05/11/2019 3.5  2.5 - 4.9 mg/dL Final    Magnesium 05/10/2019 1.80  1.80 - 2.40 mg/dL Final    POC Glucose 05/10/2019 129* 70 - 99 mg/dl Final    Performed on 05/10/2019 ACCU-CHEK   Final    POC Glucose 05/11/2019 122* 70 - 99 mg/dl Final    Performed on 05/11/2019 ACCU-CHEK   Final    POC Glucose 05/11/2019 116* 70 - 99 mg/dl Final    Performed on 05/11/2019 ACCU-CHEK   Final       Ct Abdomen Pelvis W Iv Contrast Additional Contrast? None    Result Date: 5/10/2019  EXAMINATION: CT OF THE ABDOMEN AND PELVIS WITH CONTRAST 5/10/2019 4:53 pm TECHNIQUE: CT of the abdomen and pelvis was performed with the administration of intravenous contrast. Multiplanar reformatted images are provided for review. Dose modulation, iterative reconstruction, and/or weight based adjustment of the mA/kV was utilized to reduce the radiation dose to as low as reasonably achievable. COMPARISON: None.  HISTORY: ORDERING SYSTEM PROVIDED HISTORY: JANUSZ slater TECHNOLOGIST PROVIDED HISTORY: If patient is on cardiac monitor and/or pulse ox, they may be taken off cardiac monitor and pulse ox, left on O2 if currently on. All monitors reattached when patient returns to room. Additional Contrast?->None Ordering Physician Provided Reason for Exam: RLQ abdpain Acuity: Acute Type of Exam: Initial Relevant Medical/Surgical History: Abdominal Pain (Pt states a medication he is on is concerning for ganegrene (jardiance). Called his PCP with concerns of abd pain for the past 4 days and his PCP told him to come here for CT and blood work.) FINDINGS: Lower Chest: Dependent atelectasis in the lung bases Organs: Left renal cysts measuring up to 3.1 cm.  2.1 cm left renal cortical lesion with density greater than simple fluid. Remaining solid organs and gallbladder unremarkable GI/Bowel: Appendix enlarged and fluid filled with periappendiceal inflammatory stranding. No focal fluid collection. Diverticula of the colon with no diverticular inflammatory stranding Pelvis: Urinary bladder unremarkable. No pelvic fluid Peritoneum/Retroperitoneum: No ascites or pneumoperitoneum. Aorta normal in caliber Bones/Soft Tissues: No acute bony abnormality. Status post L4-L5-S1 fusion. Disc space cages at L2-L3     1. Uncomplicated acute appendicitis 2. 2.1 cm left renal hyperdense cyst versus mass. Recommend dedicated renal CT or MRI for further evaluation       Pathology:  FINAL DIAGNOSIS:    Appendix, appendectomy:  - Acute appendicitis with serositis. BUCCA/BUCCA       Assessment:  OR Date 5/10/19, laparoscopic appendectomy for acute nonperforated appendicitis with focal peritonitis   Left renal mass    Plan:  No heavy lifting over 20lbs for 6 weeks total postop  Diet and activity as tolerated otherwise  We'll refer to urology regarding workup and treatment for left renal mass   Patient request change and primary care physician and will refer to Dr. Gill Blum for establishment of care  Follow up with general surgery office as needed    Valeta Handsome B. Florencia Spatz MD, FACS  5/24/2019  11:21 AM

## 2019-05-28 VITALS — WEIGHT: 286 LBS | BODY MASS INDEX: 35.75 KG/M2

## 2019-05-29 ENCOUNTER — CARE COORDINATION (OUTPATIENT)
Dept: CASE MANAGEMENT | Age: 64
End: 2019-05-29

## 2019-05-29 NOTE — CARE COORDINATION
[facility] Transitions Follow Up Call    2019    Patient: Ortiz Amor  Patient : 1955   MRN: [id number]  Reason for Admission:   Discharge Date: 19 RARS: Readmission Risk Score: 17    Final transition call made, contact info left on vm as well as Reminded patient that if they have any questions/concerns at anytime, they can always call PCP/specialist as they have an MD on call .         Follow Up  Future Appointments   Date Time Provider Amanda Nicole   2019  8:20 AM Alejandra Gonzales MD [facility] PEDRO Madera RN

## 2019-06-11 DIAGNOSIS — E11.65 UNCONTROLLED TYPE 2 DIABETES MELLITUS WITH HYPERGLYCEMIA, WITH LONG-TERM CURRENT USE OF INSULIN (HCC): ICD-10-CM

## 2019-06-11 DIAGNOSIS — E66.01 MORBID OBESITY DUE TO EXCESS CALORIES (HCC): ICD-10-CM

## 2019-06-11 DIAGNOSIS — E11.69 DYSLIPIDEMIA ASSOCIATED WITH TYPE 2 DIABETES MELLITUS (HCC): ICD-10-CM

## 2019-06-11 DIAGNOSIS — Z79.4 UNCONTROLLED TYPE 2 DIABETES MELLITUS WITH HYPERGLYCEMIA, WITH LONG-TERM CURRENT USE OF INSULIN (HCC): ICD-10-CM

## 2019-06-11 DIAGNOSIS — E78.5 DYSLIPIDEMIA ASSOCIATED WITH TYPE 2 DIABETES MELLITUS (HCC): ICD-10-CM

## 2019-06-11 DIAGNOSIS — I10 ESSENTIAL HYPERTENSION: ICD-10-CM

## 2019-06-12 RX ORDER — DULAGLUTIDE 0.75 MG/.5ML
INJECTION, SOLUTION SUBCUTANEOUS
Qty: 2 ML | Refills: 1 | Status: SHIPPED | OUTPATIENT
Start: 2019-06-12 | End: 2019-07-30 | Stop reason: SDUPTHER

## 2019-06-18 ENCOUNTER — HOSPITAL ENCOUNTER (OUTPATIENT)
Dept: MRI IMAGING | Age: 64
Discharge: HOME | End: 2019-06-18
Payer: COMMERCIAL

## 2019-06-18 DIAGNOSIS — D41.02 HEMANGIOPERICYTOMA OF KIDNEY, LEFT: ICD-10-CM

## 2019-06-18 PROCEDURE — 2580000003 HC RX 258: Performed by: FAMILY MEDICINE

## 2019-06-18 PROCEDURE — 74183 MRI ABD W/O CNTR FLWD CNTR: CPT

## 2019-06-18 PROCEDURE — 6360000004 HC RX CONTRAST MEDICATION: Performed by: FAMILY MEDICINE

## 2019-06-18 PROCEDURE — A9579 GAD-BASE MR CONTRAST NOS,1ML: HCPCS | Performed by: FAMILY MEDICINE

## 2019-06-18 RX ORDER — SODIUM CHLORIDE 0.9 % (FLUSH) 0.9 %
10 SYRINGE (ML) INJECTION ONCE
Status: COMPLETED | OUTPATIENT
Start: 2019-06-18 | End: 2019-06-18

## 2019-06-18 RX ADMIN — Medication 10 ML: at 14:15

## 2019-06-18 RX ADMIN — GADOTERIDOL 15 ML: 279.3 INJECTION, SOLUTION INTRAVENOUS at 14:14

## 2019-06-24 ENCOUNTER — HOSPITAL ENCOUNTER (OUTPATIENT)
Age: 64
Discharge: HOME | End: 2019-06-24
Payer: COMMERCIAL

## 2019-06-24 DIAGNOSIS — E11.65 UNCONTROLLED TYPE 2 DIABETES MELLITUS WITH HYPERGLYCEMIA, WITH LONG-TERM CURRENT USE OF INSULIN (HCC): ICD-10-CM

## 2019-06-24 DIAGNOSIS — Z79.4 UNCONTROLLED TYPE 2 DIABETES MELLITUS WITH HYPERGLYCEMIA, WITH LONG-TERM CURRENT USE OF INSULIN (HCC): ICD-10-CM

## 2019-06-24 LAB
A/G RATIO: 1.5 (ref 1.1–2.2)
ALBUMIN SERPL-MCNC: 4.1 G/DL (ref 3.4–5)
ALP BLD-CCNC: 44 U/L (ref 40–129)
ALT SERPL-CCNC: 14 U/L (ref 10–40)
ANION GAP SERPL CALCULATED.3IONS-SCNC: 9 MMOL/L (ref 3–16)
AST SERPL-CCNC: 22 U/L (ref 15–37)
BILIRUB SERPL-MCNC: 0.3 MG/DL (ref 0–1)
BUN BLDV-MCNC: 20 MG/DL (ref 7–20)
CALCIUM SERPL-MCNC: 9.7 MG/DL (ref 8.3–10.6)
CHLORIDE BLD-SCNC: 104 MMOL/L (ref 99–110)
CHOLESTEROL, FASTING: 140 MG/DL (ref 0–199)
CO2: 26 MMOL/L (ref 21–32)
CREAT SERPL-MCNC: 1.2 MG/DL (ref 0.8–1.3)
ESTIMATED AVERAGE GLUCOSE: 142.7 MG/DL
GFR AFRICAN AMERICAN: >60
GFR NON-AFRICAN AMERICAN: >60
GLOBULIN: 2.7 G/DL
GLUCOSE BLD-MCNC: 96 MG/DL (ref 70–99)
HBA1C MFR BLD: 6.6 %
HDLC SERPL-MCNC: 32 MG/DL (ref 40–60)
LDL CHOLESTEROL CALCULATED: 86 MG/DL
POTASSIUM SERPL-SCNC: 3.9 MMOL/L (ref 3.5–5.1)
SODIUM BLD-SCNC: 139 MMOL/L (ref 136–145)
TOTAL PROTEIN: 6.8 G/DL (ref 6.4–8.2)
TRIGLYCERIDE, FASTING: 110 MG/DL (ref 0–150)
VLDLC SERPL CALC-MCNC: 22 MG/DL

## 2019-06-24 PROCEDURE — 83036 HEMOGLOBIN GLYCOSYLATED A1C: CPT

## 2019-06-24 PROCEDURE — 80061 LIPID PANEL: CPT

## 2019-06-24 PROCEDURE — 80053 COMPREHEN METABOLIC PANEL: CPT

## 2019-06-24 PROCEDURE — 36415 COLL VENOUS BLD VENIPUNCTURE: CPT

## 2019-06-25 ENCOUNTER — OFFICE VISIT (OUTPATIENT)
Dept: ENDOCRINOLOGY | Age: 64
End: 2019-06-25
Payer: COMMERCIAL

## 2019-06-25 ENCOUNTER — HOSPITAL ENCOUNTER (OUTPATIENT)
Dept: MRI IMAGING | Age: 64
Discharge: HOME | End: 2019-06-25
Payer: COMMERCIAL

## 2019-06-25 VITALS
HEART RATE: 90 BPM | DIASTOLIC BLOOD PRESSURE: 79 MMHG | HEIGHT: 75 IN | BODY MASS INDEX: 34.07 KG/M2 | WEIGHT: 274 LBS | SYSTOLIC BLOOD PRESSURE: 112 MMHG | OXYGEN SATURATION: 97 %

## 2019-06-25 DIAGNOSIS — I10 ESSENTIAL HYPERTENSION: ICD-10-CM

## 2019-06-25 DIAGNOSIS — E66.01 MORBID OBESITY DUE TO EXCESS CALORIES (HCC): ICD-10-CM

## 2019-06-25 DIAGNOSIS — M47.22 CERVICAL SPONDYLOSIS WITH RADICULOPATHY: ICD-10-CM

## 2019-06-25 DIAGNOSIS — E11.69 DYSLIPIDEMIA ASSOCIATED WITH TYPE 2 DIABETES MELLITUS (HCC): ICD-10-CM

## 2019-06-25 DIAGNOSIS — D35.02 ADENOMA OF LEFT ADRENAL GLAND: ICD-10-CM

## 2019-06-25 DIAGNOSIS — E11.9 CONTROLLED TYPE 2 DIABETES MELLITUS WITHOUT COMPLICATION, WITH LONG-TERM CURRENT USE OF INSULIN (HCC): Primary | ICD-10-CM

## 2019-06-25 DIAGNOSIS — M50.322 DEGENERATION OF C5-C6 INTERVERTEBRAL DISC: ICD-10-CM

## 2019-06-25 DIAGNOSIS — Z79.4 CONTROLLED TYPE 2 DIABETES MELLITUS WITHOUT COMPLICATION, WITH LONG-TERM CURRENT USE OF INSULIN (HCC): Primary | ICD-10-CM

## 2019-06-25 DIAGNOSIS — E78.5 DYSLIPIDEMIA ASSOCIATED WITH TYPE 2 DIABETES MELLITUS (HCC): ICD-10-CM

## 2019-06-25 PROCEDURE — 99214 OFFICE O/P EST MOD 30 MIN: CPT | Performed by: INTERNAL MEDICINE

## 2019-06-25 PROCEDURE — 2022F DILAT RTA XM EVC RTNOPTHY: CPT | Performed by: INTERNAL MEDICINE

## 2019-06-25 PROCEDURE — 72141 MRI NECK SPINE W/O DYE: CPT

## 2019-06-25 PROCEDURE — 3017F COLORECTAL CA SCREEN DOC REV: CPT | Performed by: INTERNAL MEDICINE

## 2019-06-25 PROCEDURE — G8417 CALC BMI ABV UP PARAM F/U: HCPCS | Performed by: INTERNAL MEDICINE

## 2019-06-25 PROCEDURE — 4040F PNEUMOC VAC/ADMIN/RCVD: CPT | Performed by: INTERNAL MEDICINE

## 2019-06-25 PROCEDURE — G8427 DOCREV CUR MEDS BY ELIG CLIN: HCPCS | Performed by: INTERNAL MEDICINE

## 2019-06-25 PROCEDURE — 4004F PT TOBACCO SCREEN RCVD TLK: CPT | Performed by: INTERNAL MEDICINE

## 2019-06-25 PROCEDURE — 1123F ACP DISCUSS/DSCN MKR DOCD: CPT | Performed by: INTERNAL MEDICINE

## 2019-06-25 PROCEDURE — 3044F HG A1C LEVEL LT 7.0%: CPT | Performed by: INTERNAL MEDICINE

## 2019-06-25 RX ORDER — DEXAMETHASONE 1 MG
TABLET ORAL
Qty: 1 TABLET | Refills: 0 | Status: SHIPPED | OUTPATIENT
Start: 2019-06-25 | End: 2019-09-24

## 2019-06-25 RX ORDER — LISINOPRIL AND HYDROCHLOROTHIAZIDE 20; 12.5 MG/1; MG/1
1 TABLET ORAL DAILY
Qty: 90 TABLET | Refills: 2 | Status: SHIPPED | OUTPATIENT
Start: 2019-06-25 | End: 2020-08-25

## 2019-06-25 NOTE — PATIENT INSTRUCTIONS
Patient Education        Back Stretches: Exercises  Your Care Instructions  Here are some examples of exercises for stretching your back. Start each exercise slowly. Ease off the exercise if you start to have pain. Your doctor or physical therapist will tell you when you can start these exercises and which ones will work best for you. How to do the exercises  Overhead stretch    1. Stand comfortably with your feet shoulder-width apart. 2. Looking straight ahead, raise both arms over your head and reach toward the ceiling. Do not allow your head to tilt back. 3. Hold for 15 to 30 seconds, then lower your arms to your sides. 4. Repeat 2 to 4 times. Side stretch    1. Stand comfortably with your feet shoulder-width apart. 2. Raise one arm over your head, and then lean to the other side. 3. Slide your hand down your leg as you let the weight of your arm gently stretch your side muscles. Hold for 15 to 30 seconds. 4. Repeat 2 to 4 times on each side. Press-up    1. Lie on your stomach, supporting your body with your forearms. 2. Press your elbows down into the floor to raise your upper back. As you do this, relax your stomach muscles and allow your back to arch without using your back muscles. As your press up, do not let your hips or pelvis come off the floor. 3. Hold for 15 to 30 seconds, then relax. 4. Repeat 2 to 4 times. Relax and rest    1. Lie on your back with a rolled towel under your neck and a pillow under your knees. Extend your arms comfortably to your sides. 2. Relax and breathe normally. 3. Remain in this position for about 10 minutes. 4. If you can, do this 2 or 3 times each day. Follow-up care is a key part of your treatment and safety. Be sure to make and go to all appointments, and call your doctor if you are having problems. It's also a good idea to know your test results and keep a list of the medicines you take. Where can you learn more?   Go to [email or web address] and sign in to your [employer] account. Enter M250 in the [employer] box to learn more about \"Back Stretches: Exercises. \"     If you do not have an account, please click on the \"Sign Up Now\" link. Current as of: September 20, 2018  Content Version: 12.0  © 5653-9617 [employer]. Care instructions adapted under license by [facility] ([facility] [place]). If you have questions about a medical condition or this instruction, always ask your healthcare professional. [address] any warranty or liability for your use of this information. Patient Education        Healthy Upper Back: Exercises  Your Care Instructions  Here are some examples of exercises for your upper back. Start each exercise slowly. Ease off the exercise if you start to have pain. Your doctor or physical therapist will tell you when you can start these exercises and which ones will work best for you. How to do the exercises  Lower neck and upper back stretch    5. Stretch your arms out in front of your body. Clasp one hand on top of your other hand. 6. Gently reach out so that you feel your shoulder blades stretching away from each other. 7. Gently bend your head forward. 8. Hold for 15 to 30 seconds. 9. Repeat 2 to 4 times. Midback stretch    5. Kneel on the floor, and sit back on your ankles. 6. Lean forward, place your hands on the floor, and stretch your arms out in front of you. Rest your head between your arms. 7. Gently push your chest toward the floor, reaching as far in front of you as possible. 8. Hold for 15 to 30 seconds. 9. Repeat 2 to 4 times. Shoulder rolls    5. Sit comfortably with your feet shoulder-width apart. You can also do this exercise while standing. 6. Roll your shoulders up, then back, and then down in a smooth, circular motion. 7. Repeat 2 to 4 times. Wall push-up    5.  Stand against a wall with your feet about 12 to 24 inches back from the wall. If you feel any pain when you do this exercise, stand closer to the wall. 6. Place your hands on the wall slightly wider apart than your shoulders, and lean forward. 7. Gently lean your body toward the wall. Then push back to your starting position. Keep the motion smooth and controlled. 8. Repeat 8 to 12 times. Resisted shoulder blade squeeze    1. Sit or stand, holding the band in both hands in front of you. Keep your elbows close to your sides, bent at a 90-degree angle. Your palms should face up. 2. Squeeze your shoulder blades together, and move your arms to the outside, stretching the band. Be sure to keep your elbows at your sides while you do this. 3. Relax. 4. Repeat 8 to 12 times. Resisted rows    1. Put the band around a solid object, such as a bedpost, at about waist level. Hold one end of the band in each hand. 2. With your elbows at your sides and bent to 90 degrees, pull the band back to move your shoulder blades toward each other. Return to the starting position. 3. Repeat 8 to 12 times. Follow-up care is a key part of your treatment and safety. Be sure to make and go to all appointments, and call your doctor if you are having problems. It's also a good idea to know your test results and keep a list of the medicines you take. Where can you learn more? Go to [email or web address] [email or web address]. org and sign in to your [employer] account. Enter M249 in the [facility] box to learn more about \"Healthy Upper Back: Exercises. \"     If you do not have an account, please click on the \"Sign Up Now\" link. Current as of: September 20, 2018  Content Version: 12.0  © 0451-6113 [employer]. Care instructions adapted under license by [facility] ([facility]). If you have questions about a medical condition or this instruction, always ask your healthcare professional. [address] any warranty or liability for your use of this information.

## 2019-06-25 NOTE — PROGRESS NOTES
Patient ID:   Benjamin Blackmon is a 70 y.o. male    Chief Complaint:   Benjamin Blackmon presents for an evaluation of Type 2 Diabetes Mellitus , Hyperlipidemia and hypertension. Subjective:   Type 2 Diabetes Mellitus diagnosed in 2017  On insulin since April 2019   Metformin caused muscle soreness     Finished Julianuvia today     Lantus 36 units daily at night. TTT of . Jardiance 28UF daily     Trulicity 9.89WD weekly on Tuesday     Checks blood sugars 3 times per day. Reviewed/Reported  AM:   Lunch:  Supper:   HS:     Hypoglycemias: None     Meals: Three, may be protein shake for breakfast. Dinner is bigger. Late night snack (cup of mixed nuts). Soda once a day, diet. No juices. Exercise: None because of back problems     Denies chest pain, exertional dyspnea. Family history of CAD: Dad at age 64. Mother had stroke in her 66's. Smokes 1/2 PPD. Counselled for smoking cessation.      Currently not on ASA, recommend baby aspirin if okay with gastroenterologist or PCP      The following portions of the patient's history were reviewed and updated as appropriate:       Family History   Problem Relation Age of Onset    Stroke Mother     Heart Disease Father          Social History     Socioeconomic History    Marital status:      Spouse name: Not on file    Number of children: Not on file    Years of education: Not on file    Highest education level: Not on file   Occupational History    Occupation: disability   Social Needs    Financial resource strain: Not on file    Food insecurity:     Worry: Not on file     Inability: Not on file    Transportation needs:     Medical: Not on file     Non-medical: Not on file   Tobacco Use    Smoking status: Current Every Day Smoker     Packs/day: 0.50     Years: 15.00     Pack years: 7.50     Types: Cigarettes    Smokeless tobacco: Never Used   Substance and Sexual Activity    Alcohol use: Not Currently     Alcohol/week: 0.6 oz Types: 1 Standard drinks or equivalent per week    Drug use: No     Comment: Medical Marijuana    Sexual activity: Yes     Partners: Female   Lifestyle    Physical activity:     Days per week: Not on file     Minutes per session: Not on file    Stress: Not on file   Relationships    Social connections:     Talks on phone: Not on file     Gets together: Not on file     Attends [employer] service: Not on file     Active member of club or organization: Not on file     Attends meetings of clubs or organizations: Not on file     Relationship status: Not on file    Intimate partner violence:     Fear of current or ex partner: Not on file     Emotionally abused: Not on file     Physically abused: Not on file     Forced sexual activity: Not on file   Other Topics Concern    Not on file   Social History Narrative    ** Merged History Encounter **            Past Medical History:   Diagnosis Date    Abdominal pain     Blood transfusion     30 yrs ago    Chronic back pain     Chronic pain syndrome     Depression     Depression     Diabetes mellitus ([facility] [address]     Failed back surgical syndrome     FH: colonic polyps     History of duodenal ulcer     now gets peptic ulcer    Hyperlipidemia     Hypertension     Insomnia     Obesity     Psychiatric problem     depression and anxiety    Sleep apnea     USES C-PAP       Past Surgical History:   Procedure Laterality Date    BACK SURGERY       X 2    COLONOSCOPY      INGUINAL HERNIA REPAIR      RIGHT X 2    LAPAROSCOPIC APPENDECTOMY N/A 5/10/2019    APPENDECTOMY LAPAROSCOPIC, LYSIS OF ADHESIONS performed by Severiano Chand [facility] at [facility]    fatty tumor removal under right arm    UPPER GASTROINTESTINAL ENDOSCOPY           Allergies   Allergen Reactions    Cymbalta [Duloxetine Hcl] Diarrhea    Cymbalta [Duloxetine Hcl] Diarrhea    Shellfish-Derived Products Nausea And Vomiting    Shellfish-Derived Products Nausea And Vomiting Current Outpatient Medications:     lisinopril-hydrochlorothiazide (PRINZIDE;ZESTORETIC) 20-12.5 MG per tablet, Take 1 tablet by mouth daily, Disp: 90 tablet, Rfl: 2    dexamethasone (DECADRON) 1 MG tablet, 1mg night before blood work, between 11pm-midnight., Disp: 1 tablet, Rfl: 0    TRULICITY 6.37 SD/4.8MO SOPN, INJECT THE CONTENTS OF ONE SYRINGE UNDER THE SKIN ONCE WEEKLY, Disp: 2 mL, Rfl: 1    tiZANidine (ZANAFLEX) 4 MG tablet, Take a 1/2 tablet in the morning and one tablet at night, Disp: 60 tablet, Rfl: 0    empagliflozin (JARDIANCE) 25 MG tablet, Take 1 tablet by mouth daily, Disp: 90 tablet, Rfl: 1    insulin glargine (LANTUS SOLOSTAR) 100 UNIT/ML injection pen, Inject 28 Units into the skin nightly (Patient taking differently: Inject 36 Units into the skin nightly ), Disp: 10 pen, Rfl: 1    Insulin Pen Needle 32G X 4 MM MISC, Use to inject Lantus one time daily, Disp: 100 each, Rfl: 3    diclofenac sodium 1 % GEL, APPLY TO THE AFFECTED AREA(S) THREE TIMES A DAY, Disp: 5 Tube, Rfl: 1    escitalopram (LEXAPRO) 20 MG tablet, TAKE ONE TABLET BY MOUTH ONCE DAILY (Patient taking differently: nightly TAKE ONE TABLET BY MOUTH ONCE DAILY), Disp: 30 tablet, Rfl: 1    traZODone (DESYREL) 50 MG tablet, Take 50 mg by mouth nightly, Disp: , Rfl:     fenofibrate 160 MG tablet, Take 160 mg by mouth daily, Disp: , Rfl:     lidocaine (LIDODERM) 5 %, Place 2 patches onto the skin daily as needed 12 hours on, 12 hours off. , Disp: , Rfl:     atorvastatin (LIPITOR) 80 MG tablet, Take 80 mg by mouth nightly , Disp: , Rfl:     pantoprazole (PROTONIX) 40 MG tablet, Take 40 mg by mouth daily as needed , Disp: , Rfl:     clobetasol (TEMOVATE) 0.05 % cream, Apply topically 2 times daily to affected areas as needed for psoriasis. , Disp: 60 g, Rfl: 3    Multiple Vitamin (MULTIVITAMIN PO), Take 1 tablet by mouth daily.   , Disp: , Rfl:     cetirizine (ZYRTEC) 10 MG tablet, Take 10 mg by mouth daily as needed , Disp: , Rfl:       Review of Systems:    Constitutional: Negative for fever, chills, and unexpected weight change. HENT: Negative for congestion, ear pain, rhinorrhea,  sore throat and trouble swallowing. Eyes: Negative for photophobia, redness, itching. Respiratory: Negative for cough, shortness of breath and sputum. Cardiovascular: Negative for chest pain, palpitations and leg swelling. Gastrointestinal: has heartburn. Negative for nausea, vomiting, abdominal pain, diarrhea, constipation. Endocrine: Negative for cold intolerance, heat intolerance, polydipsia, polyphagia and polyuria. Genitourinary: Negative for dysuria, urgency, frequency, hematuria and flank pain. Musculoskeletal: neck pain due to DJD , pain radiate to neck, arms. Negative for myalgias, back pain, arthralgias. Skin/Nail: Negative for rash, itching. Normal nails. Neurological: Negative for seizures, weakness, light-headedness, numbness and headaches. Hematological/ Lymph nodes: Negative for adenopathy. Does not bruise/bleed easily. Psychiatric/Behavioral: Negative for suicidal ideas, depression, anxiety, sleep disturbance and decreased concentration. Objective:   Physical Exam:  /79 (Site: Left Upper Arm, Position: Sitting, Cuff Size: Medium Adult)   Pulse 90   Ht 6' 3\" (1.905 m)   Wt 274 lb (124.3 kg)   SpO2 97%   BMI 34.25 kg/m²   Constitutional: Patient is oriented to person, place, and time. Patient appears well-developed and well-nourished. HENT:    Head: Normocephalic and atraumatic. Eyes: Conjunctivae and EOM are normal.     Neck: Normal range of motion. Cardiovascular: Normal rate, regular rhythm and normal heart sounds. Pulmonary/Chest: Effort normal and breath sounds normal.   Abdominal: Soft. Bowel sounds are normal.   Musculoskeletal: Normal range of motion. Neurological: Patient is alert and oriented to person, place, and time. Patient has normal reflexes.    Skin: Skin is warm and dry.   Psychiatric: Patient has a normal mood and affect.  Patient behavior is normal.     Lab Review:    Hospital Outpatient Visit on 06/24/2019   Component Date Value Ref Range Status    Sodium 06/24/2019 139  136 - 145 mmol/L Final    Potassium 06/24/2019 3.9  3.5 - 5.1 mmol/L Final    Chloride 06/24/2019 104  99 - 110 mmol/L Final    CO2 06/24/2019 26  21 - 32 mmol/L Final    Anion Gap 06/24/2019 9  3 - 16 Final    Glucose 06/24/2019 96  70 - 99 mg/dL Final    BUN 06/24/2019 20  7 - 20 mg/dL Final    CREATININE 06/24/2019 1.2  0.8 - 1.3 mg/dL Final    GFR Non- 06/24/2019 >60  >60 Final    GFR  06/24/2019 >60  >60 Final    Calcium 06/24/2019 9.7  8.3 - 10.6 mg/dL Final    Total Protein 06/24/2019 6.8  6.4 - 8.2 g/dL Final    Alb 06/24/2019 4.1  3.4 - 5.0 g/dL Final    Albumin/Globulin Ratio 06/24/2019 1.5  1.1 - 2.2 Final    Total Bilirubin 06/24/2019 0.3  0.0 - 1.0 mg/dL Final    Alkaline Phosphatase 06/24/2019 44  40 - 129 U/L Final    ALT 06/24/2019 14  10 - 40 U/L Final    AST 06/24/2019 22  15 - 37 U/L Final    Globulin 06/24/2019 2.7  g/dL Final    Cholesterol, Fasting 06/24/2019 140  0 - 199 mg/dL Final    Triglyceride, Fasting 06/24/2019 110  0 - 150 mg/dL Final    HDL 06/24/2019 32* 40 - 60 mg/dL Final    LDL Calculated 06/24/2019 86  <100 mg/dL Final    VLDL Cholesterol Calculated 06/24/2019 22  Not Established mg/dL Final    Hemoglobin A1C 06/24/2019 6.6  See comment % Final    eAG 06/24/2019 142.7  mg/dL Final   Orders Only on 05/22/2019   Component Date Value Ref Range Status    Hemoglobin A1C 05/22/2019 8.3  See comment % Final    eAG 05/22/2019 191.5  mg/dL Final   Orders Only on 05/22/2019   Component Date Value Ref Range Status    Glucose 05/22/2019 94  70 - 99 mg/dL Final    Cholesterol, Total 05/22/2019 151  0 - 199 mg/dL Final    Triglycerides 05/22/2019 122  0 - 150 mg/dL Final    HDL 05/22/2019 30* 40 - 60 mg/dL Final    LDL Calculated 05/22/2019 97  <100 mg/dL Final   Admission on 05/10/2019, Discharged on 05/11/2019   Component Date Value Ref Range Status    Sodium 05/10/2019 138  136 - 145 mmol/L Final    Potassium 05/10/2019 3.9  3.5 - 5.1 mmol/L Final    Chloride 05/10/2019 103  99 - 110 mmol/L Final    CO2 05/10/2019 22  21 - 32 mmol/L Final    Anion Gap 05/10/2019 13  3 - 16 Final    Glucose 05/10/2019 98  70 - 99 mg/dL Final    BUN 05/10/2019 24* 7 - 20 mg/dL Final    CREATININE 05/10/2019 1.0  0.8 - 1.3 mg/dL Final    GFR Non- 05/10/2019 >60  >60 Final    GFR  05/10/2019 >60  >60 Final    Calcium 05/10/2019 10.4  8.3 - 10.6 mg/dL Final    Total Protein 05/10/2019 7.1  6.4 - 8.2 g/dL Final    Alb 05/10/2019 4.0  3.4 - 5.0 g/dL Final    Albumin/Globulin Ratio 05/10/2019 1.3  1.1 - 2.2 Final    Total Bilirubin 05/10/2019 0.3  0.0 - 1.0 mg/dL Final    Alkaline Phosphatase 05/10/2019 51  40 - 129 U/L Final    ALT 05/10/2019 14  10 - 40 U/L Final    AST 05/10/2019 15  15 - 37 U/L Final    Globulin 05/10/2019 3.1  g/dL Final    WBC 05/10/2019 11.1* 4.0 - 11.0 K/uL Final    RBC 05/10/2019 4.74  4.20 - 5.90 M/uL Final    Hemoglobin 05/10/2019 14.5  13.5 - 17.5 g/dL Final    Hematocrit 05/10/2019 43.4  40.5 - 52.5 % Final    MCV 05/10/2019 91.5  80.0 - 100.0 fL Final    MCH 05/10/2019 30.6  26.0 - 34.0 pg Final    MCHC 05/10/2019 33.5  31.0 - 36.0 g/dL Final    RDW 05/10/2019 14.8  12.4 - 15.4 % Final    Platelets 94/88/2526 276  135 - 450 K/uL Final    MPV 05/10/2019 9.0  5.0 - 10.5 fL Final    Neutrophils % 05/10/2019 59.2  % Final    Lymphocytes % 05/10/2019 26.1  % Final    Monocytes % 05/10/2019 9.6  % Final    Eosinophils % 05/10/2019 4.6  % Final    Basophils % 05/10/2019 0.5  % Final    Neutrophils # 05/10/2019 6.6  1.7 - 7.7 K/uL Final    Lymphocytes # 05/10/2019 2.9  1.0 - 5.1 K/uL Final    Monocytes # 05/10/2019 1.1  0.0 - 1.3 K/uL Final    Eosinophils # 05/10/2019 0.5  0.0 - 0.6 K/uL Final    Basophils # 05/10/2019 0.1  0.0 - 0.2 K/uL Final    Amylase 05/10/2019 44  25 - 115 U/L Final    Lipase 05/10/2019 30.0  13.0 - 60.0 U/L Final    Color, UA 05/10/2019 YELLOW  Straw/Yellow Final    Clarity, UA 05/10/2019 Clear  Clear Final    Glucose, Ur 05/10/2019 >=1000* Negative mg/dL Final    Bilirubin Urine 05/10/2019 Negative  Negative Final    Ketones, Urine 05/10/2019 Negative  Negative mg/dL Final    Specific Gravity, UA 05/10/2019 1.024  1.005 - 1.030 Final    Blood, Urine 05/10/2019 Negative  Negative Final    pH, UA 05/10/2019 5.5  5.0 - 8.0 Final    Protein, UA 05/10/2019 Negative  Negative mg/dL Final    Urobilinogen, Urine 05/10/2019 0.2  <2.0 E.U./dL Final    Nitrite, Urine 05/10/2019 Negative  Negative Final    Leukocyte Esterase, Urine 05/10/2019 Negative  Negative Final    Microscopic Examination 05/10/2019 Not Indicated   Final    Urine Type 05/10/2019 Not Specified   Final    POC Glucose 05/10/2019 90  70 - 99 mg/dl Final    Performed on 05/10/2019 ACCU-CHEK   Final    Ventricular Rate 05/10/2019 93  BPM Final    Atrial Rate 05/10/2019 93  BPM Final    P-R Interval 05/10/2019 190  ms Final    QRS Duration 05/10/2019 114  ms Final    Q-T Interval 05/10/2019 408  ms Final    QTc Calculation (Bazett) 05/10/2019 507  ms Final    P Axis 05/10/2019 8  degrees Final    R Axis 05/10/2019 34  degrees Final    T Axis 05/10/2019 46  degrees Final    Diagnosis 05/10/2019 Normal sinus rhythmProlonged QTAbnormal ECGWhen compared with ECG of 02-OCT-2017 19:34,Premature atrial complexes are no longer PresentNon-specific change in ST segment in Inferior leadsST no longer depressed in Anterior leadsConfirmed by Saeed Baird MD, Jerome Lobo ([id number]) on 5/11/2019 4:56:46 PM   Final    Hep A IgM 05/10/2019 Non-reactive  Non-reactive Final    Hep B Core Ab, IgM 05/10/2019 Non-reactive  Non-reactive Final    Hep B S Ag Interp 05/10/2019 Non-reactive Non-reactive Final    Hep C Ab Interp 05/10/2019 Non-reactive  Non-reactive Final    POC Glucose 05/10/2019 193* 70 - 99 mg/dl Final    Performed on 05/10/2019 ACCU-CHEK   Final    Sodium 05/11/2019 139  136 - 145 mmol/L Final    Potassium 05/11/2019 4.5  3.5 - 5.1 mmol/L Final    Chloride 05/11/2019 104  99 - 110 mmol/L Final    CO2 05/11/2019 25  21 - 32 mmol/L Final    Anion Gap 05/11/2019 10  3 - 16 Final    Glucose 05/11/2019 117* 70 - 99 mg/dL Final    BUN 05/11/2019 25* 7 - 20 mg/dL Final    CREATININE 05/11/2019 1.2  0.8 - 1.3 mg/dL Final    GFR Non- 05/11/2019 >60  >60 Final    GFR  05/11/2019 >60  >60 Final    Calcium 05/11/2019 9.3  8.3 - 10.6 mg/dL Final    WBC 05/11/2019 11.9* 4.0 - 11.0 K/uL Final    RBC 05/11/2019 4.44  4.20 - 5.90 M/uL Final    Hemoglobin 05/11/2019 13.4* 13.5 - 17.5 g/dL Final    Hematocrit 05/11/2019 40.8  40.5 - 52.5 % Final    MCV 05/11/2019 92.0  80.0 - 100.0 fL Final    MCH 05/11/2019 30.2  26.0 - 34.0 pg Final    MCHC 05/11/2019 32.9  31.0 - 36.0 g/dL Final    RDW 05/11/2019 14.9  12.4 - 15.4 % Final    Platelets 07/53/6216 250  135 - 450 K/uL Final    MPV 05/11/2019 8.8  5.0 - 10.5 fL Final    Neutrophils % 05/11/2019 87.4  % Final    Lymphocytes % 05/11/2019 7.2  % Final    Monocytes % 05/11/2019 5.2  % Final    Eosinophils % 05/11/2019 0.1  % Final    Basophils % 05/11/2019 0.1  % Final    Neutrophils # 05/11/2019 10.4* 1.7 - 7.7 K/uL Final    Lymphocytes # 05/11/2019 0.9* 1.0 - 5.1 K/uL Final    Monocytes # 05/11/2019 0.6  0.0 - 1.3 K/uL Final    Eosinophils # 05/11/2019 0.0  0.0 - 0.6 K/uL Final    Basophils # 05/11/2019 0.0  0.0 - 0.2 K/uL Final    Magnesium 05/11/2019 2.00  1.80 - 2.40 mg/dL Final    Phosphorus 05/11/2019 3.5  2.5 - 4.9 mg/dL Final    Magnesium 05/10/2019 1.80  1.80 - 2.40 mg/dL Final    POC Glucose 05/10/2019 129* 70 - 99 mg/dl Final    Performed on 05/10/2019 ACCU-CHEK   Final    POC Glucose 05/11/2019 122* 70 - 99 mg/dl Final    Performed on 05/11/2019 ACCU-CHEK   Final    POC Glucose 05/11/2019 116* 70 - 99 mg/dl Final    Performed on 05/11/2019 ACCU-CHEK   Final   Office Visit on 04/11/2019   Component Date Value Ref Range Status    Diabetic Retinopathy 12/12/2018 Negative   Final   Orders Only on 04/02/2019   Component Date Value Ref Range Status    Ferritin 04/02/2019 310.5* 15.0 - 150.0 ng/mL Final   Orders Only on 04/02/2019   Component Date Value Ref Range Status    Iron 04/02/2019 137  37 - 145 ug/dL Final    TIBC 04/02/2019 382  260 - 445 ug/dL Final    Iron Saturation 04/02/2019 36  15 - 50 % Final   Orders Only on 04/02/2019   Component Date Value Ref Range Status    Total Protein 04/02/2019 6.9  6.4 - 8.2 g/dL Final    Alb 04/02/2019 4.3  3.4 - 5.0 g/dL Final    Alkaline Phosphatase 04/02/2019 62  40 - 129 U/L Final    ALT 04/02/2019 24  10 - 40 U/L Final    AST 04/02/2019 20  15 - 37 U/L Final    Total Bilirubin 04/02/2019 0.5  0.0 - 1.0 mg/dL Final    Bilirubin, Direct 04/02/2019 <0.2  0.0 - 0.3 mg/dL Final    Bilirubin, Indirect 04/02/2019 see below  0.0 - 1.0 mg/dL Final   Orders Only on 04/02/2019   Component Date Value Ref Range Status    Sodium 04/02/2019 133* 136 - 145 mmol/L Final    Potassium 04/02/2019 4.3  3.5 - 5.1 mmol/L Final    Chloride 04/02/2019 97* 99 - 110 mmol/L Final    CO2 04/02/2019 22  21 - 32 mmol/L Final    Anion Gap 04/02/2019 14  3 - 16 Final    Glucose 04/02/2019 246* 70 - 99 mg/dL Final    BUN 04/02/2019 26* 7 - 20 mg/dL Final    CREATININE 04/02/2019 0.9  0.6 - 1.2 mg/dL Final    GFR Non- 04/02/2019 >60  >60 Final    GFR  04/02/2019 >60  >60 Final    Calcium 04/02/2019 10.1  8.3 - 10.6 mg/dL Final   Orders Only on 04/02/2019   Component Date Value Ref Range Status    WBC 04/02/2019 8.8  4.0 - 11.0 K/uL Final    RBC 04/02/2019 5.12  4.00 - 5.20 M/uL Final    Hemoglobin 04/02/2019 15.3 12.0 - 16.0 g/dL Final    Hematocrit 04/02/2019 45.6  36.0 - 48.0 % Final    MCV 04/02/2019 89.2  80.0 - 100.0 fL Final    MCH 04/02/2019 29.9  26.0 - 34.0 pg Final    MCHC 04/02/2019 33.5  31.0 - 36.0 g/dL Final    RDW 04/02/2019 14.1  12.4 - 15.4 % Final    Platelets 20/83/3197 253  135 - 450 K/uL Final    MPV 04/02/2019 10.5  5.0 - 10.5 fL Final    Neutrophils % 04/02/2019 54.9  % Final    Lymphocytes % 04/02/2019 31.3  % Final    Monocytes % 04/02/2019 10.2  % Final    Eosinophils % 04/02/2019 3.3  % Final    Basophils % 04/02/2019 0.3  % Final    Neutrophils # 04/02/2019 4.9  1.7 - 7.7 K/uL Final    Lymphocytes # 04/02/2019 2.8  1.0 - 5.1 K/uL Final    Monocytes # 04/02/2019 0.9  0.0 - 1.3 K/uL Final    Eosinophils # 04/02/2019 0.3  0.0 - 0.6 K/uL Final    Basophils # 04/02/2019 0.0  0.0 - 0.2 K/uL Final   There may be more visits with results that are not included. Assessment and Plan     Kisha Rosa was seen today for diabetes. Diagnoses and all orders for this visit:    Controlled type 2 diabetes mellitus without complication, with long-term current use of insulin (HCC)    Dyslipidemia associated with type 2 diabetes mellitus (HCC)  -     lisinopril-hydrochlorothiazide (PRINZIDE;ZESTORETIC) 20-12.5 MG per tablet; Take 1 tablet by mouth daily  -     Metanephrines Plasma Free; Future  -     Renin; Future  -     Aldosterone; Future  -     Hemoglobin A1C; Future  -     Lipid, Fasting; Future  -     Comprehensive Metabolic Panel; Future    Essential hypertension  -     lisinopril-hydrochlorothiazide (PRINZIDE;ZESTORETIC) 20-12.5 MG per tablet; Take 1 tablet by mouth daily  -     Metanephrines Plasma Free; Future  -     Renin; Future  -     Aldosterone; Future  -     Hemoglobin A1C; Future  -     Lipid, Fasting; Future  -     Comprehensive Metabolic Panel;  Future    Morbid obesity due to excess calories (HCC)    Adenoma of left adrenal gland  -     Cortisol AM, Total; Future  - dexamethasone (DECADRON) 1 MG tablet; 1mg night before blood work, between 11pm-midnight. 1: Type 2 DM complicated with hyperglycemia, neuropathy   Controlled A1C 6.6% June 2019 < 11.2%    Blood sugars and A1C under control     Continue Jardiance 25mg daily   Continue with Trulicity 7.31. Will go up on the dise if blood sugars go high in the evening. Lantus 36 units at bedtime   Use Treat to target basal insulin. If pre-breakfast sugar is above 130 on 2 consecutive days increase Lantus by 2 units. If pre-breakfast sugar is below 90 on one day decrease Lantus by 2 units. All instructions provided in written. Check Blood sugars 3 times per day. Log them along with insulin and send them every 2 weeks. Call for blood sugars less than 60 or more than 400. Eye exam: Last exam in Dec 2018, denies retinopathy. Foot exam:  April 2019   Deformity/amputation: absent  Skin lesions/pre-ulcerative calluses: present   Edema: right- negative, left- negative  Sensory exam: Monofilament sensation: normal  Pulses: normal, Vibration (128 Hz): mildly decreased     Renal screen: 24 hour microalbumin normal April 2019     Smoker: Counselled for smoking cessation   TSH screen: March 2019   CDE visit in April 2019    2: HTN   Controlled     3: Hyperlipidemia   LDL: 86 , HDL: 32 , TGs: 110 June 2019     Atorvastatin 80mg daily   Fenofibrate 160mg daily - one month supply left. Will recheck after stopping it.    Denies missing doses     4: Morbid obesity   Need to work on diet, exercise and lose weight     If tolerating Trulicity 1.92 weekly will go up on the dose     5: Left adrenal adenoma on MRI   1.3 cm x 1.1 cm   Will do hormonal testing , Renin, jarad, metanephrines   DEX suppression test     RTC in 3 months A1C, lipids     EDUCATION:   Greater than 50% of this follow-up visit was spent in general counseling regarding   obesity, diet, exercise, importance of adherence to insulin regime, recognition and treatment of hypo and hyperglycemia,  glucose logging, proper diabetes management, diabetic complications with poor management and the importance of glycemic control in order to avoid the complications of diabetes. Risks and potential complications of diabetes were reviewed with the patient. Diabetes health maintenance plan and follow-up were discussed and understood by the patient. We reviewed the importance of medication compliance and regular follow-up. Aggressive lifestyle modification was encouraged. Exercise Counselling: This patient is a candidate for regular physical exercise. Instructions to perform the following types of exercise:  Swimming or water aerobic exercise  Brisk walking  Playing tennis  Stationary bicycle or elliptical indoor  Low impact aerobic exercise    Instructions given to exercise for the following duration:  30 minutes a day for five-seven days per week.     Following instructions for being active throughout the day in addition to formal exercise:  Walk instead of drive whenever possible  Take the stairs instead of the elevator  Work in the garden  Park to the far end of the parking lot to add more walking steps to destination      Electronically signed by Lauren Coley MD on 6/25/2019 at 8:45 AM

## 2019-07-29 ENCOUNTER — HOSPITAL ENCOUNTER (OUTPATIENT)
Dept: GENERAL RADIOLOGY | Age: 64
Discharge: HOME | End: 2019-07-29
Payer: COMMERCIAL

## 2019-07-29 ENCOUNTER — HOSPITAL ENCOUNTER (OUTPATIENT)
Age: 64
Discharge: HOME | End: 2019-07-29
Payer: COMMERCIAL

## 2019-07-29 DIAGNOSIS — E78.5 DYSLIPIDEMIA ASSOCIATED WITH TYPE 2 DIABETES MELLITUS (HCC): ICD-10-CM

## 2019-07-29 DIAGNOSIS — Z01.818 PREOP TESTING: ICD-10-CM

## 2019-07-29 DIAGNOSIS — D35.02 ADENOMA OF LEFT ADRENAL GLAND: ICD-10-CM

## 2019-07-29 DIAGNOSIS — E11.69 DYSLIPIDEMIA ASSOCIATED WITH TYPE 2 DIABETES MELLITUS (HCC): ICD-10-CM

## 2019-07-29 DIAGNOSIS — I10 ESSENTIAL HYPERTENSION: ICD-10-CM

## 2019-07-29 LAB
A/G RATIO: 1.6 (ref 1.1–2.2)
ABO/RH: NORMAL
ALBUMIN SERPL-MCNC: 4.4 G/DL (ref 3.4–5)
ALP BLD-CCNC: 45 U/L (ref 40–129)
ALT SERPL-CCNC: 17 U/L (ref 10–40)
ANION GAP SERPL CALCULATED.3IONS-SCNC: 15 MMOL/L (ref 3–16)
ANTIBODY SCREEN: NORMAL
APTT: 33.8 SEC (ref 26–36)
AST SERPL-CCNC: 22 U/L (ref 15–37)
BASOPHILS ABSOLUTE: 0.1 K/UL (ref 0–0.2)
BASOPHILS RELATIVE PERCENT: 0.6 %
BILIRUB SERPL-MCNC: 0.4 MG/DL (ref 0–1)
BUN BLDV-MCNC: 29 MG/DL (ref 7–20)
CALCIUM SERPL-MCNC: 10.1 MG/DL (ref 8.3–10.6)
CHLORIDE BLD-SCNC: 104 MMOL/L (ref 99–110)
CO2: 23 MMOL/L (ref 21–32)
CREAT SERPL-MCNC: 1 MG/DL (ref 0.8–1.3)
EKG ATRIAL RATE: 87 BPM
EKG DIAGNOSIS: NORMAL
EKG P AXIS: -14 DEGREES
EKG P-R INTERVAL: 204 MS
EKG Q-T INTERVAL: 366 MS
EKG QRS DURATION: 98 MS
EKG QTC CALCULATION (BAZETT): 440 MS
EKG R AXIS: 52 DEGREES
EKG T AXIS: 27 DEGREES
EKG VENTRICULAR RATE: 87 BPM
EOSINOPHILS ABSOLUTE: 0.6 K/UL (ref 0–0.6)
EOSINOPHILS RELATIVE PERCENT: 6.7 %
GFR AFRICAN AMERICAN: >60
GFR NON-AFRICAN AMERICAN: >60
GLOBULIN: 2.7 G/DL
GLUCOSE BLD-MCNC: 85 MG/DL (ref 70–99)
HCT VFR BLD CALC: 47.2 % (ref 40.5–52.5)
HEMOGLOBIN: 15.7 G/DL (ref 13.5–17.5)
INR BLD: 1.03 (ref 0.86–1.14)
LYMPHOCYTES ABSOLUTE: 2.5 K/UL (ref 1–5.1)
LYMPHOCYTES RELATIVE PERCENT: 26.9 %
MCH RBC QN AUTO: 30.2 PG (ref 26–34)
MCHC RBC AUTO-ENTMCNC: 33.3 G/DL (ref 31–36)
MCV RBC AUTO: 90.7 FL (ref 80–100)
MONOCYTES ABSOLUTE: 0.9 K/UL (ref 0–1.3)
MONOCYTES RELATIVE PERCENT: 9.3 %
NEUTROPHILS ABSOLUTE: 5.3 K/UL (ref 1.7–7.7)
NEUTROPHILS RELATIVE PERCENT: 56.5 %
PDW BLD-RTO: 14.1 % (ref 12.4–15.4)
PLATELET # BLD: 303 K/UL (ref 135–450)
PMV BLD AUTO: 9.4 FL (ref 5–10.5)
POTASSIUM SERPL-SCNC: 3.8 MMOL/L (ref 3.5–5.1)
PROTHROMBIN TIME: 11.7 SEC (ref 9.8–13)
RBC # BLD: 5.2 M/UL (ref 4.2–5.9)
SODIUM BLD-SCNC: 142 MMOL/L (ref 136–145)
TOTAL PROTEIN: 7.1 G/DL (ref 6.4–8.2)
WBC # BLD: 9.5 K/UL (ref 4–11)

## 2019-07-29 PROCEDURE — 85610 PROTHROMBIN TIME: CPT

## 2019-07-29 PROCEDURE — 86901 BLOOD TYPING SEROLOGIC RH(D): CPT

## 2019-07-29 PROCEDURE — 36415 COLL VENOUS BLD VENIPUNCTURE: CPT

## 2019-07-29 PROCEDURE — 93005 ELECTROCARDIOGRAM TRACING: CPT | Performed by: UROLOGY

## 2019-07-29 PROCEDURE — 86900 BLOOD TYPING SEROLOGIC ABO: CPT

## 2019-07-29 PROCEDURE — 71046 X-RAY EXAM CHEST 2 VIEWS: CPT

## 2019-07-29 PROCEDURE — 86850 RBC ANTIBODY SCREEN: CPT

## 2019-07-29 PROCEDURE — 85730 THROMBOPLASTIN TIME PARTIAL: CPT

## 2019-07-29 PROCEDURE — 85025 COMPLETE CBC W/AUTO DIFF WBC: CPT

## 2019-07-29 PROCEDURE — 93010 ELECTROCARDIOGRAM REPORT: CPT | Performed by: INTERNAL MEDICINE

## 2019-07-29 PROCEDURE — 80053 COMPREHEN METABOLIC PANEL: CPT

## 2019-07-31 ENCOUNTER — OFFICE VISIT (OUTPATIENT)
Dept: ORTHOPEDIC SURGERY | Age: 64
End: 2019-07-31
Payer: COMMERCIAL

## 2019-07-31 VITALS — HEIGHT: 74 IN | RESPIRATION RATE: 17 BRPM | BODY MASS INDEX: 34.01 KG/M2 | WEIGHT: 265 LBS

## 2019-07-31 DIAGNOSIS — M17.12 PRIMARY OSTEOARTHRITIS OF LEFT KNEE: ICD-10-CM

## 2019-07-31 DIAGNOSIS — M25.562 LEFT KNEE PAIN, UNSPECIFIED CHRONICITY: Primary | ICD-10-CM

## 2019-07-31 PROCEDURE — 1123F ACP DISCUSS/DSCN MKR DOCD: CPT | Performed by: ORTHOPAEDIC SURGERY

## 2019-07-31 PROCEDURE — 20610 DRAIN/INJ JOINT/BURSA W/O US: CPT | Performed by: ORTHOPAEDIC SURGERY

## 2019-07-31 PROCEDURE — 99203 OFFICE O/P NEW LOW 30 MIN: CPT | Performed by: ORTHOPAEDIC SURGERY

## 2019-07-31 PROCEDURE — 4004F PT TOBACCO SCREEN RCVD TLK: CPT | Performed by: ORTHOPAEDIC SURGERY

## 2019-07-31 PROCEDURE — G8417 CALC BMI ABV UP PARAM F/U: HCPCS | Performed by: ORTHOPAEDIC SURGERY

## 2019-07-31 PROCEDURE — 4040F PNEUMOC VAC/ADMIN/RCVD: CPT | Performed by: ORTHOPAEDIC SURGERY

## 2019-07-31 PROCEDURE — 3017F COLORECTAL CA SCREEN DOC REV: CPT | Performed by: ORTHOPAEDIC SURGERY

## 2019-07-31 PROCEDURE — G8427 DOCREV CUR MEDS BY ELIG CLIN: HCPCS | Performed by: ORTHOPAEDIC SURGERY

## 2019-08-06 ENCOUNTER — HOSPITAL ENCOUNTER (OUTPATIENT)
Age: 64
Discharge: HOME | DRG: 657 | End: 2019-08-07
Attending: UROLOGY | Admitting: UROLOGY
Payer: COMMERCIAL

## 2019-08-06 ENCOUNTER — ANESTHESIA EVENT (OUTPATIENT)
Dept: OPERATING ROOM | Age: 64
DRG: 657 | End: 2019-08-06
Payer: COMMERCIAL

## 2019-08-06 ENCOUNTER — ANESTHESIA (OUTPATIENT)
Dept: OPERATING ROOM | Age: 64
DRG: 657 | End: 2019-08-06
Payer: COMMERCIAL

## 2019-08-06 VITALS
OXYGEN SATURATION: 98 % | DIASTOLIC BLOOD PRESSURE: 54 MMHG | SYSTOLIC BLOOD PRESSURE: 84 MMHG | RESPIRATION RATE: 9 BRPM | TEMPERATURE: 97.5 F

## 2019-08-06 DIAGNOSIS — Z01.818 PREOP TESTING: Primary | ICD-10-CM

## 2019-08-06 DIAGNOSIS — N28.89 RENAL MASS, LEFT: ICD-10-CM

## 2019-08-06 LAB
ABO/RH: NORMAL
ANTIBODY SCREEN: NORMAL
GLUCOSE BLD-MCNC: 141 MG/DL (ref 70–99)
GLUCOSE BLD-MCNC: 143 MG/DL (ref 70–99)
GLUCOSE BLD-MCNC: 88 MG/DL (ref 70–99)
PERFORMED ON: ABNORMAL
PERFORMED ON: ABNORMAL
PERFORMED ON: NORMAL

## 2019-08-06 PROCEDURE — 88341 IMHCHEM/IMCYTCHM EA ADD ANTB: CPT

## 2019-08-06 PROCEDURE — 7100000000 HC PACU RECOVERY - FIRST 15 MIN: Performed by: UROLOGY

## 2019-08-06 PROCEDURE — 2580000003 HC RX 258: Performed by: UROLOGY

## 2019-08-06 PROCEDURE — 86900 BLOOD TYPING SEROLOGIC ABO: CPT

## 2019-08-06 PROCEDURE — 6360000002 HC RX W HCPCS: Performed by: UROLOGY

## 2019-08-06 PROCEDURE — 6360000002 HC RX W HCPCS: Performed by: ANESTHESIOLOGY

## 2019-08-06 PROCEDURE — 8E0W4CZ ROBOTIC ASSISTED PROCEDURE OF TRUNK REGION, PERCUTANEOUS ENDOSCOPIC APPROACH: ICD-10-PCS | Performed by: UROLOGY

## 2019-08-06 PROCEDURE — 6370000000 HC RX 637 (ALT 250 FOR IP): Performed by: UROLOGY

## 2019-08-06 PROCEDURE — 6360000002 HC RX W HCPCS

## 2019-08-06 PROCEDURE — 88342 IMHCHEM/IMCYTCHM 1ST ANTB: CPT

## 2019-08-06 PROCEDURE — 86850 RBC ANTIBODY SCREEN: CPT

## 2019-08-06 PROCEDURE — 0TB14ZZ EXCISION OF LEFT KIDNEY, PERCUTANEOUS ENDOSCOPIC APPROACH: ICD-10-PCS | Performed by: UROLOGY

## 2019-08-06 PROCEDURE — 88307 TISSUE EXAM BY PATHOLOGIST: CPT

## 2019-08-06 PROCEDURE — 6370000000 HC RX 637 (ALT 250 FOR IP): Performed by: ANESTHESIOLOGY

## 2019-08-06 PROCEDURE — 3700000001 HC ADD 15 MINUTES (ANESTHESIA): Performed by: UROLOGY

## 2019-08-06 PROCEDURE — 94760 N-INVAS EAR/PLS OXIMETRY 1: CPT

## 2019-08-06 PROCEDURE — 6360000002 HC RX W HCPCS: Performed by: NURSE ANESTHETIST, CERTIFIED REGISTERED

## 2019-08-06 PROCEDURE — 1200000000 HC SEMI PRIVATE

## 2019-08-06 PROCEDURE — 2709999900 HC NON-CHARGEABLE SUPPLY: Performed by: UROLOGY

## 2019-08-06 PROCEDURE — S2900 ROBOTIC SURGICAL SYSTEM: HCPCS | Performed by: UROLOGY

## 2019-08-06 PROCEDURE — 2500000003 HC RX 250 WO HCPCS: Performed by: NURSE ANESTHETIST, CERTIFIED REGISTERED

## 2019-08-06 PROCEDURE — 94150 VITAL CAPACITY TEST: CPT

## 2019-08-06 PROCEDURE — 7100000001 HC PACU RECOVERY - ADDTL 15 MIN: Performed by: UROLOGY

## 2019-08-06 PROCEDURE — 3600000019 HC SURGERY ROBOT ADDTL 15MIN: Performed by: UROLOGY

## 2019-08-06 PROCEDURE — 3600000009 HC SURGERY ROBOT BASE: Performed by: UROLOGY

## 2019-08-06 PROCEDURE — 2720000010 HC SURG SUPPLY STERILE: Performed by: UROLOGY

## 2019-08-06 PROCEDURE — 6370000000 HC RX 637 (ALT 250 FOR IP)

## 2019-08-06 PROCEDURE — 3700000000 HC ANESTHESIA ATTENDED CARE: Performed by: UROLOGY

## 2019-08-06 PROCEDURE — 86901 BLOOD TYPING SEROLOGIC RH(D): CPT

## 2019-08-06 RX ORDER — GINSENG 100 MG
CAPSULE ORAL 2 TIMES DAILY
Status: DISCONTINUED | OUTPATIENT
Start: 2019-08-06 | End: 2019-08-07 | Stop reason: HOSPADM

## 2019-08-06 RX ORDER — SENNA AND DOCUSATE SODIUM 50; 8.6 MG/1; MG/1
1 TABLET, FILM COATED ORAL 2 TIMES DAILY
Status: DISCONTINUED | OUTPATIENT
Start: 2019-08-06 | End: 2019-08-07 | Stop reason: HOSPADM

## 2019-08-06 RX ORDER — ONDANSETRON 2 MG/ML
INJECTION INTRAMUSCULAR; INTRAVENOUS PRN
Status: DISCONTINUED | OUTPATIENT
Start: 2019-08-06 | End: 2019-08-06 | Stop reason: SDUPTHER

## 2019-08-06 RX ORDER — LABETALOL HYDROCHLORIDE 5 MG/ML
5 INJECTION, SOLUTION INTRAVENOUS EVERY 10 MIN PRN
Status: DISCONTINUED | OUTPATIENT
Start: 2019-08-06 | End: 2019-08-06 | Stop reason: HOSPADM

## 2019-08-06 RX ORDER — MORPHINE SULFATE 2 MG/ML
2 INJECTION, SOLUTION INTRAMUSCULAR; INTRAVENOUS
Status: DISCONTINUED | OUTPATIENT
Start: 2019-08-06 | End: 2019-08-07 | Stop reason: HOSPADM

## 2019-08-06 RX ORDER — BUPIVACAINE HYDROCHLORIDE AND EPINEPHRINE 5; 5 MG/ML; UG/ML
INJECTION, SOLUTION PERINEURAL
Status: COMPLETED | OUTPATIENT
Start: 2019-08-06 | End: 2019-08-06

## 2019-08-06 RX ORDER — MAGNESIUM HYDROXIDE 1200 MG/15ML
LIQUID ORAL CONTINUOUS PRN
Status: COMPLETED | OUTPATIENT
Start: 2019-08-06 | End: 2019-08-06

## 2019-08-06 RX ORDER — KETAMINE HCL IN NACL, ISO-OSM 100MG/10ML
SYRINGE (ML) INJECTION PRN
Status: DISCONTINUED | OUTPATIENT
Start: 2019-08-06 | End: 2019-08-06 | Stop reason: SDUPTHER

## 2019-08-06 RX ORDER — ESCITALOPRAM OXALATE 10 MG/1
20 TABLET ORAL NIGHTLY
Status: DISCONTINUED | OUTPATIENT
Start: 2019-08-06 | End: 2019-08-07 | Stop reason: HOSPADM

## 2019-08-06 RX ORDER — LIDOCAINE HYDROCHLORIDE 10 MG/ML
0.5 INJECTION, SOLUTION EPIDURAL; INFILTRATION; INTRACAUDAL; PERINEURAL ONCE
Status: DISCONTINUED | OUTPATIENT
Start: 2019-08-06 | End: 2019-08-06 | Stop reason: HOSPADM

## 2019-08-06 RX ORDER — SODIUM CHLORIDE 9 MG/ML
INJECTION, SOLUTION INTRAVENOUS CONTINUOUS
Status: DISCONTINUED | OUTPATIENT
Start: 2019-08-06 | End: 2019-08-06

## 2019-08-06 RX ORDER — SODIUM CHLORIDE 9 MG/ML
INJECTION, SOLUTION INTRAVENOUS CONTINUOUS
Status: DISCONTINUED | OUTPATIENT
Start: 2019-08-06 | End: 2019-08-07 | Stop reason: HOSPADM

## 2019-08-06 RX ORDER — LISINOPRIL 20 MG/1
20 TABLET ORAL DAILY
Status: DISCONTINUED | OUTPATIENT
Start: 2019-08-07 | End: 2019-08-07 | Stop reason: HOSPADM

## 2019-08-06 RX ORDER — MEPERIDINE HYDROCHLORIDE 25 MG/ML
12.5 INJECTION INTRAMUSCULAR; INTRAVENOUS; SUBCUTANEOUS EVERY 5 MIN PRN
Status: DISCONTINUED | OUTPATIENT
Start: 2019-08-06 | End: 2019-08-06 | Stop reason: HOSPADM

## 2019-08-06 RX ORDER — DEXTROSE MONOHYDRATE 25 G/50ML
12.5 INJECTION, SOLUTION INTRAVENOUS PRN
Status: DISCONTINUED | OUTPATIENT
Start: 2019-08-06 | End: 2019-08-07 | Stop reason: HOSPADM

## 2019-08-06 RX ORDER — DEXTROSE MONOHYDRATE 50 MG/ML
100 INJECTION, SOLUTION INTRAVENOUS PRN
Status: DISCONTINUED | OUTPATIENT
Start: 2019-08-06 | End: 2019-08-07 | Stop reason: HOSPADM

## 2019-08-06 RX ORDER — CETIRIZINE HYDROCHLORIDE 10 MG/1
5 TABLET ORAL DAILY PRN
Status: DISCONTINUED | OUTPATIENT
Start: 2019-08-06 | End: 2019-08-07 | Stop reason: HOSPADM

## 2019-08-06 RX ORDER — HYDROCODONE BITARTRATE AND ACETAMINOPHEN 5; 325 MG/1; MG/1
1 TABLET ORAL EVERY 6 HOURS PRN
Qty: 12 TABLET | Refills: 0 | Status: SHIPPED | OUTPATIENT
Start: 2019-08-06 | End: 2019-08-09

## 2019-08-06 RX ORDER — LISINOPRIL AND HYDROCHLOROTHIAZIDE 20; 12.5 MG/1; MG/1
1 TABLET ORAL DAILY
Status: DISCONTINUED | OUTPATIENT
Start: 2019-08-06 | End: 2019-08-06 | Stop reason: CLARIF

## 2019-08-06 RX ORDER — CEFAZOLIN SODIUM 2 G/100ML
2 INJECTION, SOLUTION INTRAVENOUS EVERY 8 HOURS
Status: COMPLETED | OUTPATIENT
Start: 2019-08-06 | End: 2019-08-07

## 2019-08-06 RX ORDER — OXYCODONE HYDROCHLORIDE AND ACETAMINOPHEN 5; 325 MG/1; MG/1
1 TABLET ORAL
Status: COMPLETED | OUTPATIENT
Start: 2019-08-06 | End: 2019-08-06

## 2019-08-06 RX ORDER — OXYCODONE HYDROCHLORIDE 5 MG/1
10 TABLET ORAL EVERY 4 HOURS PRN
Status: DISCONTINUED | OUTPATIENT
Start: 2019-08-06 | End: 2019-08-07 | Stop reason: HOSPADM

## 2019-08-06 RX ORDER — PROPOFOL 10 MG/ML
INJECTION, EMULSION INTRAVENOUS PRN
Status: DISCONTINUED | OUTPATIENT
Start: 2019-08-06 | End: 2019-08-06 | Stop reason: SDUPTHER

## 2019-08-06 RX ORDER — MIDAZOLAM HYDROCHLORIDE 1 MG/ML
INJECTION INTRAMUSCULAR; INTRAVENOUS PRN
Status: DISCONTINUED | OUTPATIENT
Start: 2019-08-06 | End: 2019-08-06 | Stop reason: SDUPTHER

## 2019-08-06 RX ORDER — MAGNESIUM HYDROXIDE 1200 MG/15ML
LIQUID ORAL
Status: COMPLETED | OUTPATIENT
Start: 2019-08-06 | End: 2019-08-06

## 2019-08-06 RX ORDER — NICOTINE POLACRILEX 4 MG
15 LOZENGE BUCCAL PRN
Status: DISCONTINUED | OUTPATIENT
Start: 2019-08-06 | End: 2019-08-07 | Stop reason: HOSPADM

## 2019-08-06 RX ORDER — FENOFIBRATE 160 MG/1
160 TABLET ORAL NIGHTLY
Status: DISCONTINUED | OUTPATIENT
Start: 2019-08-06 | End: 2019-08-07 | Stop reason: HOSPADM

## 2019-08-06 RX ORDER — AMOXICILLIN 250 MG
1 CAPSULE ORAL 2 TIMES DAILY
Qty: 50 TABLET | Refills: 0 | Status: SHIPPED | OUTPATIENT
Start: 2019-08-06 | End: 2019-09-24

## 2019-08-06 RX ORDER — PANTOPRAZOLE SODIUM 40 MG/1
40 TABLET, DELAYED RELEASE ORAL
Status: DISCONTINUED | OUTPATIENT
Start: 2019-08-07 | End: 2019-08-07 | Stop reason: HOSPADM

## 2019-08-06 RX ORDER — OXYCODONE HYDROCHLORIDE 5 MG/1
5 TABLET ORAL EVERY 4 HOURS PRN
Status: DISCONTINUED | OUTPATIENT
Start: 2019-08-06 | End: 2019-08-07 | Stop reason: HOSPADM

## 2019-08-06 RX ORDER — ONDANSETRON 2 MG/ML
4 INJECTION INTRAMUSCULAR; INTRAVENOUS
Status: DISCONTINUED | OUTPATIENT
Start: 2019-08-06 | End: 2019-08-06 | Stop reason: HOSPADM

## 2019-08-06 RX ORDER — HYDROCHLOROTHIAZIDE 25 MG/1
12.5 TABLET ORAL DAILY
Status: DISCONTINUED | OUTPATIENT
Start: 2019-08-07 | End: 2019-08-07 | Stop reason: HOSPADM

## 2019-08-06 RX ORDER — HYDRALAZINE HYDROCHLORIDE 20 MG/ML
5 INJECTION INTRAMUSCULAR; INTRAVENOUS EVERY 10 MIN PRN
Status: DISCONTINUED | OUTPATIENT
Start: 2019-08-06 | End: 2019-08-06 | Stop reason: HOSPADM

## 2019-08-06 RX ORDER — LIDOCAINE HYDROCHLORIDE 20 MG/ML
INJECTION, SOLUTION EPIDURAL; INFILTRATION; INTRACAUDAL; PERINEURAL PRN
Status: DISCONTINUED | OUTPATIENT
Start: 2019-08-06 | End: 2019-08-06 | Stop reason: SDUPTHER

## 2019-08-06 RX ORDER — FENTANYL CITRATE 50 UG/ML
INJECTION, SOLUTION INTRAMUSCULAR; INTRAVENOUS PRN
Status: DISCONTINUED | OUTPATIENT
Start: 2019-08-06 | End: 2019-08-06 | Stop reason: SDUPTHER

## 2019-08-06 RX ORDER — ROCURONIUM BROMIDE 10 MG/ML
INJECTION, SOLUTION INTRAVENOUS PRN
Status: DISCONTINUED | OUTPATIENT
Start: 2019-08-06 | End: 2019-08-06 | Stop reason: SDUPTHER

## 2019-08-06 RX ORDER — CEFAZOLIN SODIUM 2 G/100ML
2 INJECTION, SOLUTION INTRAVENOUS ONCE
Status: CANCELLED | OUTPATIENT
Start: 2019-08-06 | End: 2019-08-06

## 2019-08-06 RX ORDER — ACETAMINOPHEN 325 MG/1
650 TABLET ORAL EVERY 6 HOURS
Status: DISCONTINUED | OUTPATIENT
Start: 2019-08-06 | End: 2019-08-07 | Stop reason: HOSPADM

## 2019-08-06 RX ORDER — TIZANIDINE 4 MG/1
2 TABLET ORAL NIGHTLY
Status: DISCONTINUED | OUTPATIENT
Start: 2019-08-06 | End: 2019-08-07 | Stop reason: HOSPADM

## 2019-08-06 RX ORDER — CEFAZOLIN SODIUM 2 G/100ML
INJECTION, SOLUTION INTRAVENOUS
Status: COMPLETED
Start: 2019-08-06 | End: 2019-08-06

## 2019-08-06 RX ORDER — HYDROMORPHONE HCL 110MG/55ML
1 PATIENT CONTROLLED ANALGESIA SYRINGE INTRAVENOUS ONCE
Status: COMPLETED | OUTPATIENT
Start: 2019-08-06 | End: 2019-08-06

## 2019-08-06 RX ORDER — HYDROMORPHONE HCL 110MG/55ML
0.5 PATIENT CONTROLLED ANALGESIA SYRINGE INTRAVENOUS EVERY 5 MIN PRN
Status: COMPLETED | OUTPATIENT
Start: 2019-08-06 | End: 2019-08-06

## 2019-08-06 RX ORDER — BISACODYL 10 MG
10 SUPPOSITORY, RECTAL RECTAL DAILY PRN
Status: DISCONTINUED | OUTPATIENT
Start: 2019-08-06 | End: 2019-08-07 | Stop reason: HOSPADM

## 2019-08-06 RX ORDER — MAGNESIUM SULFATE HEPTAHYDRATE 500 MG/ML
INJECTION, SOLUTION INTRAMUSCULAR; INTRAVENOUS PRN
Status: DISCONTINUED | OUTPATIENT
Start: 2019-08-06 | End: 2019-08-06 | Stop reason: SDUPTHER

## 2019-08-06 RX ORDER — SODIUM CHLORIDE 0.9 % (FLUSH) 0.9 %
10 SYRINGE (ML) INJECTION EVERY 12 HOURS SCHEDULED
Status: DISCONTINUED | OUTPATIENT
Start: 2019-08-06 | End: 2019-08-07 | Stop reason: HOSPADM

## 2019-08-06 RX ORDER — HYDROMORPHONE HCL 110MG/55ML
PATIENT CONTROLLED ANALGESIA SYRINGE INTRAVENOUS
Status: COMPLETED
Start: 2019-08-06 | End: 2019-08-06

## 2019-08-06 RX ORDER — SODIUM CHLORIDE 0.9 % (FLUSH) 0.9 %
10 SYRINGE (ML) INJECTION PRN
Status: DISCONTINUED | OUTPATIENT
Start: 2019-08-06 | End: 2019-08-07 | Stop reason: HOSPADM

## 2019-08-06 RX ORDER — SUCCINYLCHOLINE/SOD CL,ISO/PF 200MG/10ML
SYRINGE (ML) INTRAVENOUS PRN
Status: DISCONTINUED | OUTPATIENT
Start: 2019-08-06 | End: 2019-08-06 | Stop reason: SDUPTHER

## 2019-08-06 RX ORDER — DEXAMETHASONE SODIUM PHOSPHATE 4 MG/ML
INJECTION, SOLUTION INTRA-ARTICULAR; INTRALESIONAL; INTRAMUSCULAR; INTRAVENOUS; SOFT TISSUE PRN
Status: DISCONTINUED | OUTPATIENT
Start: 2019-08-06 | End: 2019-08-06 | Stop reason: SDUPTHER

## 2019-08-06 RX ORDER — MORPHINE SULFATE 4 MG/ML
4 INJECTION, SOLUTION INTRAMUSCULAR; INTRAVENOUS
Status: DISCONTINUED | OUTPATIENT
Start: 2019-08-06 | End: 2019-08-07 | Stop reason: HOSPADM

## 2019-08-06 RX ORDER — ATROPA BELLADONNA AND OPIUM 16.2; 3 MG/1; MG/1
30 SUPPOSITORY RECTAL EVERY 8 HOURS PRN
Status: DISCONTINUED | OUTPATIENT
Start: 2019-08-06 | End: 2019-08-07 | Stop reason: HOSPADM

## 2019-08-06 RX ORDER — ONDANSETRON 2 MG/ML
4 INJECTION INTRAMUSCULAR; INTRAVENOUS EVERY 6 HOURS PRN
Status: DISCONTINUED | OUTPATIENT
Start: 2019-08-06 | End: 2019-08-07 | Stop reason: HOSPADM

## 2019-08-06 RX ORDER — TRAZODONE HYDROCHLORIDE 50 MG/1
50 TABLET ORAL NIGHTLY
Status: DISCONTINUED | OUTPATIENT
Start: 2019-08-06 | End: 2019-08-07 | Stop reason: HOSPADM

## 2019-08-06 RX ORDER — ATORVASTATIN CALCIUM 40 MG/1
80 TABLET, FILM COATED ORAL NIGHTLY
Status: DISCONTINUED | OUTPATIENT
Start: 2019-08-06 | End: 2019-08-07 | Stop reason: HOSPADM

## 2019-08-06 RX ADMIN — TIZANIDINE 2 MG: 4 TABLET ORAL at 21:21

## 2019-08-06 RX ADMIN — OXYCODONE HYDROCHLORIDE AND ACETAMINOPHEN 1 TABLET: 5; 325 TABLET ORAL at 14:51

## 2019-08-06 RX ADMIN — HYDROMORPHONE HYDROCHLORIDE 0.5 MG: 2 INJECTION INTRAMUSCULAR; INTRAVENOUS; SUBCUTANEOUS at 14:19

## 2019-08-06 RX ADMIN — Medication 10 MG: at 12:36

## 2019-08-06 RX ADMIN — ONDANSETRON 4 MG: 2 INJECTION INTRAMUSCULAR; INTRAVENOUS at 11:11

## 2019-08-06 RX ADMIN — CEFAZOLIN SODIUM 2 G: 2 INJECTION, SOLUTION INTRAVENOUS at 18:19

## 2019-08-06 RX ADMIN — HYDROMORPHONE HYDROCHLORIDE 0.5 MG: 2 INJECTION INTRAMUSCULAR; INTRAVENOUS; SUBCUTANEOUS at 14:05

## 2019-08-06 RX ADMIN — LIDOCAINE HYDROCHLORIDE 100 MG: 20 INJECTION, SOLUTION EPIDURAL; INFILTRATION; INTRACAUDAL; PERINEURAL at 11:11

## 2019-08-06 RX ADMIN — ROCURONIUM BROMIDE 40 MG: 10 INJECTION, SOLUTION INTRAVENOUS at 11:20

## 2019-08-06 RX ADMIN — FENTANYL CITRATE 50 MCG: 50 INJECTION, SOLUTION INTRAMUSCULAR; INTRAVENOUS at 11:54

## 2019-08-06 RX ADMIN — MAGNESIUM SULFATE HEPTAHYDRATE 1 G: 500 INJECTION, SOLUTION INTRAMUSCULAR; INTRAVENOUS at 11:20

## 2019-08-06 RX ADMIN — SENNOSIDES AND DOCUSATE SODIUM 1 TABLET: 8.6; 5 TABLET ORAL at 21:22

## 2019-08-06 RX ADMIN — ROCURONIUM BROMIDE 10 MG: 10 INJECTION, SOLUTION INTRAVENOUS at 11:11

## 2019-08-06 RX ADMIN — Medication 10 MG: at 11:20

## 2019-08-06 RX ADMIN — INSULIN GLARGINE 17 UNITS: 100 INJECTION, SOLUTION SUBCUTANEOUS at 21:43

## 2019-08-06 RX ADMIN — HYDROMORPHONE HYDROCHLORIDE 0.5 MG: 2 INJECTION INTRAMUSCULAR; INTRAVENOUS; SUBCUTANEOUS at 14:30

## 2019-08-06 RX ADMIN — SUGAMMADEX 200 MG: 100 INJECTION, SOLUTION INTRAVENOUS at 13:39

## 2019-08-06 RX ADMIN — HYDROMORPHONE HYDROCHLORIDE 0.5 MG: 2 INJECTION INTRAMUSCULAR; INTRAVENOUS; SUBCUTANEOUS at 14:13

## 2019-08-06 RX ADMIN — FENTANYL CITRATE 100 MCG: 50 INJECTION, SOLUTION INTRAMUSCULAR; INTRAVENOUS at 11:11

## 2019-08-06 RX ADMIN — Medication 160 MG: at 11:11

## 2019-08-06 RX ADMIN — FENTANYL CITRATE 50 MCG: 50 INJECTION, SOLUTION INTRAMUSCULAR; INTRAVENOUS at 13:42

## 2019-08-06 RX ADMIN — ACETAMINOPHEN 650 MG: 325 TABLET, FILM COATED ORAL at 18:19

## 2019-08-06 RX ADMIN — CEFAZOLIN SODIUM 2 G: 2 INJECTION, SOLUTION INTRAVENOUS at 10:52

## 2019-08-06 RX ADMIN — ROCURONIUM BROMIDE 20 MG: 10 INJECTION, SOLUTION INTRAVENOUS at 12:12

## 2019-08-06 RX ADMIN — HYDROMORPHONE HYDROCHLORIDE 1 MG: 2 INJECTION INTRAMUSCULAR; INTRAVENOUS; SUBCUTANEOUS at 17:16

## 2019-08-06 RX ADMIN — DESMOPRESSIN ACETATE 80 MG: 0.2 TABLET ORAL at 21:21

## 2019-08-06 RX ADMIN — TRAZODONE HYDROCHLORIDE 50 MG: 50 TABLET ORAL at 21:22

## 2019-08-06 RX ADMIN — PROPOFOL 200 MG: 10 INJECTION, EMULSION INTRAVENOUS at 11:11

## 2019-08-06 RX ADMIN — FENOFIBRATE 160 MG: 160 TABLET ORAL at 21:22

## 2019-08-06 RX ADMIN — Medication 1 MG: at 15:20

## 2019-08-06 RX ADMIN — SODIUM CHLORIDE: 9 INJECTION, SOLUTION INTRAVENOUS at 13:11

## 2019-08-06 RX ADMIN — MIDAZOLAM HYDROCHLORIDE 2 MG: 1 INJECTION, SOLUTION INTRAMUSCULAR; INTRAVENOUS at 11:04

## 2019-08-06 RX ADMIN — HYDROMORPHONE HYDROCHLORIDE 1 MG: 2 INJECTION INTRAMUSCULAR; INTRAVENOUS; SUBCUTANEOUS at 15:20

## 2019-08-06 RX ADMIN — DEXAMETHASONE SODIUM PHOSPHATE 4 MG: 4 INJECTION, SOLUTION INTRAMUSCULAR; INTRAVENOUS at 11:11

## 2019-08-06 RX ADMIN — FENTANYL CITRATE 50 MCG: 50 INJECTION, SOLUTION INTRAMUSCULAR; INTRAVENOUS at 13:08

## 2019-08-06 RX ADMIN — OXYCODONE HYDROCHLORIDE 10 MG: 5 TABLET ORAL at 21:22

## 2019-08-06 RX ADMIN — Medication 10 MG: at 13:10

## 2019-08-06 RX ADMIN — ESCITALOPRAM OXALATE 20 MG: 10 TABLET ORAL at 21:21

## 2019-08-06 RX ADMIN — Medication 10 ML: at 21:42

## 2019-08-06 RX ADMIN — MAGNESIUM SULFATE HEPTAHYDRATE 1 G: 500 INJECTION, SOLUTION INTRAMUSCULAR; INTRAVENOUS at 13:10

## 2019-08-06 RX ADMIN — SODIUM CHLORIDE: 9 INJECTION, SOLUTION INTRAVENOUS at 11:06

## 2019-08-06 RX ADMIN — INSULIN LISPRO 1 UNITS: 100 INJECTION, SOLUTION INTRAVENOUS; SUBCUTANEOUS at 21:43

## 2019-08-06 RX ADMIN — MORPHINE SULFATE 4 MG: 4 INJECTION INTRAVENOUS at 19:14

## 2019-08-06 ASSESSMENT — PAIN SCALES - GENERAL
PAINLEVEL_OUTOF10: 7
PAINLEVEL_OUTOF10: 8
PAINLEVEL_OUTOF10: 9
PAINLEVEL_OUTOF10: 8
PAINLEVEL_OUTOF10: 10
PAINLEVEL_OUTOF10: 8
PAINLEVEL_OUTOF10: 2
PAINLEVEL_OUTOF10: 2
PAINLEVEL_OUTOF10: 7
PAINLEVEL_OUTOF10: 7
PAINLEVEL_OUTOF10: 0

## 2019-08-06 ASSESSMENT — PULMONARY FUNCTION TESTS
PIF_VALUE: 4
PIF_VALUE: 24
PIF_VALUE: 23
PIF_VALUE: 10
PIF_VALUE: 24
PIF_VALUE: 2
PIF_VALUE: 19
PIF_VALUE: 23
PIF_VALUE: 18
PIF_VALUE: 23
PIF_VALUE: 23
PIF_VALUE: 20
PIF_VALUE: 23
PIF_VALUE: 23
PIF_VALUE: 22
PIF_VALUE: 19
PIF_VALUE: 23
PIF_VALUE: 20
PIF_VALUE: 4
PIF_VALUE: 24
PIF_VALUE: 19
PIF_VALUE: 23
PIF_VALUE: 23
PIF_VALUE: 24
PIF_VALUE: 22
PIF_VALUE: 23
PIF_VALUE: 20
PIF_VALUE: 0
PIF_VALUE: 23
PIF_VALUE: 17
PIF_VALUE: 23
PIF_VALUE: 22
PIF_VALUE: 23
PIF_VALUE: 22
PIF_VALUE: 30
PIF_VALUE: 23
PIF_VALUE: 23
PIF_VALUE: 24
PIF_VALUE: 23
PIF_VALUE: 24
PIF_VALUE: 24
PIF_VALUE: 17
PIF_VALUE: 16
PIF_VALUE: 0
PIF_VALUE: 22
PIF_VALUE: 21
PIF_VALUE: 25
PIF_VALUE: 21
PIF_VALUE: 17
PIF_VALUE: 23
PIF_VALUE: 3
PIF_VALUE: 23
PIF_VALUE: 25
PIF_VALUE: 20
PIF_VALUE: 22
PIF_VALUE: 23
PIF_VALUE: 1
PIF_VALUE: 22
PIF_VALUE: 23
PIF_VALUE: 22
PIF_VALUE: 16
PIF_VALUE: 22
PIF_VALUE: 20
PIF_VALUE: 2
PIF_VALUE: 23
PIF_VALUE: 23
PIF_VALUE: 24
PIF_VALUE: 24
PIF_VALUE: 22
PIF_VALUE: 4
PIF_VALUE: 23
PIF_VALUE: 17
PIF_VALUE: 22
PIF_VALUE: 23
PIF_VALUE: 22
PIF_VALUE: 23
PIF_VALUE: 23
PIF_VALUE: 19
PIF_VALUE: 15
PIF_VALUE: 25
PIF_VALUE: 22
PIF_VALUE: 23
PIF_VALUE: 19
PIF_VALUE: 23
PIF_VALUE: 22
PIF_VALUE: 23
PIF_VALUE: 16
PIF_VALUE: 24
PIF_VALUE: 24
PIF_VALUE: 23
PIF_VALUE: 22
PIF_VALUE: 6
PIF_VALUE: 24
PIF_VALUE: 19
PIF_VALUE: 23
PIF_VALUE: 21
PIF_VALUE: 19
PIF_VALUE: 20
PIF_VALUE: 19
PIF_VALUE: 18
PIF_VALUE: 22
PIF_VALUE: 0
PIF_VALUE: 23
PIF_VALUE: 20
PIF_VALUE: 31
PIF_VALUE: 23
PIF_VALUE: 22
PIF_VALUE: 23
PIF_VALUE: 19
PIF_VALUE: 4
PIF_VALUE: 23
PIF_VALUE: 23
PIF_VALUE: 3
PIF_VALUE: 23
PIF_VALUE: 19
PIF_VALUE: 23
PIF_VALUE: 26
PIF_VALUE: 22
PIF_VALUE: 23
PIF_VALUE: 0
PIF_VALUE: 24
PIF_VALUE: 0
PIF_VALUE: 20
PIF_VALUE: 23
PIF_VALUE: 17
PIF_VALUE: 23
PIF_VALUE: 25
PIF_VALUE: 22
PIF_VALUE: 21
PIF_VALUE: 24
PIF_VALUE: 18
PIF_VALUE: 23
PIF_VALUE: 23
PIF_VALUE: 22
PIF_VALUE: 19
PIF_VALUE: 23
PIF_VALUE: 23
PIF_VALUE: 25
PIF_VALUE: 5
PIF_VALUE: 23
PIF_VALUE: 17
PIF_VALUE: 22
PIF_VALUE: 20
PIF_VALUE: 17
PIF_VALUE: 22
PIF_VALUE: 18
PIF_VALUE: 2
PIF_VALUE: 23
PIF_VALUE: 19
PIF_VALUE: 23
PIF_VALUE: 20
PIF_VALUE: 22
PIF_VALUE: 2
PIF_VALUE: 23
PIF_VALUE: 23

## 2019-08-06 ASSESSMENT — PAIN DESCRIPTION - PAIN TYPE: TYPE: SURGICAL PAIN

## 2019-08-06 ASSESSMENT — PAIN - FUNCTIONAL ASSESSMENT: PAIN_FUNCTIONAL_ASSESSMENT: 0-10

## 2019-08-06 ASSESSMENT — PAIN DESCRIPTION - LOCATION: LOCATION: ABDOMEN

## 2019-08-06 ASSESSMENT — LIFESTYLE VARIABLES: SMOKING_STATUS: 1

## 2019-08-06 NOTE — PROGRESS NOTES
Incentive Spirometry education and demonstration completed by Respiratory Therapy Yes      Response to education: Very Good     Teaching Time: 5 minutes    Minimum Predicted Vital Capacity - 822 mL. Patient's Actual Vital Capacity - 2500 mL. Turning over to Nursing for routine follow-up Yes.     Electronically signed by Sonja Martinez RCP on 8/6/2019 at 6:29 PM

## 2019-08-06 NOTE — ANESTHESIA PRE PROCEDURE
Screen (If Applicable):  No results found for: [facility]    Anesthesia Evaluation  Patient summary reviewed and Nursing notes reviewed no history of anesthetic complications:   Airway: Mallampati: III        Dental:    (+) caps      Pulmonary:normal exam    (+) sleep apnea:  current smoker                           Cardiovascular:  Exercise tolerance: good (>4 METS),   (+) hypertension:, hyperlipidemia      ECG reviewed  Rhythm: regular                      Neuro/Psych:   (+) psychiatric history:depression/anxiety             GI/Hepatic/Renal:   (+) GERD:, PUD, liver disease (LFT abnormal past):, morbid obesity          Endo/Other:    (+) Diabetes (suspect poor control)Type II DM, , electrolyte abnormalities, . ROS comment: H/o ETOH in detox. Also positive tox screen 2018 for Amphetamines. Dehydrated today by urine spec grav. Abdominal:   (+) obese,         Vascular:                                          Anesthesia Plan      general     ASA 3 - emergent       Induction: intravenous and rapid sequence. MIPS: Postoperative opioids intended, Prophylactic antiemetics administered and Postoperative trial extubation. Anesthetic plan and risks discussed with patient. Plan discussed with CRNA.                   Shweta Delatorre MD   8/6/2019

## 2019-08-06 NOTE — PROGRESS NOTES
Patient alert, oriented. Sitting up tolerating PO fluids and applesauce. Abd soft, lap sites intact. VSS. Patient rates pain 7/10, has had 2mg of dilaudid, will work in PO pain medication as needed. Family to bedside. Phase 1 criteria met. Holding for a room.

## 2019-08-07 VITALS
RESPIRATION RATE: 16 BRPM | OXYGEN SATURATION: 95 % | SYSTOLIC BLOOD PRESSURE: 103 MMHG | HEIGHT: 74 IN | TEMPERATURE: 97.6 F | WEIGHT: 259 LBS | DIASTOLIC BLOOD PRESSURE: 64 MMHG | BODY MASS INDEX: 33.24 KG/M2 | HEART RATE: 67 BPM

## 2019-08-07 LAB
ANION GAP SERPL CALCULATED.3IONS-SCNC: 12 MMOL/L (ref 3–16)
BUN BLDV-MCNC: 29 MG/DL (ref 7–20)
CALCIUM SERPL-MCNC: 9.1 MG/DL (ref 8.3–10.6)
CHLORIDE BLD-SCNC: 102 MMOL/L (ref 99–110)
CO2: 23 MMOL/L (ref 21–32)
CREAT SERPL-MCNC: 1.2 MG/DL (ref 0.8–1.3)
ESTIMATED AVERAGE GLUCOSE: 134.1 MG/DL
GFR AFRICAN AMERICAN: >60
GFR NON-AFRICAN AMERICAN: >60
GLUCOSE BLD-MCNC: 107 MG/DL (ref 70–99)
GLUCOSE BLD-MCNC: 85 MG/DL (ref 70–99)
GLUCOSE BLD-MCNC: 89 MG/DL (ref 70–99)
HBA1C MFR BLD: 6.3 %
HCT VFR BLD CALC: 43.1 % (ref 40.5–52.5)
HEMOGLOBIN: 14.3 G/DL (ref 13.5–17.5)
PERFORMED ON: NORMAL
PERFORMED ON: NORMAL
POTASSIUM SERPL-SCNC: 4.1 MMOL/L (ref 3.5–5.1)
SODIUM BLD-SCNC: 137 MMOL/L (ref 136–145)

## 2019-08-07 PROCEDURE — 36415 COLL VENOUS BLD VENIPUNCTURE: CPT

## 2019-08-07 PROCEDURE — 85014 HEMATOCRIT: CPT

## 2019-08-07 PROCEDURE — 96374 THER/PROPH/DIAG INJ IV PUSH: CPT

## 2019-08-07 PROCEDURE — 83036 HEMOGLOBIN GLYCOSYLATED A1C: CPT

## 2019-08-07 PROCEDURE — 80048 BASIC METABOLIC PNL TOTAL CA: CPT

## 2019-08-07 PROCEDURE — 96376 TX/PRO/DX INJ SAME DRUG ADON: CPT

## 2019-08-07 PROCEDURE — 85018 HEMOGLOBIN: CPT

## 2019-08-07 PROCEDURE — 94760 N-INVAS EAR/PLS OXIMETRY 1: CPT

## 2019-08-07 PROCEDURE — 6360000002 HC RX W HCPCS: Performed by: UROLOGY

## 2019-08-07 PROCEDURE — 6370000000 HC RX 637 (ALT 250 FOR IP): Performed by: UROLOGY

## 2019-08-07 RX ADMIN — CEFAZOLIN SODIUM 2 G: 2 INJECTION, SOLUTION INTRAVENOUS at 04:33

## 2019-08-07 RX ADMIN — ACETAMINOPHEN 650 MG: 325 TABLET, FILM COATED ORAL at 00:35

## 2019-08-07 RX ADMIN — ACETAMINOPHEN 650 MG: 325 TABLET, FILM COATED ORAL at 04:33

## 2019-08-07 RX ADMIN — SENNOSIDES AND DOCUSATE SODIUM 1 TABLET: 8.6; 5 TABLET ORAL at 08:22

## 2019-08-07 RX ADMIN — MORPHINE SULFATE 4 MG: 4 INJECTION INTRAVENOUS at 00:40

## 2019-08-07 RX ADMIN — OXYCODONE HYDROCHLORIDE 10 MG: 5 TABLET ORAL at 04:32

## 2019-08-07 RX ADMIN — OXYCODONE HYDROCHLORIDE 10 MG: 5 TABLET ORAL at 10:05

## 2019-08-07 RX ADMIN — MORPHINE SULFATE 4 MG: 4 INJECTION INTRAVENOUS at 06:31

## 2019-08-07 ASSESSMENT — PAIN DESCRIPTION - LOCATION
LOCATION: ABDOMEN

## 2019-08-07 ASSESSMENT — PAIN SCALES - GENERAL
PAINLEVEL_OUTOF10: 2
PAINLEVEL_OUTOF10: 7
PAINLEVEL_OUTOF10: 8
PAINLEVEL_OUTOF10: 7
PAINLEVEL_OUTOF10: 2
PAINLEVEL_OUTOF10: 3
PAINLEVEL_OUTOF10: 2
PAINLEVEL_OUTOF10: 4
PAINLEVEL_OUTOF10: 8

## 2019-08-07 ASSESSMENT — PAIN DESCRIPTION - PAIN TYPE
TYPE: SURGICAL PAIN

## 2019-08-07 NOTE — CONSULTS
[address]                     [facility], [address]                                  CONSULTATION    PATIENT NAME: Grace Tierney                    :        1955  MED REC NO:   [id number]                          ROOM:       4462  ACCOUNT NO:   [de-identified]                           ADMIT DATE: 2019  PROVIDER:     Robert Salazar MD    CONSULT DATE:  2019    I obtained the history and performed the physical exam on the patient on  the medical floor on 2019. SOURCE OF HISTORY:  The patient. CHIEF COMPLAINT:  \"I had part of my kidney removed. \"    HISTORY OF PRESENT ILLNESS:  This 68-year-old  male who is  status post partial nephrectomy for what appears to be renal  cancer/renal mass on the left kidney, we have been consulted for medical  management of the patient's chronic medical problems. The patient  currently does not have any fresh complaints. PAST MEDICAL/PAST SURGICAL HISTORY:  1. Hypertension. 2.  Depression. 3.  Sleep apnea. 4.  Chronic pain. 5.  Obesity. 6.  Type 2 diabetes. 7.  Dyslipidemia. ALLERGIC HISTORY:  CYMBALTA. FAMILY HISTORY:  Reviewed by me and is currently noncontributory. SOCIAL HISTORY:  Lives at home. Active smoker. MEDICATIONS:  1. Zestoretic. 2.  Zanaflex. 3.  Jardiance. 4.  Lantus. 5.  Lexapro. 6.  Desyrel. 7.  Lipitor. 8.  Multivitamins. 9.  Decadron. 10.  Lidoderm. 11.  Protonix. 12.  Temovate. 13.  Zyrtec. REVIEW OF SYSTEMS:  Per the history of present illness. All other  systems have been reviewed and are negative except for the history of  present illness. PHYSICAL EXAMINATION:  VITAL SIGNS:  Temperature 98.4, respiratory rate 12, pulse 86, blood  pressure 108/73, and saturating 97%. CNS:  Alert, awake, and oriented. PSYCHIATRIC:  The patient is cooperative. EYES:  Pupils are reactive to light.   ENT:  Extraocular muscle movements are

## 2019-08-07 NOTE — DISCHARGE INSTR - COC
Continuity of Care Form    Patient Name: Adela Shah   :  1955  MRN:  [id number]    Admit date:  2019  Discharge date:  ***    Code Status Order: Full Code   Advance Directives:   Advance Care Flowsheet Documentation     Date/Time Healthcare Directive Type of Healthcare Directive Copy in [address] Agent's Name Healthcare Agent's Phone Number    19 183  No, patient does not have an advance directive for healthcare treatment -- -- -- -- --    19 1009  No, patient does not have an advance directive for healthcare treatment -- -- -- -- --          Admitting Physician:  Trung Espinoza MD  PCP: Michael Lares MD    Discharging Nurse: [facility]/Room#: [id number]  Discharging Unit Phone Number: ***    Emergency Contact:   Extended Emergency Contact Information  Primary Emergency Contact: Alexus Mcnamara  Address: [address] [place] [address] [address] Phone: [phone]  Work Phone: [phone]  Relation: Spouse    Past Surgical History:  Past Surgical History:   Procedure Laterality Date    BACK SURGERY       X 2    COLONOSCOPY      INGUINAL HERNIA REPAIR      RIGHT X 2    LAPAROSCOPIC APPENDECTOMY N/A 5/10/2019    APPENDECTOMY LAPAROSCOPIC, LYSIS OF ADHESIONS performed by Sandor Johnson MD at [facility] 1690 tumor removal under right arm    PARTIAL NEPHRECTOMY Left 2019    ROBOTIC LAPAROSCOPIC LEFT PARTIAL NEPHRECTOMY performed by Trung Espinoza MD at [address] [place] [address]         Immunization History:   Immunization History   Administered Date(s) Administered    Influenza, High Dose (Fluzone 65 yrs and older) 2018    Pneumococcal Conjugate 13-valent (Ircgtol78) 2018    Pneumococcal Conjugate 7-valent (Jules Dangelo) 10/12/2005       Active Problems:  Patient Active Problem List Diagnosis Code    Acute meniscal injury of knee S83.8X9A    HTN (hypertension) I10    Depression F32.9    Sleep apnea G47.30    Chronic pain syndrome G89.4    Failed back surgical syndrome M96.1    Obesity E66.9    Chondromalacia of patellofemoral joint M22.40    Primary insomnia F51.01    Gastroesophageal reflux disease without esophagitis K21.9    Cervical sprain S13. 9XXA    Fibromyalgia M79.7    Cervical radiculitis M54.12    DDD (degenerative disc disease), cervical M50.30    ISMAEL (generalized anxiety disorder) F41.1    S/P alcohol detoxification Z09    Tobacco abuse counseling Z71.6    Trigger little finger of right hand M65.351    At risk for respiratory depression due to opioid Z91.89    Other psoriasis L40.8    Uncontrolled type 2 diabetes mellitus with hyperglycemia, with long-term current use of insulin (HCC) E11.65, Z79.4    Dyslipidemia associated with type 2 diabetes mellitus (HCC) E11.69, E78.5    Essential hypertension I10    Morbid obesity due to excess calories ([facility]) E66.01    Smoker J47.686    Uncomplicated acute appendicitis K35.80    Acute appendicitis with localized peritonitis, without perforation, abscess, or gangrene K35.30    Controlled type 2 diabetes mellitus without complication, with long-term current use of insulin ([facility]) E11.9, Z79.4    Renal mass, left N28.89       Isolation/Infection:   Isolation          No Isolation            Nurse Assessment:  Last Vital Signs: /64   Pulse 67   Temp 97.6 °F (36.4 °C)   Resp 16   Ht 6' 2\" (1.88 m)   Wt 259 lb (117.5 kg)   SpO2 95%   BMI 33.25 kg/m²     Last documented pain score (0-10 scale): Pain Level: 7  Last Weight:   Wt Readings from Last 1 Encounters:   08/06/19 259 lb (117.5 kg)     Mental Status:  {IP PT MENTAL STATUS:20030}    IV Access:  {[facility] IV ACCESS:344943953}    Nursing Mobility/ADLs:  Walking   {[facility] D[facility] MARILEE:785257246}  Transfer  {[facility] D[facility] JEDS:685666754}  Bathing  {[facility] D[facility] Discharging to Facility/ Agency   · Name:   · Address:  · Phone:  · Fax:    Dialysis Facility (if applicable)   · Name:  · Address:  · Dialysis Schedule:  · Phone:  · Fax:    / signature: {Esignature:705778531}    PHYSICIAN SECTION    Prognosis: {Prognosis:4926297034}    Condition at Discharge: Stephanie Rubio Patient Condition:224683723}    Rehab Potential (if transferring to Rehab): {Prognosis:6264772244}    Recommended Labs or Other Treatments After Discharge: ***    Physician Certification: I certify the above information and transfer of Anahi Holman  is necessary for the continuing treatment of the diagnosis listed and that he requires {Admit to Appropriate Level of Care:52847} for {GREATER/LESS:086318412} 30 days.      Update Admission H&P: {[facility] DME Changes in CWLQN:962974638}    PHYSICIAN SIGNATURE:  {Esignature:893441785}

## 2019-08-07 NOTE — PROGRESS NOTES
Assessment completed and documented. Pt refused BP meds due to lower BP. The care plan and education has been reviewed and mutually agreed upon with the patient. Breakfast eaten. No needs at this time. Call light in reach  Will monitor.

## 2019-08-08 ENCOUNTER — CARE COORDINATION (OUTPATIENT)
Dept: CASE MANAGEMENT | Age: 64
End: 2019-08-08

## 2019-08-09 ENCOUNTER — CARE COORDINATION (OUTPATIENT)
Dept: CASE MANAGEMENT | Age: 64
End: 2019-08-09

## 2019-08-09 DIAGNOSIS — N28.89 RENAL MASS, LEFT: Primary | ICD-10-CM

## 2019-08-09 NOTE — CARE COORDINATION
[address] [facility] Transitions Initial Follow Up Call    Call within 2 business days of discharge: Yes    Patient: Patti Forrest Patient : 1955   MRN: [id number]  Reason for Admission: LEFT sided robotic assisted laparoscopic partial nephrectomy  Discharge Date: 19 RARS: Readmission Risk Score: 16      Last Discharge [facility]       Complaint Diagnosis Description Type Department Provider    19  Preop testing . .. Admission (Discharged) [address] Ramos Morrison MD           Spoke with: [address]: [facility]    Non-face-to-face services provided:  Obtained and reviewed discharge summary and/or continuity of care documents    Care Transitions 24 Hour Call    Do you have any ongoing symptoms?:  No  Do you have a copy of your discharge instructions?:  Yes  Do you have all of your prescriptions and are they filled?:  Yes  Have you been contacted by a [facility] Pharmacist?:  No  Have you scheduled your follow up appointment?:  Yes  How are you going to get to your appointment?:  Car - family or friend to transport  Were you discharged with any Home Care or Post Acute Services:  No  Do you feel like you have everything you need to keep you well at home?:  Yes  Care Transitions Interventions  No Identified Needs       Pt states doing well, no issues or concerns. Has 5 small incisions that are CDI. Having some pain, \"not bad\" , using pain med as prescribed. Urinating with out difficulty and  Has had a BM. Has all new meds, reviewed all others. F/U appt with Dr. Shea Zeng scheduled for 19. Agreed to more CTC f/u calls      Follow Up  No future appointments.     Jose Mota RN

## 2019-08-20 ENCOUNTER — CARE COORDINATION (OUTPATIENT)
Dept: CASE MANAGEMENT | Age: 64
End: 2019-08-20

## 2019-08-27 ENCOUNTER — HOSPITAL ENCOUNTER (OUTPATIENT)
Dept: GENERAL RADIOLOGY | Age: 64
Discharge: HOME | End: 2019-08-27
Payer: COMMERCIAL

## 2019-08-27 ENCOUNTER — HOSPITAL ENCOUNTER (OUTPATIENT)
Age: 64
Discharge: HOME | End: 2019-08-27
Payer: COMMERCIAL

## 2019-08-27 DIAGNOSIS — M43.02 CERVICAL SPONDYLOLYSIS: ICD-10-CM

## 2019-08-27 PROCEDURE — 72040 X-RAY EXAM NECK SPINE 2-3 VW: CPT

## 2019-09-11 DIAGNOSIS — M50.30 DDD (DEGENERATIVE DISC DISEASE), CERVICAL: ICD-10-CM

## 2019-09-11 DIAGNOSIS — M79.7 FIBROMYALGIA: ICD-10-CM

## 2019-09-11 DIAGNOSIS — M54.12 CERVICAL RADICULOPATHY: ICD-10-CM

## 2019-09-11 DIAGNOSIS — M96.1 FAILED BACK SURGICAL SYNDROME: ICD-10-CM

## 2019-09-11 DIAGNOSIS — G89.4 CHRONIC PAIN SYNDROME: ICD-10-CM

## 2019-09-11 DIAGNOSIS — M54.12 CERVICAL RADICULITIS: ICD-10-CM

## 2019-09-11 RX ORDER — TIZANIDINE 4 MG/1
TABLET ORAL
Qty: 90 TABLET | Refills: 0 | OUTPATIENT
Start: 2019-09-11

## 2019-09-12 ENCOUNTER — HOSPITAL ENCOUNTER (OUTPATIENT)
Age: 64
Discharge: HOME | End: 2019-09-12
Payer: COMMERCIAL

## 2019-09-12 LAB
A/G RATIO: 1.6 (ref 1.1–2.2)
ALBUMIN SERPL-MCNC: 4.1 G/DL (ref 3.4–5)
ALP BLD-CCNC: 42 U/L (ref 40–129)
ALT SERPL-CCNC: 13 U/L (ref 10–40)
ANION GAP SERPL CALCULATED.3IONS-SCNC: 15 MMOL/L (ref 3–16)
AST SERPL-CCNC: 18 U/L (ref 15–37)
BILIRUB SERPL-MCNC: 0.4 MG/DL (ref 0–1)
BUN BLDV-MCNC: 29 MG/DL (ref 7–20)
CALCIUM SERPL-MCNC: 9.5 MG/DL (ref 8.3–10.6)
CHLORIDE BLD-SCNC: 104 MMOL/L (ref 99–110)
CHOLESTEROL, TOTAL: 126 MG/DL (ref 0–199)
CO2: 24 MMOL/L (ref 21–32)
CREAT SERPL-MCNC: 1.1 MG/DL (ref 0.8–1.3)
ESTIMATED AVERAGE GLUCOSE: 134.1 MG/DL
GFR AFRICAN AMERICAN: >60
GFR NON-AFRICAN AMERICAN: >60
GLOBULIN: 2.5 G/DL
GLUCOSE BLD-MCNC: 84 MG/DL (ref 70–99)
HBA1C MFR BLD: 6.3 %
HDLC SERPL-MCNC: 31 MG/DL (ref 40–60)
LDL CHOLESTEROL CALCULATED: 71 MG/DL
POTASSIUM SERPL-SCNC: 3.7 MMOL/L (ref 3.5–5.1)
SODIUM BLD-SCNC: 143 MMOL/L (ref 136–145)
TOTAL PROTEIN: 6.6 G/DL (ref 6.4–8.2)
TRIGL SERPL-MCNC: 119 MG/DL (ref 0–150)
VLDLC SERPL CALC-MCNC: 24 MG/DL

## 2019-09-12 PROCEDURE — 83835 ASSAY OF METANEPHRINES: CPT

## 2019-09-12 PROCEDURE — 80061 LIPID PANEL: CPT

## 2019-09-12 PROCEDURE — 82088 ASSAY OF ALDOSTERONE: CPT

## 2019-09-12 PROCEDURE — 36415 COLL VENOUS BLD VENIPUNCTURE: CPT

## 2019-09-12 PROCEDURE — 80053 COMPREHEN METABOLIC PANEL: CPT

## 2019-09-12 PROCEDURE — 84244 ASSAY OF RENIN: CPT

## 2019-09-12 PROCEDURE — 83036 HEMOGLOBIN GLYCOSYLATED A1C: CPT

## 2019-09-13 ENCOUNTER — HOSPITAL ENCOUNTER (OUTPATIENT)
Age: 64
Discharge: HOME | End: 2019-09-13
Payer: COMMERCIAL

## 2019-09-13 LAB — CORTISOL - AM: 2 UG/DL (ref 4.3–22.4)

## 2019-09-13 PROCEDURE — 82533 TOTAL CORTISOL: CPT

## 2019-09-13 PROCEDURE — 36415 COLL VENOUS BLD VENIPUNCTURE: CPT

## 2019-09-14 LAB — ALDOSTERONE: 8.4 NG/DL

## 2019-09-15 LAB
METANEPH/PLASMA INTERP: NORMAL
METANEPHRINE FREE PLASMA: 0.11 NMOL/L (ref 0–0.49)
NORMETANEPHRINE FREE PLASMA: 0.69 NMOL/L (ref 0–0.89)
RENIN ACTIVITY: 4.3 NG/ML/HR

## 2019-09-23 ENCOUNTER — OFFICE VISIT (OUTPATIENT)
Dept: ORTHOPEDIC SURGERY | Age: 64
End: 2019-09-23
Payer: COMMERCIAL

## 2019-09-23 VITALS — BODY MASS INDEX: 33.24 KG/M2 | HEIGHT: 74 IN | WEIGHT: 259.04 LBS | RESPIRATION RATE: 16 BRPM

## 2019-09-23 DIAGNOSIS — M25.462 KNEE EFFUSION, LEFT: Primary | ICD-10-CM

## 2019-09-23 PROCEDURE — 3017F COLORECTAL CA SCREEN DOC REV: CPT | Performed by: ORTHOPAEDIC SURGERY

## 2019-09-23 PROCEDURE — 1123F ACP DISCUSS/DSCN MKR DOCD: CPT | Performed by: ORTHOPAEDIC SURGERY

## 2019-09-23 PROCEDURE — 99213 OFFICE O/P EST LOW 20 MIN: CPT | Performed by: ORTHOPAEDIC SURGERY

## 2019-09-23 PROCEDURE — G8427 DOCREV CUR MEDS BY ELIG CLIN: HCPCS | Performed by: ORTHOPAEDIC SURGERY

## 2019-09-23 PROCEDURE — 4040F PNEUMOC VAC/ADMIN/RCVD: CPT | Performed by: ORTHOPAEDIC SURGERY

## 2019-09-23 PROCEDURE — 4004F PT TOBACCO SCREEN RCVD TLK: CPT | Performed by: ORTHOPAEDIC SURGERY

## 2019-09-23 PROCEDURE — G8417 CALC BMI ABV UP PARAM F/U: HCPCS | Performed by: ORTHOPAEDIC SURGERY

## 2019-09-24 ENCOUNTER — OFFICE VISIT (OUTPATIENT)
Dept: ENDOCRINOLOGY | Age: 64
End: 2019-09-24
Payer: COMMERCIAL

## 2019-09-24 VITALS
HEART RATE: 80 BPM | BODY MASS INDEX: 32.55 KG/M2 | SYSTOLIC BLOOD PRESSURE: 113 MMHG | DIASTOLIC BLOOD PRESSURE: 74 MMHG | OXYGEN SATURATION: 97 % | HEIGHT: 74 IN | WEIGHT: 253.6 LBS

## 2019-09-24 DIAGNOSIS — E66.01 MORBID OBESITY DUE TO EXCESS CALORIES (HCC): ICD-10-CM

## 2019-09-24 DIAGNOSIS — E11.69 DYSLIPIDEMIA ASSOCIATED WITH TYPE 2 DIABETES MELLITUS (HCC): ICD-10-CM

## 2019-09-24 DIAGNOSIS — E11.9 CONTROLLED TYPE 2 DIABETES MELLITUS WITHOUT COMPLICATION, WITH LONG-TERM CURRENT USE OF INSULIN (HCC): Primary | ICD-10-CM

## 2019-09-24 DIAGNOSIS — Z79.4 CONTROLLED TYPE 2 DIABETES MELLITUS WITHOUT COMPLICATION, WITH LONG-TERM CURRENT USE OF INSULIN (HCC): Primary | ICD-10-CM

## 2019-09-24 DIAGNOSIS — F17.200 SMOKER: ICD-10-CM

## 2019-09-24 DIAGNOSIS — I10 ESSENTIAL HYPERTENSION: ICD-10-CM

## 2019-09-24 DIAGNOSIS — E78.5 DYSLIPIDEMIA ASSOCIATED WITH TYPE 2 DIABETES MELLITUS (HCC): ICD-10-CM

## 2019-09-24 DIAGNOSIS — E27.8 LEFT ADRENAL MASS (HCC): ICD-10-CM

## 2019-09-24 PROCEDURE — 4040F PNEUMOC VAC/ADMIN/RCVD: CPT | Performed by: INTERNAL MEDICINE

## 2019-09-24 PROCEDURE — 4004F PT TOBACCO SCREEN RCVD TLK: CPT | Performed by: INTERNAL MEDICINE

## 2019-09-24 PROCEDURE — 3044F HG A1C LEVEL LT 7.0%: CPT | Performed by: INTERNAL MEDICINE

## 2019-09-24 PROCEDURE — 1123F ACP DISCUSS/DSCN MKR DOCD: CPT | Performed by: INTERNAL MEDICINE

## 2019-09-24 PROCEDURE — 99214 OFFICE O/P EST MOD 30 MIN: CPT | Performed by: INTERNAL MEDICINE

## 2019-09-24 PROCEDURE — G8417 CALC BMI ABV UP PARAM F/U: HCPCS | Performed by: INTERNAL MEDICINE

## 2019-09-24 PROCEDURE — G8427 DOCREV CUR MEDS BY ELIG CLIN: HCPCS | Performed by: INTERNAL MEDICINE

## 2019-09-24 PROCEDURE — 2022F DILAT RTA XM EVC RTNOPTHY: CPT | Performed by: INTERNAL MEDICINE

## 2019-09-24 PROCEDURE — 3017F COLORECTAL CA SCREEN DOC REV: CPT | Performed by: INTERNAL MEDICINE

## 2019-09-24 NOTE — PROGRESS NOTES
Patient ID:   Sheila Ornelas is a 70 y.o. male    Chief Complaint:   Sheila Ornelas presents for an evaluation of Type 2 Diabetes Mellitus , Hyperlipidemia and hypertension. Subjective:   Type 2 Diabetes Mellitus diagnosed in 2017  On insulin since April 2019   Metformin caused muscle soreness      Lantus 22 units daily at night. TTT of . Jardiance 59YT daily     Trulicity 2.63GA weekly on Wednesday (sometimes causing diarrhea)    Checks blood sugars 1 times per day. Reviewed/Reported  AM:   Lunch:  Supper:   HS:     Hypoglycemias: None     Meals: Three, may be protein shake for breakfast. Dinner is bigger. Late night snack (cup of mixed nuts). Soda once a day, diet. No juices. Exercise: None because of back problems     Denies chest pain, exertional dyspnea. Family history of CAD: Dad at age 64. Mother had stroke in her 66's. Smokes 1/2 PPD. Counselled for smoking cessation.      Currently not on ASA, recommend baby aspirin if okay with gastroenterologist or PCP      The following portions of the patient's history were reviewed and updated as appropriate:       Family History   Problem Relation Age of Onset    Stroke Mother     Heart Disease Father          Social History     Socioeconomic History    Marital status:      Spouse name: Not on file    Number of children: Not on file    Years of education: Not on file    Highest education level: Not on file   Occupational History    Occupation: disability   Social Needs    Financial resource strain: Not on file    Food insecurity:     Worry: Not on file     Inability: Not on file    Transportation needs:     Medical: Not on file     Non-medical: Not on file   Tobacco Use    Smoking status: Current Every Day Smoker     Packs/day: 0.50     Years: 15.00     Pack years: 7.50     Types: Cigarettes    Smokeless tobacco: Never Used   Substance and Sexual Activity    Alcohol use: Not Currently     Alcohol/week: 1.0 standard drinks     Types: 1 Standard drinks or equivalent per week    Drug use: No     Comment: Medical Marijuana couple times a day    Sexual activity: Yes     Partners: Female   Lifestyle    Physical activity:     Days per week: Not on file     Minutes per session: Not on file    Stress: Not on file   Relationships    Social connections:     Talks on phone: Not on file     Gets together: Not on file     Attends Islam service: Not on file     Active member of club or organization: Not on file     Attends meetings of clubs or organizations: Not on file     Relationship status: Not on file    Intimate partner violence:     Fear of current or ex partner: Not on file     Emotionally abused: Not on file     Physically abused: Not on file     Forced sexual activity: Not on file   Other Topics Concern    Not on file   Social History Narrative    ** Merged History Encounter **            Past Medical History:   Diagnosis Date    Abdominal pain     Blood transfusion     30 yrs ago    Chronic back pain     Chronic pain syndrome     Depression     Depression     Diabetes mellitus ([facility] [address]     Failed back surgical syndrome     FH: colonic polyps     History of duodenal ulcer     now gets peptic ulcer    Hyperlipidemia     Hypertension     Insomnia     Obesity     Psychiatric problem     depression and anxiety    Sleep apnea     USES C-PAP    Ulcer, duodenum peptic        Past Surgical History:   Procedure Laterality Date    BACK SURGERY       X 2    COLONOSCOPY      INGUINAL HERNIA REPAIR      RIGHT X 2    LAPAROSCOPIC APPENDECTOMY N/A 5/10/2019    APPENDECTOMY LAPAROSCOPIC, LYSIS OF ADHESIONS performed by Mariajose Medley MD at [facility] 1690 tumor removal under right arm    PARTIAL NEPHRECTOMY Left 8/6/2019    ROBOTIC LAPAROSCOPIC LEFT PARTIAL NEPHRECTOMY performed by Linford Holstein, MD at [address] cetirizine (ZYRTEC) 10 MG tablet, Take 10 mg by mouth daily as needed , Disp: , Rfl:       Review of Systems:    Constitutional: Negative for fever, chills, and unexpected weight change. HENT: Negative for congestion, ear pain, rhinorrhea,  sore throat and trouble swallowing. Eyes: Negative for photophobia, redness, itching. Respiratory: Negative for cough, shortness of breath and sputum. Cardiovascular: Negative for chest pain, palpitations and leg swelling. Gastrointestinal: has heartburn. Negative for nausea, vomiting, abdominal pain, diarrhea, constipation. Endocrine: Negative for cold intolerance, heat intolerance, polydipsia, polyphagia and polyuria. Genitourinary: Negative for dysuria, urgency, frequency, hematuria and flank pain. Musculoskeletal: neck pain due to DJD , pain radiate to neck, arms. Negative for myalgias, back pain, arthralgias. Skin/Nail: Negative for rash, itching. Normal nails. Neurological: Negative for seizures, weakness, light-headedness, numbness and headaches. Hematological/ Lymph nodes: Negative for adenopathy. Does not bruise/bleed easily. Psychiatric/Behavioral: Negative for suicidal ideas, depression, anxiety, sleep disturbance and decreased concentration. Objective:   Physical Exam:  /74 (Site: Left Upper Arm, Position: Sitting, Cuff Size: Medium Adult)   Pulse 80   Ht 6' 2\" (1.88 m)   Wt 253 lb 9.6 oz (115 kg)   SpO2 97%   BMI 32.56 kg/m²   Constitutional: Patient is oriented to person, place, and time. Patient appears well-developed and well-nourished. HENT:    Head: Normocephalic and atraumatic. Eyes: Conjunctivae and EOM are normal.     Neck: Normal range of motion. Cardiovascular: Normal rate, regular rhythm and normal heart sounds. Pulmonary/Chest: Effort normal and breath sounds normal.   Abdominal: Soft. Bowel sounds are normal.   Musculoskeletal: Normal range of motion.    Neurological: Patient is alert and oriented to person, place, and time. Patient has normal reflexes. Skin: Skin is warm and dry. Psychiatric: Patient has a normal mood and affect.  Patient behavior is normal.     Lab Review:    Office Visit on 09/24/2019   Component Date Value Ref Range Status    Diabetic Retinopathy 12/15/2018 Negative   Final   Hospital Outpatient Visit on 09/13/2019   Component Date Value Ref Range Status    Cortisol - AM 09/13/2019 2.0* 4.3 - 22.4 ug/dL Final   Hospital Outpatient Visit on 09/12/2019   Component Date Value Ref Range Status    Normetanephrine, Free 09/12/2019 0.69  0.00 - 0.89 nmol/L Final    Metanephrine, Free 09/12/2019 0.11  0.00 - 0.49 nmol/L Final    Metaneph/Plasma Interp 09/12/2019 See Note   Final    Renin Activity 09/12/2019 4.3  ng/mL/hr Final    Aldosterone 09/12/2019 8.4  ng/dL Final    Hemoglobin A1C 09/12/2019 6.3  See comment % Final    eAG 09/12/2019 134.1  mg/dL Final    Cholesterol, Total 09/12/2019 126  0 - 199 mg/dL Final    Triglycerides 09/12/2019 119  0 - 150 mg/dL Final    HDL 09/12/2019 31* 40 - 60 mg/dL Final    LDL Calculated 09/12/2019 71  <100 mg/dL Final    VLDL Cholesterol Calculated 09/12/2019 24  Not Established mg/dL Final    Sodium 09/12/2019 143  136 - 145 mmol/L Final    Potassium 09/12/2019 3.7  3.5 - 5.1 mmol/L Final    Chloride 09/12/2019 104  99 - 110 mmol/L Final    CO2 09/12/2019 24  21 - 32 mmol/L Final    Anion Gap 09/12/2019 15  3 - 16 Final    Glucose 09/12/2019 84  70 - 99 mg/dL Final    BUN 09/12/2019 29* 7 - 20 mg/dL Final    CREATININE 09/12/2019 1.1  0.8 - 1.3 mg/dL Final    GFR Non- 09/12/2019 >60  >60 Final    GFR  09/12/2019 >60  >60 Final    Calcium 09/12/2019 9.5  8.3 - 10.6 mg/dL Final    Total Protein 09/12/2019 6.6  6.4 - 8.2 g/dL Final    Alb 09/12/2019 4.1  3.4 - 5.0 g/dL Final    Albumin/Globulin Ratio 09/12/2019 1.6  1.1 - 2.2 Final    Total Bilirubin 09/12/2019 0.4  0.0 - 1.0 mg/dL Final    degrees Final    R Axis 07/29/2019 52  degrees Final    T Axis 07/29/2019 27  degrees Final    Diagnosis 07/29/2019 Normal sinus rhythmNormal ECGWhen compared with ECG of 10-MAY-2019 20:52,QT has shortenedConfirmed by [facility], [id number] ([id number]) on 7/29/2019 10:10:25 AM   Final    WBC 07/29/2019 9.5  4.0 - 11.0 K/uL Final    RBC 07/29/2019 5.20  4. 20 - 5.90 M/uL Final    Hemoglobin 07/29/2019 15.7  13.5 - 17.5 g/dL Final    Hematocrit 07/29/2019 47.2  40.5 - 52.5 % Final    MCV 07/29/2019 90.7  80.0 - 100.0 fL Final    MCH 07/29/2019 30.2  26.0 - 34.0 pg Final    MCHC 07/29/2019 33.3  31.0 - 36.0 g/dL Final    RDW 07/29/2019 14.1  12.4 - 15.4 % Final    Platelets 41/74/0210 303  135 - 450 K/uL Final    MPV 07/29/2019 9.4  5.0 - 10.5 fL Final    Neutrophils % 07/29/2019 56.5  % Final    Lymphocytes % 07/29/2019 26.9  % Final    Monocytes % 07/29/2019 9.3  % Final    Eosinophils % 07/29/2019 6.7  % Final    Basophils % 07/29/2019 0.6  % Final    Neutrophils Absolute 07/29/2019 5.3  1.7 - 7.7 K/uL Final    Lymphocytes Absolute 07/29/2019 2.5  1.0 - 5.1 K/uL Final    Monocytes Absolute 07/29/2019 0.9  0.0 - 1.3 K/uL Final    Eosinophils Absolute 07/29/2019 0.6  0.0 - 0.6 K/uL Final    Basophils Absolute 07/29/2019 0.1  0.0 - 0.2 K/uL Final    Sodium 07/29/2019 142  136 - 145 mmol/L Final    Potassium 07/29/2019 3.8  3.5 - 5.1 mmol/L Final    Chloride 07/29/2019 104  99 - 110 mmol/L Final    CO2 07/29/2019 23  21 - 32 mmol/L Final    Anion Gap 07/29/2019 15  3 - 16 Final    Glucose 07/29/2019 85  70 - 99 mg/dL Final    BUN 07/29/2019 29* 7 - 20 mg/dL Final    CREATININE 07/29/2019 1.0  0.8 - 1.3 mg/dL Final    GFR Non- 07/29/2019 >60  >60 Final    GFR  07/29/2019 >60  >60 Final    Calcium 07/29/2019 10.1  8.3 - 10.6 mg/dL Final    Total Protein 07/29/2019 7.1  6.4 - 8.2 g/dL Final    Alb 07/29/2019 4.4  3.4 - 5.0 g/dL Final    Albumin/Globulin Ratio 07/29/2019 1.6  1.1 - 2.2 Final    Total Bilirubin 07/29/2019 0.4  0.0 - 1.0 mg/dL Final    Alkaline Phosphatase 07/29/2019 45  40 - 129 U/L Final    ALT 07/29/2019 17  10 - 40 U/L Final    AST 07/29/2019 22  15 - 37 U/L Final    Globulin 07/29/2019 2.7  g/dL Final    aPTT 07/29/2019 33.8  26.0 - 36.0 sec Final    Protime 07/29/2019 11.7  9.8 - 13.0 sec Final    INR 07/29/2019 1.03  0.86 - 1.14 Final    ABO/Rh 07/29/2019 O POS   Final    Antibody Screen 07/29/2019 NEG   Final   Hospital Outpatient Visit on 06/24/2019   Component Date Value Ref Range Status    Sodium 06/24/2019 139  136 - 145 mmol/L Final    Potassium 06/24/2019 3.9  3.5 - 5.1 mmol/L Final    Chloride 06/24/2019 104  99 - 110 mmol/L Final    CO2 06/24/2019 26  21 - 32 mmol/L Final    Anion Gap 06/24/2019 9  3 - 16 Final    Glucose 06/24/2019 96  70 - 99 mg/dL Final    BUN 06/24/2019 20  7 - 20 mg/dL Final    CREATININE 06/24/2019 1.2  0.8 - 1.3 mg/dL Final    GFR Non- 06/24/2019 >60  >60 Final    GFR  06/24/2019 >60  >60 Final    Calcium 06/24/2019 9.7  8.3 - 10.6 mg/dL Final    Total Protein 06/24/2019 6.8  6.4 - 8.2 g/dL Final    Alb 06/24/2019 4.1  3.4 - 5.0 g/dL Final    Albumin/Globulin Ratio 06/24/2019 1.5  1.1 - 2.2 Final    Total Bilirubin 06/24/2019 0.3  0.0 - 1.0 mg/dL Final    Alkaline Phosphatase 06/24/2019 44  40 - 129 U/L Final    ALT 06/24/2019 14  10 - 40 U/L Final    AST 06/24/2019 22  15 - 37 U/L Final    Globulin 06/24/2019 2.7  g/dL Final    Cholesterol, Fasting 06/24/2019 140  0 - 199 mg/dL Final    Triglyceride, Fasting 06/24/2019 110  0 - 150 mg/dL Final    HDL 06/24/2019 32* 40 - 60 mg/dL Final    LDL Calculated 06/24/2019 86  <100 mg/dL Final    VLDL Cholesterol Calculated 06/24/2019 22  Not Established mg/dL Final    Hemoglobin A1C 06/24/2019 6.6  See comment % Final    eAG 06/24/2019 142.7  mg/dL Final   Orders Only on 05/22/2019   Component Date Value Ref Range Status    Hemoglobin A1C 05/22/2019 8.3  See comment % Final    eAG 05/22/2019 191.5  mg/dL Final   Orders Only on 05/22/2019   Component Date Value Ref Range Status    Glucose 05/22/2019 94  70 - 99 mg/dL Final    Cholesterol, Total 05/22/2019 151  0 - 199 mg/dL Final    Triglycerides 05/22/2019 122  0 - 150 mg/dL Final    HDL 05/22/2019 30* 40 - 60 mg/dL Final    LDL Calculated 05/22/2019 97  <100 mg/dL Final   Admission on 05/10/2019, Discharged on 05/11/2019   Component Date Value Ref Range Status    Sodium 05/10/2019 138  136 - 145 mmol/L Final    Potassium 05/10/2019 3.9  3.5 - 5.1 mmol/L Final    Chloride 05/10/2019 103  99 - 110 mmol/L Final    CO2 05/10/2019 22  21 - 32 mmol/L Final    Anion Gap 05/10/2019 13  3 - 16 Final    Glucose 05/10/2019 98  70 - 99 mg/dL Final    BUN 05/10/2019 24* 7 - 20 mg/dL Final    CREATININE 05/10/2019 1.0  0.8 - 1.3 mg/dL Final    GFR Non- 05/10/2019 >60  >60 Final    GFR  05/10/2019 >60  >60 Final    Calcium 05/10/2019 10.4  8.3 - 10.6 mg/dL Final    Total Protein 05/10/2019 7.1  6.4 - 8.2 g/dL Final    Alb 05/10/2019 4.0  3.4 - 5.0 g/dL Final    Albumin/Globulin Ratio 05/10/2019 1.3  1.1 - 2.2 Final    Total Bilirubin 05/10/2019 0.3  0.0 - 1.0 mg/dL Final    Alkaline Phosphatase 05/10/2019 51  40 - 129 U/L Final    ALT 05/10/2019 14  10 - 40 U/L Final    AST 05/10/2019 15  15 - 37 U/L Final    Globulin 05/10/2019 3.1  g/dL Final    WBC 05/10/2019 11.1* 4.0 - 11.0 K/uL Final    RBC 05/10/2019 4.74  4.20 - 5.90 M/uL Final    Hemoglobin 05/10/2019 14.5  13.5 - 17.5 g/dL Final    Hematocrit 05/10/2019 43.4  40.5 - 52.5 % Final    MCV 05/10/2019 91.5  80.0 - 100.0 fL Final    MCH 05/10/2019 30.6  26.0 - 34.0 pg Final    MCHC 05/10/2019 33.5  31.0 - 36.0 g/dL Final    RDW 05/10/2019 14.8  12.4 - 15.4 % Final    Platelets 47/03/7362 276  135 - 450 K/uL Final    MPV 05/10/2019 9.0  5.0 - 10.5 fL Final    exercise    Instructions given to exercise for the following duration:  30 minutes a day for five-seven days per week.     Following instructions for being active throughout the day in addition to formal exercise:  Walk instead of drive whenever possible  Take the stairs instead of the elevator  Work in the garden  Park to the far end of the parking lot to add more walking steps to destination      Electronically signed by Shirley Garnica MD on 9/24/2019 at 8:32 AM

## 2019-10-02 ENCOUNTER — HOSPITAL ENCOUNTER (OUTPATIENT)
Age: 64
Setting detail: OUTPATIENT SURGERY
Discharge: HOME | End: 2019-10-02
Attending: PHYSICAL MEDICINE & REHABILITATION | Admitting: PHYSICAL MEDICINE & REHABILITATION
Payer: COMMERCIAL

## 2019-10-02 ENCOUNTER — APPOINTMENT (OUTPATIENT)
Dept: GENERAL RADIOLOGY | Age: 64
End: 2019-10-02
Attending: PHYSICAL MEDICINE & REHABILITATION
Payer: COMMERCIAL

## 2019-10-02 VITALS
WEIGHT: 250 LBS | OXYGEN SATURATION: 96 % | DIASTOLIC BLOOD PRESSURE: 74 MMHG | BODY MASS INDEX: 32.08 KG/M2 | HEIGHT: 74 IN | RESPIRATION RATE: 16 BRPM | SYSTOLIC BLOOD PRESSURE: 107 MMHG | TEMPERATURE: 96.6 F | HEART RATE: 77 BPM

## 2019-10-02 LAB
GLUCOSE BLD-MCNC: 70 MG/DL (ref 70–99)
PERFORMED ON: NORMAL

## 2019-10-02 PROCEDURE — 3610000054 HC PAIN LEVEL 3 BASE (NON-OR): Performed by: PHYSICAL MEDICINE & REHABILITATION

## 2019-10-02 PROCEDURE — 77003 FLUOROGUIDE FOR SPINE INJECT: CPT

## 2019-10-02 PROCEDURE — 2580000003 HC RX 258: Performed by: PHYSICAL MEDICINE & REHABILITATION

## 2019-10-02 PROCEDURE — 99152 MOD SED SAME PHYS/QHP 5/>YRS: CPT | Performed by: PHYSICAL MEDICINE & REHABILITATION

## 2019-10-02 PROCEDURE — 2709999900 HC NON-CHARGEABLE SUPPLY: Performed by: PHYSICAL MEDICINE & REHABILITATION

## 2019-10-02 PROCEDURE — 3610000055 HC PAIN LEVEL 3 ADDL 15 MIN (NON-OR): Performed by: PHYSICAL MEDICINE & REHABILITATION

## 2019-10-02 PROCEDURE — 6360000002 HC RX W HCPCS: Performed by: PHYSICAL MEDICINE & REHABILITATION

## 2019-10-02 PROCEDURE — 2500000003 HC RX 250 WO HCPCS: Performed by: PHYSICAL MEDICINE & REHABILITATION

## 2019-10-02 RX ORDER — 0.9 % SODIUM CHLORIDE 0.9 %
VIAL (ML) INJECTION
Status: COMPLETED | OUTPATIENT
Start: 2019-10-02 | End: 2019-10-02

## 2019-10-02 RX ORDER — FENTANYL CITRATE 50 UG/ML
INJECTION, SOLUTION INTRAMUSCULAR; INTRAVENOUS
Status: COMPLETED | OUTPATIENT
Start: 2019-10-02 | End: 2019-10-02

## 2019-10-02 RX ORDER — DEXAMETHASONE SODIUM PHOSPHATE 10 MG/ML
INJECTION, SOLUTION INTRAMUSCULAR; INTRAVENOUS
Status: COMPLETED | OUTPATIENT
Start: 2019-10-02 | End: 2019-10-02

## 2019-10-02 RX ORDER — LIDOCAINE HYDROCHLORIDE 10 MG/ML
INJECTION, SOLUTION EPIDURAL; INFILTRATION; INTRACAUDAL; PERINEURAL
Status: COMPLETED | OUTPATIENT
Start: 2019-10-02 | End: 2019-10-02

## 2019-10-02 RX ORDER — MIDAZOLAM HYDROCHLORIDE 1 MG/ML
INJECTION INTRAMUSCULAR; INTRAVENOUS
Status: COMPLETED | OUTPATIENT
Start: 2019-10-02 | End: 2019-10-02

## 2019-10-02 ASSESSMENT — PAIN SCALES - GENERAL: PAINLEVEL_OUTOF10: 5

## 2019-10-02 ASSESSMENT — PAIN - FUNCTIONAL ASSESSMENT: PAIN_FUNCTIONAL_ASSESSMENT: 0-10

## 2019-10-03 ENCOUNTER — HOSPITAL ENCOUNTER (OUTPATIENT)
Dept: MRI IMAGING | Age: 64
Discharge: HOME | End: 2019-10-03
Payer: COMMERCIAL

## 2019-10-03 DIAGNOSIS — M25.462 KNEE EFFUSION, LEFT: ICD-10-CM

## 2019-10-03 PROCEDURE — 73721 MRI JNT OF LWR EXTRE W/O DYE: CPT

## 2019-10-09 ENCOUNTER — OFFICE VISIT (OUTPATIENT)
Dept: ORTHOPEDIC SURGERY | Age: 64
End: 2019-10-09
Payer: COMMERCIAL

## 2019-10-09 VITALS — BODY MASS INDEX: 32.08 KG/M2 | WEIGHT: 250 LBS | HEIGHT: 74 IN

## 2019-10-09 DIAGNOSIS — S83.232D COMPLEX TEAR OF MEDIAL MENISCUS OF LEFT KNEE AS CURRENT INJURY, SUBSEQUENT ENCOUNTER: Primary | ICD-10-CM

## 2019-10-09 PROCEDURE — 99213 OFFICE O/P EST LOW 20 MIN: CPT | Performed by: ORTHOPAEDIC SURGERY

## 2019-10-09 PROCEDURE — 4004F PT TOBACCO SCREEN RCVD TLK: CPT | Performed by: ORTHOPAEDIC SURGERY

## 2019-10-09 PROCEDURE — 1123F ACP DISCUSS/DSCN MKR DOCD: CPT | Performed by: ORTHOPAEDIC SURGERY

## 2019-10-09 PROCEDURE — G8427 DOCREV CUR MEDS BY ELIG CLIN: HCPCS | Performed by: ORTHOPAEDIC SURGERY

## 2019-10-09 PROCEDURE — 4040F PNEUMOC VAC/ADMIN/RCVD: CPT | Performed by: ORTHOPAEDIC SURGERY

## 2019-10-09 PROCEDURE — G8484 FLU IMMUNIZE NO ADMIN: HCPCS | Performed by: ORTHOPAEDIC SURGERY

## 2019-10-09 PROCEDURE — G8417 CALC BMI ABV UP PARAM F/U: HCPCS | Performed by: ORTHOPAEDIC SURGERY

## 2019-10-09 PROCEDURE — 3017F COLORECTAL CA SCREEN DOC REV: CPT | Performed by: ORTHOPAEDIC SURGERY

## 2019-10-15 ENCOUNTER — TELEPHONE (OUTPATIENT)
Dept: ORTHOPEDIC SURGERY | Age: 64
End: 2019-10-15

## 2019-10-22 RX ORDER — FENOFIBRATE 145 MG/1
160 TABLET, COATED ORAL NIGHTLY
COMMUNITY
End: 2019-12-06 | Stop reason: ALTCHOICE

## 2019-10-24 ENCOUNTER — HOSPITAL ENCOUNTER (OUTPATIENT)
Age: 64
Setting detail: OUTPATIENT SURGERY
Discharge: HOME | End: 2019-10-24
Attending: ORTHOPAEDIC SURGERY | Admitting: ORTHOPAEDIC SURGERY
Payer: COMMERCIAL

## 2019-10-24 ENCOUNTER — ANESTHESIA EVENT (OUTPATIENT)
Dept: OPERATING ROOM | Age: 64
End: 2019-10-24
Payer: COMMERCIAL

## 2019-10-24 ENCOUNTER — ANESTHESIA (OUTPATIENT)
Dept: OPERATING ROOM | Age: 64
End: 2019-10-24
Payer: COMMERCIAL

## 2019-10-24 VITALS
OXYGEN SATURATION: 95 % | WEIGHT: 245 LBS | SYSTOLIC BLOOD PRESSURE: 104 MMHG | HEART RATE: 77 BPM | RESPIRATION RATE: 13 BRPM | BODY MASS INDEX: 31.44 KG/M2 | TEMPERATURE: 97 F | DIASTOLIC BLOOD PRESSURE: 72 MMHG | HEIGHT: 74 IN

## 2019-10-24 VITALS
OXYGEN SATURATION: 97 % | DIASTOLIC BLOOD PRESSURE: 58 MMHG | TEMPERATURE: 96.3 F | RESPIRATION RATE: 13 BRPM | SYSTOLIC BLOOD PRESSURE: 104 MMHG

## 2019-10-24 DIAGNOSIS — S83.232D COMPLEX TEAR OF MEDIAL MENISCUS OF LEFT KNEE AS CURRENT INJURY, SUBSEQUENT ENCOUNTER: Primary | ICD-10-CM

## 2019-10-24 LAB
ANION GAP SERPL CALCULATED.3IONS-SCNC: 10 MMOL/L (ref 3–16)
BUN BLDV-MCNC: 25 MG/DL (ref 7–20)
CALCIUM SERPL-MCNC: 9.7 MG/DL (ref 8.3–10.6)
CHLORIDE BLD-SCNC: 107 MMOL/L (ref 99–110)
CO2: 25 MMOL/L (ref 21–32)
CREAT SERPL-MCNC: 1 MG/DL (ref 0.8–1.3)
GFR AFRICAN AMERICAN: >60
GFR NON-AFRICAN AMERICAN: >60
GLUCOSE BLD-MCNC: 87 MG/DL (ref 70–99)
GLUCOSE BLD-MCNC: 88 MG/DL (ref 70–99)
GLUCOSE BLD-MCNC: 95 MG/DL (ref 70–99)
HCT VFR BLD CALC: 46.1 % (ref 40.5–52.5)
HEMOGLOBIN: 15.4 G/DL (ref 13.5–17.5)
MCH RBC QN AUTO: 29.7 PG (ref 26–34)
MCHC RBC AUTO-ENTMCNC: 33.3 G/DL (ref 31–36)
MCV RBC AUTO: 88.9 FL (ref 80–100)
PDW BLD-RTO: 15.5 % (ref 12.4–15.4)
PERFORMED ON: NORMAL
PERFORMED ON: NORMAL
PLATELET # BLD: 256 K/UL (ref 135–450)
PMV BLD AUTO: 8.5 FL (ref 5–10.5)
POTASSIUM SERPL-SCNC: 3.7 MMOL/L (ref 3.5–5.1)
RBC # BLD: 5.19 M/UL (ref 4.2–5.9)
SODIUM BLD-SCNC: 142 MMOL/L (ref 136–145)
WBC # BLD: 9.4 K/UL (ref 4–11)

## 2019-10-24 PROCEDURE — 7100000000 HC PACU RECOVERY - FIRST 15 MIN: Performed by: ORTHOPAEDIC SURGERY

## 2019-10-24 PROCEDURE — 2720000010 HC SURG SUPPLY STERILE: Performed by: ORTHOPAEDIC SURGERY

## 2019-10-24 PROCEDURE — 80048 BASIC METABOLIC PNL TOTAL CA: CPT

## 2019-10-24 PROCEDURE — 7100000001 HC PACU RECOVERY - ADDTL 15 MIN: Performed by: ORTHOPAEDIC SURGERY

## 2019-10-24 PROCEDURE — 7100000011 HC PHASE II RECOVERY - ADDTL 15 MIN: Performed by: ORTHOPAEDIC SURGERY

## 2019-10-24 PROCEDURE — 3600000014 HC SURGERY LEVEL 4 ADDTL 15MIN: Performed by: ORTHOPAEDIC SURGERY

## 2019-10-24 PROCEDURE — 2709999900 HC NON-CHARGEABLE SUPPLY: Performed by: ORTHOPAEDIC SURGERY

## 2019-10-24 PROCEDURE — 3700000000 HC ANESTHESIA ATTENDED CARE: Performed by: ORTHOPAEDIC SURGERY

## 2019-10-24 PROCEDURE — 6360000002 HC RX W HCPCS: Performed by: ORTHOPAEDIC SURGERY

## 2019-10-24 PROCEDURE — 6360000002 HC RX W HCPCS: Performed by: ANESTHESIOLOGY

## 2019-10-24 PROCEDURE — 3700000001 HC ADD 15 MINUTES (ANESTHESIA): Performed by: ORTHOPAEDIC SURGERY

## 2019-10-24 PROCEDURE — 7100000010 HC PHASE II RECOVERY - FIRST 15 MIN: Performed by: ORTHOPAEDIC SURGERY

## 2019-10-24 PROCEDURE — 6360000002 HC RX W HCPCS: Performed by: NURSE ANESTHETIST, CERTIFIED REGISTERED

## 2019-10-24 PROCEDURE — 3600000004 HC SURGERY LEVEL 4 BASE: Performed by: ORTHOPAEDIC SURGERY

## 2019-10-24 PROCEDURE — 2500000003 HC RX 250 WO HCPCS: Performed by: NURSE ANESTHETIST, CERTIFIED REGISTERED

## 2019-10-24 PROCEDURE — 2500000003 HC RX 250 WO HCPCS: Performed by: ORTHOPAEDIC SURGERY

## 2019-10-24 PROCEDURE — 85027 COMPLETE CBC AUTOMATED: CPT

## 2019-10-24 PROCEDURE — 2580000003 HC RX 258: Performed by: ORTHOPAEDIC SURGERY

## 2019-10-24 RX ORDER — SODIUM CHLORIDE, SODIUM LACTATE, POTASSIUM CHLORIDE, CALCIUM CHLORIDE 600; 310; 30; 20 MG/100ML; MG/100ML; MG/100ML; MG/100ML
INJECTION, SOLUTION INTRAVENOUS CONTINUOUS
Status: DISCONTINUED | OUTPATIENT
Start: 2019-10-24 | End: 2019-10-24 | Stop reason: HOSPADM

## 2019-10-24 RX ORDER — LIDOCAINE HYDROCHLORIDE 10 MG/ML
0.5 INJECTION, SOLUTION EPIDURAL; INFILTRATION; INTRACAUDAL; PERINEURAL ONCE
Status: DISCONTINUED | OUTPATIENT
Start: 2019-10-24 | End: 2019-10-24 | Stop reason: HOSPADM

## 2019-10-24 RX ORDER — MEPERIDINE HYDROCHLORIDE 25 MG/ML
12.5 INJECTION INTRAMUSCULAR; INTRAVENOUS; SUBCUTANEOUS EVERY 5 MIN PRN
Status: DISCONTINUED | OUTPATIENT
Start: 2019-10-24 | End: 2019-10-24 | Stop reason: HOSPADM

## 2019-10-24 RX ORDER — PROPOFOL 10 MG/ML
INJECTION, EMULSION INTRAVENOUS PRN
Status: DISCONTINUED | OUTPATIENT
Start: 2019-10-24 | End: 2019-10-24 | Stop reason: SDUPTHER

## 2019-10-24 RX ORDER — SODIUM CHLORIDE 0.9 % (FLUSH) 0.9 %
10 SYRINGE (ML) INJECTION PRN
Status: DISCONTINUED | OUTPATIENT
Start: 2019-10-24 | End: 2019-10-24 | Stop reason: HOSPADM

## 2019-10-24 RX ORDER — ONDANSETRON 2 MG/ML
4 INJECTION INTRAMUSCULAR; INTRAVENOUS
Status: DISCONTINUED | OUTPATIENT
Start: 2019-10-24 | End: 2019-10-24 | Stop reason: HOSPADM

## 2019-10-24 RX ORDER — LIDOCAINE HYDROCHLORIDE 10 MG/ML
1 INJECTION, SOLUTION EPIDURAL; INFILTRATION; INTRACAUDAL; PERINEURAL
Status: DISCONTINUED | OUTPATIENT
Start: 2019-10-24 | End: 2019-10-24 | Stop reason: HOSPADM

## 2019-10-24 RX ORDER — GLYCOPYRROLATE 1 MG/5 ML
SYRINGE (ML) INTRAVENOUS PRN
Status: DISCONTINUED | OUTPATIENT
Start: 2019-10-24 | End: 2019-10-24 | Stop reason: SDUPTHER

## 2019-10-24 RX ORDER — CEFAZOLIN SODIUM 2 G/100ML
2 INJECTION, SOLUTION INTRAVENOUS
Status: COMPLETED | OUTPATIENT
Start: 2019-10-24 | End: 2019-10-24

## 2019-10-24 RX ORDER — PHENYLEPHRINE HCL IN 0.9% NACL 1 MG/10 ML
SYRINGE (ML) INTRAVENOUS PRN
Status: DISCONTINUED | OUTPATIENT
Start: 2019-10-24 | End: 2019-10-24 | Stop reason: SDUPTHER

## 2019-10-24 RX ORDER — DEXAMETHASONE SODIUM PHOSPHATE 4 MG/ML
INJECTION, SOLUTION INTRA-ARTICULAR; INTRALESIONAL; INTRAMUSCULAR; INTRAVENOUS; SOFT TISSUE PRN
Status: DISCONTINUED | OUTPATIENT
Start: 2019-10-24 | End: 2019-10-24 | Stop reason: SDUPTHER

## 2019-10-24 RX ORDER — OXYCODONE HYDROCHLORIDE 5 MG/1
5 TABLET ORAL
Status: DISCONTINUED | OUTPATIENT
Start: 2019-10-24 | End: 2019-10-24 | Stop reason: HOSPADM

## 2019-10-24 RX ORDER — ACETAMINOPHEN 500 MG
1000 TABLET ORAL EVERY 6 HOURS PRN
Qty: 60 TABLET | Refills: 3 | Status: SHIPPED | OUTPATIENT
Start: 2019-10-24 | End: 2019-12-06 | Stop reason: ALTCHOICE

## 2019-10-24 RX ORDER — SODIUM CHLORIDE 0.9 % (FLUSH) 0.9 %
10 SYRINGE (ML) INJECTION EVERY 12 HOURS SCHEDULED
Status: DISCONTINUED | OUTPATIENT
Start: 2019-10-24 | End: 2019-10-24 | Stop reason: HOSPADM

## 2019-10-24 RX ORDER — EPHEDRINE SULFATE/0.9% NACL/PF 50 MG/5 ML
SYRINGE (ML) INTRAVENOUS PRN
Status: DISCONTINUED | OUTPATIENT
Start: 2019-10-24 | End: 2019-10-24 | Stop reason: SDUPTHER

## 2019-10-24 RX ORDER — HYDRALAZINE HYDROCHLORIDE 20 MG/ML
5 INJECTION INTRAMUSCULAR; INTRAVENOUS EVERY 10 MIN PRN
Status: DISCONTINUED | OUTPATIENT
Start: 2019-10-24 | End: 2019-10-24 | Stop reason: HOSPADM

## 2019-10-24 RX ORDER — ONDANSETRON 2 MG/ML
INJECTION INTRAMUSCULAR; INTRAVENOUS PRN
Status: DISCONTINUED | OUTPATIENT
Start: 2019-10-24 | End: 2019-10-24 | Stop reason: SDUPTHER

## 2019-10-24 RX ORDER — FENTANYL CITRATE 50 UG/ML
INJECTION, SOLUTION INTRAMUSCULAR; INTRAVENOUS PRN
Status: DISCONTINUED | OUTPATIENT
Start: 2019-10-24 | End: 2019-10-24 | Stop reason: SDUPTHER

## 2019-10-24 RX ORDER — HYDROMORPHONE HCL 110MG/55ML
0.5 PATIENT CONTROLLED ANALGESIA SYRINGE INTRAVENOUS EVERY 5 MIN PRN
Status: DISCONTINUED | OUTPATIENT
Start: 2019-10-24 | End: 2019-10-24 | Stop reason: HOSPADM

## 2019-10-24 RX ORDER — IBUPROFEN 800 MG/1
800 TABLET ORAL EVERY 6 HOURS PRN
Qty: 60 TABLET | Refills: 3 | Status: ON HOLD | OUTPATIENT
Start: 2019-10-24 | End: 2019-12-17 | Stop reason: HOSPADM

## 2019-10-24 RX ORDER — LABETALOL HYDROCHLORIDE 5 MG/ML
5 INJECTION, SOLUTION INTRAVENOUS EVERY 10 MIN PRN
Status: DISCONTINUED | OUTPATIENT
Start: 2019-10-24 | End: 2019-10-24 | Stop reason: HOSPADM

## 2019-10-24 RX ORDER — SODIUM CHLORIDE 9 MG/ML
INJECTION, SOLUTION INTRAVENOUS CONTINUOUS
Status: DISCONTINUED | OUTPATIENT
Start: 2019-10-24 | End: 2019-10-24 | Stop reason: HOSPADM

## 2019-10-24 RX ORDER — OXYCODONE HYDROCHLORIDE AND ACETAMINOPHEN 5; 325 MG/1; MG/1
1 TABLET ORAL
Status: DISCONTINUED | OUTPATIENT
Start: 2019-10-24 | End: 2019-10-24 | Stop reason: HOSPADM

## 2019-10-24 RX ORDER — OXYCODONE HYDROCHLORIDE 5 MG/1
5 TABLET ORAL EVERY 4 HOURS PRN
Qty: 10 TABLET | Refills: 0 | Status: SHIPPED | OUTPATIENT
Start: 2019-10-24 | End: 2019-10-31

## 2019-10-24 RX ADMIN — HYDROMORPHONE HYDROCHLORIDE 0.5 MG: 2 INJECTION INTRAMUSCULAR; INTRAVENOUS; SUBCUTANEOUS at 08:19

## 2019-10-24 RX ADMIN — Medication 10 MG: at 07:36

## 2019-10-24 RX ADMIN — Medication 0.2 MG: at 07:22

## 2019-10-24 RX ADMIN — CEFAZOLIN SODIUM 2 G: 2 INJECTION, SOLUTION INTRAVENOUS at 06:55

## 2019-10-24 RX ADMIN — PROPOFOL 50 MG: 10 INJECTION, EMULSION INTRAVENOUS at 07:03

## 2019-10-24 RX ADMIN — Medication 100 MCG: at 07:22

## 2019-10-24 RX ADMIN — DEXAMETHASONE SODIUM PHOSPHATE 10 MG: 4 INJECTION, SOLUTION INTRAMUSCULAR; INTRAVENOUS at 07:14

## 2019-10-24 RX ADMIN — FENTANYL CITRATE 50 MCG: 50 INJECTION, SOLUTION INTRAMUSCULAR; INTRAVENOUS at 07:02

## 2019-10-24 RX ADMIN — Medication 100 MCG: at 07:25

## 2019-10-24 RX ADMIN — FENTANYL CITRATE 50 MCG: 50 INJECTION, SOLUTION INTRAMUSCULAR; INTRAVENOUS at 07:13

## 2019-10-24 RX ADMIN — ONDANSETRON 4 MG: 2 INJECTION INTRAMUSCULAR; INTRAVENOUS at 07:19

## 2019-10-24 RX ADMIN — PROPOFOL 200 MG: 10 INJECTION, EMULSION INTRAVENOUS at 07:02

## 2019-10-24 RX ADMIN — Medication 50 MCG: at 07:09

## 2019-10-24 RX ADMIN — SODIUM CHLORIDE: 9 INJECTION, SOLUTION INTRAVENOUS at 06:21

## 2019-10-24 RX ADMIN — Medication 10 MG: at 07:29

## 2019-10-24 RX ADMIN — HYDROMORPHONE HYDROCHLORIDE 0.5 MG: 2 INJECTION INTRAMUSCULAR; INTRAVENOUS; SUBCUTANEOUS at 08:31

## 2019-10-24 ASSESSMENT — PULMONARY FUNCTION TESTS
PIF_VALUE: 4
PIF_VALUE: 5
PIF_VALUE: 2
PIF_VALUE: 4
PIF_VALUE: 4
PIF_VALUE: 5
PIF_VALUE: 3
PIF_VALUE: 3
PIF_VALUE: 4
PIF_VALUE: 5
PIF_VALUE: 3
PIF_VALUE: 3
PIF_VALUE: 5
PIF_VALUE: 4
PIF_VALUE: 3
PIF_VALUE: 1
PIF_VALUE: 2
PIF_VALUE: 4
PIF_VALUE: 3
PIF_VALUE: 3
PIF_VALUE: 2
PIF_VALUE: 3
PIF_VALUE: 4
PIF_VALUE: 5
PIF_VALUE: 3
PIF_VALUE: 4
PIF_VALUE: 5
PIF_VALUE: 3
PIF_VALUE: 1
PIF_VALUE: 5
PIF_VALUE: 5
PIF_VALUE: 3
PIF_VALUE: 4
PIF_VALUE: 16
PIF_VALUE: 3
PIF_VALUE: 4
PIF_VALUE: 4
PIF_VALUE: 3
PIF_VALUE: 3
PIF_VALUE: 4
PIF_VALUE: 2
PIF_VALUE: 3
PIF_VALUE: 4
PIF_VALUE: 5
PIF_VALUE: 1

## 2019-10-24 ASSESSMENT — PAIN - FUNCTIONAL ASSESSMENT
PAIN_FUNCTIONAL_ASSESSMENT: 0-10
PAIN_FUNCTIONAL_ASSESSMENT: 0-10

## 2019-10-24 ASSESSMENT — PAIN DESCRIPTION - LOCATION
LOCATION: KNEE
LOCATION: KNEE

## 2019-10-24 ASSESSMENT — PAIN DESCRIPTION - PAIN TYPE
TYPE: SURGICAL PAIN
TYPE: SURGICAL PAIN

## 2019-10-24 ASSESSMENT — PAIN DESCRIPTION - ORIENTATION
ORIENTATION: LEFT
ORIENTATION: LEFT

## 2019-10-24 ASSESSMENT — PAIN SCALES - GENERAL
PAINLEVEL_OUTOF10: 7
PAINLEVEL_OUTOF10: 7

## 2019-10-24 ASSESSMENT — LIFESTYLE VARIABLES: SMOKING_STATUS: 1

## 2019-10-25 DIAGNOSIS — M67.52 SYNOVIAL PLICA SYNDROME OF LEFT KNEE: ICD-10-CM

## 2019-10-25 DIAGNOSIS — S83.232D COMPLEX TEAR OF MEDIAL MENISCUS OF LEFT KNEE AS CURRENT INJURY, SUBSEQUENT ENCOUNTER: Primary | ICD-10-CM

## 2019-10-25 DIAGNOSIS — M22.42 CHONDROMALACIA OF PATELLOFEMORAL JOINT, LEFT: ICD-10-CM

## 2019-10-30 ENCOUNTER — OFFICE VISIT (OUTPATIENT)
Dept: ORTHOPEDIC SURGERY | Age: 64
End: 2019-10-30

## 2019-10-30 ENCOUNTER — HOSPITAL ENCOUNTER (OUTPATIENT)
Age: 64
Setting detail: OUTPATIENT SURGERY
Discharge: HOME | End: 2019-10-30
Attending: PHYSICAL MEDICINE & REHABILITATION | Admitting: PHYSICAL MEDICINE & REHABILITATION
Payer: COMMERCIAL

## 2019-10-30 ENCOUNTER — APPOINTMENT (OUTPATIENT)
Dept: GENERAL RADIOLOGY | Age: 64
End: 2019-10-30
Attending: PHYSICAL MEDICINE & REHABILITATION
Payer: COMMERCIAL

## 2019-10-30 VITALS
HEIGHT: 74 IN | OXYGEN SATURATION: 96 % | SYSTOLIC BLOOD PRESSURE: 94 MMHG | TEMPERATURE: 97.7 F | HEART RATE: 79 BPM | BODY MASS INDEX: 31.44 KG/M2 | WEIGHT: 245 LBS | RESPIRATION RATE: 11 BRPM | DIASTOLIC BLOOD PRESSURE: 65 MMHG

## 2019-10-30 DIAGNOSIS — S83.232D COMPLEX TEAR OF MEDIAL MENISCUS OF LEFT KNEE AS CURRENT INJURY, SUBSEQUENT ENCOUNTER: Primary | ICD-10-CM

## 2019-10-30 LAB
GLUCOSE BLD-MCNC: 72 MG/DL (ref 70–99)
PERFORMED ON: NORMAL

## 2019-10-30 PROCEDURE — 77003 FLUOROGUIDE FOR SPINE INJECT: CPT

## 2019-10-30 PROCEDURE — 3610000054 HC PAIN LEVEL 3 BASE (NON-OR): Performed by: PHYSICAL MEDICINE & REHABILITATION

## 2019-10-30 PROCEDURE — 6360000002 HC RX W HCPCS: Performed by: PHYSICAL MEDICINE & REHABILITATION

## 2019-10-30 PROCEDURE — 99024 POSTOP FOLLOW-UP VISIT: CPT | Performed by: ORTHOPAEDIC SURGERY

## 2019-10-30 PROCEDURE — 99152 MOD SED SAME PHYS/QHP 5/>YRS: CPT | Performed by: PHYSICAL MEDICINE & REHABILITATION

## 2019-10-30 PROCEDURE — 2500000003 HC RX 250 WO HCPCS: Performed by: PHYSICAL MEDICINE & REHABILITATION

## 2019-10-30 PROCEDURE — 2709999900 HC NON-CHARGEABLE SUPPLY: Performed by: PHYSICAL MEDICINE & REHABILITATION

## 2019-10-30 RX ORDER — DEXAMETHASONE SODIUM PHOSPHATE 10 MG/ML
INJECTION, SOLUTION INTRAMUSCULAR; INTRAVENOUS
Status: COMPLETED | OUTPATIENT
Start: 2019-10-30 | End: 2019-10-30

## 2019-10-30 RX ORDER — FENTANYL CITRATE 50 UG/ML
INJECTION, SOLUTION INTRAMUSCULAR; INTRAVENOUS
Status: COMPLETED | OUTPATIENT
Start: 2019-10-30 | End: 2019-10-30

## 2019-10-30 RX ORDER — MIDAZOLAM HYDROCHLORIDE 1 MG/ML
INJECTION INTRAMUSCULAR; INTRAVENOUS
Status: COMPLETED | OUTPATIENT
Start: 2019-10-30 | End: 2019-10-30

## 2019-10-30 RX ORDER — LIDOCAINE HYDROCHLORIDE 10 MG/ML
INJECTION, SOLUTION EPIDURAL; INFILTRATION; INTRACAUDAL; PERINEURAL
Status: COMPLETED | OUTPATIENT
Start: 2019-10-30 | End: 2019-10-30

## 2019-10-30 ASSESSMENT — PAIN DESCRIPTION - DESCRIPTORS: DESCRIPTORS: ACHING

## 2019-10-30 ASSESSMENT — PAIN - FUNCTIONAL ASSESSMENT: PAIN_FUNCTIONAL_ASSESSMENT: 0-10

## 2019-10-30 ASSESSMENT — PAIN SCALES - GENERAL: PAINLEVEL_OUTOF10: 5

## 2019-11-01 ENCOUNTER — HOSPITAL ENCOUNTER (OUTPATIENT)
Dept: PHYSICAL THERAPY | Age: 64
Setting detail: THERAPIES SERIES
Discharge: HOME | End: 2019-11-01
Payer: COMMERCIAL

## 2019-11-01 DIAGNOSIS — S83.232D COMPLEX TEAR OF MEDIAL MENISCUS OF LEFT KNEE AS CURRENT INJURY, SUBSEQUENT ENCOUNTER: Primary | ICD-10-CM

## 2019-11-01 DIAGNOSIS — M22.42 CHONDROMALACIA OF PATELLOFEMORAL JOINT, LEFT: ICD-10-CM

## 2019-11-01 DIAGNOSIS — M67.52 SYNOVIAL PLICA SYNDROME OF LEFT KNEE: ICD-10-CM

## 2019-11-01 PROCEDURE — 97140 MANUAL THERAPY 1/> REGIONS: CPT

## 2019-11-01 PROCEDURE — 97110 THERAPEUTIC EXERCISES: CPT

## 2019-11-01 PROCEDURE — 97161 PT EVAL LOW COMPLEX 20 MIN: CPT

## 2019-11-02 DIAGNOSIS — E11.65 UNCONTROLLED TYPE 2 DIABETES MELLITUS WITH HYPERGLYCEMIA, WITH LONG-TERM CURRENT USE OF INSULIN (HCC): ICD-10-CM

## 2019-11-02 DIAGNOSIS — Z79.4 UNCONTROLLED TYPE 2 DIABETES MELLITUS WITH HYPERGLYCEMIA, WITH LONG-TERM CURRENT USE OF INSULIN (HCC): ICD-10-CM

## 2019-11-04 RX ORDER — EMPAGLIFLOZIN 25 MG/1
TABLET, FILM COATED ORAL
Qty: 90 TABLET | Refills: 0 | Status: SHIPPED | OUTPATIENT
Start: 2019-11-04 | End: 2020-02-04 | Stop reason: SDUPTHER

## 2019-11-05 ENCOUNTER — HOSPITAL ENCOUNTER (OUTPATIENT)
Dept: PHYSICAL THERAPY | Age: 64
Setting detail: THERAPIES SERIES
Discharge: HOME | End: 2019-11-05
Payer: COMMERCIAL

## 2019-11-05 PROCEDURE — 97110 THERAPEUTIC EXERCISES: CPT

## 2019-11-05 PROCEDURE — 97140 MANUAL THERAPY 1/> REGIONS: CPT

## 2019-11-07 ENCOUNTER — HOSPITAL ENCOUNTER (OUTPATIENT)
Dept: PHYSICAL THERAPY | Age: 64
Setting detail: THERAPIES SERIES
Discharge: HOME | End: 2019-11-07
Payer: COMMERCIAL

## 2019-11-07 PROCEDURE — 97110 THERAPEUTIC EXERCISES: CPT

## 2019-11-07 PROCEDURE — 97140 MANUAL THERAPY 1/> REGIONS: CPT

## 2019-11-07 PROCEDURE — 97530 THERAPEUTIC ACTIVITIES: CPT

## 2019-11-12 ENCOUNTER — HOSPITAL ENCOUNTER (OUTPATIENT)
Dept: PHYSICAL THERAPY | Age: 64
Setting detail: THERAPIES SERIES
Discharge: HOME | End: 2019-11-12
Payer: COMMERCIAL

## 2019-11-12 PROCEDURE — 97110 THERAPEUTIC EXERCISES: CPT

## 2019-11-12 PROCEDURE — 97112 NEUROMUSCULAR REEDUCATION: CPT

## 2019-11-12 PROCEDURE — 97140 MANUAL THERAPY 1/> REGIONS: CPT

## 2019-11-13 ENCOUNTER — HOSPITAL ENCOUNTER (OUTPATIENT)
Dept: GENERAL RADIOLOGY | Age: 64
Discharge: HOME | End: 2019-11-13
Payer: COMMERCIAL

## 2019-11-13 ENCOUNTER — HOSPITAL ENCOUNTER (OUTPATIENT)
Dept: CT IMAGING | Age: 64
Discharge: HOME | End: 2019-11-13
Payer: COMMERCIAL

## 2019-11-13 DIAGNOSIS — Z80.42 FAMILY HISTORY OF MALIGNANT NEOPLASM OF PROSTATE: ICD-10-CM

## 2019-11-13 DIAGNOSIS — C64.2 MALIGNANT NEOPLASM OF LEFT KIDNEY, EXCEPT RENAL PELVIS (HCC): ICD-10-CM

## 2019-11-13 PROCEDURE — 71046 X-RAY EXAM CHEST 2 VIEWS: CPT

## 2019-11-13 PROCEDURE — 74178 CT ABD&PLV WO CNTR FLWD CNTR: CPT

## 2019-11-13 PROCEDURE — 6360000004 HC RX CONTRAST MEDICATION: Performed by: UROLOGY

## 2019-11-13 RX ADMIN — IOHEXOL 50 ML: 240 INJECTION, SOLUTION INTRATHECAL; INTRAVASCULAR; INTRAVENOUS; ORAL at 07:39

## 2019-11-13 RX ADMIN — IOPAMIDOL 75 ML: 755 INJECTION, SOLUTION INTRAVENOUS at 07:40

## 2019-11-14 ENCOUNTER — HOSPITAL ENCOUNTER (OUTPATIENT)
Dept: PHYSICAL THERAPY | Age: 64
Setting detail: THERAPIES SERIES
Discharge: HOME | End: 2019-11-14
Payer: COMMERCIAL

## 2019-11-14 PROCEDURE — 97140 MANUAL THERAPY 1/> REGIONS: CPT

## 2019-11-14 PROCEDURE — 97112 NEUROMUSCULAR REEDUCATION: CPT

## 2019-11-14 PROCEDURE — 97110 THERAPEUTIC EXERCISES: CPT

## 2019-11-19 ENCOUNTER — HOSPITAL ENCOUNTER (OUTPATIENT)
Dept: PHYSICAL THERAPY | Age: 64
Setting detail: THERAPIES SERIES
Discharge: HOME | End: 2019-11-19
Payer: COMMERCIAL

## 2019-11-19 ENCOUNTER — TELEPHONE (OUTPATIENT)
Dept: ENDOCRINOLOGY | Age: 64
End: 2019-11-19

## 2019-11-19 PROCEDURE — 97110 THERAPEUTIC EXERCISES: CPT

## 2019-11-19 PROCEDURE — 97530 THERAPEUTIC ACTIVITIES: CPT

## 2019-11-21 ENCOUNTER — HOSPITAL ENCOUNTER (OUTPATIENT)
Dept: PHYSICAL THERAPY | Age: 64
Setting detail: THERAPIES SERIES
Discharge: HOME | End: 2019-11-21
Payer: COMMERCIAL

## 2019-11-21 PROCEDURE — 97110 THERAPEUTIC EXERCISES: CPT

## 2019-11-21 PROCEDURE — 97530 THERAPEUTIC ACTIVITIES: CPT

## 2019-11-21 PROCEDURE — 97140 MANUAL THERAPY 1/> REGIONS: CPT

## 2019-11-26 DIAGNOSIS — Z79.4 UNCONTROLLED TYPE 2 DIABETES MELLITUS WITH HYPERGLYCEMIA, WITH LONG-TERM CURRENT USE OF INSULIN (HCC): ICD-10-CM

## 2019-11-26 DIAGNOSIS — E11.65 UNCONTROLLED TYPE 2 DIABETES MELLITUS WITH HYPERGLYCEMIA, WITH LONG-TERM CURRENT USE OF INSULIN (HCC): ICD-10-CM

## 2019-11-27 ENCOUNTER — OFFICE VISIT (OUTPATIENT)
Dept: ORTHOPEDIC SURGERY | Age: 64
End: 2019-11-27

## 2019-11-27 ENCOUNTER — HOSPITAL ENCOUNTER (OUTPATIENT)
Dept: PHYSICAL THERAPY | Age: 64
Setting detail: THERAPIES SERIES
Discharge: HOME | End: 2019-11-27
Payer: COMMERCIAL

## 2019-11-27 VITALS — WEIGHT: 244.93 LBS | BODY MASS INDEX: 31.43 KG/M2 | RESPIRATION RATE: 18 BRPM | HEIGHT: 74 IN

## 2019-11-27 DIAGNOSIS — S83.232D COMPLEX TEAR OF MEDIAL MENISCUS OF LEFT KNEE AS CURRENT INJURY, SUBSEQUENT ENCOUNTER: Primary | ICD-10-CM

## 2019-11-27 PROCEDURE — 99024 POSTOP FOLLOW-UP VISIT: CPT | Performed by: ORTHOPAEDIC SURGERY

## 2019-11-27 PROCEDURE — 97110 THERAPEUTIC EXERCISES: CPT

## 2019-11-27 PROCEDURE — 97530 THERAPEUTIC ACTIVITIES: CPT

## 2019-11-27 PROCEDURE — 97140 MANUAL THERAPY 1/> REGIONS: CPT

## 2019-12-02 ENCOUNTER — TELEPHONE (OUTPATIENT)
Dept: PHARMACY | Facility: CLINIC | Age: 64
End: 2019-12-02

## 2019-12-10 ENCOUNTER — HOSPITAL ENCOUNTER (OUTPATIENT)
Dept: PREADMISSION TESTING | Age: 64
Discharge: HOME | End: 2019-12-14
Payer: MEDICARE

## 2019-12-10 ENCOUNTER — ANESTHESIA EVENT (OUTPATIENT)
Dept: OPERATING ROOM | Age: 64
End: 2019-12-10
Payer: MEDICARE

## 2019-12-10 DIAGNOSIS — Z01.818 PREOP TESTING: ICD-10-CM

## 2019-12-10 LAB
ABO/RH: NORMAL
ANION GAP SERPL CALCULATED.3IONS-SCNC: 16 MMOL/L (ref 3–16)
ANTIBODY SCREEN: NORMAL
APTT: 34.7 SEC (ref 24.2–36.2)
BUN BLDV-MCNC: 31 MG/DL (ref 7–20)
CALCIUM SERPL-MCNC: 10.3 MG/DL (ref 8.3–10.6)
CHLORIDE BLD-SCNC: 101 MMOL/L (ref 99–110)
CO2: 23 MMOL/L (ref 21–32)
CREAT SERPL-MCNC: 0.9 MG/DL (ref 0.8–1.3)
GFR AFRICAN AMERICAN: >60
GFR NON-AFRICAN AMERICAN: >60
GLUCOSE BLD-MCNC: 90 MG/DL (ref 70–99)
HCT VFR BLD CALC: 46.6 % (ref 40.5–52.5)
HEMOGLOBIN: 15.5 G/DL (ref 13.5–17.5)
INR BLD: 0.98 (ref 0.86–1.14)
MCH RBC QN AUTO: 30.2 PG (ref 26–34)
MCHC RBC AUTO-ENTMCNC: 33.2 G/DL (ref 31–36)
MCV RBC AUTO: 91 FL (ref 80–100)
PDW BLD-RTO: 15 % (ref 12.4–15.4)
PLATELET # BLD: 224 K/UL (ref 135–450)
PMV BLD AUTO: 9.7 FL (ref 5–10.5)
POTASSIUM SERPL-SCNC: 4.3 MMOL/L (ref 3.5–5.1)
PROTHROMBIN TIME: 11.4 SEC (ref 10–13.2)
RBC # BLD: 5.12 M/UL (ref 4.2–5.9)
SODIUM BLD-SCNC: 140 MMOL/L (ref 136–145)
WBC # BLD: 8.4 K/UL (ref 4–11)

## 2019-12-10 PROCEDURE — 85027 COMPLETE CBC AUTOMATED: CPT

## 2019-12-10 PROCEDURE — 85610 PROTHROMBIN TIME: CPT

## 2019-12-10 PROCEDURE — 86900 BLOOD TYPING SEROLOGIC ABO: CPT

## 2019-12-10 PROCEDURE — 80048 BASIC METABOLIC PNL TOTAL CA: CPT

## 2019-12-10 PROCEDURE — 86901 BLOOD TYPING SEROLOGIC RH(D): CPT

## 2019-12-10 PROCEDURE — 85730 THROMBOPLASTIN TIME PARTIAL: CPT

## 2019-12-10 PROCEDURE — 36415 COLL VENOUS BLD VENIPUNCTURE: CPT

## 2019-12-10 PROCEDURE — 86850 RBC ANTIBODY SCREEN: CPT

## 2019-12-10 ASSESSMENT — LIFESTYLE VARIABLES: SMOKING_STATUS: 1

## 2019-12-17 ENCOUNTER — HOSPITAL ENCOUNTER (OUTPATIENT)
Age: 64
Setting detail: OUTPATIENT SURGERY
Discharge: HOME | End: 2019-12-17
Attending: NEUROLOGICAL SURGERY | Admitting: NEUROLOGICAL SURGERY
Payer: MEDICARE

## 2019-12-17 ENCOUNTER — APPOINTMENT (OUTPATIENT)
Dept: GENERAL RADIOLOGY | Age: 64
End: 2019-12-17
Attending: NEUROLOGICAL SURGERY
Payer: MEDICARE

## 2019-12-17 ENCOUNTER — ANESTHESIA (OUTPATIENT)
Dept: OPERATING ROOM | Age: 64
End: 2019-12-17
Payer: MEDICARE

## 2019-12-17 VITALS
HEIGHT: 74 IN | HEART RATE: 76 BPM | OXYGEN SATURATION: 94 % | TEMPERATURE: 96.9 F | DIASTOLIC BLOOD PRESSURE: 63 MMHG | WEIGHT: 235 LBS | RESPIRATION RATE: 16 BRPM | SYSTOLIC BLOOD PRESSURE: 100 MMHG | BODY MASS INDEX: 30.16 KG/M2

## 2019-12-17 VITALS — DIASTOLIC BLOOD PRESSURE: 61 MMHG | SYSTOLIC BLOOD PRESSURE: 99 MMHG | TEMPERATURE: 96.3 F | OXYGEN SATURATION: 98 %

## 2019-12-17 DIAGNOSIS — Z01.818 PREOP TESTING: Primary | ICD-10-CM

## 2019-12-17 DIAGNOSIS — M54.12 CERVICAL RADICULITIS: ICD-10-CM

## 2019-12-17 LAB
ABO/RH: NORMAL
ANTIBODY SCREEN: NORMAL
GLUCOSE BLD-MCNC: 89 MG/DL (ref 70–99)
GLUCOSE BLD-MCNC: 99 MG/DL (ref 70–99)
PERFORMED ON: NORMAL
PERFORMED ON: NORMAL

## 2019-12-17 PROCEDURE — 7100000001 HC PACU RECOVERY - ADDTL 15 MIN: Performed by: NEUROLOGICAL SURGERY

## 2019-12-17 PROCEDURE — C1713 ANCHOR/SCREW BN/BN,TIS/BN: HCPCS | Performed by: NEUROLOGICAL SURGERY

## 2019-12-17 PROCEDURE — 6360000002 HC RX W HCPCS: Performed by: ANESTHESIOLOGY

## 2019-12-17 PROCEDURE — 6360000002 HC RX W HCPCS: Performed by: NEUROLOGICAL SURGERY

## 2019-12-17 PROCEDURE — 7100000000 HC PACU RECOVERY - FIRST 15 MIN: Performed by: NEUROLOGICAL SURGERY

## 2019-12-17 PROCEDURE — 3600000014 HC SURGERY LEVEL 4 ADDTL 15MIN: Performed by: NEUROLOGICAL SURGERY

## 2019-12-17 PROCEDURE — 72020 X-RAY EXAM OF SPINE 1 VIEW: CPT

## 2019-12-17 PROCEDURE — 2580000003 HC RX 258: Performed by: NURSE ANESTHETIST, CERTIFIED REGISTERED

## 2019-12-17 PROCEDURE — 3700000001 HC ADD 15 MINUTES (ANESTHESIA): Performed by: NEUROLOGICAL SURGERY

## 2019-12-17 PROCEDURE — 3600000004 HC SURGERY LEVEL 4 BASE: Performed by: NEUROLOGICAL SURGERY

## 2019-12-17 PROCEDURE — 6370000000 HC RX 637 (ALT 250 FOR IP)

## 2019-12-17 PROCEDURE — 86900 BLOOD TYPING SEROLOGIC ABO: CPT

## 2019-12-17 PROCEDURE — 7100000011 HC PHASE II RECOVERY - ADDTL 15 MIN: Performed by: NEUROLOGICAL SURGERY

## 2019-12-17 PROCEDURE — 2580000003 HC RX 258: Performed by: NEUROLOGICAL SURGERY

## 2019-12-17 PROCEDURE — 86850 RBC ANTIBODY SCREEN: CPT

## 2019-12-17 PROCEDURE — 2720000010 HC SURG SUPPLY STERILE: Performed by: NEUROLOGICAL SURGERY

## 2019-12-17 PROCEDURE — 3700000000 HC ANESTHESIA ATTENDED CARE: Performed by: NEUROLOGICAL SURGERY

## 2019-12-17 PROCEDURE — 2500000003 HC RX 250 WO HCPCS: Performed by: NEUROLOGICAL SURGERY

## 2019-12-17 PROCEDURE — 36415 COLL VENOUS BLD VENIPUNCTURE: CPT

## 2019-12-17 PROCEDURE — 2500000003 HC RX 250 WO HCPCS: Performed by: NURSE ANESTHETIST, CERTIFIED REGISTERED

## 2019-12-17 PROCEDURE — 6360000002 HC RX W HCPCS: Performed by: NURSE ANESTHETIST, CERTIFIED REGISTERED

## 2019-12-17 PROCEDURE — 2709999900 HC NON-CHARGEABLE SUPPLY: Performed by: NEUROLOGICAL SURGERY

## 2019-12-17 PROCEDURE — 3209999900 FLUORO FOR SURGICAL PROCEDURES

## 2019-12-17 PROCEDURE — 86901 BLOOD TYPING SEROLOGIC RH(D): CPT

## 2019-12-17 PROCEDURE — 7100000010 HC PHASE II RECOVERY - FIRST 15 MIN: Performed by: NEUROLOGICAL SURGERY

## 2019-12-17 DEVICE — BONE SCREW 9791325 4.0X15 SELF DRILL VAR
Type: IMPLANTABLE DEVICE | Site: NECK | Status: FUNCTIONAL
Brand: VENTURE™ ANTERIOR CERVICAL PLATE SYSTEM

## 2019-12-17 DEVICE — DBM T45001 1CC PASTE GRAFTON PLUS
Type: IMPLANTABLE DEVICE | Site: NECK | Status: FUNCTIONAL
Brand: GRAFTON®AND GRAFTON PLUS®DEMINERALIZED BONE MATRIX (DBM)

## 2019-12-17 DEVICE — GRAFT HUM TISS W11XH6XL14MM CORT LORDTC SPCR BLK: Type: IMPLANTABLE DEVICE | Site: NECK | Status: FUNCTIONAL

## 2019-12-17 RX ORDER — SODIUM CHLORIDE 9 MG/ML
INJECTION, SOLUTION INTRAVENOUS CONTINUOUS
Status: DISCONTINUED | OUTPATIENT
Start: 2019-12-17 | End: 2019-12-17 | Stop reason: HOSPADM

## 2019-12-17 RX ORDER — SODIUM CHLORIDE 9 MG/ML
INJECTION, SOLUTION INTRAVENOUS CONTINUOUS PRN
Status: DISCONTINUED | OUTPATIENT
Start: 2019-12-17 | End: 2019-12-17 | Stop reason: SDUPTHER

## 2019-12-17 RX ORDER — FENTANYL CITRATE 50 UG/ML
INJECTION, SOLUTION INTRAMUSCULAR; INTRAVENOUS PRN
Status: DISCONTINUED | OUTPATIENT
Start: 2019-12-17 | End: 2019-12-17 | Stop reason: SDUPTHER

## 2019-12-17 RX ORDER — LIDOCAINE HYDROCHLORIDE 10 MG/ML
0.5 INJECTION, SOLUTION EPIDURAL; INFILTRATION; INTRACAUDAL; PERINEURAL ONCE
Status: DISCONTINUED | OUTPATIENT
Start: 2019-12-17 | End: 2019-12-17 | Stop reason: HOSPADM

## 2019-12-17 RX ORDER — DEXAMETHASONE SODIUM PHOSPHATE 4 MG/ML
INJECTION, SOLUTION INTRA-ARTICULAR; INTRALESIONAL; INTRAMUSCULAR; INTRAVENOUS; SOFT TISSUE PRN
Status: DISCONTINUED | OUTPATIENT
Start: 2019-12-17 | End: 2019-12-17 | Stop reason: SDUPTHER

## 2019-12-17 RX ORDER — LORAZEPAM 2 MG/ML
0.5 INJECTION INTRAMUSCULAR EVERY 10 MIN PRN
Status: COMPLETED | OUTPATIENT
Start: 2019-12-17 | End: 2019-12-17

## 2019-12-17 RX ORDER — CEFAZOLIN SODIUM 2 G/100ML
2 INJECTION, SOLUTION INTRAVENOUS
Status: COMPLETED | OUTPATIENT
Start: 2019-12-17 | End: 2019-12-17

## 2019-12-17 RX ORDER — PHENYLEPHRINE HCL IN 0.9% NACL 1 MG/10 ML
SYRINGE (ML) INTRAVENOUS PRN
Status: DISCONTINUED | OUTPATIENT
Start: 2019-12-17 | End: 2019-12-17 | Stop reason: SDUPTHER

## 2019-12-17 RX ORDER — LABETALOL HYDROCHLORIDE 5 MG/ML
5 INJECTION, SOLUTION INTRAVENOUS EVERY 10 MIN PRN
Status: DISCONTINUED | OUTPATIENT
Start: 2019-12-17 | End: 2019-12-17 | Stop reason: HOSPADM

## 2019-12-17 RX ORDER — HYDROMORPHONE HCL 110MG/55ML
0.5 PATIENT CONTROLLED ANALGESIA SYRINGE INTRAVENOUS EVERY 5 MIN PRN
Status: COMPLETED | OUTPATIENT
Start: 2019-12-17 | End: 2019-12-17

## 2019-12-17 RX ORDER — SUCCINYLCHOLINE/SOD CL,ISO/PF 200MG/10ML
SYRINGE (ML) INTRAVENOUS PRN
Status: DISCONTINUED | OUTPATIENT
Start: 2019-12-17 | End: 2019-12-17 | Stop reason: SDUPTHER

## 2019-12-17 RX ORDER — BUPIVACAINE HYDROCHLORIDE 5 MG/ML
INJECTION, SOLUTION EPIDURAL; INTRACAUDAL
Status: COMPLETED | OUTPATIENT
Start: 2019-12-17 | End: 2019-12-17

## 2019-12-17 RX ORDER — MAGNESIUM HYDROXIDE 1200 MG/15ML
LIQUID ORAL CONTINUOUS PRN
Status: COMPLETED | OUTPATIENT
Start: 2019-12-17 | End: 2019-12-17

## 2019-12-17 RX ORDER — NEOSTIGMINE METHYLSULFATE 5 MG/5 ML
SYRINGE (ML) INTRAVENOUS PRN
Status: DISCONTINUED | OUTPATIENT
Start: 2019-12-17 | End: 2019-12-17 | Stop reason: SDUPTHER

## 2019-12-17 RX ORDER — ROCURONIUM BROMIDE 10 MG/ML
INJECTION, SOLUTION INTRAVENOUS PRN
Status: DISCONTINUED | OUTPATIENT
Start: 2019-12-17 | End: 2019-12-17 | Stop reason: SDUPTHER

## 2019-12-17 RX ORDER — ONDANSETRON 2 MG/ML
INJECTION INTRAMUSCULAR; INTRAVENOUS PRN
Status: DISCONTINUED | OUTPATIENT
Start: 2019-12-17 | End: 2019-12-17 | Stop reason: SDUPTHER

## 2019-12-17 RX ORDER — FENTANYL CITRATE 50 UG/ML
25 INJECTION, SOLUTION INTRAMUSCULAR; INTRAVENOUS EVERY 5 MIN PRN
Status: DISCONTINUED | OUTPATIENT
Start: 2019-12-17 | End: 2019-12-17 | Stop reason: HOSPADM

## 2019-12-17 RX ORDER — SODIUM CHLORIDE 0.9 % (FLUSH) 0.9 %
10 SYRINGE (ML) INJECTION PRN
Status: DISCONTINUED | OUTPATIENT
Start: 2019-12-17 | End: 2019-12-17 | Stop reason: HOSPADM

## 2019-12-17 RX ORDER — PROPOFOL 10 MG/ML
INJECTION, EMULSION INTRAVENOUS PRN
Status: DISCONTINUED | OUTPATIENT
Start: 2019-12-17 | End: 2019-12-17 | Stop reason: SDUPTHER

## 2019-12-17 RX ORDER — PROMETHAZINE HYDROCHLORIDE 25 MG/ML
6.25 INJECTION, SOLUTION INTRAMUSCULAR; INTRAVENOUS
Status: DISCONTINUED | OUTPATIENT
Start: 2019-12-17 | End: 2019-12-17 | Stop reason: HOSPADM

## 2019-12-17 RX ORDER — OXYCODONE HYDROCHLORIDE 5 MG/1
5 TABLET ORAL EVERY 6 HOURS PRN
Qty: 28 TABLET | Refills: 0 | Status: SHIPPED | OUTPATIENT
Start: 2019-12-17 | End: 2019-12-24

## 2019-12-17 RX ORDER — GLYCOPYRROLATE 1 MG/5 ML
SYRINGE (ML) INTRAVENOUS PRN
Status: DISCONTINUED | OUTPATIENT
Start: 2019-12-17 | End: 2019-12-17 | Stop reason: SDUPTHER

## 2019-12-17 RX ORDER — SODIUM CHLORIDE 0.9 % (FLUSH) 0.9 %
10 SYRINGE (ML) INJECTION EVERY 12 HOURS SCHEDULED
Status: DISCONTINUED | OUTPATIENT
Start: 2019-12-17 | End: 2019-12-17 | Stop reason: HOSPADM

## 2019-12-17 RX ORDER — HYDROMORPHONE HCL 110MG/55ML
PATIENT CONTROLLED ANALGESIA SYRINGE INTRAVENOUS PRN
Status: DISCONTINUED | OUTPATIENT
Start: 2019-12-17 | End: 2019-12-17 | Stop reason: SDUPTHER

## 2019-12-17 RX ADMIN — Medication 100 MCG: at 12:12

## 2019-12-17 RX ADMIN — HYDROMORPHONE HYDROCHLORIDE 0.5 MG: 2 INJECTION INTRAMUSCULAR; INTRAVENOUS; SUBCUTANEOUS at 13:38

## 2019-12-17 RX ADMIN — SODIUM CHLORIDE: 9 INJECTION, SOLUTION INTRAVENOUS at 10:37

## 2019-12-17 RX ADMIN — Medication 4 MG: at 12:12

## 2019-12-17 RX ADMIN — HYDROMORPHONE HYDROCHLORIDE 0.5 MG: 2 INJECTION INTRAMUSCULAR; INTRAVENOUS; SUBCUTANEOUS at 13:04

## 2019-12-17 RX ADMIN — ONDANSETRON 4 MG: 2 INJECTION INTRAMUSCULAR; INTRAVENOUS at 12:11

## 2019-12-17 RX ADMIN — FENTANYL CITRATE 50 MCG: 50 INJECTION, SOLUTION INTRAMUSCULAR; INTRAVENOUS at 10:50

## 2019-12-17 RX ADMIN — HYDROMORPHONE HYDROCHLORIDE 0.5 MG: 2 INJECTION, SOLUTION INTRAMUSCULAR; INTRAVENOUS; SUBCUTANEOUS at 11:11

## 2019-12-17 RX ADMIN — Medication 140 MG: at 10:44

## 2019-12-17 RX ADMIN — ROCURONIUM BROMIDE 35 MG: 10 INJECTION, SOLUTION INTRAVENOUS at 10:49

## 2019-12-17 RX ADMIN — Medication 100 MCG: at 12:10

## 2019-12-17 RX ADMIN — LORAZEPAM 0.5 MG: 2 INJECTION, SOLUTION INTRAMUSCULAR; INTRAVENOUS at 12:46

## 2019-12-17 RX ADMIN — FENTANYL CITRATE 25 MCG: 50 INJECTION, SOLUTION INTRAMUSCULAR; INTRAVENOUS at 13:00

## 2019-12-17 RX ADMIN — ROCURONIUM BROMIDE 5 MG: 10 INJECTION, SOLUTION INTRAVENOUS at 10:42

## 2019-12-17 RX ADMIN — HYDROMORPHONE HYDROCHLORIDE 0.5 MG: 2 INJECTION INTRAMUSCULAR; INTRAVENOUS; SUBCUTANEOUS at 13:13

## 2019-12-17 RX ADMIN — DEXAMETHASONE SODIUM PHOSPHATE 12 MG: 4 INJECTION, SOLUTION INTRAMUSCULAR; INTRAVENOUS at 10:50

## 2019-12-17 RX ADMIN — HYDROMORPHONE HYDROCHLORIDE 0.5 MG: 2 INJECTION, SOLUTION INTRAMUSCULAR; INTRAVENOUS; SUBCUTANEOUS at 12:04

## 2019-12-17 RX ADMIN — Medication 100 MCG: at 11:16

## 2019-12-17 RX ADMIN — Medication 0.6 MG: at 12:12

## 2019-12-17 RX ADMIN — LORAZEPAM 0.5 MG: 2 INJECTION, SOLUTION INTRAMUSCULAR; INTRAVENOUS at 13:06

## 2019-12-17 RX ADMIN — HYDROMORPHONE HYDROCHLORIDE 0.5 MG: 2 INJECTION INTRAMUSCULAR; INTRAVENOUS; SUBCUTANEOUS at 13:33

## 2019-12-17 RX ADMIN — HYDROMORPHONE HYDROCHLORIDE 0.5 MG: 2 INJECTION, SOLUTION INTRAMUSCULAR; INTRAVENOUS; SUBCUTANEOUS at 12:30

## 2019-12-17 RX ADMIN — FENTANYL CITRATE 50 MCG: 50 INJECTION, SOLUTION INTRAMUSCULAR; INTRAVENOUS at 10:42

## 2019-12-17 RX ADMIN — ROCURONIUM BROMIDE 10 MG: 10 INJECTION, SOLUTION INTRAVENOUS at 11:40

## 2019-12-17 RX ADMIN — SODIUM CHLORIDE: 9 INJECTION, SOLUTION INTRAVENOUS at 12:18

## 2019-12-17 RX ADMIN — FENTANYL CITRATE 25 MCG: 50 INJECTION, SOLUTION INTRAMUSCULAR; INTRAVENOUS at 10:37

## 2019-12-17 RX ADMIN — FENTANYL CITRATE 50 MCG: 50 INJECTION, SOLUTION INTRAMUSCULAR; INTRAVENOUS at 11:04

## 2019-12-17 RX ADMIN — FENTANYL CITRATE 25 MCG: 50 INJECTION, SOLUTION INTRAMUSCULAR; INTRAVENOUS at 10:35

## 2019-12-17 RX ADMIN — HYDROMORPHONE HYDROCHLORIDE 0.5 MG: 2 INJECTION, SOLUTION INTRAMUSCULAR; INTRAVENOUS; SUBCUTANEOUS at 12:20

## 2019-12-17 RX ADMIN — CEFAZOLIN SODIUM 2 G: 2 INJECTION, SOLUTION INTRAVENOUS at 10:35

## 2019-12-17 RX ADMIN — PROPOFOL 200 MG: 10 INJECTION, EMULSION INTRAVENOUS at 10:43

## 2019-12-17 ASSESSMENT — PULMONARY FUNCTION TESTS
PIF_VALUE: 18
PIF_VALUE: 18
PIF_VALUE: 17
PIF_VALUE: 18
PIF_VALUE: 18
PIF_VALUE: 17
PIF_VALUE: 19
PIF_VALUE: 18
PIF_VALUE: 18
PIF_VALUE: 0
PIF_VALUE: 19
PIF_VALUE: 17
PIF_VALUE: 17
PIF_VALUE: 18
PIF_VALUE: 17
PIF_VALUE: 18
PIF_VALUE: 17
PIF_VALUE: 17
PIF_VALUE: 18
PIF_VALUE: 17
PIF_VALUE: 18
PIF_VALUE: 2
PIF_VALUE: 18
PIF_VALUE: 17
PIF_VALUE: 17
PIF_VALUE: 18
PIF_VALUE: 0
PIF_VALUE: 2
PIF_VALUE: 18
PIF_VALUE: 19
PIF_VALUE: 18
PIF_VALUE: 17
PIF_VALUE: 16
PIF_VALUE: 18
PIF_VALUE: 17
PIF_VALUE: 18
PIF_VALUE: 17
PIF_VALUE: 5
PIF_VALUE: 2
PIF_VALUE: 17
PIF_VALUE: 18
PIF_VALUE: 17
PIF_VALUE: 18
PIF_VALUE: 0
PIF_VALUE: 19
PIF_VALUE: 18
PIF_VALUE: 18
PIF_VALUE: 20
PIF_VALUE: 18
PIF_VALUE: 19
PIF_VALUE: 18
PIF_VALUE: 10
PIF_VALUE: 18
PIF_VALUE: 19
PIF_VALUE: 5
PIF_VALUE: 19
PIF_VALUE: 18
PIF_VALUE: 5
PIF_VALUE: 19
PIF_VALUE: 3
PIF_VALUE: 15
PIF_VALUE: 18
PIF_VALUE: 17
PIF_VALUE: 18
PIF_VALUE: 16
PIF_VALUE: 18
PIF_VALUE: 16
PIF_VALUE: 18
PIF_VALUE: 3
PIF_VALUE: 17
PIF_VALUE: 18
PIF_VALUE: 1
PIF_VALUE: 19
PIF_VALUE: 18
PIF_VALUE: 17
PIF_VALUE: 17
PIF_VALUE: 0
PIF_VALUE: 18
PIF_VALUE: 17
PIF_VALUE: 18
PIF_VALUE: 4
PIF_VALUE: 17
PIF_VALUE: 18
PIF_VALUE: 18
PIF_VALUE: 16
PIF_VALUE: 1
PIF_VALUE: 18
PIF_VALUE: 17
PIF_VALUE: 18
PIF_VALUE: 18
PIF_VALUE: 19
PIF_VALUE: 18
PIF_VALUE: 2
PIF_VALUE: 16
PIF_VALUE: 17
PIF_VALUE: 19

## 2019-12-17 ASSESSMENT — PAIN DESCRIPTION - ORIENTATION
ORIENTATION: LOWER
ORIENTATION: LOWER;UPPER
ORIENTATION: LOWER

## 2019-12-17 ASSESSMENT — PAIN SCALES - GENERAL
PAINLEVEL_OUTOF10: 6
PAINLEVEL_OUTOF10: 7
PAINLEVEL_OUTOF10: 6
PAINLEVEL_OUTOF10: 7
PAINLEVEL_OUTOF10: 4
PAINLEVEL_OUTOF10: 1
PAINLEVEL_OUTOF10: 7
PAINLEVEL_OUTOF10: 7
PAINLEVEL_OUTOF10: 5
PAINLEVEL_OUTOF10: 5
PAINLEVEL_OUTOF10: 7

## 2019-12-17 ASSESSMENT — PAIN - FUNCTIONAL ASSESSMENT: PAIN_FUNCTIONAL_ASSESSMENT: 0-10

## 2019-12-17 ASSESSMENT — PAIN DESCRIPTION - LOCATION
LOCATION: BACK
LOCATION: NECK

## 2019-12-17 ASSESSMENT — PAIN DESCRIPTION - PAIN TYPE: TYPE: SURGICAL PAIN

## 2019-12-17 ASSESSMENT — PAIN DESCRIPTION - DESCRIPTORS
DESCRIPTORS: ACHING
DESCRIPTORS: PATIENT UNABLE TO DESCRIBE
DESCRIPTORS: ACHING
DESCRIPTORS: SPASM

## 2020-01-02 ENCOUNTER — CLINICAL DOCUMENTATION (OUTPATIENT)
Dept: PHARMACY | Facility: CLINIC | Age: 65
End: 2020-01-02

## 2020-01-02 NOTE — PROGRESS NOTES
Pharmacy Pop Care Documentation:     Sheela House is being removed from the diabetes management program for the following reason(s): 12/02/19: Per patient he will no longer have [employer] in [address] [place] [address]

## 2020-01-03 ENCOUNTER — TELEPHONE (OUTPATIENT)
Dept: ENDOCRINOLOGY | Age: 65
End: 2020-01-03

## 2020-01-03 NOTE — TELEPHONE ENCOUNTER
Mr. Mitzi Godwin advised to contact his plan for alternatives. he stated he will reach out to his plan as he would like to find similar medication with lower cost. He will call the office back Monday with information.

## 2020-01-09 ENCOUNTER — OFFICE VISIT (OUTPATIENT)
Dept: ORTHOPEDIC SURGERY | Age: 65
End: 2020-01-09
Payer: MEDICARE

## 2020-01-09 VITALS
DIASTOLIC BLOOD PRESSURE: 64 MMHG | HEIGHT: 74 IN | SYSTOLIC BLOOD PRESSURE: 96 MMHG | WEIGHT: 230 LBS | RESPIRATION RATE: 16 BRPM | BODY MASS INDEX: 29.52 KG/M2

## 2020-01-09 PROCEDURE — G8427 DOCREV CUR MEDS BY ELIG CLIN: HCPCS | Performed by: ORTHOPAEDIC SURGERY

## 2020-01-09 PROCEDURE — 99203 OFFICE O/P NEW LOW 30 MIN: CPT | Performed by: ORTHOPAEDIC SURGERY

## 2020-01-09 PROCEDURE — 1123F ACP DISCUSS/DSCN MKR DOCD: CPT | Performed by: ORTHOPAEDIC SURGERY

## 2020-01-09 PROCEDURE — 4040F PNEUMOC VAC/ADMIN/RCVD: CPT | Performed by: ORTHOPAEDIC SURGERY

## 2020-01-09 PROCEDURE — 4004F PT TOBACCO SCREEN RCVD TLK: CPT | Performed by: ORTHOPAEDIC SURGERY

## 2020-01-09 PROCEDURE — G8417 CALC BMI ABV UP PARAM F/U: HCPCS | Performed by: ORTHOPAEDIC SURGERY

## 2020-01-09 PROCEDURE — 3017F COLORECTAL CA SCREEN DOC REV: CPT | Performed by: ORTHOPAEDIC SURGERY

## 2020-01-09 PROCEDURE — G8484 FLU IMMUNIZE NO ADMIN: HCPCS | Performed by: ORTHOPAEDIC SURGERY

## 2020-01-09 PROCEDURE — 20550 NJX 1 TENDON SHEATH/LIGAMENT: CPT | Performed by: ORTHOPAEDIC SURGERY

## 2020-01-09 NOTE — PROGRESS NOTES
This 75 y.o., right hand dominant retired man is seen in referral for Carey Hsu MD with a chief complaint of pain, swelling, stiffness and intermittant snapping of the right little finger. Symptoms have been present for 3 months. The digit is stiff, especially in the morning and will frequently stick in the palm when flexed and pop when extended. This is frequently associated with pain. Mild swelling has been noticed. Dr. Aneta Romero injected this digit 3/19, which provided full painless function, until the symptoms recurred 3 months ago. The problem has worsened slightly recently. There is no history of injury or significant overuse. The pain assessment has been reviewed and is correct as stated. .    The patient's social history, past medical history, family history, medications, allergies and review of systems, entered 1/9/20, have been reviewed, and dated and are recorded in the chart. On examination the patient is Height: 6' 2\" (188 cm) tall and weighs Weight: 230 lb (104.3 kg). Respirations are 18 per minute. The patient is well nourished, is oriented to time and place, demonstrates appropriate mood and affect as well as normal gait and station. There is mild soft tissue swelling of the digit. There is no deformity. There is tenderness, thickening and nodularity at the base of the flexor tendon sheath. Range of motion is slightly limited in flexion and extension. The digit sticks in flexion and pops into extension, accompanied by some pain. Skin is intact without lesions. Distal circulation and sensation are intact. Muscle strength and coordination are normal.  Reflexes are present bilaterally. Joints are stable. There are no subcutaneous nodules or enlarged epitrochlear lymph nodes. Impression: Recurrent right little finger trigger digit. The nature of this medical problem is fully discussed with the patient, including all treatment options. All questions are answered.     The

## 2020-01-21 ENCOUNTER — TELEPHONE (OUTPATIENT)
Dept: ENDOCRINOLOGY | Age: 65
End: 2020-01-21

## 2020-01-21 NOTE — TELEPHONE ENCOUNTER
Mr. Connie Solomon informed to  take half the amount of insulin during prep days and resume the whole dose after the scope. Mr. Connie Solomon stated there are no alternatives with his plan for the Trulicity.

## 2020-01-22 NOTE — TELEPHONE ENCOUNTER
Per Christen Mcgarry with [facility]  Mr. Oscar Francois can apply for patient assistance with [employer]. Sheron Martinez He will have to have met $1100 deductible. Is this is too much and he can meet that he can file for hardship with [employer]. The deductible could possibly be waved. Left a message for Mr. Oscar Francois to call me back.

## 2020-01-23 RX ORDER — LORATADINE 10 MG/1
10 TABLET ORAL DAILY
COMMUNITY
End: 2020-08-25

## 2020-01-23 NOTE — PROGRESS NOTES
Name_______________________________________Printed:____________________  Date and time of surgery__2/11/2020  1030______________________Arrival Time:________________   1. The instructions given regarding when and if a patient needs to stop oral intake prior to surgery varies. Follow the specific instructions you were given                  ___Nothing to eat or to drink after Midnight the night before. _XXX___Endoscopy patient follow your DRS instructions-generally you will be doing a part of the prep after Midnight                   ____Carbo loading or ERAS instructions will be given to select patients-if you have been given those instructions -please do the following                           The evening before your surgery after dinner before midnight drink 2 20 ounces of gatorade. If you are diabetic use sugar free. The morning of surgery drink 40 ounces of water. This needs to be finished 3 hours prior to your surgery start time. 2. Take the following pills with a small sip of water on the morning of surgery____________none_______________________________________                3. Aspirin, Ibuprofen, Advil, Naproxen, Vitamin E and other Anti-inflammatory products and supplements should be stopped for 5 -7days before surgery or as directed by your physician. 4. Check with your Doctor regarding stopping Plavix, Coumadin,Eliquis, Lovenox,Effient,Pradaxa,Xarelto, Fragmin or other blood thinners and follow their instructions. 5. Do not smoke, and do not drink any alcoholic beverages 24 hours prior to surgery. This includes NA Beer. Refrain from the usage of any recreational drugs. 6. You may brush your teeth and gargle the morning of surgery. DO NOT SWALLOW WATER   7. You MUST make arrangements for a responsible adult to stay on site while you are here and take you home after your surgery. You will not be allowed to leave alone or drive yourself home.   It is strongly suggested

## 2020-01-24 ENCOUNTER — ANESTHESIA EVENT (OUTPATIENT)
Dept: ENDOSCOPY | Age: 65
End: 2020-01-24
Payer: MEDICARE

## 2020-01-28 RX ORDER — INSULIN GLARGINE 100 [IU]/ML
INJECTION, SOLUTION SUBCUTANEOUS
Qty: 30 ML | Refills: 1 | Status: SHIPPED | OUTPATIENT
Start: 2020-01-28 | End: 2020-10-01 | Stop reason: SDUPTHER

## 2020-02-03 NOTE — TELEPHONE ENCOUNTER
Pt called and request a refill for Jardiance sent to [facility]    9-24-19  FOV    2-25-20

## 2020-02-10 ENCOUNTER — TELEPHONE (OUTPATIENT)
Dept: ENDOCRINOLOGY | Age: 65
End: 2020-02-10

## 2020-02-11 ENCOUNTER — ANESTHESIA (OUTPATIENT)
Dept: ENDOSCOPY | Age: 65
End: 2020-02-11
Payer: MEDICARE

## 2020-02-11 ENCOUNTER — HOSPITAL ENCOUNTER (OUTPATIENT)
Age: 65
Setting detail: OUTPATIENT SURGERY
Discharge: HOME | End: 2020-02-11
Attending: INTERNAL MEDICINE | Admitting: INTERNAL MEDICINE
Payer: MEDICARE

## 2020-02-11 VITALS
DIASTOLIC BLOOD PRESSURE: 73 MMHG | HEIGHT: 74 IN | OXYGEN SATURATION: 100 % | HEART RATE: 84 BPM | SYSTOLIC BLOOD PRESSURE: 104 MMHG | WEIGHT: 218.1 LBS | RESPIRATION RATE: 16 BRPM | TEMPERATURE: 96.8 F | BODY MASS INDEX: 27.99 KG/M2

## 2020-02-11 VITALS
OXYGEN SATURATION: 97 % | RESPIRATION RATE: 20 BRPM | SYSTOLIC BLOOD PRESSURE: 89 MMHG | DIASTOLIC BLOOD PRESSURE: 52 MMHG

## 2020-02-11 LAB
CHOLESTEROL, FASTING: 126 MG/DL (ref 0–199)
GLUCOSE BLD-MCNC: 101 MG/DL (ref 70–99)
GLUCOSE BLD-MCNC: 94 MG/DL (ref 70–99)
HDLC SERPL-MCNC: 36 MG/DL (ref 40–60)
LDL CHOLESTEROL CALCULATED: 74 MG/DL
PERFORMED ON: ABNORMAL
PERFORMED ON: NORMAL
TRIGLYCERIDE, FASTING: 78 MG/DL (ref 0–150)
VLDLC SERPL CALC-MCNC: 16 MG/DL

## 2020-02-11 PROCEDURE — 2709999900 HC NON-CHARGEABLE SUPPLY: Performed by: INTERNAL MEDICINE

## 2020-02-11 PROCEDURE — 7100000010 HC PHASE II RECOVERY - FIRST 15 MIN: Performed by: INTERNAL MEDICINE

## 2020-02-11 PROCEDURE — 6360000002 HC RX W HCPCS: Performed by: NURSE ANESTHETIST, CERTIFIED REGISTERED

## 2020-02-11 PROCEDURE — 80061 LIPID PANEL: CPT

## 2020-02-11 PROCEDURE — 2580000003 HC RX 258: Performed by: FAMILY MEDICINE

## 2020-02-11 PROCEDURE — 36415 COLL VENOUS BLD VENIPUNCTURE: CPT

## 2020-02-11 PROCEDURE — 3609010600 HC COLONOSCOPY POLYPECTOMY SNARE/COLD BIOPSY: Performed by: INTERNAL MEDICINE

## 2020-02-11 PROCEDURE — 83036 HEMOGLOBIN GLYCOSYLATED A1C: CPT

## 2020-02-11 PROCEDURE — 6370000000 HC RX 637 (ALT 250 FOR IP): Performed by: INTERNAL MEDICINE

## 2020-02-11 PROCEDURE — 88305 TISSUE EXAM BY PATHOLOGIST: CPT

## 2020-02-11 PROCEDURE — 3700000001 HC ADD 15 MINUTES (ANESTHESIA): Performed by: INTERNAL MEDICINE

## 2020-02-11 PROCEDURE — 7100000000 HC PACU RECOVERY - FIRST 15 MIN: Performed by: INTERNAL MEDICINE

## 2020-02-11 PROCEDURE — 3700000000 HC ANESTHESIA ATTENDED CARE: Performed by: INTERNAL MEDICINE

## 2020-02-11 PROCEDURE — 2500000003 HC RX 250 WO HCPCS: Performed by: NURSE ANESTHETIST, CERTIFIED REGISTERED

## 2020-02-11 PROCEDURE — 7100000011 HC PHASE II RECOVERY - ADDTL 15 MIN: Performed by: INTERNAL MEDICINE

## 2020-02-11 PROCEDURE — 7100000001 HC PACU RECOVERY - ADDTL 15 MIN: Performed by: INTERNAL MEDICINE

## 2020-02-11 RX ORDER — SODIUM CHLORIDE 0.9 % (FLUSH) 0.9 %
10 SYRINGE (ML) INJECTION EVERY 12 HOURS SCHEDULED
Status: DISCONTINUED | OUTPATIENT
Start: 2020-02-11 | End: 2020-02-11 | Stop reason: HOSPADM

## 2020-02-11 RX ORDER — SODIUM CHLORIDE 9 MG/ML
INJECTION, SOLUTION INTRAVENOUS CONTINUOUS
Status: DISCONTINUED | OUTPATIENT
Start: 2020-02-11 | End: 2020-02-11 | Stop reason: HOSPADM

## 2020-02-11 RX ORDER — PROPOFOL 10 MG/ML
INJECTION, EMULSION INTRAVENOUS PRN
Status: DISCONTINUED | OUTPATIENT
Start: 2020-02-11 | End: 2020-02-11 | Stop reason: SDUPTHER

## 2020-02-11 RX ORDER — SODIUM CHLORIDE 0.9 % (FLUSH) 0.9 %
10 SYRINGE (ML) INJECTION PRN
Status: DISCONTINUED | OUTPATIENT
Start: 2020-02-11 | End: 2020-02-11 | Stop reason: HOSPADM

## 2020-02-11 RX ORDER — LIDOCAINE HYDROCHLORIDE 20 MG/ML
INJECTION, SOLUTION INFILTRATION; PERINEURAL PRN
Status: DISCONTINUED | OUTPATIENT
Start: 2020-02-11 | End: 2020-02-11 | Stop reason: SDUPTHER

## 2020-02-11 RX ADMIN — PHENYLEPHRINE HYDROCHLORIDE 100 MCG: 10 INJECTION INTRAVENOUS at 10:24

## 2020-02-11 RX ADMIN — PROPOFOL 20 MG: 10 INJECTION, EMULSION INTRAVENOUS at 10:27

## 2020-02-11 RX ADMIN — PROPOFOL 20 MG: 10 INJECTION, EMULSION INTRAVENOUS at 10:10

## 2020-02-11 RX ADMIN — PROPOFOL 80 MG: 10 INJECTION, EMULSION INTRAVENOUS at 10:06

## 2020-02-11 RX ADMIN — PHENYLEPHRINE HYDROCHLORIDE 100 MCG: 10 INJECTION INTRAVENOUS at 10:27

## 2020-02-11 RX ADMIN — PHENYLEPHRINE HYDROCHLORIDE 100 MCG: 10 INJECTION INTRAVENOUS at 10:19

## 2020-02-11 RX ADMIN — PROPOFOL 20 MG: 10 INJECTION, EMULSION INTRAVENOUS at 10:08

## 2020-02-11 RX ADMIN — PROPOFOL 40 MG: 10 INJECTION, EMULSION INTRAVENOUS at 10:35

## 2020-02-11 RX ADMIN — PROPOFOL 50 MG: 10 INJECTION, EMULSION INTRAVENOUS at 10:12

## 2020-02-11 RX ADMIN — PHENYLEPHRINE HYDROCHLORIDE 200 MCG: 10 INJECTION INTRAVENOUS at 10:15

## 2020-02-11 RX ADMIN — PHENYLEPHRINE HYDROCHLORIDE 100 MCG: 10 INJECTION INTRAVENOUS at 10:05

## 2020-02-11 RX ADMIN — PROPOFOL 20 MG: 10 INJECTION, EMULSION INTRAVENOUS at 10:31

## 2020-02-11 RX ADMIN — PHENYLEPHRINE HYDROCHLORIDE 200 MCG: 10 INJECTION INTRAVENOUS at 10:30

## 2020-02-11 RX ADMIN — PHENYLEPHRINE HYDROCHLORIDE 200 MCG: 10 INJECTION INTRAVENOUS at 10:37

## 2020-02-11 RX ADMIN — PROPOFOL 20 MG: 10 INJECTION, EMULSION INTRAVENOUS at 10:15

## 2020-02-11 RX ADMIN — LIDOCAINE HYDROCHLORIDE 100 MG: 20 INJECTION, SOLUTION INFILTRATION; PERINEURAL at 10:06

## 2020-02-11 RX ADMIN — PHENYLEPHRINE HYDROCHLORIDE 100 MCG: 10 INJECTION INTRAVENOUS at 10:09

## 2020-02-11 RX ADMIN — PHENYLEPHRINE HYDROCHLORIDE 100 MCG: 10 INJECTION INTRAVENOUS at 10:11

## 2020-02-11 RX ADMIN — PROPOFOL 20 MG: 10 INJECTION, EMULSION INTRAVENOUS at 10:19

## 2020-02-11 RX ADMIN — SODIUM CHLORIDE: 9 INJECTION, SOLUTION INTRAVENOUS at 09:45

## 2020-02-11 RX ADMIN — PHENYLEPHRINE HYDROCHLORIDE 200 MCG: 10 INJECTION INTRAVENOUS at 10:33

## 2020-02-11 RX ADMIN — PROPOFOL 20 MG: 10 INJECTION, EMULSION INTRAVENOUS at 10:23

## 2020-02-11 ASSESSMENT — PAIN SCALES - GENERAL
PAINLEVEL_OUTOF10: 0

## 2020-02-11 ASSESSMENT — PAIN - FUNCTIONAL ASSESSMENT: PAIN_FUNCTIONAL_ASSESSMENT: 0-10

## 2020-02-11 ASSESSMENT — LIFESTYLE VARIABLES: SMOKING_STATUS: 1

## 2020-02-11 NOTE — ANESTHESIA POSTPROCEDURE EVALUATION
Department of Anesthesiology  Postprocedure Note    Patient: Manuela Rock  MRN: [id number]  YOB: 1955  Date of evaluation: 2/11/2020  Time:  11:22 AM     Procedure Summary     Date:  02/11/20 Room / Location:  [address] [place] [address]    Anesthesia Start:  1380 Anesthesia Stop:  3784    Procedure:  COLONOSCOPY POLYPECTOMY SNARE/COLD BIOPSY (N/A ) Diagnosis:  (HISTORY OF COLON POLYPS Z86.010)    Surgeon:  Tam Gongora MD Responsible Provider:  Michelle Muniz MD    Anesthesia Type:  TIVA ASA Status:  3          Anesthesia Type: TIVA    Farrah Phase I: Farrah Score: 9    Farrah Phase II: Farrah Score: 10    Last vitals: Reviewed and per EMR flowsheets.        Anesthesia Post Evaluation    Patient location during evaluation: PACU  Patient participation: complete - patient participated  Level of consciousness: awake  Airway patency: patent  Nausea & Vomiting: no vomiting  Complications: no  Cardiovascular status: hemodynamically stable  Respiratory status: acceptable  Hydration status: euvolemic

## 2020-02-11 NOTE — H&P
[address] [place] [address]    Pre-operative History and Physical    Patient: Ish Rodrigez  : 1955  CSN:     History Obtained From:   Patient or guardian. HISTORY OF PRESENT ILLNESS:    The patient is a 75 y.o. male here for high risk CRC screening colonoscopy. He has a personal history of colon polyps.       Past Medical History:    Past Medical History:   Diagnosis Date    Abdominal pain     Blood transfusion     30 yrs ago    Chronic back pain     Chronic pain syndrome     Depression     Depression     Diabetes mellitus ([address]     Failed back surgical syndrome     FH: colonic polyps     History of duodenal ulcer     now gets peptic ulcer    Hyperlipidemia     Hypertension     Insomnia     Obesity     Psychiatric problem     depression and anxiety    Sleep apnea     USES C-PAP    Ulcer, duodenum peptic      Past Surgical History:    Past Surgical History:   Procedure Laterality Date    BACK SURGERY       X 2    CERVICAL FUSION N/A 2019    ANTERIOR CERVICAL DISCECTOMY AND FUSION AT [address] [facility] ([address], [address] [place], [address], [address]) performed by Sonja Cherry MD at [address] [place] [address] N/A 10/2/2019    C7-T1 INTERLAMINAR EPIDURAL STEROID INJECTION WITH FLUOROSCOPY performed by Jalen Gomez MD at [facility] N/A 10/30/2019    C7-T1 INTERLMINAR EPIDURAL STEROID INJECTION WITH FLUOROSCOPY performed by Jalen Gomez MD at [facility] 32 X 2    KNEE ARTHROSCOPY Left 10/24/2019    LEFT KNEE ARTHROSCOPY WITH PARTIAL MEDIAL MENISCECTOMY;SYNOVECTOMY WITH PLICA REMOVAL performed by Sybil Cantu MD at [facility] ARTHROSCOPY Left 10/24/2019    LAPAROSCOPIC APPENDECTOMY N/A 5/10/2019    APPENDECTOMY LAPAROSCOPIC, LYSIS OF ADHESIONS performed by Leyda Abdul MD at [facility]    fatty tumor removal under right arm    equivalent per week     Family History:   Family History   Problem Relation Age of Onset    Stroke Mother     Heart Disease Father        PHYSICAL EXAM:      Ht 6' 2\" (1.88 m)   Wt 225 lb (102.1 kg)   BMI 28.89 kg/m²  I        Heart:  RRR, normal s1s2    Lungs:  CTA and normal effort    Abdomen:   Soft, nt nd. ASSESSMENT AND PLAN:    1. Patient is a 75 y.o. male here for endoscopy with MAC sedation. 2.  Procedure options, risks and benefits reviewed with patient and/or guardian. They express understanding.     Esthela Blount MD  [address]  2/11/2020

## 2020-02-12 ENCOUNTER — TELEPHONE (OUTPATIENT)
Dept: ENDOCRINOLOGY | Age: 65
End: 2020-02-12

## 2020-02-12 LAB
ESTIMATED AVERAGE GLUCOSE: 114 MG/DL
HBA1C MFR BLD: 5.6 %

## 2020-02-12 NOTE — TELEPHONE ENCOUNTER
PT left v/m stating he has a question regarding 1 of his medications. He did not indicate which med.

## 2020-02-21 PROBLEM — M47.812 CERVICAL SPONDYLOSIS: Status: ACTIVE | Noted: 2019-12-17

## 2020-02-21 PROBLEM — Z98.1 ARTHRODESIS STATUS: Status: ACTIVE | Noted: 2020-01-02

## 2020-02-25 ENCOUNTER — OFFICE VISIT (OUTPATIENT)
Dept: ENDOCRINOLOGY | Age: 65
End: 2020-02-25
Payer: MEDICARE

## 2020-02-25 VITALS
DIASTOLIC BLOOD PRESSURE: 69 MMHG | HEIGHT: 74 IN | OXYGEN SATURATION: 98 % | BODY MASS INDEX: 29.88 KG/M2 | HEART RATE: 68 BPM | SYSTOLIC BLOOD PRESSURE: 103 MMHG | WEIGHT: 232.8 LBS

## 2020-02-25 PROBLEM — E66.3 OVERWEIGHT: Status: ACTIVE | Noted: 2020-02-25

## 2020-02-25 PROCEDURE — 99214 OFFICE O/P EST MOD 30 MIN: CPT | Performed by: INTERNAL MEDICINE

## 2020-02-25 PROCEDURE — G8427 DOCREV CUR MEDS BY ELIG CLIN: HCPCS | Performed by: INTERNAL MEDICINE

## 2020-02-25 PROCEDURE — G8484 FLU IMMUNIZE NO ADMIN: HCPCS | Performed by: INTERNAL MEDICINE

## 2020-02-25 PROCEDURE — 4040F PNEUMOC VAC/ADMIN/RCVD: CPT | Performed by: INTERNAL MEDICINE

## 2020-02-25 PROCEDURE — 4004F PT TOBACCO SCREEN RCVD TLK: CPT | Performed by: INTERNAL MEDICINE

## 2020-02-25 PROCEDURE — 3017F COLORECTAL CA SCREEN DOC REV: CPT | Performed by: INTERNAL MEDICINE

## 2020-02-25 PROCEDURE — 1123F ACP DISCUSS/DSCN MKR DOCD: CPT | Performed by: INTERNAL MEDICINE

## 2020-02-25 PROCEDURE — 3044F HG A1C LEVEL LT 7.0%: CPT | Performed by: INTERNAL MEDICINE

## 2020-02-25 PROCEDURE — G8417 CALC BMI ABV UP PARAM F/U: HCPCS | Performed by: INTERNAL MEDICINE

## 2020-02-25 PROCEDURE — 2022F DILAT RTA XM EVC RTNOPTHY: CPT | Performed by: INTERNAL MEDICINE

## 2020-02-25 RX ORDER — LISINOPRIL 10 MG/1
10 TABLET ORAL DAILY
Qty: 90 TABLET | Refills: 3 | Status: SHIPPED | OUTPATIENT
Start: 2020-02-25 | End: 2021-02-25

## 2020-02-25 RX ORDER — BLOOD-GLUCOSE METER
EACH MISCELLANEOUS
COMMUNITY
End: 2020-06-02 | Stop reason: SDUPTHER

## 2020-02-25 NOTE — PROGRESS NOTES
EPIDURAL STEROID INJECTION WITH FLUOROSCOPY performed by Love Mittal MD at [address] 10/30/2019    C7-T1 INTERLMINAR EPIDURAL STEROID INJECTION WITH FLUOROSCOPY performed by Love Mittal MD at [facility] 32 X 2    KNEE ARTHROSCOPY Left 10/24/2019    LEFT KNEE ARTHROSCOPY WITH PARTIAL MEDIAL MENISCECTOMY;SYNOVECTOMY WITH PLICA REMOVAL performed by Karol Perera MD at [place] [address] ARTHROSCOPY Left 10/24/2019    LAPAROSCOPIC APPENDECTOMY N/A 5/10/2019    APPENDECTOMY LAPAROSCOPIC, LYSIS OF ADHESIONS performed by Will Cabezas MD at [facility]    fatty tumor removal under right arm    PARTIAL NEPHRECTOMY Left 8/6/2019    ROBOTIC LAPAROSCOPIC LEFT PARTIAL NEPHRECTOMY performed by Manny Butcher MD at [address] [facility] ENDOSCOPY           Allergies   Allergen Reactions    Cymbalta [Duloxetine Hcl] Diarrhea    Metformin And Related      Muscle aches    Shellfish-Derived Products Nausea And Vomiting     *PT STATES NO PROBLEMS WITH TOPICAL IODINE         Current Outpatient Medications:     medical marijuana, MEDICAL MARIJUANA, Disp: , Rfl:     Blood Glucose Monitoring Suppl (PRODIGY AUTOCODE BLOOD GLUCOSE) LILO, Prodigy Autocode Meter kit, Disp: , Rfl:     blood glucose test strips (PRODIGY NO CODING BLOOD GLUC) strip, Prodigy No Coding strips, Disp: , Rfl:     lisinopril (PRINIVIL;ZESTRIL) 10 MG tablet, Take 1 tablet by mouth daily, Disp: 90 tablet, Rfl: 3    Dulaglutide 1.5 MG/0.5ML SOPN, INJECT THE CONTENTS OF 1 SYRINGE INTO THE SKIN ONCE WEEKLY, Disp: 2 pen, Rfl: 1    empagliflozin (JARDIANCE) 25 MG tablet, Take 25 mg by mouth every morning, Disp: 90 tablet, Rfl: 0    Insulin Pen Needle 32G X 4 MM MISC, USE TO INJECT LANTUS ONCE DAILY, Disp: 100 each, Rfl: 3    LANTUS SOLOSTAR 100 UNIT/ML injection pen, INJECT 28 UNITS INTO THE SKIN NIGHTLY (Patient taking differently: Inject 20 Units into the skin nightly ), Disp: 30 mL, Rfl: 1    loratadine (CLARITIN) 10 MG tablet, Take 10 mg by mouth daily, Disp: , Rfl:     lisinopril-hydrochlorothiazide (PRINZIDE;ZESTORETIC) 20-12.5 MG per tablet, Take 1 tablet by mouth daily (Patient taking differently: Take 0.5 tablets by mouth nightly ), Disp: 90 tablet, Rfl: 2    tiZANidine (ZANAFLEX) 4 MG tablet, Take a 1/2 tablet in the morning and one tablet at night (Patient taking differently: Take 4 mg by mouth 2 times daily ), Disp: 60 tablet, Rfl: 0    diclofenac sodium 1 % GEL, APPLY TO THE AFFECTED AREA(S) THREE TIMES A DAY (Patient taking differently: APPLY TO THE AFFECTED AREA(S) THREE TIMES A DAY PRN), Disp: 5 Tube, Rfl: 1    traZODone (DESYREL) 50 MG tablet, Take 50 mg by mouth nightly, Disp: , Rfl:     lidocaine (LIDODERM) 5 %, Place 2 patches onto the skin daily as needed 12 hours on, 12 hours off. , Disp: , Rfl:     atorvastatin (LIPITOR) 80 MG tablet, Take 80 mg by mouth nightly , Disp: , Rfl:     pantoprazole (PROTONIX) 40 MG tablet, Take 40 mg by mouth daily as needed , Disp: , Rfl:     clobetasol (TEMOVATE) 0.05 % cream, Apply topically 2 times daily to affected areas as needed for psoriasis. , Disp: 60 g, Rfl: 3      Review of Systems:    Constitutional: Negative for fever, chills, and unexpected weight change. HENT: Negative for congestion, ear pain, rhinorrhea,  sore throat and trouble swallowing. Eyes: Negative for photophobia, redness, itching. Respiratory: Negative for cough, shortness of breath and sputum. Cardiovascular: Negative for chest pain, palpitations and leg swelling. Gastrointestinal: has heartburn. Negative for nausea, vomiting, abdominal pain, diarrhea, constipation. Endocrine: Negative for cold intolerance, heat intolerance, polydipsia, polyphagia and polyuria. Genitourinary: Negative for dysuria, urgency, frequency, hematuria and flank pain.    Musculoskeletal: neck pain due to DJD , pain radiate to neck, arms. Negative for myalgias, back pain, arthralgias. Skin/Nail: Negative for rash, itching. Normal nails. Neurological: Negative for seizures, weakness, light-headedness, numbness and headaches. Hematological/ Lymph nodes: Negative for adenopathy. Does not bruise/bleed easily. Psychiatric/Behavioral: Negative for suicidal ideas, depression, anxiety, sleep disturbance and decreased concentration. Objective:   Physical Exam:  /69 (Site: Right Upper Arm, Position: Sitting, Cuff Size: Large Adult)   Pulse 68   Ht 6' 2\" (1.88 m)   Wt 232 lb 12.8 oz (105.6 kg)   SpO2 98%   BMI 29.89 kg/m²   Constitutional: Patient is oriented to person, place, and time. Patient appears well-developed and well-nourished. HENT:    Head: Normocephalic and atraumatic. Eyes: Conjunctivae and EOM are normal.     Neck: Normal range of motion. Cardiovascular: Normal rate, regular rhythm and normal heart sounds. Pulmonary/Chest: Effort normal and breath sounds normal.   Abdominal: Soft. Bowel sounds are normal.   Musculoskeletal: Normal range of motion. Neurological: Patient is alert and oriented to person, place, and time. Patient has normal reflexes. Skin: Skin is warm and dry. Psychiatric: Patient has a normal mood and affect.  Patient behavior is normal.     Lab Review:    Admission on 02/11/2020, Discharged on 02/11/2020   Component Date Value Ref Range Status    Cholesterol, Fasting 02/11/2020 126  0 - 199 mg/dL Final    Triglyceride, Fasting 02/11/2020 78  0 - 150 mg/dL Final    HDL 02/11/2020 36* 40 - 60 mg/dL Final    LDL Calculated 02/11/2020 74  <100 mg/dL Final    VLDL Cholesterol Calculated 02/11/2020 16  Not Established mg/dL Final    Hemoglobin A1C 02/11/2020 5.6  See comment % Final    eAG 02/11/2020 114.0  mg/dL Final    POC Glucose 02/11/2020 101* 70 - 99 mg/dl Final    Performed on 02/11/2020 ACCU-CHEK   Final    POC Glucose 02/11/2020 94  70 - 99 mg/dl 10/24/2019, Discharged on 10/24/2019   Component Date Value Ref Range Status    Sodium 10/24/2019 142  136 - 145 mmol/L Final    Potassium 10/24/2019 3.7  3.5 - 5.1 mmol/L Final    Chloride 10/24/2019 107  99 - 110 mmol/L Final    CO2 10/24/2019 25  21 - 32 mmol/L Final    Anion Gap 10/24/2019 10  3 - 16 Final    Glucose 10/24/2019 95  70 - 99 mg/dL Final    BUN 10/24/2019 25* 7 - 20 mg/dL Final    CREATININE 10/24/2019 1.0  0.8 - 1.3 mg/dL Final    GFR Non- 10/24/2019 >60  >60 Final    GFR  10/24/2019 >60  >60 Final    Calcium 10/24/2019 9.7  8.3 - 10.6 mg/dL Final    WBC 10/24/2019 9.4  4.0 - 11.0 K/uL Final    RBC 10/24/2019 5.19  4.20 - 5.90 M/uL Final    Hemoglobin 10/24/2019 15.4  13.5 - 17.5 g/dL Final    Hematocrit 10/24/2019 46.1  40.5 - 52.5 % Final    MCV 10/24/2019 88.9  80.0 - 100.0 fL Final    MCH 10/24/2019 29.7  26.0 - 34.0 pg Final    MCHC 10/24/2019 33.3  31.0 - 36.0 g/dL Final    RDW 10/24/2019 15.5* 12.4 - 15.4 % Final    Platelets 41/63/8042 256  135 - 450 K/uL Final    MPV 10/24/2019 8.5  5.0 - 10.5 fL Final    POC Glucose 10/24/2019 87  70 - 99 mg/dl Final    Performed on 10/24/2019 ACCU-CHEK   Final    POC Glucose 10/24/2019 88  70 - 99 mg/dl Final    Performed on 10/24/2019 ACCU-CHEK   Final   Admission on 10/02/2019, Discharged on 10/02/2019   Component Date Value Ref Range Status    POC Glucose 10/02/2019 70  70 - 99 mg/dl Final    Performed on 10/02/2019 ACCU-CHEK   Final   Office Visit on 09/24/2019   Component Date Value Ref Range Status    Diabetic Retinopathy 12/15/2018 Negative   Final   Hospital Outpatient Visit on 09/13/2019   Component Date Value Ref Range Status    Cortisol - AM 09/13/2019 2.0* 4.3 - 22.4 ug/dL Final   Hospital Outpatient Visit on 09/12/2019   Component Date Value Ref Range Status    Normetanephrine, Free 09/12/2019 0.69  0.00 - 0.89 nmol/L Final    Metanephrine, Free 09/12/2019 0.11  0.00 - 0.49 of medication compliance and regular follow-up. Aggressive lifestyle modification was encouraged. Exercise Counselling: This patient is a candidate for regular physical exercise. Instructions to perform the following types of exercise:  Swimming or water aerobic exercise  Brisk walking  Playing tennis  Stationary bicycle or elliptical indoor  Low impact aerobic exercise    Instructions given to exercise for the following duration:  30 minutes a day for five-seven days per week.     Following instructions for being active throughout the day in addition to formal exercise:  Walk instead of drive whenever possible  Take the stairs instead of the elevator  Work in the garden  Park to the far end of the parking lot to add more walking steps to destination      Electronically signed by Irais Sun MD on 2/25/2020 at 8:47 AM

## 2020-02-25 NOTE — PATIENT INSTRUCTIONS
good, quick way to make sure that you have a balanced meal. It also helps you spread carbs throughout the day. ? Divide your plate by types of foods. Put non-starchy vegetables on half the plate, meat or other protein food on one-quarter of the plate, and a grain or starchy vegetable in the final quarter of the plate. To this you can add a small piece of fruit and 1 cup of milk or yogurt, depending on how many carbs you are supposed to eat at a meal.  · Try to eat about the same amount of carbs at each meal. Do not \"save up\" your daily allowance of carbs to eat at one meal.  · Proteins have very little or no carbs per serving. Examples of proteins are beef, chicken, turkey, fish, eggs, tofu, cheese, cottage cheese, and peanut butter. A serving size of meat is 3 ounces, which is about the size of a deck of cards. Examples of meat substitute serving sizes (equal to 1 ounce of meat) are 1/4 cup of cottage cheese, 1 egg, 1 tablespoon of peanut butter, and ½ cup of tofu. How can you eat out and still eat healthy? · Learn to estimate the serving sizes of foods that have carbohydrate. If you measure food at home, it will be easier to estimate the amount in a serving of restaurant food. · If the meal you order has too much carbohydrate (such as potatoes, corn, or baked beans), ask to have a low-carbohydrate food instead. Ask for a salad or green vegetables. · If you use insulin, check your blood sugar before and after eating out to help you plan how much to eat in the future. · If you eat more carbohydrate at a meal than you had planned, take a walk or do other exercise. This will help lower your blood sugar. What else should you know? · Limit saturated fat, such as the fat from meat and dairy products. This is a healthy choice because people who have diabetes are at higher risk of heart disease. So choose lean cuts of meat and nonfat or low-fat dairy products.  Use olive or canola oil instead of butter or shortening when cooking. · Don't skip meals. Your blood sugar may drop too low if you skip meals and take insulin or certain medicines for diabetes. · Check with your doctor before you drink alcohol. Alcohol can cause your blood sugar to drop too low. Alcohol can also cause a bad reaction if you take certain diabetes medicines. Follow-up care is a key part of your treatment and safety. Be sure to make and go to all appointments, and call your doctor if you are having problems. It's also a good idea to know your test results and keep a list of the medicines you take. Where can you learn more? Go to https://PIE Softwarepepiceweb.Retailo. org and sign in to your [employer] account. Enter U054 in the [employer] box to learn more about \"Learning About Diabetes Food Guidelines. \"     If you do not have an account, please click on the \"Sign Up Now\" link. Current as of: April 16, 2019  Content Version: 12.3  © 4057-6510 Healthwise, Incorporated. Care instructions adapted under license by [facility] ([facility]). If you have questions about a medical condition or this instruction, always ask your healthcare professional. [address] any warranty or liability for your use of this information.

## 2020-03-04 ENCOUNTER — HOSPITAL ENCOUNTER (OUTPATIENT)
Dept: GENERAL RADIOLOGY | Age: 65
Discharge: HOME | End: 2020-03-04
Payer: MEDICARE

## 2020-03-04 ENCOUNTER — HOSPITAL ENCOUNTER (OUTPATIENT)
Age: 65
Discharge: HOME | End: 2020-03-04
Payer: MEDICARE

## 2020-03-04 PROCEDURE — 72040 X-RAY EXAM NECK SPINE 2-3 VW: CPT

## 2020-05-11 RX ORDER — EMPAGLIFLOZIN 25 MG/1
25 TABLET, FILM COATED ORAL EVERY MORNING
Qty: 90 TABLET | Refills: 0 | Status: SHIPPED | OUTPATIENT
Start: 2020-05-11 | End: 2020-08-05 | Stop reason: SDUPTHER

## 2020-05-28 RX ORDER — BLOOD SUGAR DIAGNOSTIC
STRIP MISCELLANEOUS
Qty: 100 EACH | Refills: 2 | Status: SHIPPED | OUTPATIENT
Start: 2020-05-28 | End: 2020-06-02 | Stop reason: SDUPTHER

## 2020-06-02 RX ORDER — BLOOD-GLUCOSE METER
EACH MISCELLANEOUS
Qty: 1 DEVICE | Refills: 0 | Status: SHIPPED | OUTPATIENT
Start: 2020-06-02

## 2020-06-02 RX ORDER — BLOOD SUGAR DIAGNOSTIC
STRIP MISCELLANEOUS
Qty: 100 EACH | Refills: 5 | Status: SHIPPED | OUTPATIENT
Start: 2020-06-02

## 2020-06-02 NOTE — TELEPHONE ENCOUNTER
[facility] [phone] called states that the escript med request for the Prodigy Test Strips had the Diagnosis code had wriiten on it and they cannot accept that. They are requesting a new escript with the Diagnosis code typed on it.

## 2020-06-08 ENCOUNTER — HOSPITAL ENCOUNTER (OUTPATIENT)
Dept: ULTRASOUND IMAGING | Age: 65
Discharge: HOME | End: 2020-06-08
Payer: MEDICARE

## 2020-06-08 PROCEDURE — 76770 US EXAM ABDO BACK WALL COMP: CPT

## 2020-06-16 ENCOUNTER — HOSPITAL ENCOUNTER (OUTPATIENT)
Dept: MRI IMAGING | Age: 65
Discharge: HOME | End: 2020-06-16
Payer: MEDICARE

## 2020-06-16 PROCEDURE — 73221 MRI JOINT UPR EXTREM W/O DYE: CPT

## 2020-06-16 PROCEDURE — 72146 MRI CHEST SPINE W/O DYE: CPT

## 2020-06-17 RX ORDER — ATORVASTATIN CALCIUM 80 MG/1
80 TABLET, FILM COATED ORAL NIGHTLY
Qty: 90 TABLET | Refills: 2 | Status: SHIPPED | OUTPATIENT
Start: 2020-06-17 | End: 2021-02-25 | Stop reason: SDUPTHER

## 2020-08-03 ENCOUNTER — OFFICE VISIT (OUTPATIENT)
Dept: ORTHOPEDIC SURGERY | Age: 65
End: 2020-08-03
Payer: MEDICARE

## 2020-08-03 VITALS — TEMPERATURE: 97.1 F | HEIGHT: 74 IN | WEIGHT: 232 LBS | BODY MASS INDEX: 29.77 KG/M2

## 2020-08-03 PROBLEM — G56.21 ULNAR NERVE ENTRAPMENT AT RIGHT ELBOW: Status: ACTIVE | Noted: 2020-08-03

## 2020-08-03 PROCEDURE — 1123F ACP DISCUSS/DSCN MKR DOCD: CPT | Performed by: ORTHOPAEDIC SURGERY

## 2020-08-03 PROCEDURE — 99213 OFFICE O/P EST LOW 20 MIN: CPT | Performed by: ORTHOPAEDIC SURGERY

## 2020-08-03 PROCEDURE — 4004F PT TOBACCO SCREEN RCVD TLK: CPT | Performed by: ORTHOPAEDIC SURGERY

## 2020-08-03 PROCEDURE — 4040F PNEUMOC VAC/ADMIN/RCVD: CPT | Performed by: ORTHOPAEDIC SURGERY

## 2020-08-03 PROCEDURE — G8427 DOCREV CUR MEDS BY ELIG CLIN: HCPCS | Performed by: ORTHOPAEDIC SURGERY

## 2020-08-03 PROCEDURE — G8417 CALC BMI ABV UP PARAM F/U: HCPCS | Performed by: ORTHOPAEDIC SURGERY

## 2020-08-03 PROCEDURE — 3017F COLORECTAL CA SCREEN DOC REV: CPT | Performed by: ORTHOPAEDIC SURGERY

## 2020-08-05 RX ORDER — EMPAGLIFLOZIN 25 MG/1
25 TABLET, FILM COATED ORAL EVERY MORNING
Qty: 90 TABLET | Refills: 1 | Status: SHIPPED | OUTPATIENT
Start: 2020-08-05 | End: 2021-01-29

## 2020-08-12 ENCOUNTER — TELEPHONE (OUTPATIENT)
Dept: ENDOCRINOLOGY | Age: 65
End: 2020-08-12

## 2020-08-12 NOTE — TELEPHONE ENCOUNTER
PT requests refill of Lantus pen needles sent to [facility] and he has completed his labs today at [facility]

## 2020-08-13 NOTE — TELEPHONE ENCOUNTER
Mr Miryam Rodriguez informed pen needles sent. Explained lab results received Dr. Swapna Delaney will discuss at [facility].

## 2020-08-25 ENCOUNTER — OFFICE VISIT (OUTPATIENT)
Dept: ENDOCRINOLOGY | Age: 65
End: 2020-08-25
Payer: MEDICARE

## 2020-08-25 VITALS
OXYGEN SATURATION: 97 % | SYSTOLIC BLOOD PRESSURE: 103 MMHG | HEART RATE: 82 BPM | BODY MASS INDEX: 24.9 KG/M2 | WEIGHT: 194 LBS | TEMPERATURE: 97.3 F | HEIGHT: 74 IN | DIASTOLIC BLOOD PRESSURE: 68 MMHG

## 2020-08-25 PROBLEM — Z79.4 CONTROLLED TYPE 2 DIABETES MELLITUS WITH DIABETIC POLYNEUROPATHY, WITH LONG-TERM CURRENT USE OF INSULIN (HCC): Status: ACTIVE | Noted: 2020-08-25

## 2020-08-25 PROBLEM — E11.42 CONTROLLED TYPE 2 DIABETES MELLITUS WITH DIABETIC POLYNEUROPATHY, WITH LONG-TERM CURRENT USE OF INSULIN (HCC): Status: ACTIVE | Noted: 2020-08-25

## 2020-08-25 PROBLEM — M79.10 MUSCLE PAIN: Status: ACTIVE | Noted: 2020-08-25

## 2020-08-25 PROCEDURE — G8427 DOCREV CUR MEDS BY ELIG CLIN: HCPCS | Performed by: INTERNAL MEDICINE

## 2020-08-25 PROCEDURE — 3017F COLORECTAL CA SCREEN DOC REV: CPT | Performed by: INTERNAL MEDICINE

## 2020-08-25 PROCEDURE — 4004F PT TOBACCO SCREEN RCVD TLK: CPT | Performed by: INTERNAL MEDICINE

## 2020-08-25 PROCEDURE — 99214 OFFICE O/P EST MOD 30 MIN: CPT | Performed by: INTERNAL MEDICINE

## 2020-08-25 PROCEDURE — G8420 CALC BMI NORM PARAMETERS: HCPCS | Performed by: INTERNAL MEDICINE

## 2020-08-25 PROCEDURE — 4040F PNEUMOC VAC/ADMIN/RCVD: CPT | Performed by: INTERNAL MEDICINE

## 2020-08-25 PROCEDURE — 2022F DILAT RTA XM EVC RTNOPTHY: CPT | Performed by: INTERNAL MEDICINE

## 2020-08-25 PROCEDURE — 3044F HG A1C LEVEL LT 7.0%: CPT | Performed by: INTERNAL MEDICINE

## 2020-08-25 PROCEDURE — 1123F ACP DISCUSS/DSCN MKR DOCD: CPT | Performed by: INTERNAL MEDICINE

## 2020-08-25 NOTE — PATIENT INSTRUCTIONS
Patient Education        Diabetes Foot Health: Care Instructions  Your Care Instructions     When you have diabetes, your feet need extra care and attention. Diabetes can damage the nerve endings and blood vessels in your feet, making you less likely to notice when your feet are injured. Diabetes also limits your body's ability to fight infection and get blood to areas that need it. If you get a minor foot injury, it could become an ulcer or a serious infection. With good foot care, you can prevent most of these problems. Caring for your feet can be quick and easy. Most of the care can be done when you are bathing or getting ready for bed. Follow-up care is a key part of your treatment and safety. Be sure to make and go to all appointments, and call your doctor if you are having problems. It's also a good idea to know your test results and keep a list of the medicines you take. How can you care for yourself at home? · Keep your blood sugar close to normal by watching what and how much you eat, monitoring blood sugar, taking medicines if prescribed, and getting regular exercise. · Do not smoke. Smoking affects blood flow and can make foot problems worse. If you need help quitting, talk to your doctor about stop-smoking programs and medicines. These can increase your chances of quitting for good. · Eat a diet that is low in fats. High fat intake can cause fat to build up in your blood vessels and decrease blood flow. · Inspect your feet daily for blisters, cuts, cracks, or sores. If you cannot see well, use a mirror or have someone help you. · Take care of your feet:  ? Wash your feet every day. Use warm (not hot) water. Check the water temperature with your wrists or other part of your body, not your feet. ? Dry your feet well. Pat them dry. Do not rub the skin on your feet too hard. Dry well between your toes.  If the skin on your feet stays moist, bacteria or a fungus can grow, which can lead to infection. ? Keep your skin soft. Use moisturizing skin cream to keep the skin on your feet soft and prevent calluses and cracks. But do not put the cream between your toes, and stop using any cream that causes a rash. ? Clean underneath your toenails carefully. Do not use a sharp object to clean underneath your toenails. Use the blunt end of a nail file or other rounded tool. ? Trim and file your toenails straight across to prevent ingrown toenails. Use a nail clipper, not scissors. Use an emery board to smooth the edges. · Change socks daily. Socks without seams are best, because seams often rub the feet. You can find socks for people with diabetes from specialty catalogs. · Look inside your shoes every day for things like gravel or torn linings, which could cause blisters or sores. · Buy shoes that fit well:  ? Look for shoes that have plenty of space around the toes. This helps prevent bunions and blisters. ? Try on shoes while wearing the kind of socks you will usually wear with the shoes. ? Avoid plastic shoes. They may rub your feet and cause blisters. Good shoes should be made of materials that are flexible and breathable, such as leather or cloth. ? Break in new shoes slowly by wearing them for no more than an hour a day for several days. Take extra time to check your feet for red areas, blisters, or other problems after you wear new shoes. · Do not go barefoot. Do not wear sandals, and do not wear shoes with very thin soles. Thin soles are easy to puncture. They also do not protect your feet from hot pavement or cold weather. · Have your doctor check your feet during each visit. If you have a foot problem, see your doctor. Do not try to treat an early foot problem at home. Home remedies or treatments that you can buy without a prescription (such as corn removers) can be harmful. · Always get early treatment for foot problems.  A minor irritation can lead to a major problem if not properly cared for early. When should you call for help? Call your doctor now or seek immediate medical care if:  · You have a foot sore, an ulcer or break in the skin that is not healing after 4 days, bleeding corns or calluses, or an ingrown toenail. · You have blue or black areas, which can mean bruising or blood flow problems. · You have peeling skin or tiny blisters between your toes or cracking or oozing of the skin. · You have a fever for more than 24 hours and a foot sore. · You have new numbness or tingling in your feet that does not go away after you move your feet or change positions. · You have unexplained or unusual swelling of the foot or ankle. Watch closely for changes in your health, and be sure to contact your doctor if:  · You cannot do proper foot care. Where can you learn more? Go to https://Outernetpepiceweb.Magic Software Enterprises. org and sign in to your [employer] account. Enter A739 in the [employer] ([employer]) box to learn more about \"Diabetes Foot Health: Care Instructions. \"     If you do not have an account, please click on the \"Sign Up Now\" link. Current as of: December 20, 2019               Content Version: 12.5  © 2817-7309 Healthwise, Incorporated. Care instructions adapted under license by [facility] ([facility]). If you have questions about a medical condition or this instruction, always ask your healthcare professional. [place] [address] any warranty or liability for your use of this information.

## 2020-08-25 NOTE — PROGRESS NOTES
Patient ID:   Severino Atwood is a 75 y.o. male    Chief Complaint:   Severino Atwood presents for an evaluation of Type 2 Diabetes Mellitus , Hyperlipidemia and hypertension. Subjective:   Type 2 Diabetes Mellitus diagnosed in 2017  On insulin since April 2019   Metformin caused muscle soreness     Has received steroid shots for trigger finger back pain      Lost 38 lbs since Feb 2020 by working on diet  Has some muscle pains     Lantus 18 units daily at night. TTT of . Jardiance 54CW daily   Trulicity 1.5 mg weekly on Wednesday    Checks blood sugars 1 times per day. Reviewed/Reported  AM: 90-low 100's   Lunch:  Supper:   HS: under 130     Hypoglycemias: None     Meals: Three, may be protein shake for breakfast. Dinner is bigger. Late night snack (cup of mixed nuts). Soda once a day, diet. No juices. Exercise: None because of back problems. Was walking but had to stop in June 2020 due to back pain and left foot bunion. Denies chest pain, exertional dyspnea. Family history of CAD: Dad at age 64. Mother had stroke in her 66's. Smokes 1/2 PPD. Counselled for smoking cessation.      Currently not on ASA, recommend baby aspirin if okay with gastroenterologist or PCP      The following portions of the patient's history were reviewed and updated as appropriate:       Family History   Problem Relation Age of Onset    Stroke Mother     Heart Disease Father          Social History     Socioeconomic History    Marital status:      Spouse name: Not on file    Number of children: Not on file    Years of education: Not on file    Highest education level: Not on file   Occupational History    Occupation: disability   Social Needs    Financial resource strain: Not on file    Food insecurity     Worry: Not on file     Inability: Not on file   [employer] needs     Medical: Not on file     Non-medical: Not on file   Tobacco Use    Smoking status: Current Every Day Smoker     Packs/day: 1.00     Years: 15.00     Pack years: 15.00     Types: Cigarettes    Smokeless tobacco: Never Used   Substance and Sexual Activity    Alcohol use: Not Currently     Alcohol/week: 1.0 standard drinks     Types: 1 Standard drinks or equivalent per week    Drug use: No     Comment: Medical Marijuana couple times a day    Sexual activity: Yes     Partners: Female   Lifestyle    Physical activity     Days per week: Not on file     Minutes per session: Not on file    Stress: Not on file   Relationships    Social connections     Talks on phone: Not on file     Gets together: Not on file     Attends Anabaptism service: Not on file     Active member of club or organization: Not on file     Attends meetings of clubs or organizations: Not on file     Relationship status: Not on file    Intimate partner violence     Fear of current or ex partner: Not on file     Emotionally abused: Not on file     Physically abused: Not on file     Forced sexual activity: Not on file   Other Topics Concern    Not on file   Social History Narrative    ** Merged History Encounter **            Past Medical History:   Diagnosis Date    Abdominal pain     Blood transfusion     30 yrs ago    Chronic back pain     Chronic pain syndrome     Depression     Depression     Diabetes mellitus ([facility] [address]     Failed back surgical syndrome     FH: colonic polyps     History of duodenal ulcer     now gets peptic ulcer    Hyperlipidemia     Hypertension     Insomnia     Obesity     Psychiatric problem     depression and anxiety    Sleep apnea     USES C-PAP    Ulcer, duodenum peptic        Past Surgical History:   Procedure Laterality Date    BACK SURGERY       X 2    CERVICAL FUSION N/A 12/17/2019    ANTERIOR CERVICAL DISCECTOMY AND FUSION AT [facility] ([id number], [address], [address] [place], [address]) performed by Timo Chris MD at [facility] COLONOSCOPY N/A 2/11/2020    COLONOSCOPY POLYPECTOMY SNARE/COLD arms. Negative for arthralgias. Skin/Nail: Negative for rash, itching. Normal nails. Neurological: Negative for seizures, weakness, light-headedness, numbness and headaches. Hematological/ Lymph nodes: Negative for adenopathy. Does not bruise/bleed easily. Psychiatric/Behavioral: Negative for suicidal ideas, depression, anxiety, sleep disturbance and decreased concentration. Objective:   Physical Exam:  /68 (Site: Left Upper Arm, Position: Sitting, Cuff Size: Large Adult)   Pulse 82   Temp 97.3 °F (36.3 °C) (Temporal)   Ht 6' 2\" (1.88 m)   Wt 194 lb (88 kg)   SpO2 97%   BMI 24.91 kg/m²   Constitutional: Patient is oriented to person, place, and time. Patient appears well-developed and well-nourished. HENT:    Head: Normocephalic and atraumatic. Eyes: Conjunctivae and EOM are normal.     Neck: Normal range of motion. Cardiovascular: Normal rate, regular rhythm and normal heart sounds. Pulmonary/Chest: Effort normal and breath sounds normal.   Abdominal: Soft. Bowel sounds are normal.   Musculoskeletal: Normal range of motion. Neurological: Patient is alert and oriented to person, place, and time. Patient has normal reflexes. Skin: Skin is warm and dry. Psychiatric: Patient has a normal mood and affect.  Patient behavior is normal.     Lab Review:    Orders Only on 08/12/2020   Component Date Value Ref Range Status    Hemoglobin A1C 08/12/2020 5.3  4.8 - 5.6 % Final   Orders Only on 08/12/2020   Component Date Value Ref Range Status    Creatinine, Ur 08/12/2020 121.5  Not Estab. mg/dL Final    Microalbumin, Random Urine 08/12/2020 13.0  Not Estab. ug/mL Final    Microalbumin Creatinine Ratio 08/12/2020 11  0 - 29 mg/g creat Final   Orders Only on 08/12/2020   Component Date Value Ref Range Status    Glucose 08/12/2020 77  65 - 99 mg/dL Final    BUN 08/12/2020 26  8 - 27 mg/dL Final    CREATININE 08/12/2020 0.80  0.76 - 1.27 mg/dL Final    GFR Non-African American 08/12/2020 93  >59 mL/min/1.73 Final    GFR  08/12/2020 108  >59 mL/min/1.73 Final    BUN/Creatinine Ratio 08/12/2020 33* 10 - 24 Final    Sodium 08/12/2020 138  134 - 144 mmol/L Final    Potassium 08/12/2020 4.3  3.5 - 5.2 mmol/L Final    Chloride 08/12/2020 105  96 - 106 mmol/L Final    CO2 08/12/2020 22  20 - 29 mmol/L Final    Calcium 08/12/2020 9.8  8.6 - 10.2 mg/dL Final    Total Protein 08/12/2020 6.1  6.0 - 8.5 g/dL Final    Alb 08/12/2020 4.3  3.8 - 4.8 g/dL Final    Globulin 08/12/2020 1.8  1.5 - 4.5 g/dL Final    Albumin/Globulin Ratio 08/12/2020 2.4* 1.2 - 2.2 Final    Total Bilirubin 08/12/2020 0.5  0.0 - 1.2 mg/dL Final    Alkaline Phosphatase 08/12/2020 65  39 - 117 IU/L Final    AST 08/12/2020 24  0 - 40 IU/L Final    ALT 08/12/2020 29  0 - 44 IU/L Final   Admission on 02/11/2020, Discharged on 02/11/2020   Component Date Value Ref Range Status    Cholesterol, Fasting 02/11/2020 126  0 - 199 mg/dL Final    Triglyceride, Fasting 02/11/2020 78  0 - 150 mg/dL Final    HDL 02/11/2020 36* 40 - 60 mg/dL Final    LDL Calculated 02/11/2020 74  <100 mg/dL Final    VLDL Cholesterol Calculated 02/11/2020 16  Not Established mg/dL Final    Hemoglobin A1C 02/11/2020 5.6  See comment % Final    eAG 02/11/2020 114.0  mg/dL Final    POC Glucose 02/11/2020 101* 70 - 99 mg/dl Final    Performed on 02/11/2020 ACCU-CHEK   Final    POC Glucose 02/11/2020 94  70 - 99 mg/dl Final    Performed on 02/11/2020 ACCU-CHEK   Final   Admission on 12/17/2019, Discharged on 12/17/2019   Component Date Value Ref Range Status    ABO/Rh 12/17/2019 O POS   Final    Antibody Screen 12/17/2019 NEG   Final    POC Glucose 12/17/2019 89  70 - 99 mg/dl Final    Performed on 12/17/2019 ACCU-CHEK   Final    POC Glucose 12/17/2019 99  70 - 99 mg/dl Final    Performed on 12/17/2019 [facility] Outpatient Visit on 12/10/2019   Component Date Value Ref Range Status    Sodium 12/10/2019 140  136 - 145 mmol/L Final    Potassium 12/10/2019 4.3  3.5 - 5.1 mmol/L Final    Chloride 12/10/2019 101  99 - 110 mmol/L Final    CO2 12/10/2019 23  21 - 32 mmol/L Final    Anion Gap 12/10/2019 16  3 - 16 Final    Glucose 12/10/2019 90  70 - 99 mg/dL Final    BUN 12/10/2019 31* 7 - 20 mg/dL Final    CREATININE 12/10/2019 0.9  0.8 - 1.3 mg/dL Final    GFR Non- 12/10/2019 >60  >60 Final    GFR  12/10/2019 >60  >60 Final    Calcium 12/10/2019 10.3  8.3 - 10.6 mg/dL Final    ABO/Rh 12/10/2019 O POS   Final    Antibody Screen 12/10/2019 NEG   Final    aPTT 12/10/2019 34.7  24.2 - 36.2 sec Final    Protime 12/10/2019 11.4  10.0 - 13.2 sec Final    INR 12/10/2019 0.98  0.86 - 1.14 Final    WBC 12/10/2019 8.4  4.0 - 11.0 K/uL Final    RBC 12/10/2019 5.12  4.20 - 5.90 M/uL Final    Hemoglobin 12/10/2019 15.5  13.5 - 17.5 g/dL Final    Hematocrit 12/10/2019 46.6  40.5 - 52.5 % Final    MCV 12/10/2019 91.0  80.0 - 100.0 fL Final    MCH 12/10/2019 30.2  26.0 - 34.0 pg Final    MCHC 12/10/2019 33.2  31.0 - 36.0 g/dL Final    RDW 12/10/2019 15.0  12.4 - 15.4 % Final    Platelets 97/63/6730 224  135 - 450 K/uL Final    MPV 12/10/2019 9.7  5.0 - 10.5 fL Final   Admission on 10/30/2019, Discharged on 10/30/2019   Component Date Value Ref Range Status    POC Glucose 10/30/2019 72  70 - 99 mg/dl Final    Performed on 10/30/2019 ACCU-CHEK   Final   Admission on 10/24/2019, Discharged on 10/24/2019   Component Date Value Ref Range Status    Sodium 10/24/2019 142  136 - 145 mmol/L Final    Potassium 10/24/2019 3.7  3.5 - 5.1 mmol/L Final    Chloride 10/24/2019 107  99 - 110 mmol/L Final    CO2 10/24/2019 25  21 - 32 mmol/L Final    Anion Gap 10/24/2019 10  3 - 16 Final    Glucose 10/24/2019 95  70 - 99 mg/dL Final    BUN 10/24/2019 25* 7 - 20 mg/dL Final    CREATININE 10/24/2019 1.0  0.8 - 1.3 mg/dL Final    GFR Non- 10/24/2019 >60  >60 CK; Future  -     Hemoglobin A1C; Future  -     Comprehensive Metabolic Panel; Future  -     Lipid, Fasting; Future    Smoker  -     Vitamin D 25 Hydroxy; Future  -     TSH WITH REFLEX TO FT4; Future  -     CK; Future    Left adrenal mass (HCC)  -     Vitamin D 25 Hydroxy; Future  -     TSH WITH REFLEX TO FT4; Future  -     CK; Future  -     CT ADRENALS WO CONTRAST; Future    Muscle pain  -     Vitamin D 25 Hydroxy; Future  -     TSH WITH REFLEX TO FT4; Future  -     CK; Future    Disorder of cartilage, unspecified   -     Vitamin D 25 Hydroxy; Future          1: Type 2 DM complicated with neuropathy   Controlled A1C 5.3% < 5.6% < 6.3% <  6.6% < 11.2%    H/o left partial pacreatectomy     Blood sugars and A1C are in good control     Continue Jardiance 25mg daily   C/w  Trulicity 2.5FF weekly  Lantus 18 units at bedtime   Use Treat to target basal insulin. If pre-breakfast sugar is above 130 on 2 consecutive days increase Lantus by 2 units. If pre-breakfast sugar is below 90 on one day decrease Lantus by 2 units. All instructions provided in written. Check Blood sugars 3 times per day. Log them along with insulin and send them every 2 weeks. Call for blood sugars less than 60 or more than 400. Eye exam: Last exam in Oct 2019, denies retinopathy. Has slight cataracts.    Foot exam:  April 2019   Deformity/amputation: absent  Skin lesions/pre-ulcerative calluses: present   Edema: right- negative, left- negative  Sensory exam: Monofilament sensation: normal  Pulses: normal, Vibration (128 Hz): mildly decreased     Renal screen: Aug 2020     Smoker: Counselled for smoking cessation   TSH screen: March 2019   CDE visit in April 2019    2: HTN   Low normal   Asymptomatic   Lisinopril 5 mg daily - stop   May restart if SBP >130, DBP >80     3: Hyperlipidemia   LDL: 74 , HDL: 36, TGs: 78 Feb 2020   Atorvastatin 80mg daily   Denies missing doses     4: Left adrenal adenoma on MRI   1.3 cm x 1.1 cm   Functional work up normal Sep 2019   CT Nov 2019: No change in the mild left adrenal gland thickening. Pancreatic cyst 0.7 x 0.9 cm cystic lesion     Do CT scan     6: Muscle pains  Check CK, TSH, Vit D today     RTC in 6 months A1C, lipids, CMP     EDUCATION:   Greater than 50% of this follow-up visit was spent in general counseling regarding   obesity, diet, exercise, importance of adherence to insulin regime, recognition and treatment of hypo and hyperglycemia,  glucose logging, proper diabetes management, diabetic complications with poor management and the importance of glycemic control in order to avoid the complications of diabetes. Risks and potential complications of diabetes were reviewed with the patient. Diabetes health maintenance plan and follow-up were discussed and understood by the patient. We reviewed the importance of medication compliance and regular follow-up. Aggressive lifestyle modification was encouraged. Exercise Counselling: This patient is a candidate for regular physical exercise. Instructions to perform the following types of exercise:  Swimming or water aerobic exercise  Brisk walking  Playing tennis  Stationary bicycle or elliptical indoor  Low impact aerobic exercise    Instructions given to exercise for the following duration:  30 minutes a day for five-seven days per week.     Following instructions for being active throughout the day in addition to formal exercise:  Walk instead of drive whenever possible  Take the stairs instead of the elevator  Work in the garden  Park to the far end of the parking lot to add more walking steps to destination      Electronically signed by Vaishnavi Palomo MD on 8/25/2020 at 9:07 AM

## 2020-10-01 ENCOUNTER — TELEPHONE (OUTPATIENT)
Dept: ENDOCRINOLOGY | Age: 65
End: 2020-10-01

## 2020-10-01 RX ORDER — INSULIN GLARGINE 100 [IU]/ML
18 INJECTION, SOLUTION SUBCUTANEOUS NIGHTLY
Qty: 5 PEN | Refills: 3 | Status: SHIPPED | OUTPATIENT
Start: 2020-10-01 | End: 2021-08-25 | Stop reason: SDUPTHER

## 2020-10-23 ENCOUNTER — TELEPHONE (OUTPATIENT)
Dept: ENDOCRINOLOGY | Age: 65
End: 2020-10-23

## 2020-10-23 NOTE — TELEPHONE ENCOUNTER
Mychart message was sent in Aug 2020, pasted below   If he is not using Mychart we can deactivate it     Mychart message   Work up done for muscle pains is normal   Thanks,   Anayeli Estrella MD

## 2020-10-23 NOTE — TELEPHONE ENCOUNTER
PT called states he hasn't received his results yet and has called twice now and would like to have a call back.  States he has cold extremities and wants to know what's going on

## 2020-10-28 ENCOUNTER — TELEPHONE (OUTPATIENT)
Dept: ENDOCRINOLOGY | Age: 65
End: 2020-10-28

## 2020-10-28 ENCOUNTER — OFFICE VISIT (OUTPATIENT)
Dept: ORTHOPEDIC SURGERY | Age: 65
End: 2020-10-28
Payer: MEDICARE

## 2020-10-28 VITALS — WEIGHT: 194 LBS | BODY MASS INDEX: 24.9 KG/M2 | HEIGHT: 74 IN

## 2020-10-28 PROCEDURE — L0625 LO FLEX L1-BELOW L5 PRE OTS: HCPCS | Performed by: FAMILY MEDICINE

## 2020-10-28 PROCEDURE — 4040F PNEUMOC VAC/ADMIN/RCVD: CPT | Performed by: FAMILY MEDICINE

## 2020-10-28 PROCEDURE — 1123F ACP DISCUSS/DSCN MKR DOCD: CPT | Performed by: FAMILY MEDICINE

## 2020-10-28 PROCEDURE — 4004F PT TOBACCO SCREEN RCVD TLK: CPT | Performed by: FAMILY MEDICINE

## 2020-10-28 PROCEDURE — G8427 DOCREV CUR MEDS BY ELIG CLIN: HCPCS | Performed by: FAMILY MEDICINE

## 2020-10-28 PROCEDURE — 3017F COLORECTAL CA SCREEN DOC REV: CPT | Performed by: FAMILY MEDICINE

## 2020-10-28 PROCEDURE — G8420 CALC BMI NORM PARAMETERS: HCPCS | Performed by: FAMILY MEDICINE

## 2020-10-28 PROCEDURE — G8484 FLU IMMUNIZE NO ADMIN: HCPCS | Performed by: FAMILY MEDICINE

## 2020-10-28 PROCEDURE — 99203 OFFICE O/P NEW LOW 30 MIN: CPT | Performed by: FAMILY MEDICINE

## 2020-10-28 RX ORDER — IBUPROFEN 800 MG/1
800 TABLET ORAL EVERY 8 HOURS PRN
Qty: 90 TABLET | Refills: 3 | Status: SHIPPED | OUTPATIENT
Start: 2020-10-28 | End: 2021-08-24 | Stop reason: ALTCHOICE

## 2020-10-28 RX ORDER — METHYLPREDNISOLONE 4 MG/1
TABLET ORAL
Qty: 42 KIT | Refills: 0 | Status: SHIPPED | OUTPATIENT
Start: 2020-10-28 | End: 2021-02-25 | Stop reason: ALTCHOICE

## 2020-10-28 NOTE — PROGRESS NOTES
social and family histories were reviewed and updated as appropriate. Review of Systems  Pertinent items are noted in HPI  Review of systems reviewed from Patient History Form dated on 10/28/2020 and available in the patient's chart under the Media tab. Vital Signs  There were no vitals filed for this visit. General Exam:     Constitutional: Patient is adequately groomed with no evidence of malnutrition  DTRs: Deep tendon reflexes are intact  Mental Status: The patient is oriented to time, place and person. The patient's mood and affect are appropriate. Lymphatic: The lymphatic examination bilaterally reveals all areas to be without enlargement or induration. Vascular: Examination reveals no swelling or calf tenderness. Peripheral pulses are palpable and 2+. Neurological: The patient has good coordination. There is no weakness or sensory deficit. Lumbar/Sacral Spine Examination  Inspection: There is no high-grade deformity or soft tissue swelling. Well-healed surgical scars from his previous lumbar fusion. He is fairly tight but no palpable spasm. Palpation: Does have clinical tenderness along the lower lumbar facets and lumbar facets. He does have central gluteal tenderness and lateral gluteal tenderness right greater than left. Rang of Motion: He does have diminished motion secondary to pain. He going forward flexed about 30 does have pain with extension to neutral and facet loading on the right does produce his lower back discomfort and possibly some pain in the proximal right leg L4 distribution. He also has pain with extension to the right with about a 50% reduction in lateral bending or rotation. Strength: There does not appear to be focal lower extremity motor deficits. Special Tests: He does appear to have positive straight leg raise right greater than left. Screening hip testing appears benign. Skin: There are no rashes, ulcerations or lesions.  Distal motor sensory and vascular exams intact. Gait: Altalgia. Reflexes:  Symmetrically preserved     Additional Comments:     Additional Examinations:  Right Lower Extremity: Examination of the right lower extremity does not show any tenderness, deformity or injury. Range of motion is unremarkable. There is no gross instability. There are no rashes, ulcerations or lesions. Strength and tone are normal.  Left Lower Extremity: Examination of the left lower extremity does not show any tenderness, deformity or injury. Range of motion is unremarkable. There is no gross instability. There are no rashes, ulcerations or lesions. Strength and tone are normal.      Diagnostic Test Findings: AP and lateral lumbar spine films were obtained today and does show well-positioned hardware consistent with his previous lumbar fusions without evidence of hardware failure. I do not really see any evidence of acute osseous injury or compression although he does have fairly pronounced lower facet arthropathy and lower level lumbar degenerative changes. Assessment: #1.   2 to 3-week status post lifting injury with exacerbation chronic episodic mechanical low back pain with underlying lumbar degenerative disc disease and previous lumbar fusions x2 with possible superimposed right L4 radiculopathy    Impression:  Encounter Diagnoses   Name Primary?     Low back pain, unspecified back pain laterality, unspecified chronicity, unspecified whether sciatica present Yes    Status post lumbar spinal fusion     DDD (degenerative disc disease), lumbar     Lumbar spondylolysis     Lumbar radiculopathy        Office Procedures:  Orders Placed This Encounter   Procedures    XR LUMBAR SPINE (MIN 4 VIEWS)     Order Specific Question:   Reason for exam:     Answer:   Flexion Extention and OBL    MRI LUMBAR SPINE W WO CONTRAST     Standing Status:   Future     Standing Expiration Date:   10/28/2021     Scheduling Instructions: [address] [place] [address]      [phone]      Fax # [phone]     Order Specific Question:   Reason for exam:     Answer:   EVALUATE LUMBAR DDD, SPONDYLOLYSIS. R/O RIGHT SIDED L4-L5 HNP. HX OF PREVIOUS LUMBAR SURGERY 2007 AND 2002/    External Referral To Physical Therapy     Referral Priority:   Routine     Referral Type:   Eval and Treat     Referral Reason:   Specialty Services Required     Requested Specialty:   Physical Therapy     Number of Visits Requested:   1    RAFAEL - Linda Nieves MD, Physical Medicine and Rehabilitation, [facility]     Referral Priority:   Routine     Referral Type:   Eval and Treat     Referral Reason:   Specialty Services Required     Referred to Provider:   Marita Alvarez MD     Requested Specialty:   Physical Medicine and Rehab     Number of Visits Requested:   1    Bird and Armin Extensor Lumbosacral Support Brace (Warm and Form)     Patient was prescribed a Bird and Armin Extensor Lumbosacral Support Brace with a pocket. This orthosis is required for the following reasons:    Reduce pain by restricting mobility of the trunk  Facilitate healing following an injury to the spine or related soft tissues  Support weak spinal muscles    The patient was educated and fit by a healthcare professional with expert knowledge and specialization in brace application while under the direct supervision of the treating physician. Verbal and written instructions for the use of and application of this item were provided. They were instructed to contact the office immediately should the brace result in increased pain, decreased sensation, increased swelling or worsening of the condition. Treatment Plan:  Treatment options were discussed with Lois Barrera. We did review his plain films and exam findings. He is a now several weeks out from his lifting injury in which he was trying to keep his grill from falling over. As noted above he does have fairly extensive lumbar and spine issues both the thoracic and cervical spine as well. He has had 2 previous lumbar fusions and one cervical fusion. He states that his current pain feels distinctly different and is now having discomfort in the right leg episodically in the L4 distribution. I would like for him to have an updated MRI with contrast of the lumbar spine to evaluate for a superimposed right L4-5/L5-S1 disc herniation/protrusion. We did place him on a double dose Medrol Dosepak and placed him in a warm-and-form belt. Following his Medrol, we started him on ibuprofen 800 mg 3 times daily. His diabetes is under excellent control and will monitor for transient hyperglycemia. He may continue with his Flexeril which she does get from his primary care doctor and I would recommend that he start physical therapy up at the [address] [place] [address] location in [facility]. He is already an established patient of [facility] and I would like for him to see Dr. Aide Jeronimo or Liz Toribio PA-C post imaging for treatment recommendations. He will contact us in the interim with questions or concerns. This dictation was performed with a verbal recognition program (DRAGON) and it was checked for errors. It is possible that there are still dictated errors within this office note. If so, please bring any errors to my attention for an addendum. All efforts were made to ensure that this office note is accurate.

## 2020-10-28 NOTE — TELEPHONE ENCOUNTER
PT has been dealing with a back injury and has been placed on 2 packs of Prednisone simultaneously. And he is concerned about his Blood sugars spiking.  He would like to know what his best steps would be in regards to Blood Sugars

## 2020-10-29 ENCOUNTER — TELEPHONE (OUTPATIENT)
Dept: ENDOCRINOLOGY | Age: 65
End: 2020-10-29

## 2020-11-02 DIAGNOSIS — M51.36 DDD (DEGENERATIVE DISC DISEASE), LUMBAR: ICD-10-CM

## 2020-11-02 DIAGNOSIS — M54.16 LUMBAR RADICULOPATHY: ICD-10-CM

## 2020-11-02 DIAGNOSIS — M43.06 LUMBAR SPONDYLOLYSIS: ICD-10-CM

## 2020-11-02 DIAGNOSIS — Z98.1 STATUS POST LUMBAR SPINAL FUSION: ICD-10-CM

## 2020-11-02 DIAGNOSIS — M54.50 LOW BACK PAIN, UNSPECIFIED BACK PAIN LATERALITY, UNSPECIFIED CHRONICITY, UNSPECIFIED WHETHER SCIATICA PRESENT: ICD-10-CM

## 2020-11-02 LAB
A/G RATIO: 2.4 (ref 1.1–2.2)
ALBUMIN SERPL-MCNC: 4 G/DL (ref 3.4–5)
ALP BLD-CCNC: 74 U/L (ref 40–129)
ALT SERPL-CCNC: 48 U/L (ref 10–40)
ANION GAP SERPL CALCULATED.3IONS-SCNC: 9 MMOL/L (ref 3–16)
AST SERPL-CCNC: 26 U/L (ref 15–37)
BASOPHILS ABSOLUTE: 0 K/UL (ref 0–0.2)
BASOPHILS RELATIVE PERCENT: 0.3 %
BILIRUB SERPL-MCNC: 0.4 MG/DL (ref 0–1)
BUN BLDV-MCNC: 23 MG/DL (ref 7–20)
CALCIUM SERPL-MCNC: 9.5 MG/DL (ref 8.3–10.6)
CHLORIDE BLD-SCNC: 104 MMOL/L (ref 99–110)
CO2: 28 MMOL/L (ref 21–32)
CREAT SERPL-MCNC: 0.7 MG/DL (ref 0.8–1.3)
EOSINOPHILS ABSOLUTE: 0.2 K/UL (ref 0–0.6)
EOSINOPHILS RELATIVE PERCENT: 1.9 %
GFR AFRICAN AMERICAN: >60
GFR NON-AFRICAN AMERICAN: >60
GLOBULIN: 1.7 G/DL
GLUCOSE BLD-MCNC: 82 MG/DL (ref 70–99)
HCT VFR BLD CALC: 45.5 % (ref 40.5–52.5)
HEMOGLOBIN: 15.5 G/DL (ref 13.5–17.5)
LYMPHOCYTES ABSOLUTE: 2.9 K/UL (ref 1–5.1)
LYMPHOCYTES RELATIVE PERCENT: 28.2 %
MCH RBC QN AUTO: 31.9 PG (ref 26–34)
MCHC RBC AUTO-ENTMCNC: 34 G/DL (ref 31–36)
MCV RBC AUTO: 94 FL (ref 80–100)
MONOCYTES ABSOLUTE: 1 K/UL (ref 0–1.3)
MONOCYTES RELATIVE PERCENT: 9.9 %
NEUTROPHILS ABSOLUTE: 6.1 K/UL (ref 1.7–7.7)
NEUTROPHILS RELATIVE PERCENT: 59.7 %
PDW BLD-RTO: 14.4 % (ref 12.4–15.4)
PLATELET # BLD: 223 K/UL (ref 135–450)
PMV BLD AUTO: 8.9 FL (ref 5–10.5)
POTASSIUM SERPL-SCNC: 4.2 MMOL/L (ref 3.5–5.1)
RBC # BLD: 4.84 M/UL (ref 4.2–5.9)
SODIUM BLD-SCNC: 141 MMOL/L (ref 136–145)
TOTAL PROTEIN: 5.7 G/DL (ref 6.4–8.2)
WBC # BLD: 10.2 K/UL (ref 4–11)

## 2020-11-03 ENCOUNTER — HOSPITAL ENCOUNTER (OUTPATIENT)
Dept: MRI IMAGING | Age: 65
Discharge: HOME | End: 2020-11-03
Payer: MEDICARE

## 2020-11-03 PROCEDURE — 72158 MRI LUMBAR SPINE W/O & W/DYE: CPT

## 2020-11-03 PROCEDURE — A9579 GAD-BASE MR CONTRAST NOS,1ML: HCPCS | Performed by: FAMILY MEDICINE

## 2020-11-03 PROCEDURE — 6360000004 HC RX CONTRAST MEDICATION: Performed by: FAMILY MEDICINE

## 2020-11-03 RX ADMIN — GADOTERIDOL 18 ML: 279.3 INJECTION, SOLUTION INTRAVENOUS at 15:17

## 2020-11-13 ENCOUNTER — HOSPITAL ENCOUNTER (OUTPATIENT)
Age: 65
Discharge: HOME | End: 2020-11-13
Payer: MEDICARE

## 2020-11-13 ENCOUNTER — HOSPITAL ENCOUNTER (OUTPATIENT)
Dept: CT IMAGING | Age: 65
Discharge: HOME | End: 2020-11-13
Payer: MEDICARE

## 2020-11-13 LAB
BUN BLDV-MCNC: 22 MG/DL (ref 7–20)
CREAT SERPL-MCNC: 0.7 MG/DL (ref 0.8–1.3)
GFR AFRICAN AMERICAN: >60
GFR NON-AFRICAN AMERICAN: >60
PROSTATE SPECIFIC ANTIGEN: 1.93 NG/ML (ref 0–4)

## 2020-11-13 PROCEDURE — 82565 ASSAY OF CREATININE: CPT

## 2020-11-13 PROCEDURE — 84153 ASSAY OF PSA TOTAL: CPT

## 2020-11-13 PROCEDURE — 36415 COLL VENOUS BLD VENIPUNCTURE: CPT

## 2020-11-13 PROCEDURE — 6360000004 HC RX CONTRAST MEDICATION: Performed by: UROLOGY

## 2020-11-13 PROCEDURE — 74170 CT ABD WO CNTRST FLWD CNTRST: CPT

## 2020-11-13 PROCEDURE — 84520 ASSAY OF UREA NITROGEN: CPT

## 2020-11-13 PROCEDURE — G0103 PSA SCREENING: HCPCS

## 2020-11-13 RX ADMIN — IOPAMIDOL 75 ML: 755 INJECTION, SOLUTION INTRAVENOUS at 07:19

## 2020-11-13 RX ADMIN — IOHEXOL 50 ML: 240 INJECTION, SOLUTION INTRATHECAL; INTRAVASCULAR; INTRAVENOUS; ORAL at 07:18

## 2021-01-12 LAB — DIABETIC RETINOPATHY: NEGATIVE

## 2021-01-29 DIAGNOSIS — E11.65 UNCONTROLLED TYPE 2 DIABETES MELLITUS WITH HYPERGLYCEMIA, WITH LONG-TERM CURRENT USE OF INSULIN (HCC): ICD-10-CM

## 2021-01-29 DIAGNOSIS — Z79.4 UNCONTROLLED TYPE 2 DIABETES MELLITUS WITH HYPERGLYCEMIA, WITH LONG-TERM CURRENT USE OF INSULIN (HCC): ICD-10-CM

## 2021-01-29 RX ORDER — EMPAGLIFLOZIN 25 MG/1
TABLET, FILM COATED ORAL
Qty: 90 TABLET | Refills: 0 | Status: SHIPPED | OUTPATIENT
Start: 2021-01-29 | End: 2021-04-30 | Stop reason: SDUPTHER

## 2021-02-03 LAB — BUN BLDV-MCNC: 28 MG/DL (ref 7–20)

## 2021-02-04 ENCOUNTER — HOSPITAL ENCOUNTER (OUTPATIENT)
Dept: MRI IMAGING | Age: 66
Discharge: HOME | End: 2021-02-04
Payer: MEDICARE

## 2021-02-04 DIAGNOSIS — H53.2 DOUBLE VISION: ICD-10-CM

## 2021-02-04 LAB
CREAT SERPL-MCNC: 0.9 MG/DL (ref 0.6–1.2)
GFR AFRICAN AMERICAN: >60
GFR NON-AFRICAN AMERICAN: >60

## 2021-02-04 PROCEDURE — A9577 INJ MULTIHANCE: HCPCS | Performed by: OPHTHALMOLOGY

## 2021-02-04 PROCEDURE — 70543 MRI ORBT/FAC/NCK W/O &W/DYE: CPT

## 2021-02-04 PROCEDURE — 6360000004 HC RX CONTRAST MEDICATION: Performed by: OPHTHALMOLOGY

## 2021-02-04 PROCEDURE — 2580000003 HC RX 258: Performed by: OPHTHALMOLOGY

## 2021-02-04 RX ORDER — SODIUM CHLORIDE 0.9 % (FLUSH) 0.9 %
10 SYRINGE (ML) INJECTION EVERY 12 HOURS SCHEDULED
Status: DISCONTINUED | OUTPATIENT
Start: 2021-02-04 | End: 2021-02-05 | Stop reason: HOSPADM

## 2021-02-04 RX ORDER — SODIUM CHLORIDE 0.9 % (FLUSH) 0.9 %
10 SYRINGE (ML) INJECTION PRN
Status: DISCONTINUED | OUTPATIENT
Start: 2021-02-04 | End: 2021-02-05 | Stop reason: HOSPADM

## 2021-02-04 RX ADMIN — GADOBENATE DIMEGLUMINE 19 ML: 529 INJECTION, SOLUTION INTRAVENOUS at 20:53

## 2021-02-04 RX ADMIN — Medication 10 ML: at 20:53

## 2021-02-23 DIAGNOSIS — M79.10 MUSCLE PAIN: ICD-10-CM

## 2021-02-23 DIAGNOSIS — I10 ESSENTIAL HYPERTENSION: ICD-10-CM

## 2021-02-23 DIAGNOSIS — E11.42 CONTROLLED TYPE 2 DIABETES MELLITUS WITH DIABETIC POLYNEUROPATHY, WITH LONG-TERM CURRENT USE OF INSULIN (HCC): ICD-10-CM

## 2021-02-23 DIAGNOSIS — E11.69 DYSLIPIDEMIA ASSOCIATED WITH TYPE 2 DIABETES MELLITUS (HCC): ICD-10-CM

## 2021-02-23 DIAGNOSIS — M94.9 DISORDER OF CARTILAGE, UNSPECIFIED: ICD-10-CM

## 2021-02-23 DIAGNOSIS — E27.8 LEFT ADRENAL MASS (HCC): ICD-10-CM

## 2021-02-23 DIAGNOSIS — E78.5 DYSLIPIDEMIA ASSOCIATED WITH TYPE 2 DIABETES MELLITUS (HCC): ICD-10-CM

## 2021-02-23 DIAGNOSIS — Z79.4 CONTROLLED TYPE 2 DIABETES MELLITUS WITH DIABETIC POLYNEUROPATHY, WITH LONG-TERM CURRENT USE OF INSULIN (HCC): ICD-10-CM

## 2021-02-23 DIAGNOSIS — F17.200 SMOKER: ICD-10-CM

## 2021-02-23 PROBLEM — M25.511 PAIN IN RIGHT SHOULDER: Status: ACTIVE | Noted: 2020-03-05

## 2021-02-23 PROBLEM — M47.814 SPONDYLOSIS WITHOUT MYELOPATHY OR RADICULOPATHY, THORACIC REGION: Status: ACTIVE | Noted: 2020-06-25

## 2021-02-23 PROBLEM — M62.838 MUSCLE SPASM: Status: ACTIVE | Noted: 2020-11-19

## 2021-02-23 PROBLEM — M79.7 FIBROMYOSITIS: Status: ACTIVE | Noted: 2020-07-07

## 2021-02-23 PROBLEM — Z98.890 HISTORY OF SPINAL SURGERY: Status: ACTIVE | Noted: 2020-07-07

## 2021-02-23 LAB
A/G RATIO: 2.3 (ref 1.1–2.2)
ALBUMIN SERPL-MCNC: 4.1 G/DL (ref 3.4–5)
ALP BLD-CCNC: 84 U/L (ref 40–129)
ALT SERPL-CCNC: 34 U/L (ref 10–40)
ANION GAP SERPL CALCULATED.3IONS-SCNC: 9 MMOL/L (ref 3–16)
AST SERPL-CCNC: 29 U/L (ref 15–37)
BILIRUB SERPL-MCNC: 0.3 MG/DL (ref 0–1)
BUN BLDV-MCNC: 25 MG/DL (ref 7–20)
CALCIUM SERPL-MCNC: 9.1 MG/DL (ref 8.3–10.6)
CHLORIDE BLD-SCNC: 109 MMOL/L (ref 99–110)
CHOLESTEROL, FASTING: 145 MG/DL (ref 0–199)
CO2: 24 MMOL/L (ref 21–32)
CREAT SERPL-MCNC: 0.8 MG/DL (ref 0.8–1.3)
ESTIMATED AVERAGE GLUCOSE: 108.3 MG/DL
GFR AFRICAN AMERICAN: >60
GFR NON-AFRICAN AMERICAN: >60
GLOBULIN: 1.8 G/DL
GLUCOSE BLD-MCNC: 83 MG/DL (ref 70–99)
HBA1C MFR BLD: 5.4 %
HDLC SERPL-MCNC: 47 MG/DL (ref 40–60)
LDL CHOLESTEROL CALCULATED: 80 MG/DL
POTASSIUM SERPL-SCNC: 4.1 MMOL/L (ref 3.5–5.1)
SODIUM BLD-SCNC: 142 MMOL/L (ref 136–145)
TOTAL CK: 169 U/L (ref 39–308)
TOTAL PROTEIN: 5.9 G/DL (ref 6.4–8.2)
TRIGLYCERIDE, FASTING: 89 MG/DL (ref 0–150)
TSH REFLEX FT4: 1.39 UIU/ML (ref 0.27–4.2)
VITAMIN D 25-HYDROXY: 43.6 NG/ML
VLDLC SERPL CALC-MCNC: 18 MG/DL

## 2021-02-24 ENCOUNTER — IMMUNIZATION (OUTPATIENT)
Dept: PRIMARY CARE CLINIC | Age: 66
End: 2021-02-24
Payer: MEDICARE

## 2021-02-24 PROCEDURE — 0001A COVID-19, PFIZER VACCINE 30MCG/0.3ML DOSE: CPT | Performed by: FAMILY MEDICINE

## 2021-02-24 PROCEDURE — 91300 COVID-19, PFIZER VACCINE 30MCG/0.3ML DOSE: CPT | Performed by: FAMILY MEDICINE

## 2021-02-25 ENCOUNTER — VIRTUAL VISIT (OUTPATIENT)
Dept: ENDOCRINOLOGY | Age: 66
End: 2021-02-25
Payer: MEDICARE

## 2021-02-25 DIAGNOSIS — I10 ESSENTIAL HYPERTENSION: ICD-10-CM

## 2021-02-25 DIAGNOSIS — E11.42 CONTROLLED TYPE 2 DIABETES MELLITUS WITH DIABETIC POLYNEUROPATHY, WITH LONG-TERM CURRENT USE OF INSULIN (HCC): Primary | ICD-10-CM

## 2021-02-25 DIAGNOSIS — M79.10 MUSCLE PAIN: ICD-10-CM

## 2021-02-25 DIAGNOSIS — E78.5 DYSLIPIDEMIA ASSOCIATED WITH TYPE 2 DIABETES MELLITUS (HCC): ICD-10-CM

## 2021-02-25 DIAGNOSIS — Z79.4 CONTROLLED TYPE 2 DIABETES MELLITUS WITH DIABETIC POLYNEUROPATHY, WITH LONG-TERM CURRENT USE OF INSULIN (HCC): Primary | ICD-10-CM

## 2021-02-25 DIAGNOSIS — E11.69 DYSLIPIDEMIA ASSOCIATED WITH TYPE 2 DIABETES MELLITUS (HCC): ICD-10-CM

## 2021-02-25 DIAGNOSIS — E11.65 UNCONTROLLED TYPE 2 DIABETES MELLITUS WITH HYPERGLYCEMIA, WITH LONG-TERM CURRENT USE OF INSULIN (HCC): ICD-10-CM

## 2021-02-25 DIAGNOSIS — F17.200 SMOKER: ICD-10-CM

## 2021-02-25 DIAGNOSIS — Z79.4 UNCONTROLLED TYPE 2 DIABETES MELLITUS WITH HYPERGLYCEMIA, WITH LONG-TERM CURRENT USE OF INSULIN (HCC): ICD-10-CM

## 2021-02-25 DIAGNOSIS — E66.3 OVERWEIGHT: ICD-10-CM

## 2021-02-25 DIAGNOSIS — E27.8 LEFT ADRENAL MASS (HCC): ICD-10-CM

## 2021-02-25 PROCEDURE — 4040F PNEUMOC VAC/ADMIN/RCVD: CPT | Performed by: INTERNAL MEDICINE

## 2021-02-25 PROCEDURE — 2022F DILAT RTA XM EVC RTNOPTHY: CPT | Performed by: INTERNAL MEDICINE

## 2021-02-25 PROCEDURE — G8427 DOCREV CUR MEDS BY ELIG CLIN: HCPCS | Performed by: INTERNAL MEDICINE

## 2021-02-25 PROCEDURE — 1123F ACP DISCUSS/DSCN MKR DOCD: CPT | Performed by: INTERNAL MEDICINE

## 2021-02-25 PROCEDURE — 99214 OFFICE O/P EST MOD 30 MIN: CPT | Performed by: INTERNAL MEDICINE

## 2021-02-25 PROCEDURE — 3017F COLORECTAL CA SCREEN DOC REV: CPT | Performed by: INTERNAL MEDICINE

## 2021-02-25 PROCEDURE — 3044F HG A1C LEVEL LT 7.0%: CPT | Performed by: INTERNAL MEDICINE

## 2021-02-25 RX ORDER — ATORVASTATIN CALCIUM 80 MG/1
80 TABLET, FILM COATED ORAL NIGHTLY
Qty: 90 TABLET | Refills: 3 | Status: SHIPPED | OUTPATIENT
Start: 2021-02-25 | End: 2022-04-21

## 2021-02-25 RX ORDER — PREGABALIN 200 MG/1
CAPSULE ORAL 3 TIMES DAILY
COMMUNITY
Start: 2021-01-22 | End: 2022-01-18 | Stop reason: SDUPTHER

## 2021-02-25 RX ORDER — METHOCARBAMOL 750 MG/1
750 TABLET, FILM COATED ORAL 4 TIMES DAILY
COMMUNITY
Start: 2020-11-09 | End: 2022-02-01 | Stop reason: SDUPTHER

## 2021-02-25 RX ORDER — CELECOXIB 200 MG/1
CAPSULE ORAL
COMMUNITY
Start: 2020-12-31 | End: 2022-02-01 | Stop reason: SDUPTHER

## 2021-02-25 RX ORDER — ESCITALOPRAM OXALATE 20 MG/1
TABLET ORAL
COMMUNITY
Start: 2021-01-22 | End: 2021-10-25

## 2021-02-25 NOTE — PROGRESS NOTES
Patient ID:   Dee Dee Curry is a 77 y.o. male    Chief Complaint:   Dee Dee Curry presents for an evaluation of Type 2 Diabetes Mellitus , Hyperlipidemia and hypertension. Subjective:   Type 2 Diabetes Mellitus diagnosed in 2017  On insulin since April 2019   Metformin caused muscle soreness     Has received steroid shots for trigger finger back pain      Lost 22 lbs since Feb 2020 by working on diet  Has some muscle pains (diffuse)   Now getting pains   Seeing Neurologist     Lantus 20 units daily at night. TTT of . Jardiance 40OX daily   Trulicity 1.5 mg weekly on Wednesday    Checks blood sugars 0-1 times per day. Reviewed/Reported  AM: usually good   Lunch:  Supper:   HS: low 100's     Hypoglycemias: None     Meals: Three, may be protein shake for breakfast. Dinner is bigger. Late night snack (cup of mixed nuts). Soda once a day, diet. No juices. Exercise: None because of back problems. Was walking but had to stop in June 2020 due to back pain and left foot bunion. Denies chest pain, exertional dyspnea. Family history of CAD: Dad at age 64. Mother had stroke in her 66's. Smokes 1/2 PPD. Counselled for smoking cessation.      Currently not on ASA, recommend baby aspirin if okay with gastroenterologist or PCP      The following portions of the patient's history were reviewed and updated as appropriate:       Family History   Problem Relation Age of Onset    Stroke Mother     Heart Disease Father          Social History     Socioeconomic History    Marital status:      Spouse name: Not on file    Number of children: Not on file    Years of education: Not on file    Highest education level: Not on file   Occupational History    Occupation: disability   Social Needs    Financial resource strain: Not on file    Food insecurity     Worry: Not on file     Inability: Not on file    Transportation needs     Medical: Not on file     Non-medical: Not on file   Tobacco Use    Smoking status: Current Every Day Smoker     Packs/day: 1.00     Years: 15.00     Pack years: 15.00     Types: Cigarettes    Smokeless tobacco: Never Used   Substance and Sexual Activity    Alcohol use: Not Currently     Alcohol/week: 1.0 standard drinks     Types: 1 Standard drinks or equivalent per week    Drug use: No     Comment: Medical Marijuana couple times a day    Sexual activity: Yes     Partners: Female   Lifestyle    Physical activity     Days per week: Not on file     Minutes per session: Not on file    Stress: Not on file   Relationships    Social connections     Talks on phone: Not on file     Gets together: Not on file     Attends Protestant service: Not on file     Active member of club or organization: Not on file     Attends meetings of clubs or organizations: Not on file     Relationship status: Not on file    Intimate partner violence     Fear of current or ex partner: Not on file     Emotionally abused: Not on file     Physically abused: Not on file     Forced sexual activity: Not on file   Other Topics Concern    Not on file   Social History Narrative    ** Merged History Encounter **            Past Medical History:   Diagnosis Date    Abdominal pain     Blood transfusion     30 yrs ago    Chronic back pain     Chronic pain syndrome     Depression     Depression     Diabetes mellitus ([facility] [address]     Failed back surgical syndrome     FH: colonic polyps     History of duodenal ulcer     now gets peptic ulcer    Hyperlipidemia     Hypertension     Insomnia     Obesity     Psychiatric problem     depression and anxiety    Sleep apnea     USES C-PAP    Ulcer, duodenum peptic        Past Surgical History:   Procedure Laterality Date    BACK SURGERY       X 2    CERVICAL FUSION N/A 12/17/2019    ANTERIOR CERVICAL DISCECTOMY AND FUSION AT [address] [facility] ([address], [address], [address] [place], [address], [id number]) performed by Diana Cm MD at [address] N/A 2/11/2020    COLONOSCOPY POLYPECTOMY SNARE/COLD BIOPSY performed by Tesha Peterson MD at [address], COLON, DIAGNOSTIC      EPIDURAL STEROID INJECTION N/A 10/2/2019    C7-T1 INTERLAMINAR EPIDURAL STEROID INJECTION WITH FLUOROSCOPY performed by Renee Shaffer MD at [address] 10/30/2019    C7-T1 INTERLMINAR EPIDURAL STEROID INJECTION WITH FLUOROSCOPY performed by Renee Shaffer MD at [facility] 32 X 2    KNEE ARTHROSCOPY Left 10/24/2019    LEFT KNEE ARTHROSCOPY WITH PARTIAL MEDIAL MENISCECTOMY;SYNOVECTOMY WITH PLICA REMOVAL performed by Antonietta Dobson MD at [address] ARTHROSCOPY Left 10/24/2019    LAPAROSCOPIC APPENDECTOMY N/A 5/10/2019    APPENDECTOMY LAPAROSCOPIC, LYSIS OF ADHESIONS performed by Raffaele Muniz MD at [facility]    fatty tumor removal under right arm    PARTIAL NEPHRECTOMY Left 8/6/2019    ROBOTIC LAPAROSCOPIC LEFT PARTIAL NEPHRECTOMY performed by Sachin Adams MD at [address] ENDOSCOPY           Allergies   Allergen Reactions    Cymbalta [Duloxetine Hcl] Diarrhea    Metformin And Related      Muscle aches    Shellfish-Derived Products Nausea And Vomiting     *PT STATES NO PROBLEMS WITH TOPICAL IODINE         Current Outpatient Medications:     methocarbamol (ROBAXIN) 750 MG tablet, METHOCARBAMOL 750 MG TABS, Disp: , Rfl:     escitalopram (LEXAPRO) 20 MG tablet, TAKE 1 TABLET BY MOUTH EVERY DAY, Disp: , Rfl:     celecoxib (CELEBREX) 200 MG capsule, TAKE 1 CAPSULE BY MOUTH TWO TIMES A DAY, Disp: , Rfl:     pregabalin (LYRICA) 200 MG capsule, 3 times daily. , Disp: , Rfl:     atorvastatin (LIPITOR) 80 MG tablet, Take 1 tablet by mouth nightly, Disp: 90 tablet, Rfl: 3    JARDIANCE 25 MG tablet, TAKE 1 TABLET BY MOUTH IN THE MORNING , Disp: 90 tablet, Rfl: 0    Dulaglutide 1.5 MG/0.5ML SOPN, INJECT THE CONTENTS OF 1 SYRINGE INTO THE SKIN ONCE WEEKLY, Disp: 12 pen, Rfl: 2    insulin glargine (LANTUS SOLOSTAR) 100 UNIT/ML injection pen, Inject 18 Units into the skin nightly (Patient taking differently: Inject 20 Units into the skin nightly ), Disp: 5 pen, Rfl: 3    Insulin Pen Needle 32G X 4 MM MISC, USE TO INJECT LANTUS ONCE DAILY, Disp: 100 each, Rfl: 3    Blood Glucose Monitoring Suppl (PRODIGY AUTOCODE BLOOD GLUCOSE) LILO, Check sugar three times per day. Diagnosis code: E11.42, Disp: 1 Device, Rfl: 0    blood glucose test strips (PRODIGY NO CODING BLOOD GLUC) strip, Prodigy No Coding strips. Test BS three times daily. Diagnosis code: E11.42, Disp: 100 each, Rfl: 5    medical marijuana, MEDICAL MARIJUANA, Disp: , Rfl:     diclofenac sodium 1 % GEL, APPLY TO THE AFFECTED AREA(S) THREE TIMES A DAY (Patient taking differently: APPLY TO THE AFFECTED AREA(S) THREE TIMES A DAY PRN), Disp: 5 Tube, Rfl: 1    lidocaine (LIDODERM) 5 %, Place 2 patches onto the skin daily as needed 12 hours on, 12 hours off. , Disp: , Rfl:     pantoprazole (PROTONIX) 40 MG tablet, Take 40 mg by mouth daily as needed , Disp: , Rfl:     clobetasol (TEMOVATE) 0.05 % cream, Apply topically 2 times daily to affected areas as needed for psoriasis. , Disp: 60 g, Rfl: 3    ibuprofen (ADVIL;MOTRIN) 800 MG tablet, Take 1 tablet by mouth every 8 hours as needed for Pain, Disp: 90 tablet, Rfl: 3      Review of Systems:    Constitutional: Negative for fever, chills, and unexpected weight change. HENT: Negative for congestion, ear pain, rhinorrhea,  sore throat and trouble swallowing. Eyes: Negative for photophobia, redness, itching. Respiratory: Negative for cough, shortness of breath and sputum. Cardiovascular: Negative for chest pain, palpitations and leg swelling. Gastrointestinal: has heartburn. Negative for nausea, vomiting, abdominal pain, diarrhea, constipation.    Endocrine: Negative for cold intolerance, heat intolerance, polydipsia, polyphagia and polyuria. Genitourinary: Negative for dysuria, urgency, frequency, hematuria and flank pain. Musculoskeletal: neck pain due to DJD , pain radiate to neck, arms. Negative for arthralgias. Skin/Nail: Negative for rash, itching. Normal nails. Neurological: Negative for seizures, weakness, light-headedness, numbness and headaches. Hematological/ Lymph nodes: Negative for adenopathy. Does not bruise/bleed easily. Psychiatric/Behavioral: Negative for suicidal ideas, depression, anxiety, sleep disturbance and decreased concentration. Objective:   Physical Exam:  There were no vitals taken for this visit. Constitutional: Patient is oriented to person, place, and time. Patient appears well-developed and well-nourished. HENT:    Head: Normocephalic and atraumatic. Eyes: Conjunctivae and EOM are normal.     Neck: Normal range of motion. Cardiovascular: Normal rate, regular rhythm and normal heart sounds. Pulmonary/Chest: Effort normal and breath sounds normal.   Abdominal: Soft. Bowel sounds are normal.   Musculoskeletal: Normal range of motion. Neurological: Patient is alert and oriented to person, place, and time. Patient has normal reflexes. Skin: Skin is warm and dry. Psychiatric: Patient has a normal mood and affect.  Patient behavior is normal.     Lab Review:    Orders Only on 02/23/2021   Component Date Value Ref Range Status    Cholesterol, Fasting 02/23/2021 145  0 - 199 mg/dL Final    Triglyceride, Fasting 02/23/2021 89  0 - 150 mg/dL Final    HDL 02/23/2021 47  40 - 60 mg/dL Final    LDL Calculated 02/23/2021 80  <100 mg/dL Final    VLDL Cholesterol Calculated 02/23/2021 18  Not Established mg/dL Final    Sodium 02/23/2021 142  136 - 145 mmol/L Final    Potassium 02/23/2021 4.1  3.5 - 5.1 mmol/L Final    Chloride 02/23/2021 109  99 - 110 mmol/L Final    CO2 02/23/2021 24  21 - 32 mmol/L Final    Anion Gap 02/23/2021 9  3 - 16 Final    Glucose 02/23/2021 83 70 - 99 mg/dL Final    BUN 02/23/2021 25* 7 - 20 mg/dL Final    CREATININE 02/23/2021 0.8  0.8 - 1.3 mg/dL Final    GFR Non- 02/23/2021 >60  >60 Final    GFR  02/23/2021 >60  >60 Final    Calcium 02/23/2021 9.1  8.3 - 10.6 mg/dL Final    Total Protein 02/23/2021 5.9* 6.4 - 8.2 g/dL Final    Albumin 02/23/2021 4.1  3.4 - 5.0 g/dL Final    Albumin/Globulin Ratio 02/23/2021 2.3* 1.1 - 2.2 Final    Total Bilirubin 02/23/2021 0.3  0.0 - 1.0 mg/dL Final    Alkaline Phosphatase 02/23/2021 84  40 - 129 U/L Final    ALT 02/23/2021 34  10 - 40 U/L Final    AST 02/23/2021 29  15 - 37 U/L Final    Globulin 02/23/2021 1.8  g/dL Final    Hemoglobin A1C 02/23/2021 5.4  See comment % Final    eAG 02/23/2021 108.3  mg/dL Final    Total CK 02/23/2021 169  39 - 308 U/L Final    TSH Reflex FT4 02/23/2021 1.39  0.27 - 4.20 uIU/mL Final    Vit D, 25-Hydroxy 02/23/2021 43.6  >=30 ng/mL Final   Orders Only on 02/09/2021   Component Date Value Ref Range Status    ACETYLCHOLINE BINDING ANTIBODY 02/09/2021 0.0  0.0 - 0.4 nmol/L Final   Orders Only on 02/04/2021   Component Date Value Ref Range Status    CREATININE 02/04/2021 0.9  0.6 - 1.2 mg/dL Final    GFR Non- 02/04/2021 >60  >60 Final    GFR  02/04/2021 >60  >60 Final   Orders Only on 02/02/2021   Component Date Value Ref Range Status    BUN 02/02/2021 28* 7 - 20 mg/dL Final   Hospital Outpatient Visit on 11/13/2020   Component Date Value Ref Range Status    PSA 11/13/2020 1.93  0.00 - 4.00 ng/mL Final    BUN 11/13/2020 22* 7 - 20 mg/dL Final    CREATININE 11/13/2020 0.7* 0.8 - 1.3 mg/dL Final    GFR Non- 11/13/2020 >60  >60 Final    GFR  11/13/2020 >60  >60 Final   Orders Only on 11/02/2020   Component Date Value Ref Range Status    WBC 11/02/2020 10.2  4.0 - 11.0 K/uL Final    RBC 11/02/2020 4.84  4.20 - 5.90 M/uL Final    Hemoglobin 11/02/2020 15.5  13.5 - 17.5 g/dL Final    Hematocrit 11/02/2020 45.5  40.5 - 52.5 % Final    MCV 11/02/2020 94.0  80.0 - 100.0 fL Final    MCH 11/02/2020 31.9  26.0 - 34.0 pg Final    MCHC 11/02/2020 34.0  31.0 - 36.0 g/dL Final    RDW 11/02/2020 14.4  12.4 - 15.4 % Final    Platelets 65/10/6515 223  135 - 450 K/uL Final    MPV 11/02/2020 8.9  5.0 - 10.5 fL Final    Neutrophils % 11/02/2020 59.7  % Final    Lymphocytes % 11/02/2020 28.2  % Final    Monocytes % 11/02/2020 9.9  % Final    Eosinophils % 11/02/2020 1.9  % Final    Basophils % 11/02/2020 0.3  % Final    Neutrophils Absolute 11/02/2020 6.1  1.7 - 7.7 K/uL Final    Lymphocytes Absolute 11/02/2020 2.9  1.0 - 5.1 K/uL Final    Monocytes Absolute 11/02/2020 1.0  0.0 - 1.3 K/uL Final    Eosinophils Absolute 11/02/2020 0.2  0.0 - 0.6 K/uL Final    Basophils Absolute 11/02/2020 0.0  0.0 - 0.2 K/uL Final    Sodium 11/02/2020 141  136 - 145 mmol/L Final    Potassium 11/02/2020 4.2  3.5 - 5.1 mmol/L Final    Chloride 11/02/2020 104  99 - 110 mmol/L Final    CO2 11/02/2020 28  21 - 32 mmol/L Final    Anion Gap 11/02/2020 9  3 - 16 Final    Glucose 11/02/2020 82  70 - 99 mg/dL Final    BUN 11/02/2020 23* 7 - 20 mg/dL Final    CREATININE 11/02/2020 0.7* 0.8 - 1.3 mg/dL Final    GFR Non- 11/02/2020 >60  >60 Final    GFR  11/02/2020 >60  >60 Final    Calcium 11/02/2020 9.5  8.3 - 10.6 mg/dL Final    Total Protein 11/02/2020 5.7* 6.4 - 8.2 g/dL Final    Albumin 11/02/2020 4.0  3.4 - 5.0 g/dL Final    Albumin/Globulin Ratio 11/02/2020 2.4* 1.1 - 2.2 Final    Total Bilirubin 11/02/2020 0.4  0.0 - 1.0 mg/dL Final    Alkaline Phosphatase 11/02/2020 74  40 - 129 U/L Final    ALT 11/02/2020 48* 10 - 40 U/L Final    AST 11/02/2020 26  15 - 37 U/L Final    Globulin 11/02/2020 1.7  g/dL Final   Orders Only on 08/26/2020   Component Date Value Ref Range Status    Total CK 08/26/2020 140  41 - 331 U/L Final   Orders Only on 08/26/2020 Component Date Value Ref Range Status    TSH 08/26/2020 1.340  0.450 - 4.500 uIU/mL Final   Orders Only on 08/26/2020   Component Date Value Ref Range Status    Vit D, 25-Hydroxy 08/26/2020 38.8  30.0 - 100.0 ng/mL Final   Orders Only on 08/12/2020   Component Date Value Ref Range Status    Hemoglobin A1C 08/12/2020 5.3  4.8 - 5.6 % Final   There may be more visits with results that are not included. Assessment and Plan     Delano Torres was seen today for diabetes. Diagnoses and all orders for this visit:    Controlled type 2 diabetes mellitus with diabetic polyneuropathy, with long-term current use of insulin (HCC)  -     Hemoglobin A1C; Future    Dyslipidemia associated with type 2 diabetes mellitus (HCC)  -     atorvastatin (LIPITOR) 80 MG tablet; Take 1 tablet by mouth nightly    Essential hypertension    Smoker    Left adrenal mass (HCC)    Muscle pain    Overweight    Uncontrolled type 2 diabetes mellitus with hyperglycemia, with long-term current use of insulin ([facility])          1: Type 2 DM complicated with neuropathy   Controlled A1C 5.4% < 5.3% < 5.6% < 6.3% <  6.6% < 11.2%    H/o left partial pacreatectomy     Blood sugars and A1C are in good control     Continue Jardiance 25mg daily   C/w  Trulicity 8.6QJ weekly  Lantus 18 units at bedtime   Use Treat to target basal insulin. If pre-breakfast sugar is above 130 on 2 consecutive days increase Lantus by 2 units. If pre-breakfast sugar is below 90 on one day decrease Lantus by 2 units. All instructions provided in written. Check Blood sugars 3 times per day. Log them along with insulin and send them every 2 weeks. Call for blood sugars less than 60 or more than 400. Eye exam: Last exam in Nov 2020, denies retinopathy. Has slight cataracts. He has double vision. Work up for [employer] negative and MRI orbits normal (reportedly)   Foot exam:  April 2019. Saw podiatrist in Jan 2021. Left foot ball pain, using inserts.    Deformity/amputation: exercise  Brisk walking  Playing tennis  Stationary bicycle or elliptical indoor  Low impact aerobic exercise    Instructions given to exercise for the following duration:  30 minutes a day for five-seven days per week.     Following instructions for being active throughout the day in addition to formal exercise:  Walk instead of drive whenever possible  Take the stairs instead of the elevator  Work in the garden  Park to the far end of the parking lot to add more walking steps to destination      Electronically signed by Adriano Copeland MD on 2/25/2021 at 8:23 AM

## 2021-03-17 ENCOUNTER — IMMUNIZATION (OUTPATIENT)
Dept: PRIMARY CARE CLINIC | Age: 66
End: 2021-03-17
Payer: MEDICARE

## 2021-03-17 PROCEDURE — 0002A COVID-19, PFIZER VACCINE 30MCG/0.3ML DOSE: CPT | Performed by: FAMILY MEDICINE

## 2021-03-17 PROCEDURE — 91300 COVID-19, PFIZER VACCINE 30MCG/0.3ML DOSE: CPT | Performed by: FAMILY MEDICINE

## 2021-04-30 DIAGNOSIS — E11.65 UNCONTROLLED TYPE 2 DIABETES MELLITUS WITH HYPERGLYCEMIA, WITH LONG-TERM CURRENT USE OF INSULIN (HCC): ICD-10-CM

## 2021-04-30 DIAGNOSIS — Z79.4 UNCONTROLLED TYPE 2 DIABETES MELLITUS WITH HYPERGLYCEMIA, WITH LONG-TERM CURRENT USE OF INSULIN (HCC): ICD-10-CM

## 2021-04-30 RX ORDER — EMPAGLIFLOZIN 25 MG/1
TABLET, FILM COATED ORAL
Qty: 90 TABLET | Refills: 3 | Status: SHIPPED | OUTPATIENT
Start: 2021-04-30 | End: 2022-02-22 | Stop reason: SDUPTHER

## 2021-05-14 ENCOUNTER — HOSPITAL ENCOUNTER (OUTPATIENT)
Dept: ULTRASOUND IMAGING | Age: 66
Discharge: HOME | End: 2021-05-14
Payer: MEDICARE

## 2021-05-14 DIAGNOSIS — R91.8 OTHER NONSPECIFIC ABNORMAL FINDING OF LUNG FIELD: ICD-10-CM

## 2021-05-14 DIAGNOSIS — C64.2 MALIGNANT NEOPLASM OF LEFT KIDNEY, EXCEPT RENAL PELVIS (HCC): ICD-10-CM

## 2021-05-14 PROCEDURE — 76770 US EXAM ABDO BACK WALL COMP: CPT

## 2021-08-17 ENCOUNTER — TELEMEDICINE (OUTPATIENT)
Dept: FAMILY MEDICINE CLINIC | Age: 66
End: 2021-08-17
Payer: MEDICARE

## 2021-08-17 DIAGNOSIS — F33.1 MODERATE EPISODE OF RECURRENT MAJOR DEPRESSIVE DISORDER (HCC): ICD-10-CM

## 2021-08-17 DIAGNOSIS — M79.7 FIBROMYALGIA: ICD-10-CM

## 2021-08-17 DIAGNOSIS — F41.1 GAD (GENERALIZED ANXIETY DISORDER): ICD-10-CM

## 2021-08-17 DIAGNOSIS — H53.2 BINOCULAR VISION DISORDER WITH DIPLOPIA: ICD-10-CM

## 2021-08-17 DIAGNOSIS — Z79.4 CONTROLLED TYPE 2 DIABETES MELLITUS WITHOUT COMPLICATION, WITH LONG-TERM CURRENT USE OF INSULIN (HCC): Primary | ICD-10-CM

## 2021-08-17 DIAGNOSIS — E27.8 LEFT ADRENAL MASS (HCC): ICD-10-CM

## 2021-08-17 DIAGNOSIS — F33.42 RECURRENT MAJOR DEPRESSIVE DISORDER, IN FULL REMISSION (HCC): ICD-10-CM

## 2021-08-17 DIAGNOSIS — G47.30 SLEEP APNEA, UNSPECIFIED TYPE: ICD-10-CM

## 2021-08-17 DIAGNOSIS — Z72.0 TOBACCO ABUSE: ICD-10-CM

## 2021-08-17 DIAGNOSIS — E11.9 CONTROLLED TYPE 2 DIABETES MELLITUS WITHOUT COMPLICATION, WITH LONG-TERM CURRENT USE OF INSULIN (HCC): Primary | ICD-10-CM

## 2021-08-17 PROBLEM — K35.80 UNCOMPLICATED ACUTE APPENDICITIS: Status: RESOLVED | Noted: 2019-05-10 | Resolved: 2021-08-17

## 2021-08-17 PROBLEM — S13.9XXA CERVICAL SPRAIN: Status: RESOLVED | Noted: 2017-03-30 | Resolved: 2021-08-17

## 2021-08-17 PROCEDURE — G8427 DOCREV CUR MEDS BY ELIG CLIN: HCPCS | Performed by: FAMILY MEDICINE

## 2021-08-17 PROCEDURE — 99204 OFFICE O/P NEW MOD 45 MIN: CPT | Performed by: FAMILY MEDICINE

## 2021-08-17 PROCEDURE — G8420 CALC BMI NORM PARAMETERS: HCPCS | Performed by: FAMILY MEDICINE

## 2021-08-17 PROCEDURE — 4040F PNEUMOC VAC/ADMIN/RCVD: CPT | Performed by: FAMILY MEDICINE

## 2021-08-17 PROCEDURE — 4004F PT TOBACCO SCREEN RCVD TLK: CPT | Performed by: FAMILY MEDICINE

## 2021-08-17 PROCEDURE — 3017F COLORECTAL CA SCREEN DOC REV: CPT | Performed by: FAMILY MEDICINE

## 2021-08-17 PROCEDURE — 1123F ACP DISCUSS/DSCN MKR DOCD: CPT | Performed by: FAMILY MEDICINE

## 2021-08-17 PROCEDURE — 2022F DILAT RTA XM EVC RTNOPTHY: CPT | Performed by: FAMILY MEDICINE

## 2021-08-17 PROCEDURE — 3044F HG A1C LEVEL LT 7.0%: CPT | Performed by: FAMILY MEDICINE

## 2021-08-17 RX ORDER — TRAZODONE HYDROCHLORIDE 50 MG/1
50 TABLET ORAL NIGHTLY
COMMUNITY
End: 2021-11-04

## 2021-08-17 NOTE — PROGRESS NOTES
by mouth daily as needed       clobetasol (TEMOVATE) 0.05 % cream Apply topically 2 times daily to affected areas as needed for psoriasis. 60 g 3    ibuprofen (ADVIL;MOTRIN) 800 MG tablet Take 1 tablet by mouth every 8 hours as needed for Pain 90 tablet 3     No current facility-administered medications on file prior to visit. Wt Readings from Last 3 Encounters:   10/28/20 194 lb 0.1 oz (88 kg)   20 194 lb (88 kg)   20 232 lb (105.2 kg)     BP Readings from Last 3 Encounters:   20 103/68   20 103/69   20 (!) 89/52          Minus  (:  1955) has requested an audio/video evaluation for the following concern(s):    Chief Complaint   Patient presents with   [facility] Patient     [phone]. get established. HPI    Aida Mijares presents to establish care. he has the following concerns today:    Type 2 diabetes: Diagnosed in . On insulin since 2019. The patient is seeing endocrinology. History of muscle soreness with Metformin. Has lost weight intentionally. Hyperlipidemia: Stable on lipitor 80mg. Pain did not improve with lower dose of lipitor. Hx of renal cancer:  Incidental finding in  when was imaging for appendicitis. S/p surgery with complete resolution. Every 6 months he has imaging. Followed by Dr. Lofton Barry with urology. Left adrenal adenoma on MRI: Status post a normal functional work-up in 2019. The patient is managed by endocrinology. Fibromyalgia: Started with symptoms in . Status post evaluation by neurology and PMNR. No etiology has been found. He is currently seeing integrative medicine for massage, acupuncture, PT. Using medical marijuana. Also on robaxin, lyrica, and celebrex. Hx of     Binocular diplopia: The patient is status post evaluation by neurology and 2021. He has seen a neuro-ophthalmologist.  [facility] if [facility] is correctable.      CHIOMA: on CPAP    Depression/anxiety: stable on lexapro. No issues. Hx of duodenal and gastric ulcers: first was age 11. On protonix. Stable. Last ulcer was 20+ years ago. Hx of ETOH use:  S/p treatment and quit in 2017. Tobacco abuse:  Smoking 1ppd and not ready to quit. SH: 8/2021: lives with wife, daughter, and 2 grandkids. Retired. Was on disability prior to nursing home. Last job was .  Has 2 kids.      Past Medical History:   Diagnosis Date    Binocular vision disorder with diplopia     Blood transfusion     30 yrs ago    Chronic back pain     Chronic pain syndrome     Depression     Diabetes mellitus ([facility] [address]     Failed back surgical syndrome     FH: colonic polyps     Fibromyalgia     History of duodenal ulcer     now gets peptic ulcer    Hyperlipidemia     Hypertension     Insomnia     Obesity     Psychiatric problem     depression and anxiety    Renal carcinoma, left ([address]     Sleep apnea     USES C-PAP    Ulcer, duodenum peptic        Past Surgical History:   Procedure Laterality Date    BACK SURGERY       X 2    CERVICAL FUSION N/A 12/17/2019    ANTERIOR CERVICAL DISCECTOMY AND FUSION AT [facility] [facility] [employer] ([id number], [address], [address] [place], [id number], [id number]) performed by Ryan Bustamante MD at [address] N/A 2/11/2020    COLONOSCOPY POLYPECTOMY SNARE/COLD BIOPSY performed by Marissa Longoria MD at [address], COLON, DIAGNOSTIC      EPIDURAL STEROID INJECTION N/A 10/2/2019    C7-T1 INTERLAMINAR EPIDURAL STEROID INJECTION WITH FLUOROSCOPY performed by Altagracia Robin MD at [facility] N/A 10/30/2019    C7-T1 INTERLMINAR EPIDURAL STEROID INJECTION WITH FLUOROSCOPY performed by Altagracia Robin MD at [facility] 32 X 2    KNEE ARTHROSCOPY Left 10/24/2019    LEFT KNEE ARTHROSCOPY WITH PARTIAL MEDIAL MENISCECTOMY;SYNOVECTOMY WITH PLICA REMOVAL performed by Familia Stover MD at [facility] ARTHROSCOPY Left 10/24/2019    LAPAROSCOPIC APPENDECTOMY N/A 5/10/2019    APPENDECTOMY LAPAROSCOPIC, LYSIS OF ADHESIONS performed by Jeffrey Mas MD at [facility]    fatty tumor removal under right arm    PARTIAL NEPHRECTOMY Left 8/6/2019    ROBOTIC LAPAROSCOPIC LEFT PARTIAL NEPHRECTOMY performed by Iva Palacios MD at [address] [place] [address] History     Socioeconomic History    Marital status:      Spouse name: Not on file    Number of children: Not on file    Years of education: Not on file    Highest education level: Not on file   Occupational History    Occupation: disability   Tobacco Use    Smoking status: Current Every Day Smoker     Packs/day: 1.00     Years: 15.00     Pack years: 15.00     Types: Cigarettes    Smokeless tobacco: Never Used   Vaping Use    Vaping Use: Never used   Substance and Sexual Activity    Alcohol use: Not Currently     Alcohol/week: 1.0 standard drinks     Types: 1 Standard drinks or equivalent per week    Drug use: No     Comment: Medical Marijuana couple times a day    Sexual activity: Yes     Partners: Female   Other Topics Concern    Not on file   Social History Narrative    ** Merged History Encounter **          Social Determinants of Health     Financial Resource Strain:     Difficulty of Paying Living Expenses:    Food Insecurity:     Worried About Running Out of Food in the Last Year:     Ran Out of Food in the Last Year:    Transportation Needs:     Lack of Transportation (Medical):      Lack of Transportation (Non-Medical):    Physical Activity:     Days of Exercise per Week:     Minutes of Exercise per Session:    Stress:     Feeling of Stress :    Social Connections:     Frequency of Communication with Friends and Family:     Frequency of Social Gatherings with Friends and Family:     Attends [employer] Assessment/Plan     1. Controlled type 2 diabetes mellitus without complication, with long-term current use of insulin (HCC)  Stable. Continue current regimen. Continue f/u with endo. Note reviewed. - Microalbumin / Creatinine Urine Ratio; Future    2. Moderate episode of recurrent major depressive disorder (HCC)  Stable. Continue current regimen. Continue lexapro. 3. Sleep apnea, unspecified type  Stable. Continue current regimen. Continue CPAP. 4. Fibromyalgia  Stable. Continue current regimen. Seeing integrative medicine. On lyrica, robaxin, celebrex. 6. ISMAEL (generalized anxiety disorder)  Stable. Continue current regimen. 7. Left adrenal mass (HCC)  Stable. Continue current regimen. Has regular imaging done    8. Binocular vision disorder with diplopia  Persistent. Has f/u with surgeon scheduled. 9. Tobacco abuse  Recommended cessation. - CT Lung Screen (Initial or Annual); Future    10. Hx of renal carcinoma  Stable. Continue current regimen. Continue f/u with urology. Discussed medications with patient, who voiced understanding of their use and indications. All questions answered. Return in about 6 months (around 2/17/2022) for Annual Wellness Visit. Ana Barker is being evaluated by a Virtual Visit (video visit) encounter to address concerns as mentioned above. A caregiver was present when appropriate. Due to this being a TeleHealth encounter ([facility]), evaluation of the following organ systems was limited: Vitals/Constitutional/EENT/Resp/CV/GI//MS/Neuro/Skin/Heme-Lymph-Imm.   Pursuant to the emergency declaration under the [facility], [address] [place] [address] authority and the [employer] Act, this Virtual Visit was conducted with patient's (and/or legal guardian's) consent, to reduce the patient's risk of exposure to COVID-19 and provide necessary medical care. The patient (and/or legal guardian) has also been advised to contact this office for worsening conditions or problems, and seek emergency medical treatment and/or call 911 if deemed necessary. The patient and no one were present for the visit. Patient identification was verified at the start of the visit: Yes    Total time spent on this encounter: Not billed by time. Services were provided through a video synchronous discussion virtually to substitute for in-person clinic visit. --Geri Holstein, MD on 8/17/2021    An electronic signature was used to authenticate this note.

## 2021-08-24 PROBLEM — N18.30 STAGE 3 CHRONIC KIDNEY DISEASE (HCC): Status: ACTIVE | Noted: 2021-08-24

## 2021-08-24 PROBLEM — E87.6 HYPOKALEMIA: Status: ACTIVE | Noted: 2021-08-24

## 2021-08-24 PROBLEM — D64.9 ANEMIA: Status: ACTIVE | Noted: 2021-08-24

## 2021-08-24 PROBLEM — F10.939 ALCOHOL WITHDRAWAL SYNDROME (HCC): Status: ACTIVE | Noted: 2021-08-24

## 2021-08-24 PROBLEM — J30.1 ALLERGIC RHINITIS DUE TO POLLEN: Status: ACTIVE | Noted: 2021-08-24

## 2021-08-24 PROBLEM — E83.52 HYPERCALCEMIA: Status: ACTIVE | Noted: 2021-08-24

## 2021-08-24 PROBLEM — E53.9 VITAMIN B DEFICIENCY: Status: ACTIVE | Noted: 2021-08-24

## 2021-08-24 PROBLEM — E11.65 HYPERGLYCEMIA DUE TO TYPE 2 DIABETES MELLITUS (HCC): Status: ACTIVE | Noted: 2021-08-24

## 2021-08-24 PROBLEM — F10.21 CHRONIC ALCOHOLISM IN REMISSION (HCC): Status: ACTIVE | Noted: 2021-08-24

## 2021-08-24 PROBLEM — E04.9 NON-TOXIC NODULAR GOITER: Status: ACTIVE | Noted: 2021-08-24

## 2021-08-24 PROBLEM — E11.21 DIABETIC RENAL DISEASE (HCC): Status: ACTIVE | Noted: 2021-08-24

## 2021-08-24 PROBLEM — D72.829 LEUKOCYTOSIS: Status: ACTIVE | Noted: 2021-08-24

## 2021-08-24 PROBLEM — Z90.5 ABSENT KIDNEY: Status: ACTIVE | Noted: 2021-08-24

## 2021-08-24 PROBLEM — E78.5 HYPERLIPIDEMIA: Status: ACTIVE | Noted: 2021-08-24

## 2021-08-24 PROBLEM — M89.9 SCAPULAR DYSFUNCTION: Status: ACTIVE | Noted: 2021-05-10

## 2021-08-25 ENCOUNTER — VIRTUAL VISIT (OUTPATIENT)
Dept: ENDOCRINOLOGY | Age: 66
End: 2021-08-25
Payer: MEDICARE

## 2021-08-25 DIAGNOSIS — Z79.4 UNCONTROLLED TYPE 2 DIABETES MELLITUS WITH HYPERGLYCEMIA, WITH LONG-TERM CURRENT USE OF INSULIN (HCC): ICD-10-CM

## 2021-08-25 DIAGNOSIS — Z79.4 CONTROLLED TYPE 2 DIABETES MELLITUS WITH DIABETIC POLYNEUROPATHY, WITH LONG-TERM CURRENT USE OF INSULIN (HCC): Primary | ICD-10-CM

## 2021-08-25 DIAGNOSIS — E78.5 DYSLIPIDEMIA ASSOCIATED WITH TYPE 2 DIABETES MELLITUS (HCC): ICD-10-CM

## 2021-08-25 DIAGNOSIS — I10 ESSENTIAL HYPERTENSION: ICD-10-CM

## 2021-08-25 DIAGNOSIS — E11.65 UNCONTROLLED TYPE 2 DIABETES MELLITUS WITH HYPERGLYCEMIA, WITH LONG-TERM CURRENT USE OF INSULIN (HCC): ICD-10-CM

## 2021-08-25 DIAGNOSIS — E11.69 DYSLIPIDEMIA ASSOCIATED WITH TYPE 2 DIABETES MELLITUS (HCC): ICD-10-CM

## 2021-08-25 DIAGNOSIS — F17.200 SMOKER: ICD-10-CM

## 2021-08-25 DIAGNOSIS — E11.42 CONTROLLED TYPE 2 DIABETES MELLITUS WITH DIABETIC POLYNEUROPATHY, WITH LONG-TERM CURRENT USE OF INSULIN (HCC): Primary | ICD-10-CM

## 2021-08-25 PROCEDURE — 3044F HG A1C LEVEL LT 7.0%: CPT | Performed by: INTERNAL MEDICINE

## 2021-08-25 PROCEDURE — 1123F ACP DISCUSS/DSCN MKR DOCD: CPT | Performed by: INTERNAL MEDICINE

## 2021-08-25 PROCEDURE — G8427 DOCREV CUR MEDS BY ELIG CLIN: HCPCS | Performed by: INTERNAL MEDICINE

## 2021-08-25 PROCEDURE — 4040F PNEUMOC VAC/ADMIN/RCVD: CPT | Performed by: INTERNAL MEDICINE

## 2021-08-25 PROCEDURE — 99214 OFFICE O/P EST MOD 30 MIN: CPT | Performed by: INTERNAL MEDICINE

## 2021-08-25 PROCEDURE — 3017F COLORECTAL CA SCREEN DOC REV: CPT | Performed by: INTERNAL MEDICINE

## 2021-08-25 PROCEDURE — 2022F DILAT RTA XM EVC RTNOPTHY: CPT | Performed by: INTERNAL MEDICINE

## 2021-08-25 RX ORDER — INSULIN GLARGINE 100 [IU]/ML
18 INJECTION, SOLUTION SUBCUTANEOUS NIGHTLY
Qty: 5 PEN | Refills: 3 | Status: SHIPPED | OUTPATIENT
Start: 2021-08-25 | End: 2022-08-26

## 2021-08-25 RX ORDER — DULAGLUTIDE 3 MG/.5ML
3 INJECTION, SOLUTION SUBCUTANEOUS WEEKLY
Qty: 12 PEN | Refills: 3 | Status: SHIPPED | OUTPATIENT
Start: 2021-08-25 | End: 2022-08-10

## 2021-08-25 NOTE — PROGRESS NOTES
a day, diet. No juices. Exercise: Limited due to back problems. Exercise with the bands. Denies chest pain, exertional dyspnea. Family history of CAD: Dad at age 64. Mother had stroke in her 66's. Smokes 1 PPD. Counselled for smoking cessation. Currently not on ASA, recommend baby aspirin if okay with gastroenterologist or PCP      The following portions of the patient's history were reviewed and updated as appropriate:       Family History   Problem Relation Age of Onset    Stroke Mother     Heart Disease Father          Social History     Socioeconomic History    Marital status:      Spouse name: Not on file    Number of children: Not on file    Years of education: Not on file    Highest education level: Not on file   Occupational History    Occupation: disability   Tobacco Use    Smoking status: Current Every Day Smoker     Packs/day: 1.00     Years: 15.00     Pack years: 15.00     Types: Cigarettes    Smokeless tobacco: Never Used   Vaping Use    Vaping Use: Never used   Substance and Sexual Activity    Alcohol use: Not Currently     Alcohol/week: 1.0 standard drinks     Types: 1 Standard drinks or equivalent per week    Drug use: Yes     Types: Marijuana     Comment: Medical Marijuana couple times a day    Sexual activity: Yes     Partners: Female   Other Topics Concern    Not on file   Social History Narrative    ** Merged History Encounter **          Social Determinants of Health     Financial Resource Strain:     Difficulty of Paying Living Expenses:    Food Insecurity:     Worried About Running Out of Food in the Last Year:     Ran Out of Food in the Last Year:    Transportation Needs:     Lack of Transportation (Medical):      Lack of Transportation (Non-Medical):    Physical Activity:     Days of Exercise per Week:     Minutes of Exercise per Session:    Stress:     Feeling of Stress :    Social Connections:     Frequency of Communication with Friends and Family:     Frequency of Social Gatherings with Friends and Family:     Attends Protestant Services:     Active Member of Clubs or Organizations:     Attends Club or Organization Meetings:     Marital Status:    Intimate Partner Violence:     Fear of Current or Ex-Partner:     Emotionally Abused:     Physically Abused:     Sexually Abused:        Past Medical History:   Diagnosis Date    Binocular vision disorder with diplopia     Blood transfusion     30 yrs ago    Chronic back pain     Chronic pain syndrome     Depression     Diabetes mellitus ([facility] [address]     Failed back surgical syndrome     FH: colonic polyps     Fibromyalgia     History of duodenal ulcer     now gets peptic ulcer    Hyperlipidemia     Hypertension     Insomnia     Obesity     Psychiatric problem     depression and anxiety    Renal carcinoma, left ([address]     Sleep apnea     USES C-PAP    Ulcer, duodenum peptic        Past Surgical History:   Procedure Laterality Date    BACK SURGERY       X 2    CERVICAL FUSION N/A 12/17/2019    ANTERIOR CERVICAL DISCECTOMY AND FUSION AT [address] [facility] ([id number], [address], [address] [place], [address], [id number]) performed by Tamiko Gayle MD at [facility] COLONOSCOPY N/A 2/11/2020    COLONOSCOPY POLYPECTOMY SNARE/COLD BIOPSY performed by Melvin Gonzalez MD at [address] [place] [address], COLON, DIAGNOSTIC      EPIDURAL STEROID INJECTION N/A 10/2/2019    C7-T1 INTERLAMINAR EPIDURAL STEROID INJECTION WITH FLUOROSCOPY performed by Caleb Hector MD at [facility] N/A 10/30/2019    C7-T1 INTERLMINAR EPIDURAL STEROID INJECTION WITH FLUOROSCOPY performed by Caleb Hector MD at [facility] 32 X 2    KNEE ARTHROSCOPY Left 10/24/2019    LEFT KNEE ARTHROSCOPY WITH PARTIAL MEDIAL MENISCECTOMY;SYNOVECTOMY WITH PLICA REMOVAL performed by Thea Mendez MD at [facility] ARTHROSCOPY Left 10/24/2019    LAPAROSCOPIC APPENDECTOMY N/A 5/10/2019    APPENDECTOMY LAPAROSCOPIC, LYSIS OF ADHESIONS performed by Heide Brennan MD at [facility]    fatty tumor removal under right arm    PARTIAL NEPHRECTOMY Left 8/6/2019    ROBOTIC LAPAROSCOPIC LEFT PARTIAL NEPHRECTOMY performed by Jerry Chris MD at [facility] ENDOSCOPY           Allergies   Allergen Reactions    Cymbalta [Duloxetine Hcl] Diarrhea    Metformin And Related      Muscle aches    Shellfish-Derived Products Nausea And Vomiting     *PT STATES NO PROBLEMS WITH TOPICAL IODINE         Current Outpatient Medications:     insulin glargine (LANTUS SOLOSTAR) 100 UNIT/ML injection pen, Inject 18 Units into the skin nightly, Disp: 5 pen, Rfl: 3    Insulin Pen Needle 32G X 4 MM MISC, USE TO INJECT LANTUS ONCE DAILY, Disp: 100 each, Rfl: 3    Dulaglutide (TRULICITY) 3 RZ/0.2XQ SOPN, Inject 3 mg into the skin once a week, Disp: 12 pen, Rfl: 3    traZODone (DESYREL) 50 MG tablet, Take 50 mg by mouth nightly, Disp: , Rfl:     empagliflozin (JARDIANCE) 25 MG tablet, TAKE 1 TABLET BY MOUTH IN THE MORNING, Disp: 90 tablet, Rfl: 3    methocarbamol (ROBAXIN) 750 MG tablet, Take 750 mg by mouth 4 times daily , Disp: , Rfl:     escitalopram (LEXAPRO) 20 MG tablet, TAKE 1 TABLET BY MOUTH EVERY DAY, Disp: , Rfl:     celecoxib (CELEBREX) 200 MG capsule, TAKE 1 CAPSULE BY MOUTH TWO TIMES A DAY, Disp: , Rfl:     pregabalin (LYRICA) 200 MG capsule, 3 times daily. , Disp: , Rfl:     atorvastatin (LIPITOR) 80 MG tablet, Take 1 tablet by mouth nightly, Disp: 90 tablet, Rfl: 3    Dulaglutide 1.5 MG/0.5ML SOPN, INJECT THE CONTENTS OF 1 SYRINGE INTO THE SKIN ONCE WEEKLY, Disp: 12 pen, Rfl: 2    Blood Glucose Monitoring Suppl (PRODIGY AUTOCODE BLOOD GLUCOSE) LILO, Check sugar three times per day.  Diagnosis code: E11.42, Disp: 1 Device, Rfl: 0    blood glucose test strips (PRODIGY NO CODING BLOOD GLUC) strip, Prodigy No Coding strips. Test BS three times daily. Diagnosis code: E11.42, Disp: 100 each, Rfl: 5    medical marijuana, MEDICAL MARIJUANA, Disp: , Rfl:     pantoprazole (PROTONIX) 40 MG tablet, Take 40 mg by mouth daily as needed , Disp: , Rfl:     clobetasol (TEMOVATE) 0.05 % cream, Apply topically 2 times daily to affected areas as needed for psoriasis. , Disp: 60 g, Rfl: 3      Review of Systems:    Constitutional: Negative for fever, chills, and unexpected weight change. HENT: Negative for congestion, ear pain, rhinorrhea,  sore throat and trouble swallowing. Eyes: Negative for photophobia, redness, itching. Respiratory: Negative for cough, shortness of breath and sputum. Cardiovascular: Negative for chest pain, palpitations and leg swelling. Gastrointestinal: has heartburn. Negative for nausea, vomiting, abdominal pain, diarrhea, constipation. Endocrine: Negative for cold intolerance, heat intolerance, polydipsia, polyphagia and polyuria. Genitourinary: Negative for dysuria, urgency, frequency, hematuria and flank pain. Musculoskeletal: neck pain due to DJD , pain radiate to neck, arms. Negative for arthralgias. Skin/Nail: Negative for rash, itching. Normal nails. Neurological: Negative for seizures, weakness, light-headedness, numbness and headaches. Hematological/ Lymph nodes: Negative for adenopathy. Does not bruise/bleed easily. Psychiatric/Behavioral: Negative for suicidal ideas, depression, anxiety, sleep disturbance and decreased concentration. Objective:   Physical Exam:  There were no vitals taken for this visit.        Lab Review:    Orders Only on 08/18/2021   Component Date Value Ref Range Status    Hemoglobin A1C 08/18/2021 5.5  See comment % Final    eAG 08/18/2021 111.2  mg/dL Final    Microalbumin, Random Urine 08/18/2021 <1.20  <2.0 mg/dL Final    Creatinine, Ur 08/18/2021 45.3  39.0 - 259.0 mg/dL Final    Microalbumin Creatinine Ratio 08/18/2021 see below  0.0 - 30.0 mg/g Final   Orders Only on 02/23/2021   Component Date Value Ref Range Status    Cholesterol, Fasting 02/23/2021 145  0 - 199 mg/dL Final    Triglyceride, Fasting 02/23/2021 89  0 - 150 mg/dL Final    HDL 02/23/2021 47  40 - 60 mg/dL Final    LDL Calculated 02/23/2021 80  <100 mg/dL Final    VLDL Cholesterol Calculated 02/23/2021 18  Not Established mg/dL Final    Sodium 02/23/2021 142  136 - 145 mmol/L Final    Potassium 02/23/2021 4.1  3.5 - 5.1 mmol/L Final    Chloride 02/23/2021 109  99 - 110 mmol/L Final    CO2 02/23/2021 24  21 - 32 mmol/L Final    Anion Gap 02/23/2021 9  3 - 16 Final    Glucose 02/23/2021 83  70 - 99 mg/dL Final    BUN 02/23/2021 25* 7 - 20 mg/dL Final    CREATININE 02/23/2021 0.8  0.8 - 1.3 mg/dL Final    GFR Non- 02/23/2021 >60  >60 Final    GFR  02/23/2021 >60  >60 Final    Calcium 02/23/2021 9.1  8.3 - 10.6 mg/dL Final    Total Protein 02/23/2021 5.9* 6.4 - 8.2 g/dL Final    Albumin 02/23/2021 4.1  3.4 - 5.0 g/dL Final    Albumin/Globulin Ratio 02/23/2021 2.3* 1.1 - 2.2 Final    Total Bilirubin 02/23/2021 0.3  0.0 - 1.0 mg/dL Final    Alkaline Phosphatase 02/23/2021 84  40 - 129 U/L Final    ALT 02/23/2021 34  10 - 40 U/L Final    AST 02/23/2021 29  15 - 37 U/L Final    Globulin 02/23/2021 1.8  g/dL Final    Hemoglobin A1C 02/23/2021 5.4  See comment % Final    eAG 02/23/2021 108.3  mg/dL Final    Total CK 02/23/2021 169  39 - 308 U/L Final    TSH Reflex FT4 02/23/2021 1.39  0.27 - 4.20 uIU/mL Final    Vit D, 25-Hydroxy 02/23/2021 43.6  >=30 ng/mL Final   Orders Only on 02/09/2021   Component Date Value Ref Range Status    ACETYLCHOLINE BINDING ANTIBODY 02/09/2021 0.0  0.0 - 0.4 nmol/L Final   Orders Only on 02/04/2021   Component Date Value Ref Range Status    CREATININE 02/04/2021 0.9  0.6 - 1.2 mg/dL Final    GFR Non- 02/04/2021 >60  >60 Final    GFR  02/04/2021 >60  >60 Final   Orders Only on 02/02/2021   Component Date Value Ref Range Status    BUN 02/02/2021 28* 7 - 20 mg/dL Final   Hospital Outpatient Visit on 11/13/2020   Component Date Value Ref Range Status    PSA 11/13/2020 1.93  0.00 - 4.00 ng/mL Final    BUN 11/13/2020 22* 7 - 20 mg/dL Final    CREATININE 11/13/2020 0.7* 0.8 - 1.3 mg/dL Final    GFR Non- 11/13/2020 >60  >60 Final    GFR  11/13/2020 >60  >60 Final   Orders Only on 11/02/2020   Component Date Value Ref Range Status    WBC 11/02/2020 10.2  4.0 - 11.0 K/uL Final    RBC 11/02/2020 4.84  4.20 - 5.90 M/uL Final    Hemoglobin 11/02/2020 15.5  13.5 - 17.5 g/dL Final    Hematocrit 11/02/2020 45.5  40.5 - 52.5 % Final    MCV 11/02/2020 94.0  80.0 - 100.0 fL Final    MCH 11/02/2020 31.9  26.0 - 34.0 pg Final    MCHC 11/02/2020 34.0  31.0 - 36.0 g/dL Final    RDW 11/02/2020 14.4  12.4 - 15.4 % Final    Platelets 75/95/3501 223  135 - 450 K/uL Final    MPV 11/02/2020 8.9  5.0 - 10.5 fL Final    Neutrophils % 11/02/2020 59.7  % Final    Lymphocytes % 11/02/2020 28.2  % Final    Monocytes % 11/02/2020 9.9  % Final    Eosinophils % 11/02/2020 1.9  % Final    Basophils % 11/02/2020 0.3  % Final    Neutrophils Absolute 11/02/2020 6.1  1.7 - 7.7 K/uL Final    Lymphocytes Absolute 11/02/2020 2.9  1.0 - 5.1 K/uL Final    Monocytes Absolute 11/02/2020 1.0  0.0 - 1.3 K/uL Final    Eosinophils Absolute 11/02/2020 0.2  0.0 - 0.6 K/uL Final    Basophils Absolute 11/02/2020 0.0  0.0 - 0.2 K/uL Final    Sodium 11/02/2020 141  136 - 145 mmol/L Final    Potassium 11/02/2020 4.2  3.5 - 5.1 mmol/L Final    Chloride 11/02/2020 104  99 - 110 mmol/L Final    CO2 11/02/2020 28  21 - 32 mmol/L Final    Anion Gap 11/02/2020 9  3 - 16 Final    Glucose 11/02/2020 82  70 - 99 mg/dL Final    BUN 11/02/2020 23* 7 - 20 mg/dL Final    CREATININE 11/02/2020 0.7* 0.8 - 1.3 mg/dL Final    GFR Non- 11/02/2020 >60  >60 Final    GFR  11/02/2020 >60  >60 Final    Calcium 11/02/2020 9.5  8.3 - 10.6 mg/dL Final    Total Protein 11/02/2020 5.7* 6.4 - 8.2 g/dL Final    Albumin 11/02/2020 4.0  3.4 - 5.0 g/dL Final    Albumin/Globulin Ratio 11/02/2020 2.4* 1.1 - 2.2 Final    Total Bilirubin 11/02/2020 0.4  0.0 - 1.0 mg/dL Final    Alkaline Phosphatase 11/02/2020 74  40 - 129 U/L Final    ALT 11/02/2020 48* 10 - 40 U/L Final    AST 11/02/2020 26  15 - 37 U/L Final    Globulin 11/02/2020 1.7  g/dL Final   Orders Only on 08/26/2020   Component Date Value Ref Range Status    Total CK 08/26/2020 140  41 - 331 U/L Final   Orders Only on 08/26/2020   Component Date Value Ref Range Status    TSH 08/26/2020 1.340  0.450 - 4.500 uIU/mL Final   Orders Only on 08/26/2020   Component Date Value Ref Range Status    Vit D, 25-Hydroxy 08/26/2020 38.8  30.0 - 100.0 ng/mL Final   There may be more visits with results that are not included. Assessment and Plan     [facility] was seen today for follow-up and diabetes. Diagnoses and all orders for this visit:    Controlled type 2 diabetes mellitus with diabetic polyneuropathy, with long-term current use of insulin ([address]    Uncontrolled type 2 diabetes mellitus with hyperglycemia, with long-term current use of insulin (HCC)  -     insulin glargine (LANTUS SOLOSTAR) 100 UNIT/ML injection pen; Inject 18 Units into the skin nightly  -     Insulin Pen Needle 32G X 4 MM MISC; USE TO INJECT LANTUS ONCE DAILY  -     Dulaglutide (TRULICITY) 3 UG/5.1OU SOPN; Inject 3 mg into the skin once a week  -     Hemoglobin A1C; Future  -     Lipid, Fasting; Future  -     Comprehensive Metabolic Panel; Future    Dyslipidemia associated with type 2 diabetes mellitus (HCC)  -     Lipid, Fasting; Future  -     Comprehensive Metabolic Panel;  Future    Essential hypertension    Smoker          1: Type 2 DM complicated with neuropathy   Controlled A1C 5.5% <  5.4% < 5.3% < 5.6% < 6.3% <  6.6% < 11.2%    H/o left partial pacreatectomy     Blood sugars and A1C are in good control     Continue Jardiance 25mg daily   Discussed going up on trulicity. He feels comfortable and no side effects from it. Change Trulicity 5.7DB weekly  Lantus 18 units at bedtime   Use Treat to target basal insulin. If pre-breakfast sugar is above 130 on 2 consecutive days increase Lantus by 2 units. If pre-breakfast sugar is below 90 on one day decrease Lantus by 2 units. All instructions provided in written. Check Blood sugars 3 times per day. Log them along with insulin and send them every 2 weeks. Call for blood sugars less than 60 or more than 400. Eye exam: Last exam in Nov 2020, denies retinopathy. Has slight cataracts. He has double vision. Work up for [employer] negative and MRI orbits normal (reportedly). Seeing ophthalmologist tomorrow for double vision. He reports negative work up for [employer], [address] Foot exam:  April 2019. Saw podiatrist in Jan 2021. Left foot ball pain, using inserts. Deformity/amputation: absent  Skin lesions/pre-ulcerative calluses: present   Edema: right- negative, left- negative  Sensory exam: Monofilament sensation: normal  Pulses: normal, Vibration (128 Hz): mildly decreased     Renal screen: Aug 2021     Smoker: Counselled for smoking cessation   TSH screen: March 2019   CDE visit in April 2019    2: HTN   Low normal   Asymptomatic   Lisinopril 5 mg daily - stopped    May restart if SBP >130, DBP >80     3: Hyperlipidemia   LDL: 80 , HDL: 47, TGs: 89 Feb 2021    Atorvastatin 80mg daily   Denies missing doses     4: Left adrenal adenoma on MRI   1.3 cm x 1.1 cm   Functional work up normal Sep 2019   CT Nov 2019: No change in the mild left adrenal gland thickening. Pancreatic cyst 0.7 x 0.9 cm cystic lesion     CT scan Nov 2020:   Pancreatic lesions not seen   Left adrenal looks stable-- I have images reviewed myself. There eis no reprot on adrenal adenoma. It appears to be 1.3 x 1.5 cms.      Getting CT scans or US every 6 months after kidney surgery     6: Muscle pains  Normal CK, TSH, Vit D today - Feb 2021     RTC in 6 months A1C, lipids    NOV: in person     EDUCATION:   Greater than 50% of this follow-up visit was spent in general counseling regarding   obesity, diet, exercise, importance of adherence to insulin regime, recognition and treatment of hypo and hyperglycemia,  glucose logging, proper diabetes management, diabetic complications with poor management and the importance of glycemic control in order to avoid the complications of diabetes. Risks and potential complications of diabetes were reviewed with the patient. Diabetes health maintenance plan and follow-up were discussed and understood by the patient. We reviewed the importance of medication compliance and regular follow-up. Aggressive lifestyle modification was encouraged. Exercise Counselling: This patient is a candidate for regular physical exercise. Instructions to perform the following types of exercise:  Swimming or water aerobic exercise  Brisk walking  Playing tennis  Stationary bicycle or elliptical indoor  Low impact aerobic exercise    Instructions given to exercise for the following duration:  30 minutes a day for five-seven days per week.     Following instructions for being active throughout the day in addition to formal exercise:  Walk instead of drive whenever possible  Take the stairs instead of the elevator  Work in the garden  Park to the far end of the parking lot to add more walking steps to destination      Electronically signed by Atilio Martinez MD on 8/25/2021 at 8:15 AM

## 2021-09-21 ENCOUNTER — TELEPHONE (OUTPATIENT)
Dept: ORTHOPEDIC SURGERY | Age: 66
End: 2021-09-21

## 2021-09-21 NOTE — TELEPHONE ENCOUNTER
Dear PCP Provider: [facility]) has partnered with [facility] to utilize predictive analytics to improve the care management for patients with arthritic knee pain. Utilizing claims data and patient visit features, we have developed an algorithmic risk score to determine which patient's quality of life may be most impacted to their knee pain. The selection criteria for this  program are: 1) risk score greater than .85; 2) existing [address] [place] [address] Floor patient; and 3) seen for knee pain in the past 3 years. Based upon the predictive analytics risk score  program, your patient has a risk score of greater than . 85 (i.e. 85% probability in having their quality of life impacted by their knee pain). To assist with care management of your patient, a navigator will be contacting them to understand their knee pain status and to educate on the [address] Pain Program, including scheduling an appointment with a joint specialist or their PCP. If you feel this patient not appropriate to be contacted given other medical reasons, please reply back to the navigator within 7 days with reason why not to be contacted. Otherwise, the navigator will follow up with you on the details of the patient encounter after the call.  Thank you for your support for this program.

## 2021-09-28 ENCOUNTER — TELEPHONE (OUTPATIENT)
Dept: ORTHOPEDIC SURGERY | Age: 66
End: 2021-09-28

## 2021-09-28 NOTE — TELEPHONE ENCOUNTER
Attempted to contact patient to give him information on our joint pain program. Patient's wife said that he was unable to talk at this time.  He can give us a call at [phone] if he would like more information or to get scheduled with a joint pain specialist.

## 2021-10-05 NOTE — PROGRESS NOTES
Colonoscopy placed, routed to Surgery scheduler and letter sent out to Pt.  Order placed Covid test   Teaching / education initiated regarding perioperative experience, expectations, and pain management during stay. Patient verbalized understanding.

## 2021-10-06 ENCOUNTER — OFFICE VISIT (OUTPATIENT)
Dept: PULMONOLOGY | Age: 66
End: 2021-10-06
Payer: MEDICARE

## 2021-10-06 VITALS
SYSTOLIC BLOOD PRESSURE: 130 MMHG | HEART RATE: 71 BPM | BODY MASS INDEX: 27.72 KG/M2 | DIASTOLIC BLOOD PRESSURE: 80 MMHG | OXYGEN SATURATION: 95 % | WEIGHT: 216 LBS | HEIGHT: 74 IN

## 2021-10-06 DIAGNOSIS — G47.33 OSA (OBSTRUCTIVE SLEEP APNEA): Primary | ICD-10-CM

## 2021-10-06 DIAGNOSIS — I10 ESSENTIAL HYPERTENSION: ICD-10-CM

## 2021-10-06 DIAGNOSIS — J30.89 NON-SEASONAL ALLERGIC RHINITIS, UNSPECIFIED TRIGGER: ICD-10-CM

## 2021-10-06 DIAGNOSIS — E66.3 OVERWEIGHT (BMI 25.0-29.9): ICD-10-CM

## 2021-10-06 PROCEDURE — 3017F COLORECTAL CA SCREEN DOC REV: CPT | Performed by: INTERNAL MEDICINE

## 2021-10-06 PROCEDURE — 1123F ACP DISCUSS/DSCN MKR DOCD: CPT | Performed by: INTERNAL MEDICINE

## 2021-10-06 PROCEDURE — 99204 OFFICE O/P NEW MOD 45 MIN: CPT | Performed by: INTERNAL MEDICINE

## 2021-10-06 PROCEDURE — G8484 FLU IMMUNIZE NO ADMIN: HCPCS | Performed by: INTERNAL MEDICINE

## 2021-10-06 PROCEDURE — G8417 CALC BMI ABV UP PARAM F/U: HCPCS | Performed by: INTERNAL MEDICINE

## 2021-10-06 PROCEDURE — G8427 DOCREV CUR MEDS BY ELIG CLIN: HCPCS | Performed by: INTERNAL MEDICINE

## 2021-10-06 PROCEDURE — 4004F PT TOBACCO SCREEN RCVD TLK: CPT | Performed by: INTERNAL MEDICINE

## 2021-10-06 PROCEDURE — 4040F PNEUMOC VAC/ADMIN/RCVD: CPT | Performed by: INTERNAL MEDICINE

## 2021-10-06 RX ORDER — FLUTICASONE PROPIONATE 50 MCG
1 SPRAY, SUSPENSION (ML) NASAL DAILY
Qty: 16 G | Refills: 3 | Status: SHIPPED | OUTPATIENT
Start: 2021-10-06

## 2021-10-06 RX ORDER — AZELASTINE 1 MG/ML
1 SPRAY, METERED NASAL 2 TIMES DAILY
Qty: 60 ML | Refills: 3 | Status: SHIPPED | OUTPATIENT
Start: 2021-10-06

## 2021-10-06 ASSESSMENT — SLEEP AND FATIGUE QUESTIONNAIRES
ESS TOTAL SCORE: 4
HOW LIKELY ARE YOU TO NOD OFF OR FALL ASLEEP WHILE SITTING AND READING: 0
HOW LIKELY ARE YOU TO NOD OFF OR FALL ASLEEP WHILE SITTING AND TALKING TO SOMEONE: 0
HOW LIKELY ARE YOU TO NOD OFF OR FALL ASLEEP WHILE WATCHING TV: 0
HOW LIKELY ARE YOU TO NOD OFF OR FALL ASLEEP WHILE LYING DOWN TO REST IN THE AFTERNOON WHEN CIRCUMSTANCES PERMIT: 3
HOW LIKELY ARE YOU TO NOD OFF OR FALL ASLEEP IN A CAR, WHILE STOPPED FOR A FEW MINUTES IN TRAFFIC: 0
HOW LIKELY ARE YOU TO NOD OFF OR FALL ASLEEP WHILE SITTING QUIETLY AFTER LUNCH WITHOUT ALCOHOL: 0
HOW LIKELY ARE YOU TO NOD OFF OR FALL ASLEEP WHEN YOU ARE A PASSENGER IN A CAR FOR AN HOUR WITHOUT A BREAK: 1
HOW LIKELY ARE YOU TO NOD OFF OR FALL ASLEEP WHILE SITTING INACTIVE IN A PUBLIC PLACE: 0

## 2021-10-06 NOTE — PROGRESS NOTES
PULMONARY OFFICE NEW PATIENT VISIT    CONSULTING PHYSICIAN:  Shahid Baird MD , No ref. provider found     REASON FOR VISIT:   Chief Complaint   Patient presents with    New Patient       DATE OF VISIT: 10/6/2021    HISTORY OF PRESENT ILLNESS: 77y.o. year old male comes into the pulmonary/sleep clinic for the first time. Patient reports that he had a sleep study done in 2004 which showed severe obstructive sleep apnea. He was given CPAP therapy at 9 cm H2O which she has used ever since. His machine has become more than 13years old and has stopped working. He is unable to sleep without the machine and is having a hard time every night. His weight is stable. Is also been diagnosed to have fibromyalgia. Also reports postnasal drip and upper airway irritation. Sleep Medicine 10/6/2021   Sitting and reading 0   Watching TV 0   Sitting, inactive in a public place (e.g. a theatre or a meeting) 0   As a passenger in a car for an hour without a break 1   Lying down to rest in the afternoon when circumstances permit 3   Sitting and talking to someone 0   Sitting quietly after a lunch without alcohol 0   In a car, while stopped for a few minutes in traffic 0   Total score 4       REVIEW OF SYSTEMS:   CONSTITUTIONAL SYMPTOMS: The patient denies fever, fatigue, night sweats, weight loss or weight gain. HEENT: No vision changes. No tinnitus, Denies sinus pain. No hoarseness, or dysphagia. NECK: Patient denies swelling in the neck. CARDIOVASCULAR: Denies chest pain, palpitation, syncope. RESPIRATORY: Denies shortness of breath or cough. GASTROINTESTINAL: Denies nausea, abdominal pain or change in bowel function. GENITOURINARY: Denies obstructive symptoms. No history of incontinence. BREASTS: No masses or lumps in the breasts. SKIN: No rashes or itching. MUSCULOSKELETAL: Denies weakness or bone pain. NEUROLOGICAL: No headaches or seizures. PSYCHIATRIC: Denies mood swings or depression. ENDOCRINE: Denies heat or cold intolerance or excessive thirst.  HEMATOLOGIC/LYMPHATIC: Denies easy bruising or lymph node swelling. ALLERGIC/IMMUNOLOGIC: No environmental allergies.     PAST MEDICAL HISTORY:   Past Medical History:   Diagnosis Date    Binocular vision disorder with diplopia     Blood transfusion     30 yrs ago    Chronic back pain     Chronic pain syndrome     Depression     Diabetes mellitus ([facility] [address]     Failed back surgical syndrome     FH: colonic polyps     Fibromyalgia     History of duodenal ulcer     now gets peptic ulcer    Hyperlipidemia     Hypertension     Insomnia     Obesity     Psychiatric problem     depression and anxiety    Renal carcinoma, left ([address]     Sleep apnea     USES C-PAP    Ulcer, duodenum peptic        PAST SURGICAL HISTORY:   Past Surgical History:   Procedure Laterality Date    BACK SURGERY       X 2    CERVICAL FUSION N/A 12/17/2019    ANTERIOR CERVICAL DISCECTOMY AND FUSION AT [address] [facility] [employer] ([address], [address] [place], [address], [address]) performed by Reid Maloney MD at [facility] COLONOSCOPY N/A 2/11/2020    COLONOSCOPY POLYPECTOMY SNARE/COLD BIOPSY performed by Monserrat Carrillo MD at [address], COLON, DIAGNOSTIC      EPIDURAL STEROID INJECTION N/A 10/2/2019    C7-T1 INTERLAMINAR EPIDURAL STEROID INJECTION WITH FLUOROSCOPY performed by Rizwana Dawson MD at [facility] N/A 10/30/2019    C7-T1 INTERLMINAR EPIDURAL STEROID INJECTION WITH FLUOROSCOPY performed by Rizwana Dawson MD at [facility] 32 X 2    KNEE ARTHROSCOPY Left 10/24/2019    LEFT KNEE ARTHROSCOPY WITH PARTIAL MEDIAL MENISCECTOMY;SYNOVECTOMY WITH PLICA REMOVAL performed by Nancy Mobley MD at [facility] ARTHROSCOPY Left 10/24/2019    LAPAROSCOPIC APPENDECTOMY N/A 5/10/2019    APPENDECTOMY LAPAROSCOPIC, LYSIS OF ADHESIONS performed by Anthony Ramos MD at [facility] OR    OTHER SURGICAL HISTORY  1963    fatty tumor removal under right arm    PARTIAL NEPHRECTOMY Left 8/6/2019    ROBOTIC LAPAROSCOPIC LEFT PARTIAL NEPHRECTOMY performed by Dena Davies MD at [address] HISTORY:   Social History     Tobacco Use    Smoking status: Current Every Day Smoker     Packs/day: 1.00     Years: 15.00     Pack years: 15.00     Types: Cigarettes    Smokeless tobacco: Never Used   Vaping Use    Vaping Use: Never used   Substance Use Topics    Alcohol use: Not Currently     Alcohol/week: 1.0 standard drinks     Types: 1 Standard drinks or equivalent per week    Drug use: Yes     Types: Marijuana     Comment: Medical Marijuana couple times a day       FAMILY HISTORY:   Family History   Problem Relation Age of Onset    Stroke Mother     Heart Disease Father        MEDICATIONS:     Current Outpatient Medications on File Prior to Visit   Medication Sig Dispense Refill    insulin glargine (LANTUS SOLOSTAR) 100 UNIT/ML injection pen Inject 18 Units into the skin nightly 5 pen 3    Insulin Pen Needle 32G X 4 MM MISC USE TO INJECT LANTUS ONCE DAILY 100 each 3    Dulaglutide (TRULICITY) 3 OB/2.1MG SOPN Inject 3 mg into the skin once a week 12 pen 3    traZODone (DESYREL) 50 MG tablet Take 50 mg by mouth nightly      empagliflozin (JARDIANCE) 25 MG tablet TAKE 1 TABLET BY MOUTH IN THE MORNING 90 tablet 3    methocarbamol (ROBAXIN) 750 MG tablet Take 750 mg by mouth 4 times daily       escitalopram (LEXAPRO) 20 MG tablet TAKE 1 TABLET BY MOUTH EVERY DAY      celecoxib (CELEBREX) 200 MG capsule TAKE 1 CAPSULE BY MOUTH TWO TIMES A DAY      pregabalin (LYRICA) 200 MG capsule 3 times daily.       atorvastatin (LIPITOR) 80 MG tablet Take 1 tablet by mouth nightly 90 tablet 3    Dulaglutide 1.5 MG/0.5ML SOPN INJECT THE CONTENTS OF 1 SYRINGE INTO THE SKIN ONCE WEEKLY 12 pen 2    Blood Glucose Monitoring Suppl (PRODIGY AUTOCODE BLOOD and oriented x 3. Groslly non-focal. Motor strength is 5+/5 in all muscle groups. The patient has a normal sensorium globally. LABS:    Lab Summary Latest Ref Rng & Units 2/23/2021 2/4/2021 2/2/2021   WBC 4.0 - 11.0 K/uL - - -   Hgb 13.5 - 17.5 g/dL - - -   Hct 40.5 - 52.5 % - - -   Platelets 518 - 283 K/uL - - -   Sodium 136 - 145 mmol/L 142 - -   Potassium 3.5 - 5.1 mmol/L 4.1 - -   BUN 7 - 20 mg/dL 25(H) - 28(H)   Creatinine 0.8 - 1.3 mg/dL 0.8 0.9 -   Glucose 70 - 99 mg/dL 83 - -   Calcium 8.3 - 10.6 mg/dL 9.1 - -   Alk Phos 40 - 129 U/L 84 - -   Albumin 3.4 - 5.0 g/dL 4.1 - -   Bilirubin 0.0 - 1.0 mg/dL 0.3 - -   AST 15 - 37 U/L 29 - -   ALT 10 - 40 U/L 34 - -   HDL cholesterol 40 - 60 mg/dL 47 - -   LDL calc <100 mg/dL 80 - -   VLDL calc Not Established mg/dL 18 - -   Some recent data might be hidden       All labs were personally reviewed by me any my interpretation is: Chem 8 is normal.     IMAGING:    No new chest imaging for me to review. Pulmonary Functions Testing Results:          IMPRESSION AND RECOMMENDATIONS:     1. CHIOMA (obstructive sleep apnea)  -Patient's sleep study is very old. -He also needs a new machine.  -He would prefer a home sleep testing for reevaluation.  -If home sleep testing is not able to establish the diagnosis, he will need an in lab polysomnography.  - Home Sleep Study; Future  - Home Sleep Study; Future    2. Essential hypertension  -Currently not on any antihypertensives. 3. Overweight (BMI 25.0-29.9)  -I strongly advised the patient to make efforts to lose weight. I discussed various modalities including moderate intensity intermittent exercises, diet control and bariatric surgery. If the patient loses even 10 to 15% of current body weight, it will be beneficial in improving the overall health. 4. Non-seasonal allergic rhinitis, unspecified trigger  -I will start the patient on Flonase and azelastine nasal spray.           Return in about 6 weeks (around 11/17/2021). Marlon Wagner MD  Pulmonary Critical Care and Sleep Medicine  10/6/2021, 3:34 PM    This note was completed using dragon medical speech recognition software. Grammatical errors, random word insertions, pronoun errors and incomplete sentences are occasional consequences of this technology due to software limitations. If there are questions or concerns about the content of this note of information contained within the body of this dictation they should be addressed with the provider for clarification.

## 2021-10-22 ENCOUNTER — TELEPHONE (OUTPATIENT)
Dept: PULMONOLOGY | Age: 66
End: 2021-10-22

## 2021-10-25 ENCOUNTER — HOSPITAL ENCOUNTER (OUTPATIENT)
Dept: SLEEP CENTER | Age: 66
Discharge: HOME | End: 2021-10-25
Payer: MEDICARE

## 2021-10-25 ENCOUNTER — TELEPHONE (OUTPATIENT)
Dept: FAMILY MEDICINE CLINIC | Age: 66
End: 2021-10-25

## 2021-10-25 DIAGNOSIS — G47.33 OSA (OBSTRUCTIVE SLEEP APNEA): ICD-10-CM

## 2021-10-25 PROCEDURE — 95806 SLEEP STUDY UNATT&RESP EFFT: CPT

## 2021-10-25 RX ORDER — ESCITALOPRAM OXALATE 20 MG/1
TABLET ORAL
Qty: 30 TABLET | Refills: 0 | OUTPATIENT
Start: 2021-10-25

## 2021-10-25 RX ORDER — ESCITALOPRAM OXALATE 20 MG/1
TABLET ORAL
Qty: 90 TABLET | Refills: 0 | Status: SHIPPED | OUTPATIENT
Start: 2021-10-25 | End: 2022-01-24

## 2021-10-25 NOTE — TELEPHONE ENCOUNTER
asttMedication:   Requested Prescriptions     Pending Prescriptions Disp Refills    escitalopram (LEXAPRO) 20 MG tablet [Pharmacy Med Name: Escitalopram Oxalate Oral Tablet 20 MG] 90 tablet 0     Sig: TAKE 1 TABLET BY MOUTH EVERY DAY        Last Filled:  Unknown     Patient Phone Number: [phone] (home)     Last appt: 8/17/2021  Return in about 6 months (around 2/17/2022) for Annual Wellness Visit. Next appt: Visit date not found    Last OARRS:   RX Monitoring 4/12/2019   Attestation The Prescription Monitoring Report for this patient was reviewed today.

## 2021-10-25 NOTE — TELEPHONE ENCOUNTER
Medication and Quantity requested:     escitalopram (LEXAPRO) 20 MG tablet     Quantity: ?       Last Visit  VV 8/17/21    Pharmacy and phone number updated in EPIC:  Yes Nathaniel Ackerman

## 2021-10-27 ENCOUNTER — TELEPHONE (OUTPATIENT)
Dept: PULMONOLOGY | Age: 66
End: 2021-10-27

## 2021-10-27 PROCEDURE — 95806 SLEEP STUDY UNATT&RESP EFFT: CPT | Performed by: INTERNAL MEDICINE

## 2021-11-04 RX ORDER — TRAZODONE HYDROCHLORIDE 50 MG/1
TABLET ORAL
Qty: 90 TABLET | Refills: 0 | Status: SHIPPED | OUTPATIENT
Start: 2021-11-04 | End: 2022-01-28

## 2021-11-04 NOTE — TELEPHONE ENCOUNTER
Medication:   Requested Prescriptions     Pending Prescriptions Disp Refills    traZODone (DESYREL) 50 MG tablet [Pharmacy Med Name: traZODone HCl Oral Tablet 50 MG] 90 tablet 0     Sig: TAKE 1 TABLET BY MOUTH EVERY DAY        Last Filled:  Not on patients medication list     Patient Phone Number: [phone] (home)     Last appt: 8/17/2021   Next appt: Visit date not found    Last OARRS:   RX Monitoring 4/12/2019   Attestation The Prescription Monitoring Report for this patient was reviewed today.

## 2022-01-03 ENCOUNTER — VIRTUAL VISIT (OUTPATIENT)
Dept: FAMILY MEDICINE CLINIC | Age: 67
End: 2022-01-03
Payer: MEDICARE

## 2022-01-03 DIAGNOSIS — J40 BRONCHITIS: ICD-10-CM

## 2022-01-03 DIAGNOSIS — J40 BRONCHITIS: Primary | ICD-10-CM

## 2022-01-03 PROCEDURE — G8427 DOCREV CUR MEDS BY ELIG CLIN: HCPCS | Performed by: FAMILY MEDICINE

## 2022-01-03 PROCEDURE — 99213 OFFICE O/P EST LOW 20 MIN: CPT | Performed by: FAMILY MEDICINE

## 2022-01-03 PROCEDURE — 3017F COLORECTAL CA SCREEN DOC REV: CPT | Performed by: FAMILY MEDICINE

## 2022-01-03 PROCEDURE — 1123F ACP DISCUSS/DSCN MKR DOCD: CPT | Performed by: FAMILY MEDICINE

## 2022-01-03 PROCEDURE — 4040F PNEUMOC VAC/ADMIN/RCVD: CPT | Performed by: FAMILY MEDICINE

## 2022-01-03 RX ORDER — PREDNISONE 20 MG/1
40 TABLET ORAL DAILY
Qty: 10 TABLET | Refills: 0 | Status: SHIPPED | OUTPATIENT
Start: 2022-01-03 | End: 2022-01-08

## 2022-01-03 RX ORDER — ALBUTEROL SULFATE 90 UG/1
2 AEROSOL, METERED RESPIRATORY (INHALATION) 4 TIMES DAILY PRN
Qty: 18 G | Refills: 0 | Status: SHIPPED | OUTPATIENT
Start: 2022-01-03 | End: 2022-02-22 | Stop reason: ALTCHOICE

## 2022-01-03 RX ORDER — AZITHROMYCIN 250 MG/1
250 TABLET, FILM COATED ORAL DAILY
Qty: 6 TABLET | Refills: 0 | Status: SHIPPED | OUTPATIENT
Start: 2022-01-03 | End: 2022-01-08

## 2022-01-03 ASSESSMENT — PATIENT HEALTH QUESTIONNAIRE - PHQ9
SUM OF ALL RESPONSES TO PHQ QUESTIONS 1-9: 0
2. FEELING DOWN, DEPRESSED OR HOPELESS: 0
3. TROUBLE FALLING OR STAYING ASLEEP: 0
SUM OF ALL RESPONSES TO PHQ QUESTIONS 1-9: 0

## 2022-01-03 NOTE — PROGRESS NOTES
1/3/2022    TELEHEALTH EVALUATION -- Audio/Visual (During UJEZQ-87 public health emergency)    HPI:    Augustina Jordan (:  1955) has requested an audio/video evaluation for the following concern(s): Is been having pressure in his sinuses and has been coughing. He usually gets bronchitis in the season and was not around anybody who were sick. He has been immunized. Denies any fever. Denies any loss of smell. Review of Systems:  Gen:  Denies fever, chills, headaches. No weight loss  HEENT: As mentioned above  CV:  Denies chest pain or tightness, palpitations. Pulm: As mentioned above  Abd:  Denies abdominal pain, change in bowel habits. Prior to Visit Medications    Medication Sig Taking? Authorizing Provider   azithromycin (ZITHROMAX Z-MIRIAM) 250 MG tablet Take 1 tablet by mouth daily for 5 days Take 2 tablets on the first day, followed by one tablet daily x 4 days.  Yes Eloy Phoenix, MD   predniSONE (DELTASONE) 20 MG tablet Take 2 tablets by mouth daily for 5 days Yes Eloy Phoenix, MD   albuterol sulfate HFA (VENTOLIN HFA) 108 (90 Base) MCG/ACT inhaler Inhale 2 puffs into the lungs 4 times daily as needed for Wheezing Yes Eloy Phoenix, MD   traZODone (DESYREL) 50 MG tablet TAKE 1 TABLET BY MOUTH EVERY DAY  Yes Eloy Phoenix, MD   escitalopram (LEXAPRO) 20 MG tablet TAKE 1 TABLET BY MOUTH EVERY DAY  Yes Thelma Gonzales MD   fluticasone (FLONASE) 50 MCG/ACT nasal spray 1 spray by Each Nostril route daily Yes Rito Chiu MD   azelastine (ASTELIN) 0.1 % nasal spray 1 spray by Nasal route 2 times daily Use in each nostril as directed Yes Rito hCiu MD   insulin glargine (LANTUS SOLOSTAR) 100 UNIT/ML injection pen Inject 18 Units into the skin nightly Yes Selene Marc MD   Insulin Pen Needle 32G X 4 MM MISC USE TO INJECT LANTUS ONCE DAILY Yes Selene Marc MD   Dulaglutide (TRULICITY) 3 WW/9.7SJ SOPN Inject 3 mg into the skin once a week Yes Madhavi Ritter Treva Fong MD   empagliflozin (JARDIANCE) 25 MG tablet TAKE 1 TABLET BY MOUTH IN THE MORNING Yes Yesenia Romero MD   methocarbamol (ROBAXIN) 750 MG tablet Take 750 mg by mouth 4 times daily  Yes Historical Provider, MD   celecoxib (CELEBREX) 200 MG capsule TAKE 1 CAPSULE BY MOUTH TWO TIMES A DAY Yes Historical Provider, MD   pregabalin (LYRICA) 200 MG capsule 3 times daily. Yes Historical Provider, MD   atorvastatin (LIPITOR) 80 MG tablet Take 1 tablet by mouth nightly Yes Yesenia Romero MD   Dulaglutide 1.5 MG/0.5ML SOPN INJECT THE CONTENTS OF 1 SYRINGE INTO THE SKIN ONCE WEEKLY Yes Yesenia Romero MD   Blood Glucose Monitoring Suppl (PRODIGY AUTOCODE BLOOD GLUCOSE) LILO Check sugar three times per day. Diagnosis code: E11.42 Yes Yesenia Romero MD   blood glucose test strips (PRODIGY NO CODING BLOOD GLUC) strip Prodigy No Coding strips. Test BS three times daily. Diagnosis code: E11.42 Yes Yesenia Romero MD   medical marijuana MEDICAL MARIJUANA Yes Historical Provider, MD   pantoprazole (PROTONIX) 40 MG tablet Take 40 mg by mouth daily as needed  Yes Historical Provider, MD   clobetasol (TEMOVATE) 0.05 % cream Apply topically 2 times daily to affected areas as needed for psoriasis.  Yes Ed Mooney MD       Past Medical History:   Diagnosis Date    Binocular vision disorder with diplopia     Blood transfusion     30 yrs ago    Chronic back pain     Chronic pain syndrome     Depression     Diabetes mellitus ([address]     Failed back surgical syndrome     FH: colonic polyps     Fibromyalgia     History of duodenal ulcer     now gets peptic ulcer    Hyperlipidemia     Hypertension     Insomnia     Obesity     Psychiatric problem     depression and anxiety    Renal carcinoma, left (HCC)     Sleep apnea     USES C-PAP    Ulcer, duodenum peptic        Past Surgical History:   Procedure Laterality Date    BACK SURGERY       X 2    CERVICAL FUSION N/A 12/17/2019    ANTERIOR CERVICAL DISCECTOMY AND FUSION AT [address] [facility] ([address], [address], [address] [place], [address], [address]) performed by Aj Slade MD at [facility] COLONOSCOPY N/A 2/11/2020    COLONOSCOPY POLYPECTOMY SNARE/COLD BIOPSY performed by Adrien Alatorre MD at [address], COLON, DIAGNOSTIC      EPIDURAL STEROID INJECTION N/A 10/2/2019    C7-T1 INTERLAMINAR EPIDURAL STEROID INJECTION WITH FLUOROSCOPY performed by Srikanth Arzate MD at [facility] N/A 10/30/2019    C7-T1 INTERLMINAR EPIDURAL STEROID INJECTION WITH FLUOROSCOPY performed by S[facility]ate MD at [facility] 32 X 2    KNEE ARTHROSCOPY Left 10/24/2019    LEFT KNEE ARTHROSCOPY WITH PARTIAL MEDIAL MENISCECTOMY;SYNOVECTOMY WITH PLICA REMOVAL performed by Silke Peterson MD at [place] [address] ARTHROSCOPY Left 10/24/2019    LAPAROSCOPIC APPENDECTOMY N/A 5/10/2019    APPENDECTOMY LAPAROSCOPIC, LYSIS OF ADHESIONS performed by Steffen Reese MD at [facility]    fatty tumor removal under right arm    PARTIAL NEPHRECTOMY Left 8/6/2019    ROBOTIC LAPAROSCOPIC LEFT PARTIAL NEPHRECTOMY performed by Rebeca Causey MD at [address] ENDOSCOPY         Family History   Problem Relation Age of Onset    Stroke Mother     Heart Disease Father        Allergies   Allergen Reactions    Cymbalta [Duloxetine Hcl] Diarrhea    Metformin And Related      Muscle aches    Shellfish-Derived Products Nausea And Vomiting     *PT STATES NO PROBLEMS WITH TOPICAL IODINE       Social History     Tobacco Use    Smoking status: Current Every Day Smoker     Packs/day: 1.00     Years: 15.00     Pack years: 15.00     Types: Cigarettes    Smokeless tobacco: Never Used   Vaping Use    Vaping Use: Never used   Substance Use Topics    Alcohol use: Not Currently     Alcohol/week: 1.0 standard drink Types: 1 Standard drinks or equivalent per week    Drug use: Yes     Types: Marijuana [employer])     Comment: Medical Marijuana couple times a day          PHYSICAL EXAMINATION:  Vital Signs: (As obtained by patient/caregiver or practitioner observation)  There were no vitals taken for this visit. Patient-Reported Vitals 1/3/2022   Patient-Reported Weight 215 lb   Patient-Reported Height 6.2. Respiratory rate appears normal      Constitutional: Appears congested  Mental status: Alert and awake. Oriented to person/place/time. Able to follow commands    Eyes: EOM normal. Sclera normal. No discharge visible  HENT: Congested  Ears Normal    Neck: No visualized mass   Pulmonary/Chest: Respiratory effort normal.  Has been coughing  Musculoskeletal:  Normal range of motion of neck  Neurological:       No Facial Asymmetry (Cranial nerve 7 motor function) (limited exam to video visit). No gaze palsy       Skin:  No significant exanthematous lesions or discoloration noted on facial skin       Psychiatric: Normal Affect. No Hallucinations            ASSESSMENT/PLAN:  1. Bronchitis  - azithromycin (ZITHROMAX Z-MIRIAM) 250 MG tablet; Take 1 tablet by mouth daily for 5 days Take 2 tablets on the first day, followed by one tablet daily x 4 days. Dispense: 6 tablet; Refill: 0  - COVID-19; Future  - predniSONE (DELTASONE) 20 MG tablet; Take 2 tablets by mouth daily for 5 days  Dispense: 10 tablet; Refill: 0      Osmani Montalvo is a 74 y.o. male being evaluated by a Virtual Visit (video visit) encounter to address concerns as mentioned above. A caregiver was present when appropriate. Due to this being a TeleHealth encounter (During [place] [address] public health emergency), evaluation of the following organ systems was limited: Vitals/Constitutional/EENT/Resp/CV/GI//MS/Neuro/Skin/Heme-Lymph-Imm.   Pursuant to the emergency declaration under the [facility] waiver authority and the Coronavirus Preparedness and Response Supplemental Appropriations Act, this Virtual Visit was conducted with patient's (and/or legal guardian's) consent, to reduce the patient's risk of exposure to COVID-19 and provide necessary medical care. The patient (and/or legal guardian) has also been advised to contact this office for worsening conditions or problems, and seek emergency medical treatment and/or call 911 if deemed necessary. Patient identification was verified at the start of the visit: Yes    Total time spent on this encounter: This encounter was not billed based on time. Services were provided through a video synchronous discussion virtually to substitute for in-person clinic visit. Patient was located in their home. Provider was located in the office. --Elizabeth Hope MD on 1/3/2022 at 10:46 AM    An electronic signature was used to authenticate this note. Balbir Obrien

## 2022-01-04 LAB — SARS-COV-2: NOT DETECTED

## 2022-01-18 ENCOUNTER — PATIENT MESSAGE (OUTPATIENT)
Dept: FAMILY MEDICINE CLINIC | Age: 67
End: 2022-01-18

## 2022-01-18 DIAGNOSIS — M79.7 FIBROMYALGIA: Primary | ICD-10-CM

## 2022-01-18 RX ORDER — PREGABALIN 200 MG/1
200 CAPSULE ORAL 3 TIMES DAILY
Qty: 90 CAPSULE | Refills: 0 | Status: SHIPPED | OUTPATIENT
Start: 2022-01-18 | End: 2022-03-28

## 2022-01-18 NOTE — TELEPHONE ENCOUNTER
Patient had been seeing Dr. Danisha Andre at [address] [place] [address] and Spine - he does not feel the need to keep seeing him as the only thing they have him do is the Physical Therapy (which he is doing). Asking if you would follow/rx his medications that Dr. Danisha Andre was rx'ing. For right now he needs    Medication and Quantity requested:      pregabalin (LYRICA) 200 MG capsule    Quantity ?     Last Visit  1/3/22    Pharmacy and phone number updated in EPIC:  Yes Honey Layton

## 2022-01-18 NOTE — TELEPHONE ENCOUNTER
Last OV 1/3/2022   Next OV Visit date not found    Requested Prescriptions     Pending Prescriptions Disp Refills    pregabalin (LYRICA) 200 MG capsule 90 capsule 0     Sig: Take 1 capsule by mouth 3 times daily for 30 days.     VV 1/3 with Dr. Alfie Tanner  Please advise

## 2022-01-21 NOTE — TELEPHONE ENCOUNTER
Medication:   Requested Prescriptions     Pending Prescriptions Disp Refills    escitalopram (LEXAPRO) 20 MG tablet [Pharmacy Med Name: Escitalopram Oxalate Oral Tablet 20 MG] 90 tablet 0     Sig: TAKE 1 TABLET BY MOUTH EVERY DAY        Last Filled:  10/25/2021 90 tabs 0 refills     Patient Phone Number: [phone] (home)     Last appt: 1/3/2022   Next appt: Visit date not found    Last OARRS:   RX Monitoring 4/12/2019   Attestation The Prescription Monitoring Report for this patient was reviewed today.

## 2022-01-24 ENCOUNTER — OFFICE VISIT (OUTPATIENT)
Dept: PULMONOLOGY | Age: 67
End: 2022-01-24
Payer: MEDICARE

## 2022-01-24 VITALS
HEIGHT: 74 IN | WEIGHT: 238 LBS | BODY MASS INDEX: 30.54 KG/M2 | HEART RATE: 75 BPM | SYSTOLIC BLOOD PRESSURE: 134 MMHG | OXYGEN SATURATION: 98 % | DIASTOLIC BLOOD PRESSURE: 78 MMHG

## 2022-01-24 DIAGNOSIS — G47.33 OSA (OBSTRUCTIVE SLEEP APNEA): Primary | ICD-10-CM

## 2022-01-24 DIAGNOSIS — I10 ESSENTIAL HYPERTENSION: ICD-10-CM

## 2022-01-24 DIAGNOSIS — J30.89 NON-SEASONAL ALLERGIC RHINITIS, UNSPECIFIED TRIGGER: ICD-10-CM

## 2022-01-24 DIAGNOSIS — E66.3 OVERWEIGHT (BMI 25.0-29.9): ICD-10-CM

## 2022-01-24 PROCEDURE — 99214 OFFICE O/P EST MOD 30 MIN: CPT | Performed by: INTERNAL MEDICINE

## 2022-01-24 PROCEDURE — 4004F PT TOBACCO SCREEN RCVD TLK: CPT | Performed by: INTERNAL MEDICINE

## 2022-01-24 PROCEDURE — 3017F COLORECTAL CA SCREEN DOC REV: CPT | Performed by: INTERNAL MEDICINE

## 2022-01-24 PROCEDURE — G8427 DOCREV CUR MEDS BY ELIG CLIN: HCPCS | Performed by: INTERNAL MEDICINE

## 2022-01-24 PROCEDURE — G8417 CALC BMI ABV UP PARAM F/U: HCPCS | Performed by: INTERNAL MEDICINE

## 2022-01-24 PROCEDURE — 4040F PNEUMOC VAC/ADMIN/RCVD: CPT | Performed by: INTERNAL MEDICINE

## 2022-01-24 PROCEDURE — 1123F ACP DISCUSS/DSCN MKR DOCD: CPT | Performed by: INTERNAL MEDICINE

## 2022-01-24 PROCEDURE — G8484 FLU IMMUNIZE NO ADMIN: HCPCS | Performed by: INTERNAL MEDICINE

## 2022-01-24 RX ORDER — ESCITALOPRAM OXALATE 20 MG/1
TABLET ORAL
Qty: 90 TABLET | Refills: 0 | Status: SHIPPED | OUTPATIENT
Start: 2022-01-24 | End: 2022-04-22

## 2022-01-24 ASSESSMENT — SLEEP AND FATIGUE QUESTIONNAIRES
HOW LIKELY ARE YOU TO NOD OFF OR FALL ASLEEP WHILE LYING DOWN TO REST IN THE AFTERNOON WHEN CIRCUMSTANCES PERMIT: 2
HOW LIKELY ARE YOU TO NOD OFF OR FALL ASLEEP WHILE SITTING AND READING: 0
HOW LIKELY ARE YOU TO NOD OFF OR FALL ASLEEP IN A CAR, WHILE STOPPED FOR A FEW MINUTES IN TRAFFIC: 0
HOW LIKELY ARE YOU TO NOD OFF OR FALL ASLEEP WHILE SITTING INACTIVE IN A PUBLIC PLACE: 0
HOW LIKELY ARE YOU TO NOD OFF OR FALL ASLEEP WHILE WATCHING TV: 0
HOW LIKELY ARE YOU TO NOD OFF OR FALL ASLEEP WHILE SITTING AND TALKING TO SOMEONE: 0
HOW LIKELY ARE YOU TO NOD OFF OR FALL ASLEEP WHILE SITTING QUIETLY AFTER LUNCH WITHOUT ALCOHOL: 0
HOW LIKELY ARE YOU TO NOD OFF OR FALL ASLEEP WHEN YOU ARE A PASSENGER IN A CAR FOR AN HOUR WITHOUT A BREAK: 0
ESS TOTAL SCORE: 2

## 2022-01-24 NOTE — PROGRESS NOTES
PULMONARY OFFICE FOLLOW-UP VISIT    CONSULTING PHYSICIAN:  Nguyễn Sandoval MD , No ref. provider found     REASON FOR VISIT:   Chief Complaint   Patient presents with    Sleep Apnea       DATE OF VISIT: 1/24/2022    HISTORY OF PRESENT ILLNESS: 74y.o. year old male comes into the pulmonary/sleep clinic for a follow-up. Patient was diagnosed to moderate obstructive sleep apnea through home sleep testing. Was started on auto CPAP therapy and has been using it regularly. Feels benefited from it. Sleep symptoms have subsided. Denies any mask leakage or pressure intolerance. Does have problems with humidifier using a lot of water. Weight has been stable. Previously:   Patient reports that he had a sleep study done in 2004 which showed severe obstructive sleep apnea. He was given CPAP therapy at 9 cm H2O which she has used ever since. His machine has become more than 13years old and has stopped working. He is unable to sleep without the machine and is having a hard time every night. His weight is stable. Is also been diagnosed to have fibromyalgia. Also reports postnasal drip and upper airway irritation. Sleep Medicine 1/24/2022 10/6/2021   Sitting and reading 0 0   Watching TV 0 0   Sitting, inactive in a public place (e.g. a theatre or a meeting) 0 0   As a passenger in a car for an hour without a break 0 1   Lying down to rest in the afternoon when circumstances permit 2 3   Sitting and talking to someone 0 0   Sitting quietly after a lunch without alcohol 0 0   In a car, while stopped for a few minutes in traffic 0 0   Total score 2 4       REVIEW OF SYSTEMS:   8 point review of system is negative except that mentioned in the HPI.     PAST MEDICAL HISTORY:   Past Medical History:   Diagnosis Date    Binocular vision disorder with diplopia     Blood transfusion     30 yrs ago    Chronic back pain     Chronic pain syndrome     Depression     Diabetes mellitus ([facility] [address]     Failed back surgical syndrome     FH: colonic polyps     Fibromyalgia     History of duodenal ulcer     now gets peptic ulcer    Hyperlipidemia     Hypertension     Insomnia     Obesity     Psychiatric problem     depression and anxiety    Renal carcinoma, left ([address]     Sleep apnea     USES C-PAP    Ulcer, duodenum peptic        PAST SURGICAL HISTORY:   Past Surgical History:   Procedure Laterality Date    BACK SURGERY       X 2    CERVICAL FUSION N/A 12/17/2019    ANTERIOR CERVICAL DISCECTOMY AND FUSION AT [facility] ([address], [address], [address] [place], [address], [id number]) performed by Neto Cheney MD at [facility] COLONOSCOPY N/A 2/11/2020    COLONOSCOPY POLYPECTOMY SNARE/COLD BIOPSY performed by Pilar Edwards MD at [address], COLON, DIAGNOSTIC      EPIDURAL STEROID INJECTION N/A 10/2/2019    C7-T1 INTERLAMINAR EPIDURAL STEROID INJECTION WITH FLUOROSCOPY performed by Radha Navarrete MD at [facility] N/A 10/30/2019    C7-T1 INTERLMINAR EPIDURAL STEROID INJECTION WITH FLUOROSCOPY performed by Radha Navarrete MD at [facility] 32 X 2    KNEE ARTHROSCOPY Left 10/24/2019    LEFT KNEE ARTHROSCOPY WITH PARTIAL MEDIAL MENISCECTOMY;SYNOVECTOMY WITH PLICA REMOVAL performed by Yamilet Mcpherson MD at [facility] ARTHROSCOPY Left 10/24/2019    LAPAROSCOPIC APPENDECTOMY N/A 5/10/2019    APPENDECTOMY LAPAROSCOPIC, LYSIS OF ADHESIONS performed by Lyndsey Del Castillo MD at [facility]    fatty tumor removal under right arm    PARTIAL NEPHRECTOMY Left 8/6/2019    ROBOTIC LAPAROSCOPIC LEFT PARTIAL NEPHRECTOMY performed by Zack John MD at [facility] [address]:   Social History     Tobacco Use    Smoking status: Current Every Day Smoker     Packs/day: 1.00     Years: 15.00     Pack years: 15.00     Types: Cigarettes    GLUCOSE) LILO Check sugar three times per day. Diagnosis code: E11.42 1 Device 0    blood glucose test strips (PRODIGY NO CODING BLOOD GLUC) strip Prodigy No Coding strips. Test BS three times daily. Diagnosis code: E11.42 100 each 5    medical marijuana MEDICAL MARIJUANA      pantoprazole (PROTONIX) 40 MG tablet Take 40 mg by mouth daily as needed       clobetasol (TEMOVATE) 0.05 % cream Apply topically 2 times daily to affected areas as needed for psoriasis. 60 g 3     No current facility-administered medications on file prior to visit. ALLERGIES:   Allergies as of 01/24/2022 - Fully Reviewed 01/24/2022   Allergen Reaction Noted    Cymbalta [duloxetine hcl] Diarrhea 03/07/2018    Metformin and related  07/22/2019    Shellfish-derived products Nausea And Vomiting 04/12/2011      OBJECTIVE:   height is 6' 2\" (1.88 m) and weight is 238 lb (108 kg). His blood pressure is 134/78 and his pulse is 75. His oxygen saturation is 98%. PHYSICAL EXAM:    CONSTITUTIONAL: He is a 74y.o.-year-old who appears his stated age. He is alert and oriented x 3 and in no acute distress. HEENT: PERRLA, EOMI. No scleral icterus. No thrush, atraumatic, normocephalic. Mallampati class 2 airway. NECK: Supple, without cervical or supraclavicular lymphadenopathy:  CARDIOVASCULAR: S1 S2 RRR. Without murmer  RESPIRATORY & CHEST: Lungs are clear to auscultation and percussion. No wheezing, no crackles. Good air movement  GASTROINTESTINAL & ABDOMEN: Soft, nontender, positive bowel sounds in all quadrants, non-distended, without hepatosplenomegaly. GENITOURINARY: Deferred. MUSCULOSKELETAL: No tenderness to palpation of the axial skeleton. There is no clubbing. No cyanosis. No edema of the lower extremities. SKIN OF BODY: No rash or jaundice. PSYCHIATRIC EVALUATION: Normal affect. Patient answers questions appropriately. HEMATOLOGIC/LYMPHATIC/ IMMUNOLOGIC: No palpable lymphadenopathy.   NEUROLOGIC: Alert make efforts to lose weight. I discussed various modalities including moderate intensity intermittent exercises, diet control and bariatric surgery. If the patient loses even 10 to 15% of current body weight, it will be beneficial in improving the overall health. 4. Non-seasonal allergic rhinitis, unspecified trigger  -I will start the patient on Flonase and azelastine nasal spray. Return in about 6 months (around 7/24/2022). Idris Lebron MD  Pulmonary Critical Care and Sleep Medicine  1/24/2022, 8:46 AM    This note was completed using dragon medical speech recognition software. Grammatical errors, random word insertions, pronoun errors and incomplete sentences are occasional consequences of this technology due to software limitations. If there are questions or concerns about the content of this note of information contained within the body of this dictation they should be addressed with the provider for clarification.

## 2022-01-28 RX ORDER — TRAZODONE HYDROCHLORIDE 50 MG/1
TABLET ORAL
Qty: 90 TABLET | Refills: 0 | Status: SHIPPED | OUTPATIENT
Start: 2022-01-28 | End: 2022-04-26

## 2022-01-28 NOTE — TELEPHONE ENCOUNTER
Medication:   Requested Prescriptions     Pending Prescriptions Disp Refills    traZODone (DESYREL) 50 MG tablet [Pharmacy Med Name: traZODone HCl Oral Tablet 50 MG] 90 tablet 0     Sig: TAKE 1 TABLET BY MOUTH EVERY DAY     Last Filled: 11/4/21 #90 refills 0    Last appt: 1/3/2022   Next appt: Visit date not found    Last OARRS:   RX Monitoring 4/12/2019   Attestation The Prescription Monitoring Report for this patient was reviewed today.

## 2022-02-01 ENCOUNTER — VIRTUAL VISIT (OUTPATIENT)
Dept: FAMILY MEDICINE CLINIC | Age: 67
End: 2022-02-01
Payer: MEDICARE

## 2022-02-01 DIAGNOSIS — G89.4 CHRONIC PAIN SYNDROME: Primary | ICD-10-CM

## 2022-02-01 DIAGNOSIS — M79.7 FIBROMYALGIA: ICD-10-CM

## 2022-02-01 DIAGNOSIS — M50.30 DDD (DEGENERATIVE DISC DISEASE), CERVICAL: ICD-10-CM

## 2022-02-01 DIAGNOSIS — F33.1 MODERATE EPISODE OF RECURRENT MAJOR DEPRESSIVE DISORDER (HCC): ICD-10-CM

## 2022-02-01 DIAGNOSIS — Z85.528 HISTORY OF RENAL CARCINOMA: ICD-10-CM

## 2022-02-01 DIAGNOSIS — Z79.4 CONTROLLED TYPE 2 DIABETES MELLITUS WITHOUT COMPLICATION, WITH LONG-TERM CURRENT USE OF INSULIN (HCC): ICD-10-CM

## 2022-02-01 DIAGNOSIS — E11.69 DYSLIPIDEMIA ASSOCIATED WITH TYPE 2 DIABETES MELLITUS (HCC): ICD-10-CM

## 2022-02-01 DIAGNOSIS — G47.33 OSA (OBSTRUCTIVE SLEEP APNEA): ICD-10-CM

## 2022-02-01 DIAGNOSIS — E78.5 DYSLIPIDEMIA ASSOCIATED WITH TYPE 2 DIABETES MELLITUS (HCC): ICD-10-CM

## 2022-02-01 DIAGNOSIS — E27.8 LEFT ADRENAL MASS (HCC): ICD-10-CM

## 2022-02-01 DIAGNOSIS — F10.21 CHRONIC ALCOHOLISM IN REMISSION (HCC): ICD-10-CM

## 2022-02-01 DIAGNOSIS — E11.9 CONTROLLED TYPE 2 DIABETES MELLITUS WITHOUT COMPLICATION, WITH LONG-TERM CURRENT USE OF INSULIN (HCC): ICD-10-CM

## 2022-02-01 PROBLEM — E83.52 HYPERCALCEMIA: Status: RESOLVED | Noted: 2021-08-24 | Resolved: 2022-02-01

## 2022-02-01 PROBLEM — M25.511 PAIN IN RIGHT SHOULDER: Status: RESOLVED | Noted: 2020-03-05 | Resolved: 2022-02-01

## 2022-02-01 PROBLEM — M62.838 MUSCLE SPASM: Status: RESOLVED | Noted: 2020-11-19 | Resolved: 2022-02-01

## 2022-02-01 PROBLEM — E87.6 HYPOKALEMIA: Status: RESOLVED | Noted: 2021-08-24 | Resolved: 2022-02-01

## 2022-02-01 PROBLEM — M79.10 MUSCLE PAIN: Status: RESOLVED | Noted: 2020-08-25 | Resolved: 2022-02-01

## 2022-02-01 PROBLEM — N18.30 STAGE 3 CHRONIC KIDNEY DISEASE (HCC): Status: RESOLVED | Noted: 2021-08-24 | Resolved: 2022-02-01

## 2022-02-01 PROBLEM — N28.89 RENAL MASS, LEFT: Status: RESOLVED | Noted: 2019-08-06 | Resolved: 2022-02-01

## 2022-02-01 PROBLEM — Z91.89 AT RISK FOR RESPIRATORY DEPRESSION DUE TO OPIOID: Status: RESOLVED | Noted: 2019-01-18 | Resolved: 2022-02-01

## 2022-02-01 PROCEDURE — G8484 FLU IMMUNIZE NO ADMIN: HCPCS | Performed by: FAMILY MEDICINE

## 2022-02-01 PROCEDURE — 99214 OFFICE O/P EST MOD 30 MIN: CPT | Performed by: FAMILY MEDICINE

## 2022-02-01 PROCEDURE — 3017F COLORECTAL CA SCREEN DOC REV: CPT | Performed by: FAMILY MEDICINE

## 2022-02-01 PROCEDURE — 1123F ACP DISCUSS/DSCN MKR DOCD: CPT | Performed by: FAMILY MEDICINE

## 2022-02-01 PROCEDURE — G8427 DOCREV CUR MEDS BY ELIG CLIN: HCPCS | Performed by: FAMILY MEDICINE

## 2022-02-01 PROCEDURE — G8417 CALC BMI ABV UP PARAM F/U: HCPCS | Performed by: FAMILY MEDICINE

## 2022-02-01 PROCEDURE — 4004F PT TOBACCO SCREEN RCVD TLK: CPT | Performed by: FAMILY MEDICINE

## 2022-02-01 PROCEDURE — 4040F PNEUMOC VAC/ADMIN/RCVD: CPT | Performed by: FAMILY MEDICINE

## 2022-02-01 PROCEDURE — 3046F HEMOGLOBIN A1C LEVEL >9.0%: CPT | Performed by: FAMILY MEDICINE

## 2022-02-01 PROCEDURE — 2022F DILAT RTA XM EVC RTNOPTHY: CPT | Performed by: FAMILY MEDICINE

## 2022-02-01 RX ORDER — METHOCARBAMOL 750 MG/1
750-1500 TABLET, FILM COATED ORAL 3 TIMES DAILY
Qty: 180 TABLET | Refills: 3 | Status: SHIPPED | OUTPATIENT
Start: 2022-02-01 | End: 2022-08-15

## 2022-02-01 RX ORDER — CELECOXIB 200 MG/1
CAPSULE ORAL
Qty: 180 CAPSULE | Refills: 1 | Status: SHIPPED | OUTPATIENT
Start: 2022-02-01

## 2022-02-01 SDOH — ECONOMIC STABILITY: HOUSING INSECURITY
IN THE LAST 12 MONTHS, WAS THERE A TIME WHEN YOU DID NOT HAVE A STEADY PLACE TO SLEEP OR SLEPT IN A SHELTER (INCLUDING NOW)?: NO

## 2022-02-01 SDOH — ECONOMIC STABILITY: INCOME INSECURITY: IN THE LAST 12 MONTHS, WAS THERE A TIME WHEN YOU WERE NOT ABLE TO PAY THE MORTGAGE OR RENT ON TIME?: NO

## 2022-02-01 SDOH — SOCIAL STABILITY: SOCIAL NETWORK
IN A TYPICAL WEEK, HOW MANY TIMES DO YOU TALK ON THE PHONE WITH FAMILY, FRIENDS, OR NEIGHBORS?: MORE THAN THREE TIMES A WEEK

## 2022-02-01 SDOH — ECONOMIC STABILITY: INCOME INSECURITY: HOW HARD IS IT FOR YOU TO PAY FOR THE VERY BASICS LIKE FOOD, HOUSING, MEDICAL CARE, AND HEATING?: NOT HARD AT ALL

## 2022-02-01 SDOH — HEALTH STABILITY: PHYSICAL HEALTH: ON AVERAGE, HOW MANY DAYS PER WEEK DO YOU ENGAGE IN MODERATE TO STRENUOUS EXERCISE (LIKE A BRISK WALK)?: 7 DAYS

## 2022-02-01 SDOH — ECONOMIC STABILITY: HOUSING INSECURITY: IN THE LAST 12 MONTHS, HOW MANY PLACES HAVE YOU LIVED?: 1

## 2022-02-01 SDOH — SOCIAL STABILITY: SOCIAL NETWORK
DO YOU BELONG TO ANY CLUBS OR ORGANIZATIONS SUCH AS CHURCH GROUPS UNIONS, FRATERNAL OR ATHLETIC GROUPS, OR SCHOOL GROUPS?: NO

## 2022-02-01 SDOH — SOCIAL STABILITY: SOCIAL NETWORK: HOW OFTEN DO YOU ATTEND CHURCH OR RELIGIOUS SERVICES?: NEVER

## 2022-02-01 SDOH — HEALTH STABILITY: MENTAL HEALTH
STRESS IS WHEN SOMEONE FEELS TENSE, NERVOUS, ANXIOUS, OR CAN'T SLEEP AT NIGHT BECAUSE THEIR MIND IS TROUBLED. HOW STRESSED ARE YOU?: NOT AT ALL

## 2022-02-01 SDOH — SOCIAL STABILITY: SOCIAL NETWORK: ARE YOU MARRIED, WIDOWED, DIVORCED, SEPARATED, NEVER MARRIED, OR LIVING WITH A PARTNER?: MARRIED

## 2022-02-01 SDOH — ECONOMIC STABILITY: FOOD INSECURITY: WITHIN THE PAST 12 MONTHS, YOU WORRIED THAT YOUR FOOD WOULD RUN OUT BEFORE YOU GOT MONEY TO BUY MORE.: NEVER TRUE

## 2022-02-01 SDOH — HEALTH STABILITY: PHYSICAL HEALTH: ON AVERAGE, HOW MANY MINUTES DO YOU ENGAGE IN EXERCISE AT THIS LEVEL?: 60 MIN

## 2022-02-01 SDOH — HEALTH STABILITY: MENTAL HEALTH: HOW OFTEN DO YOU HAVE A DRINK CONTAINING ALCOHOL?: NEVER

## 2022-02-01 SDOH — SOCIAL STABILITY: SOCIAL NETWORK: HOW OFTEN DO YOU GET TOGETHER WITH FRIENDS OR RELATIVES?: MORE THAN THREE TIMES A WEEK

## 2022-02-01 SDOH — SOCIAL STABILITY: SOCIAL NETWORK: HOW OFTEN DO YOU ATTENT MEETINGS OF THE CLUB OR ORGANIZATION YOU BELONG TO?: NEVER

## 2022-02-01 SDOH — ECONOMIC STABILITY: TRANSPORTATION INSECURITY
IN THE PAST 12 MONTHS, HAS LACK OF TRANSPORTATION KEPT YOU FROM MEETINGS, WORK, OR FROM GETTING THINGS NEEDED FOR DAILY LIVING?: NO

## 2022-02-01 SDOH — ECONOMIC STABILITY: TRANSPORTATION INSECURITY
IN THE PAST 12 MONTHS, HAS THE LACK OF TRANSPORTATION KEPT YOU FROM MEDICAL APPOINTMENTS OR FROM GETTING MEDICATIONS?: NO

## 2022-02-01 SDOH — ECONOMIC STABILITY: FOOD INSECURITY: WITHIN THE PAST 12 MONTHS, THE FOOD YOU BOUGHT JUST DIDN'T LAST AND YOU DIDN'T HAVE MONEY TO GET MORE.: NEVER TRUE

## 2022-02-01 NOTE — PROGRESS NOTES
TELEHEALTH EVALUATION -- Audio/Visual (During WXWUX-52 public health emergency)    Saeed Carbajal   YOB: 1955    Date of Visit:  2/1/2022    Allergies   Allergen Reactions    Cymbalta [Duloxetine Hcl] Diarrhea    Metformin And Related      Muscle aches    Shellfish-Derived Products Nausea And Vomiting     *PT STATES NO PROBLEMS WITH TOPICAL IODINE     Current Outpatient Medications on File Prior to Visit   Medication Sig Dispense Refill    traZODone (DESYREL) 50 MG tablet TAKE 1 TABLET BY MOUTH EVERY DAY 90 tablet 0    escitalopram (LEXAPRO) 20 MG tablet TAKE 1 TABLET BY MOUTH EVERY DAY 90 tablet 0    pregabalin (LYRICA) 200 MG capsule Take 1 capsule by mouth 3 times daily for 30 days. 90 capsule 0    albuterol sulfate HFA (VENTOLIN HFA) 108 (90 Base) MCG/ACT inhaler Inhale 2 puffs into the lungs 4 times daily as needed for Wheezing 18 g 0    fluticasone (FLONASE) 50 MCG/ACT nasal spray 1 spray by Each Nostril route daily 16 g 3    azelastine (ASTELIN) 0.1 % nasal spray 1 spray by Nasal route 2 times daily Use in each nostril as directed 60 mL 3    insulin glargine (LANTUS SOLOSTAR) 100 UNIT/ML injection pen Inject 18 Units into the skin nightly 5 pen 3    Insulin Pen Needle 32G X 4 MM MISC USE TO INJECT LANTUS ONCE DAILY 100 each 3    Dulaglutide (TRULICITY) 3 GN/3.8RH SOPN Inject 3 mg into the skin once a week 12 pen 3    empagliflozin (JARDIANCE) 25 MG tablet TAKE 1 TABLET BY MOUTH IN THE MORNING 90 tablet 3    atorvastatin (LIPITOR) 80 MG tablet Take 1 tablet by mouth nightly 90 tablet 3    blood glucose test strips (PRODIGY NO CODING BLOOD GLUC) strip Prodigy No Coding strips. Test BS three times daily. Diagnosis code: E11.42 100 each 5    medical marijuana MEDICAL MARIJUANA      pantoprazole (PROTONIX) 40 MG tablet Take 40 mg by mouth daily as needed       clobetasol (TEMOVATE) 0.05 % cream Apply topically 2 times daily to affected areas as needed for psoriasis.  60 g 3    Dulaglutide 1.5 MG/0.5ML SOPN INJECT THE CONTENTS OF 1 SYRINGE INTO THE SKIN ONCE WEEKLY (Patient not taking: Reported on 2022) 12 pen 2    Blood Glucose Monitoring Suppl (PRODIGY AUTOCODE BLOOD GLUCOSE) LILO Check sugar three times per day. Diagnosis code: E11.42 1 Device 0     No current facility-administered medications on file prior to visit. Wt Readings from Last 3 Encounters:   22 238 lb (108 kg)   10/06/21 216 lb (98 kg)   10/28/20 194 lb 0.1 oz (88 kg)     BP Readings from Last 3 Encounters:   22 134/78   10/06/21 130/80   20 103/68          Juventino Frances (:  1955) has requested to and consented to an audio/video evaluation for the concerns or medical issues discussed below. Chief Complaint   Patient presents with    Diabetes     med review, wants to \"consolidate\" meds w Drs. HPI       Fibromyalgia and chronic back pain: Started with symptoms in . Status post evaluation by neurology and PMNR. S/p Rheumatology evaluation 2022 and advised nothing additional can be done- just needs to continue current meds and do PT. No etiology has been found. He is currently seeing integrative medicine for massage, acupuncture, PT. Using medical marijuana. Also on robaxin, lyrica, and celebrex. Patient would like me to fill these so he does not have to see PMNR again given they said there was nothing else they can do. Type 2 diabetes: Diagnosed in . On insulin since 2019. The patient is seeing endocrinology. History of muscle soreness with Metformin. Has lost weight intentionally. S/p L partial pancreactomy. HTN: was on lisinopril 5mg. Stopped given low normal BP's. Per endo plan to restart if SBP>130 or DBP >80.      Hyperlipidemia: Stable on lipitor 80mg. Pain did not improve with lower dose of lipitor.      Hx of renal cancer:  Incidental finding in  when was imaging for appendicitis. S/p surgery with complete resolution.  Every 6 months he has imaging. Followed by Dr. Manuel Rajput with urology.      Left adrenal adenoma on MRI: Status post a normal functional work-up in 2019. The patient is managed by endocrinology. Has CT or US q 6 mo.      Binocular diplopia: The patient is status post evaluation by neurology and March 2021. He has seen a neuro-ophthalmologist.  Seeing optho at [facility]. Has special glasses.      CHIOMA: on CPAP     Depression/anxiety: stable on lexapro. No issues.      Hx of duodenal and gastric ulcers: first was age 11. On protonix. Stable. Last ulcer was 20+ years ago.     Hx of ETOH use:  S/p treatment and quit in 2017.       Tobacco abuse:  Smoking 1ppd and not ready to quit.      SH: 8/2021: lives with wife, daughter, and 2 grandkids. Retired. Was on disability prior to detention.   Last job was .  Has 2 kids.        Social History     Socioeconomic History    Marital status:      Spouse name: Not on file    Number of children: Not on file    Years of education: Not on file    Highest education level: Not on file   Occupational History    Occupation: disability   Tobacco Use    Smoking status: Current Every Day Smoker     Packs/day: 1.00     Years: 15.00     Pack years: 15.00     Types: Cigarettes    Smokeless tobacco: Never Used   Vaping Use    Vaping Use: Never used   Substance and Sexual Activity    Alcohol use: Not Currently     Alcohol/week: 1.0 standard drink     Types: 1 Standard drinks or equivalent per week    Drug use: Yes     Types: Marijuana Dolph Delaplaine)     Comment: Medical Marijuana couple times a day    Sexual activity: Yes     Partners: Female   Other Topics Concern    Not on file   Social History Narrative    ** Merged History Encounter **          Social Determinants of Health     Financial Resource Strain: Low Risk     Difficulty of Paying Living Expenses: Not hard at all   Food Insecurity: No Food Insecurity    Worried About [address] in the Last Year: Never true    Ran Out of Food in the Last Year: Never true   Transportation Needs: No Transportation Needs    Lack of Transportation (Medical): No    Lack of Transportation (Non-Medical): No   Physical Activity: Sufficiently Active    Days of Exercise per Week: 7 days    Minutes of Exercise per Session: 60 min   Stress: No Stress Concern Present    Feeling of Stress : Not at all   Social Connections: Moderately Isolated    Frequency of Communication with Friends and Family: More than three times a week    Frequency of Social Gatherings with Friends and Family: More than three times a week    Attends Islam Services: Never    Active Member of Clubs or Organizations: No    Attends Club or Organization Meetings: Never    Marital Status:    Intimate Partner Violence:     Fear of Current or Ex-Partner: Not on file    Emotionally Abused: Not on file    Physically Abused: Not on file    Sexually Abused: Not on file   Housing Stability: [address] Unable to Pay for Housing in the Last Year: No    Number of [place] in the Last Year: 1    Unstable Housing in the Last Year: No         Review of Systems  As documented in the HPI. Currently no complaints of CP or RONI. Vital Signs: (As obtained by patient/caregiver or practitioner observation)    There were no vitals taken for this visit. Patient-Reported Vitals 2/1/2022   Patient-Reported Weight 215 lb   Patient-Reported Height 6 2        Physical Exam  Constitutional:       General: He is not in acute distress. Appearance: Normal appearance. He is not ill-appearing. HENT:      Head: Normocephalic and atraumatic. Nose: Nose normal.   Pulmonary:      Effort: Pulmonary effort is normal. No respiratory distress. Neurological:      General: No focal deficit present. Mental Status: He is alert. Psychiatric:         Mood and Affect: Mood normal.         Behavior: Behavior normal.           Assessment/Plan     1. Chronic pain syndrome  Stable. Continue current regimen. Told patient I can continue to fill Lyrica and can fill celebrex and robaxin. Continue home care. - celecoxib (CELEBREX) 200 MG capsule; TAKE 1 CAPSULE BY MOUTH TWO TIMES A DAY  Dispense: 180 capsule; Refill: 1  - methocarbamol (ROBAXIN) 750 MG tablet; Take 1-2 tablets by mouth 3 times daily  Dispense: 180 tablet; Refill: 3    2. Moderate episode of recurrent major depressive disorder (HCC)  Stable. Continue current regimen. 3. Fibromyalgia  See above. Stable. Continue current regimen. - celecoxib (CELEBREX) 200 MG capsule; TAKE 1 CAPSULE BY MOUTH TWO TIMES A DAY  Dispense: 180 capsule; Refill: 1  - methocarbamol (ROBAXIN) 750 MG tablet; Take 1-2 tablets by mouth 3 times daily  Dispense: 180 tablet; Refill: 3    4. History of renal carcinoma  Stable. Continue current regimen. Seeing urology. 5. Dyslipidemia associated with type 2 diabetes mellitus ([facility] [address]  Stable. Continue current regimen. Seeing endo. 6. Left adrenal mass (HCC)  Stable. Continue current regimen. Seeing endo. 8. Controlled type 2 diabetes mellitus without complication, with long-term current use of insulin (HCC)  Stable. Continue current regimen. Seeing endo. 9. DDD (degenerative disc disease), cervical  Stable. Continue current regimen. 10. CHIOMA (obstructive sleep apnea)  Stable. Continue current regimen. Seeing pulm. 11. Chronic alcoholism in remission ([facility] [address]  Stable. Continue current regimen. Continues to abstain from ETOH. Recommended flu vaccine. Discussed medications with patient, who voiced understanding of their use and indications. All questions answered. Return in about 6 months (around 8/1/2022) for Annual Wellness Visit. Mary Dupont is being evaluated by a Virtual Visit (video visit) encounter to address concerns as mentioned above. A caregiver was present when appropriate.  Due to this being a TeleHealth encounter (During CMOSZ-84 public health emergency), evaluation of the following organ systems was limited: Vitals/Constitutional/EENT/Resp/CV/GI//MS/Neuro/Skin/Heme-Lymph-Imm. Pursuant to the emergency declaration under the [facility], [address] [place] [address] and the [employer] Act, this Virtual Visit was conducted with patient's (and/or legal guardian's) consent, to reduce the patient's risk of exposure to COVID-19 and provide necessary medical care. The patient (and/or legal guardian) has also been advised to contact this office for worsening conditions or problems, and seek emergency medical treatment and/or call 911 if deemed necessary. The patient and no one were present for the visit. Patient identification was verified at the start of the visit: Yes    Total time spent on this encounter: Not billed by time. Services were provided through a video synchronous discussion virtually to substitute for in-person clinic visit. Documentation was done using voice recognition dragon software. Efforts were made to ensure accuracy; however, inadvertent, unintentional computerized transcription errors may be present. --Thelma Gonzales MD on 2/1/2022    An electronic signature was used to authenticate this note.

## 2022-02-16 NOTE — TELEPHONE ENCOUNTER
Patient has been rx'd a specialty cream for the fibromyalgia pain in his back. The Doctor who normally rx'd this is no longer in practice and he is needing a refill. Patient does not have a particular name, because it is a mixture. This is from [address] [place] [address].

## 2022-02-17 DIAGNOSIS — E11.65 UNCONTROLLED TYPE 2 DIABETES MELLITUS WITH HYPERGLYCEMIA, WITH LONG-TERM CURRENT USE OF INSULIN (HCC): ICD-10-CM

## 2022-02-17 DIAGNOSIS — E11.69 DYSLIPIDEMIA ASSOCIATED WITH TYPE 2 DIABETES MELLITUS (HCC): ICD-10-CM

## 2022-02-17 DIAGNOSIS — E78.5 DYSLIPIDEMIA ASSOCIATED WITH TYPE 2 DIABETES MELLITUS (HCC): ICD-10-CM

## 2022-02-17 DIAGNOSIS — Z79.4 UNCONTROLLED TYPE 2 DIABETES MELLITUS WITH HYPERGLYCEMIA, WITH LONG-TERM CURRENT USE OF INSULIN (HCC): ICD-10-CM

## 2022-02-17 LAB
A/G RATIO: 2.3 (ref 1.1–2.2)
ALBUMIN SERPL-MCNC: 4.3 G/DL (ref 3.4–5)
ALP BLD-CCNC: 62 U/L (ref 40–129)
ALT SERPL-CCNC: 41 U/L (ref 10–40)
ANION GAP SERPL CALCULATED.3IONS-SCNC: 11 MMOL/L (ref 3–16)
AST SERPL-CCNC: 39 U/L (ref 15–37)
BILIRUB SERPL-MCNC: 0.5 MG/DL (ref 0–1)
BUN BLDV-MCNC: 23 MG/DL (ref 7–20)
CALCIUM SERPL-MCNC: 9 MG/DL (ref 8.3–10.6)
CHLORIDE BLD-SCNC: 103 MMOL/L (ref 99–110)
CHOLESTEROL, FASTING: 133 MG/DL (ref 0–199)
CO2: 25 MMOL/L (ref 21–32)
CREAT SERPL-MCNC: 0.8 MG/DL (ref 0.8–1.3)
GFR AFRICAN AMERICAN: >60
GFR NON-AFRICAN AMERICAN: >60
GLUCOSE BLD-MCNC: 87 MG/DL (ref 70–99)
HDLC SERPL-MCNC: 39 MG/DL (ref 40–60)
LDL CHOLESTEROL CALCULATED: 79 MG/DL
POTASSIUM SERPL-SCNC: 4.4 MMOL/L (ref 3.5–5.1)
SODIUM BLD-SCNC: 139 MMOL/L (ref 136–145)
TOTAL PROTEIN: 6.2 G/DL (ref 6.4–8.2)
TRIGLYCERIDE, FASTING: 77 MG/DL (ref 0–150)
VLDLC SERPL CALC-MCNC: 15 MG/DL

## 2022-02-17 NOTE — TELEPHONE ENCOUNTER
Please call biomed to see what formulation he had before and call in a refill of the cream he had before and let him know if called in. Please also add to his med list if possible.

## 2022-02-18 LAB
ESTIMATED AVERAGE GLUCOSE: 116.9 MG/DL
HBA1C MFR BLD: 5.7 %

## 2022-02-18 NOTE — TELEPHONE ENCOUNTER
I have left a voicemail on the pharmacy machine to call office back. Please read notes below. Thanks    [place] [address] [phone]. Will try again later today.

## 2022-02-18 NOTE — TELEPHONE ENCOUNTER
I left another VM  Last dispence of cream was 12/1/21.  Requested that they fax an order to  us so that we could document the specialty cream for future orders

## 2022-02-21 PROBLEM — H52.203 MYOPIC ASTIGMATISM OF BOTH EYES: Status: ACTIVE | Noted: 2021-08-26

## 2022-02-21 PROBLEM — H50.21 HYPOTROPIA OF RIGHT EYE: Status: ACTIVE | Noted: 2021-08-26

## 2022-02-21 PROBLEM — H52.13 MYOPIC ASTIGMATISM OF BOTH EYES: Status: ACTIVE | Noted: 2021-08-26

## 2022-02-22 ENCOUNTER — OFFICE VISIT (OUTPATIENT)
Dept: ENDOCRINOLOGY | Age: 67
End: 2022-02-22
Payer: MEDICARE

## 2022-02-22 VITALS
BODY MASS INDEX: 30.77 KG/M2 | OXYGEN SATURATION: 95 % | WEIGHT: 239.8 LBS | SYSTOLIC BLOOD PRESSURE: 119 MMHG | HEART RATE: 73 BPM | DIASTOLIC BLOOD PRESSURE: 71 MMHG | HEIGHT: 74 IN

## 2022-02-22 DIAGNOSIS — E11.42 CONTROLLED TYPE 2 DIABETES MELLITUS WITH DIABETIC POLYNEUROPATHY, WITH LONG-TERM CURRENT USE OF INSULIN (HCC): Primary | ICD-10-CM

## 2022-02-22 DIAGNOSIS — I10 ESSENTIAL HYPERTENSION: ICD-10-CM

## 2022-02-22 DIAGNOSIS — E78.5 DYSLIPIDEMIA ASSOCIATED WITH TYPE 2 DIABETES MELLITUS (HCC): ICD-10-CM

## 2022-02-22 DIAGNOSIS — E11.69 DYSLIPIDEMIA ASSOCIATED WITH TYPE 2 DIABETES MELLITUS (HCC): ICD-10-CM

## 2022-02-22 DIAGNOSIS — Z79.4 CONTROLLED TYPE 2 DIABETES MELLITUS WITH DIABETIC POLYNEUROPATHY, WITH LONG-TERM CURRENT USE OF INSULIN (HCC): Primary | ICD-10-CM

## 2022-02-22 PROCEDURE — 4040F PNEUMOC VAC/ADMIN/RCVD: CPT | Performed by: INTERNAL MEDICINE

## 2022-02-22 PROCEDURE — 1123F ACP DISCUSS/DSCN MKR DOCD: CPT | Performed by: INTERNAL MEDICINE

## 2022-02-22 PROCEDURE — 4004F PT TOBACCO SCREEN RCVD TLK: CPT | Performed by: INTERNAL MEDICINE

## 2022-02-22 PROCEDURE — 2022F DILAT RTA XM EVC RTNOPTHY: CPT | Performed by: INTERNAL MEDICINE

## 2022-02-22 PROCEDURE — 3044F HG A1C LEVEL LT 7.0%: CPT | Performed by: INTERNAL MEDICINE

## 2022-02-22 PROCEDURE — G8427 DOCREV CUR MEDS BY ELIG CLIN: HCPCS | Performed by: INTERNAL MEDICINE

## 2022-02-22 PROCEDURE — 3017F COLORECTAL CA SCREEN DOC REV: CPT | Performed by: INTERNAL MEDICINE

## 2022-02-22 PROCEDURE — G8417 CALC BMI ABV UP PARAM F/U: HCPCS | Performed by: INTERNAL MEDICINE

## 2022-02-22 PROCEDURE — 99214 OFFICE O/P EST MOD 30 MIN: CPT | Performed by: INTERNAL MEDICINE

## 2022-02-22 PROCEDURE — G8484 FLU IMMUNIZE NO ADMIN: HCPCS | Performed by: INTERNAL MEDICINE

## 2022-02-22 RX ORDER — EMPAGLIFLOZIN 25 MG/1
TABLET, FILM COATED ORAL
Qty: 90 TABLET | Refills: 3 | Status: SHIPPED | OUTPATIENT
Start: 2022-02-22

## 2022-02-22 NOTE — PROGRESS NOTES
Patient ID:   Umesh Kelley is a 74 y.o. male    Chief Complaint:   Umesh Kelley presents for an evaluation of Type 2 Diabetes Mellitus , Hyperlipidemia and hypertension. Subjective:   Type 2 Diabetes Mellitus diagnosed in 2017  On insulin since April 2019   Metformin caused muscle soreness     Has received steroid shots for trigger finger back pain. None recent. Has some muscle pains (diffuse)   Now getting pains   Saw Neurologist . Then saw integrated medicine specialist. Told to have fibromyalgia. Lantus 18 units daily at night. TTT of . Jardiance 43FZ daily   Trulicity 3.0 mg weekly on Thursday    No BS readings today    Checks blood sugars once a week. Reportedly 90's to 110's    AM:   Lunch:  Supper:   HS:     Hypoglycemias: None     Meals: Three, may be protein shake for breakfast. Dinner is bigger. Late night snack (cup of mixed nuts). Soda once a day, diet. No juices. Exercise: Limited due to back problems. Exercise with the bands. Denies chest pain, exertional dyspnea. Family history of CAD: Dad at age 64. Mother had stroke in her 66's. Smokes 1 PPD. Counselled for smoking cessation.      Currently not on ASA, recommend baby aspirin if okay with gastroenterologist or PCP      The following portions of the patient's history were reviewed and updated as appropriate:       Family History   Problem Relation Age of Onset    Stroke Mother     Heart Disease Father     Other Paternal Grandmother          Social History     Socioeconomic History    Marital status:      Spouse name: Not on file    Number of children: Not on file    Years of education: Not on file    Highest education level: Not on file   Occupational History    Occupation: disability   Tobacco Use    Smoking status: Current Every Day Smoker     Packs/day: 1.00     Years: 15.00     Pack years: 15.00     Types: Cigarettes    Smokeless tobacco: Never Used   Vaping Use    Vaping Use: Never used Substance and Sexual Activity    Alcohol use: Not Currently     Alcohol/week: 1.0 standard drink     Types: 1 Standard drinks or equivalent per week    Drug use: Yes     Types: Marijuana Joe Miranda     Comment: Medical Marijuana couple times a day    Sexual activity: Yes     Partners: Female   Other Topics Concern    Not on file   Social History Narrative    ** Merged History Encounter **          Social Determinants of Health     Financial Resource Strain: Low Risk     Difficulty of Paying Living Expenses: Not hard at all   Food Insecurity: No Food Insecurity    Worried About Running Out of Food in the Last Year: Never true    Afia of Food in the Last Year: Never true   Transportation Needs: No Transportation Needs    Lack of Transportation (Medical): No    Lack of Transportation (Non-Medical): No   Physical Activity: Sufficiently Active    Days of Exercise per Week: 7 days    Minutes of Exercise per Session: 60 min   Stress: No Stress Concern Present    Feeling of Stress : Not at all   Social Connections:  Moderately Isolated    Frequency of Communication with Friends and Family: More than three times a week    Frequency of Social Gatherings with Friends and Family: More than three times a week    Attends [employer] Services: Never    Active Member of Clubs or Organizations: No    Attends Club or Organization Meetings: Never    Marital Status:    Intimate Partner Violence:     Fear of Current or Ex-Partner: Not on file    Emotionally Abused: Not on file    Physically Abused: Not on file    Sexually Abused: Not on file   Housing Stability: [address] Unable to Pay for Housing in the Last Year: No    Number of [place] in the Last Year: 1    Unstable Housing in the Last Year: No       Past Medical History:   Diagnosis Date    Binocular vision disorder with diplopia     Blood transfusion     30 yrs ago    Chronic back pain     Chronic pain syndrome     Depression     Diabetes mellitus [facility] [address]     Failed back surgical syndrome     FH: colonic polyps     Fibromyalgia     History of duodenal ulcer     now gets peptic ulcer    Hyperlipidemia     Hypertension     Insomnia     Obesity     Psychiatric problem     depression and anxiety    Renal carcinoma, left ([facility] [address]     Sleep apnea     USES C-PAP    Ulcer, duodenum peptic        Past Surgical History:   Procedure Laterality Date    BACK SURGERY       X 2    CERVICAL FUSION N/A 12/17/2019    ANTERIOR CERVICAL DISCECTOMY AND FUSION AT [address] [facility] [employer] ([address], [address] [place], [address], [address]) performed by Argenis Naik MD at [facility] COLONOSCOPY N/A 2/11/2020    COLONOSCOPY POLYPECTOMY SNARE/COLD BIOPSY performed by Laquita Workman MD at [address], COLON, DIAGNOSTIC      EPIDURAL STEROID INJECTION N/A 10/2/2019    C7-T1 INTERLAMINAR EPIDURAL STEROID INJECTION WITH FLUOROSCOPY performed by Pennie Borrego MD at [facility] N/A 10/30/2019    C7-T1 INTERLMINAR EPIDURAL STEROID INJECTION WITH FLUOROSCOPY performed by Pennie Borrego MD at [facility] 32 X 2    KNEE ARTHROSCOPY Left 10/24/2019    LEFT KNEE ARTHROSCOPY WITH PARTIAL MEDIAL MENISCECTOMY;SYNOVECTOMY WITH PLICA REMOVAL performed by Felipa Amaya MD at [facility] ARTHROSCOPY Left 10/24/2019    LAPAROSCOPIC APPENDECTOMY N/A 5/10/2019    APPENDECTOMY LAPAROSCOPIC, LYSIS OF ADHESIONS performed by Azeb Noel MD at [facility]    fatty tumor removal under right arm    PARTIAL NEPHRECTOMY Left 8/6/2019    ROBOTIC LAPAROSCOPIC LEFT PARTIAL NEPHRECTOMY performed by Ian Wade MD at [facility]           Allergies   Allergen Reactions    Cymbalta [Duloxetine Hcl] Diarrhea    Metformin And Related      Muscle aches    Shellfish-Derived Products Nausea And Vomiting *PT STATES NO PROBLEMS WITH TOPICAL IODINE         Current Outpatient Medications:     VITAMIN D, ERGOCALCIFEROL, PO, Take by mouth, Disp: , Rfl:     B Complex Vitamins (VITAMIN B COMPLEX PO), Take by mouth, Disp: , Rfl:     Nutritional Supplements (JUICE PLUS FIBRE PO), Take by mouth, Disp: , Rfl:     empagliflozin (JARDIANCE) 25 MG tablet, TAKE 1 TABLET BY MOUTH IN THE MORNING, Disp: 90 tablet, Rfl: 3    celecoxib (CELEBREX) 200 MG capsule, TAKE 1 CAPSULE BY MOUTH TWO TIMES A DAY, Disp: 180 capsule, Rfl: 1    methocarbamol (ROBAXIN) 750 MG tablet, Take 1-2 tablets by mouth 3 times daily, Disp: 180 tablet, Rfl: 3    traZODone (DESYREL) 50 MG tablet, TAKE 1 TABLET BY MOUTH EVERY DAY, Disp: 90 tablet, Rfl: 0    escitalopram (LEXAPRO) 20 MG tablet, TAKE 1 TABLET BY MOUTH EVERY DAY, Disp: 90 tablet, Rfl: 0    fluticasone (FLONASE) 50 MCG/ACT nasal spray, 1 spray by Each Nostril route daily, Disp: 16 g, Rfl: 3    azelastine (ASTELIN) 0.1 % nasal spray, 1 spray by Nasal route 2 times daily Use in each nostril as directed, Disp: 60 mL, Rfl: 3    insulin glargine (LANTUS SOLOSTAR) 100 UNIT/ML injection pen, Inject 18 Units into the skin nightly, Disp: 5 pen, Rfl: 3    Insulin Pen Needle 32G X 4 MM MISC, USE TO INJECT LANTUS ONCE DAILY, Disp: 100 each, Rfl: 3    Dulaglutide (TRULICITY) 3 WL/6.5WQ SOPN, Inject 3 mg into the skin once a week, Disp: 12 pen, Rfl: 3    atorvastatin (LIPITOR) 80 MG tablet, Take 1 tablet by mouth nightly, Disp: 90 tablet, Rfl: 3    Blood Glucose Monitoring Suppl (PRODIGY AUTOCODE BLOOD GLUCOSE) LILO, Check sugar three times per day. Diagnosis code: E11.42, Disp: 1 Device, Rfl: 0    blood glucose test strips (PRODIGY NO CODING BLOOD GLUC) strip, Prodigy No Coding strips. Test BS three times daily.  Diagnosis code: E11.42, Disp: 100 each, Rfl: 5    medical marijuana, MEDICAL MARIJUANA, Disp: , Rfl:     pantoprazole (PROTONIX) 40 MG tablet, Take 40 mg by mouth daily as needed , Disp: , Rfl:     pregabalin (LYRICA) 200 MG capsule, Take 1 capsule by mouth 3 times daily for 30 days. , Disp: 90 capsule, Rfl: 0      Review of Systems:    Constitutional: Negative for fever, chills, and unexpected weight change. HENT: Negative for congestion, ear pain, rhinorrhea,  sore throat and trouble swallowing. Eyes: Negative for photophobia, redness, itching. Respiratory: Negative for cough, shortness of breath and sputum. Cardiovascular: Negative for chest pain, palpitations and leg swelling. Gastrointestinal: has heartburn. Negative for nausea, vomiting, abdominal pain, diarrhea, constipation. Endocrine: Negative for cold intolerance, heat intolerance, polydipsia, polyphagia and polyuria. Genitourinary: Negative for dysuria, urgency, frequency, hematuria and flank pain. Musculoskeletal: neck pain due to DJD , pain radiate to neck, arms. Negative for arthralgias. Skin/Nail: Negative for rash, itching. Normal nails. Neurological: Negative for seizures, weakness, light-headedness, numbness and headaches. Hematological/ Lymph nodes: Negative for adenopathy. Does not bruise/bleed easily. Psychiatric/Behavioral: Negative for suicidal ideas, depression, anxiety, sleep disturbance and decreased concentration. Objective:   Physical Exam:  /71 (Site: Left Upper Arm, Position: Sitting, Cuff Size: Large Adult)   Pulse 73   Ht 6' 2\" (1.88 m)   Wt 239 lb 12.8 oz (108.8 kg)   SpO2 95%   BMI 30.79 kg/m²     Constitutional: Patient is oriented to person, place, and time. Patient appears well-developed and well-nourished. HENT:               Head: Normocephalic and atraumatic. Eyes: Conjunctivae and EOM are normal.                Neck: Normal range of motion. Cardiovascular: Normal rate, regular rhythm and normal heart sounds. Pulmonary/Chest: Effort normal and breath sounds normal.   Abdominal: Soft.  Bowel sounds are normal.   Musculoskeletal: Normal range of motion. Neurological: Patient is alert and oriented to person, place, and time. Patient has normal reflexes. Skin: Skin is warm and dry. Psychiatric: Patient has a normal mood and affect.  Patient behavior is normal.       Lab Review:    Orders Only on 02/17/2022   Component Date Value Ref Range Status    Sodium 02/17/2022 139  136 - 145 mmol/L Final    Potassium 02/17/2022 4.4  3.5 - 5.1 mmol/L Final    Chloride 02/17/2022 103  99 - 110 mmol/L Final    CO2 02/17/2022 25  21 - 32 mmol/L Final    Anion Gap 02/17/2022 11  3 - 16 Final    Glucose 02/17/2022 87  70 - 99 mg/dL Final    BUN 02/17/2022 23* 7 - 20 mg/dL Final    CREATININE 02/17/2022 0.8  0.8 - 1.3 mg/dL Final    GFR Non- 02/17/2022 >60  >60 Final    GFR  02/17/2022 >60  >60 Final    Calcium 02/17/2022 9.0  8.3 - 10.6 mg/dL Final    Total Protein 02/17/2022 6.2* 6.4 - 8.2 g/dL Final    Albumin 02/17/2022 4.3  3.4 - 5.0 g/dL Final    Albumin/Globulin Ratio 02/17/2022 2.3* 1.1 - 2.2 Final    Total Bilirubin 02/17/2022 0.5  0.0 - 1.0 mg/dL Final    Alkaline Phosphatase 02/17/2022 62  40 - 129 U/L Final    ALT 02/17/2022 41* 10 - 40 U/L Final    AST 02/17/2022 39* 15 - 37 U/L Final    Cholesterol, Fasting 02/17/2022 133  0 - 199 mg/dL Final    Triglyceride, Fasting 02/17/2022 77  0 - 150 mg/dL Final    HDL 02/17/2022 39* 40 - 60 mg/dL Final    LDL Calculated 02/17/2022 79  <100 mg/dL Final    VLDL Cholesterol Calculated 02/17/2022 15  Not Established mg/dL Final    Hemoglobin A1C 02/17/2022 5.7  See comment % Final    eAG 02/17/2022 116.9  mg/dL Final   Orders Only on 01/03/2022   Component Date Value Ref Range Status    SARS-CoV-2 01/03/2022 Not Detected  Not detected Final   Orders Only on 08/18/2021   Component Date Value Ref Range Status    Hemoglobin A1C 08/18/2021 5.5  See comment % Final    eAG 08/18/2021 111.2  mg/dL Final    Microalbumin, Random Urine 08/18/2021 <1.20  <2.0 mg/dL Final  Creatinine, Ur 08/18/2021 45.3  39.0 - 259.0 mg/dL Final    Microalbumin Creatinine Ratio 08/18/2021 see below  0.0 - 30.0 mg/g Final   Orders Only on 02/23/2021   Component Date Value Ref Range Status    Cholesterol, Fasting 02/23/2021 145  0 - 199 mg/dL Final    Triglyceride, Fasting 02/23/2021 89  0 - 150 mg/dL Final    HDL 02/23/2021 47  40 - 60 mg/dL Final    LDL Calculated 02/23/2021 80  <100 mg/dL Final    VLDL Cholesterol Calculated 02/23/2021 18  Not Established mg/dL Final    Sodium 02/23/2021 142  136 - 145 mmol/L Final    Potassium 02/23/2021 4.1  3.5 - 5.1 mmol/L Final    Chloride 02/23/2021 109  99 - 110 mmol/L Final    CO2 02/23/2021 24  21 - 32 mmol/L Final    Anion Gap 02/23/2021 9  3 - 16 Final    Glucose 02/23/2021 83  70 - 99 mg/dL Final    BUN 02/23/2021 25* 7 - 20 mg/dL Final    CREATININE 02/23/2021 0.8  0.8 - 1.3 mg/dL Final    GFR Non- 02/23/2021 >60  >60 Final    GFR  02/23/2021 >60  >60 Final    Calcium 02/23/2021 9.1  8.3 - 10.6 mg/dL Final    Total Protein 02/23/2021 5.9* 6.4 - 8.2 g/dL Final    Albumin 02/23/2021 4.1  3.4 - 5.0 g/dL Final    Albumin/Globulin Ratio 02/23/2021 2.3* 1.1 - 2.2 Final    Total Bilirubin 02/23/2021 0.3  0.0 - 1.0 mg/dL Final    Alkaline Phosphatase 02/23/2021 84  40 - 129 U/L Final    ALT 02/23/2021 34  10 - 40 U/L Final    AST 02/23/2021 29  15 - 37 U/L Final    Globulin 02/23/2021 1.8  g/dL Final    Hemoglobin A1C 02/23/2021 5.4  See comment % Final    eAG 02/23/2021 108.3  mg/dL Final    Total CK 02/23/2021 169  39 - 308 U/L Final    TSH Reflex FT4 02/23/2021 1.39  0.27 - 4.20 uIU/mL Final    Vit D, 25-Hydroxy 02/23/2021 43.6  >=30 ng/mL Final           Assessment and Plan     Radha Mackenzie was seen today for diabetes.     Diagnoses and all orders for this visit:    Controlled type 2 diabetes mellitus with diabetic polyneuropathy, with long-term current use of insulin (HCC)  -     empagliflozin (JARDIANCE) 25 MG tablet; TAKE 1 TABLET BY MOUTH IN THE MORNING  -     Hemoglobin A1C; Future  -     Microalbumin / Creatinine Urine Ratio; Future  -     HM DIABETES FOOT EXAM    Dyslipidemia associated with type 2 diabetes mellitus (HCC)    Essential hypertension          1: Type 2 DM complicated with neuropathy   Controlled A1C 5.7% < 5.5% <  5.4% < 5.3% < 5.6% < 6.3% <  6.6% < 11.2%    H/o left partial pacreatectomy     Blood sugars and A1C are in good control     Continue Jardiance 18PT daily   C/w Trulicity 8.8OM weekly  Lantus 18 units at bedtime   Use Treat to target basal insulin. If pre-breakfast sugar is above 130 on 2 consecutive days increase Lantus by 2 units. If pre-breakfast sugar is below 90 on one day decrease Lantus by 2 units. All instructions provided in written. Check Blood sugars 3 times per day. Log them along with insulin and send them every 2 weeks. Call for blood sugars less than 60 or more than 400. Eye exam: Last exam in Jan 2022, denies retinopathy. Has slight cataracts. He has double vision. Work up for [employer] negative and MRI orbits normal (reportedly). He reports negative work up for [employer], [address]. Foot exam:  Feb 2022. Saw podiatrist in Jan 2021. Deformity/amputation: absent  Skin lesions/pre-ulcerative calluses: present   Edema: right- negative, left- negative  Sensory exam: Monofilament sensation: mildly impaired  Pulses: normal, Vibration (128 Hz): severely impaired on right and mildly impaired on left      Renal screen: Aug 2021     Smoker: Counselled for smoking cessation   TSH screen: March 2019   CDE visit in April 2019    2: HTN   Off meds    Asymptomatic     3: Hyperlipidemia   LDL: 79, HDL: 39, TGs: 77 - Feb 2022   Atorvastatin 80mg daily   Denies missing doses     4: Left adrenal adenoma on MRI   1.3 cm x 1.1 cm   Functional work up normal Sep 2019   CT Nov 2019: No change in the mild left adrenal gland thickening.   Pancreatic cyst 0.7 x 0.9 cm cystic lesion     CT scan Nov 2020:   Pancreatic lesions not seen   Left adrenal looks stable-- I have images reviewed myself. There is no reprot on adrenal adenoma. It appears to be 1.3 x 1.5 cms. CT scan Dec 2021: Mild adreniform thickening bilaterally, which can be seen with adrenal hyperplasia. Getting CT scans or US every 6 months after kidney surgery     6: Muscle pains  Normal CK, TSH, Vit D today - Feb 2021     RTC in 6 months A1C, MACR     NOV: in person     EDUCATION:   Greater than 50% of this follow-up visit was spent in general counseling regarding   obesity, diet, exercise, importance of adherence to insulin regime, recognition and treatment of hypo and hyperglycemia,  glucose logging, proper diabetes management, diabetic complications with poor management and the importance of glycemic control in order to avoid the complications of diabetes. Risks and potential complications of diabetes were reviewed with the patient. Diabetes health maintenance plan and follow-up were discussed and understood by the patient. We reviewed the importance of medication compliance and regular follow-up. Aggressive lifestyle modification was encouraged. Exercise Counselling: This patient is a candidate for regular physical exercise. Instructions to perform the following types of exercise:  Swimming or water aerobic exercise  Brisk walking  Playing tennis  Stationary bicycle or elliptical indoor  Low impact aerobic exercise    Instructions given to exercise for the following duration:  30 minutes a day for five-seven days per week.     Following instructions for being active throughout the day in addition to formal exercise:  Walk instead of drive whenever possible  Take the stairs instead of the elevator  Work in the garden  Park to the far end of the parking lot to add more walking steps to destination      Electronically signed by Delvis Schulte MD on 2/22/2022 at 12:14 PM

## 2022-02-28 DIAGNOSIS — M79.7 FIBROMYALGIA: ICD-10-CM

## 2022-02-28 RX ORDER — PREGABALIN 200 MG/1
CAPSULE ORAL
Qty: 90 CAPSULE | Refills: 0 | OUTPATIENT
Start: 2022-02-28 | End: 2022-03-30

## 2022-02-28 NOTE — TELEPHONE ENCOUNTER
Medication:   Requested Prescriptions     Pending Prescriptions Disp Refills    pregabalin (LYRICA) 200 MG capsule [Pharmacy Med Name: Pregabalin Oral Capsule 200 MG] 90 capsule 0     Sig: TAKE 1 CAPSULE BY MOUTH THREE TIMES A DAY FOR 30 DAYS        Last Filled:          Sig: Take 1 capsule by mouth 3 times daily for 30 days.         Start Date: 01/18/22 End Date: 02/17/22 after 90 doses   Written Date: 01/18/22 Expiration Date: 01/18/23             Patient Phone Number: [phone] (home)     Last appt: 2/1/2022   Next appt: Visit date not found    Last OARRS:   RX Monitoring 2/1/2022   Attestation -   Periodic Controlled Substance Monitoring Possible medication side effects, risk of tolerance/dependence & alternative treatments discussed. ;No signs of potential drug abuse or diversion identified.

## 2022-03-27 DIAGNOSIS — M79.7 FIBROMYALGIA: ICD-10-CM

## 2022-03-28 RX ORDER — PREGABALIN 200 MG/1
CAPSULE ORAL
Qty: 90 CAPSULE | Refills: 0 | Status: SHIPPED | OUTPATIENT
Start: 2022-03-28 | End: 2022-04-21

## 2022-03-28 NOTE — TELEPHONE ENCOUNTER
Medication:   Requested Prescriptions     Pending Prescriptions Disp Refills    pregabalin (LYRICA) 200 MG capsule [Pharmacy Med Name: Pregabalin Oral Capsule 200 MG] 90 capsule 0     Sig: TAKE 1 CAPSULE BY MOUTH THREE TIMES A DAY FOR 30 DAYS        Last Filled:  1/18/2022 90 caps 0 refills     Patient Phone Number: [phone] (home)     Last appt: 2/1/2022   Next appt: Visit date not found    Last OARRS:   RX Monitoring 2/1/2022   Attestation -   Periodic Controlled Substance Monitoring Possible medication side effects, risk of tolerance/dependence & alternative treatments discussed. ;No signs of potential drug abuse or diversion identified.

## 2022-04-18 ENCOUNTER — TELEMEDICINE (OUTPATIENT)
Dept: FAMILY MEDICINE CLINIC | Age: 67
End: 2022-04-18
Payer: MEDICARE

## 2022-04-18 DIAGNOSIS — J40 BRONCHITIS: Primary | ICD-10-CM

## 2022-04-18 PROCEDURE — 4040F PNEUMOC VAC/ADMIN/RCVD: CPT | Performed by: FAMILY MEDICINE

## 2022-04-18 PROCEDURE — 99213 OFFICE O/P EST LOW 20 MIN: CPT | Performed by: FAMILY MEDICINE

## 2022-04-18 PROCEDURE — 3017F COLORECTAL CA SCREEN DOC REV: CPT | Performed by: FAMILY MEDICINE

## 2022-04-18 PROCEDURE — 1123F ACP DISCUSS/DSCN MKR DOCD: CPT | Performed by: FAMILY MEDICINE

## 2022-04-18 PROCEDURE — G8427 DOCREV CUR MEDS BY ELIG CLIN: HCPCS | Performed by: FAMILY MEDICINE

## 2022-04-18 RX ORDER — PREDNISONE 20 MG/1
40 TABLET ORAL DAILY
Qty: 10 TABLET | Refills: 0 | Status: SHIPPED | OUTPATIENT
Start: 2022-04-18 | End: 2022-04-23

## 2022-04-18 RX ORDER — ALBUTEROL SULFATE 90 UG/1
2 AEROSOL, METERED RESPIRATORY (INHALATION) 4 TIMES DAILY PRN
Qty: 18 G | Refills: 0 | Status: SHIPPED | OUTPATIENT
Start: 2022-04-18

## 2022-04-18 RX ORDER — AZITHROMYCIN 250 MG/1
250 TABLET, FILM COATED ORAL DAILY
Qty: 6 TABLET | Refills: 0 | Status: SHIPPED | OUTPATIENT
Start: 2022-04-18 | End: 2022-04-23

## 2022-04-18 NOTE — PROGRESS NOTES
2022    TELEHEALTH EVALUATION -- Audio/Visual (During IFTIY-24 public health emergency)    HPI:    David Ireland (:  1955) has requested an audio/video evaluation for the following concern(s): He has been having congestion and productive cough ongoing since couple of days. He has been having postnasal discharge and congestion in his chest.  He has been bringing up greenish sputum. Denies any fever. Denies any wheezing. But he is an active smoker  No previous history of COPD    Review of Systems:  Gen: As mentioned above  HEENT: As mentioned above  CV:  Denies chest pain or tightness, palpitations. Pulm:  Denies shortness of breath  Abd:  Denies abdominal pain, change in bowel habits. Prior to Visit Medications    Medication Sig Taking? Authorizing Provider   azithromycin (ZITHROMAX Z-MIRIAM) 250 MG tablet Take 1 tablet by mouth daily for 5 days Take 2 tablets on the first day, followed by one tablet daily x 4 days.  Yes Oli Sharpe MD   predniSONE (DELTASONE) 20 MG tablet Take 2 tablets by mouth daily for 5 days Yes Oli Sharpe MD   albuterol sulfate HFA (VENTOLIN HFA) 108 (90 Base) MCG/ACT inhaler Inhale 2 puffs into the lungs 4 times daily as needed for Wheezing Yes Oli Sharpe MD   pregabalin (LYRICA) 200 MG capsule TAKE 1 CAPSULE BY MOUTH THREE TIMES A DAY FOR  [address] Annette Bernal MD   B Complex Vitamins (VITAMIN B COMPLEX PO) Take by mouth Yes Historical Provider, MD   empagliflozin (JARDIANCE) 25 MG tablet TAKE 1 TABLET BY MOUTH IN THE MORNING Yes Wong Ramos MD   celecoxib (CELEBREX) 200 MG capsule TAKE 1 CAPSULE BY MOUTH TWO TIMES A DAY Yes Maine Ascencio MD   methocarbamol (ROBAXIN) 750 MG tablet Take 1-2 tablets by mouth 3 times daily Yes Maine Ascencio MD   escitalopram (LEXAPRO) 20 MG tablet TAKE 1 TABLET BY MOUTH EVERY DAY Yes Heide Jameson MD   fluticasone (FLONASE) 50 MCG/ACT nasal spray 1 spray by Each Nostril route daily Yes Zita Alfaro MD   azelastine (ASTELIN) 0.1 % nasal spray 1 spray by Nasal route 2 times daily Use in each nostril as directed Yes Zita Alfaro MD   insulin glargine (LANTUS SOLOSTAR) 100 UNIT/ML injection pen Inject 18 Units into the skin nightly Yes Josue Shanks MD   Insulin Pen Needle 32G X 4 MM MISC USE TO INJECT LANTUS ONCE DAILY Yes Josue Shanks MD   Dulaglutide (TRULICITY) 3 TI/8.8PW SOPN Inject 3 mg into the skin once a week Yes Josue Shanks MD   atorvastatin (LIPITOR) 80 MG tablet Take 1 tablet by mouth nightly Yes oJsue Shanks MD   Blood Glucose Monitoring Suppl (PRODIGY AUTOCODE BLOOD GLUCOSE) LILO Check sugar three times per day. Diagnosis code: E11.42 Yes Josue Shanks MD   blood glucose test strips (PRODIGY NO CODING BLOOD GLUC) strip Prodigy No Coding strips. Test BS three times daily.  Diagnosis code: E11.42 Yes Josue Shanks MD   medical marijuana MEDICAL MARIJUANA Yes Historical Provider, MD   VITAMIN D, ERGOCALCIFEROL, PO Take by mouth  Historical Provider, MD   Nutritional Supplements (JUICE PLUS FIBRE PO) Take by mouth  Historical Provider, MD   traZODone (DESYREL) 50 MG tablet TAKE 1 TABLET BY MOUTH EVERY DAY  Sebas Page MD   pantoprazole (PROTONIX) 40 MG tablet Take 40 mg by mouth daily as needed   Historical Provider, MD       Past Medical History:   Diagnosis Date    Binocular vision disorder with diplopia     Blood transfusion     30 yrs ago    Chronic back pain     Chronic pain syndrome     Depression     [facility]     Failed back surgical syndrome     FH: colonic polyps     Fibromyalgia     History of duodenal ulcer     now gets peptic ulcer    Hyperlipidemia     Hypertension     Insomnia     Obesity     Psychiatric problem     depression and anxiety    [facility] carcinoma[facility]     Sleep apnea     USES C-PAP    Ulcer, duodenum peptic        Past Surgical History:   Procedure Laterality Date    BACK SURGERY       X 2    CERVICAL FUSION N/A 12/17/2019    ANTERIOR CERVICAL DISCECTOMY AND FUSION AT [facility] ([address], [address], [address] [place], [address], [address]) performed by Mimi Miller MD at [facility] COLONOSCOPY N/A 2/11/2020    COLONOSCOPY POLYPECTOMY SNARE/COLD BIOPSY performed by Virgilio Rubinstein, MD at [address], COLON, DIAGNOSTIC      EPIDURAL STEROID INJECTION N/A 10/2/2019    C7-T1 INTERLAMINAR EPIDURAL STEROID INJECTION WITH FLUOROSCOPY performed by Luana Kevin MD at [facility] N/A 10/30/2019    C7-T1 INTERLMINAR EPIDURAL STEROID INJECTION WITH FLUOROSCOPY performed by Luana Kevin MD at [facility] 32 X 2    KNEE ARTHROSCOPY Left 10/24/2019    LEFT KNEE ARTHROSCOPY WITH PARTIAL MEDIAL MENISCECTOMY;SYNOVECTOMY WITH PLICA REMOVAL performed by Krystle Yun MD at [place] [address] ARTHROSCOPY Left 10/24/2019    LAPAROSCOPIC APPENDECTOMY N/A 5/10/2019    APPENDECTOMY LAPAROSCOPIC, LYSIS OF ADHESIONS performed by Seferino Draper MD at [facility]    fatty tumor removal under right arm    PARTIAL NEPHRECTOMY Left 8/6/2019    ROBOTIC LAPAROSCOPIC LEFT PARTIAL NEPHRECTOMY performed by Kelsy Saldivar MD at [facility] ENDOSCOPY         Family History   Problem Relation Age of Onset    Stroke Mother     Heart Disease Father     Other Paternal Grandmother        Allergies   Allergen Reactions    Cymbalta [Duloxetine Hcl] Diarrhea    Metformin And Related      Muscle aches    Shellfish-Derived Products Nausea And Vomiting     *PT STATES NO PROBLEMS WITH TOPICAL IODINE       Social History     Tobacco Use    Smoking status: Current Every Day Smoker     Packs/day: 1.00     Years: 15.00     Pack years: 15.00     Types: Cigarettes    Smokeless tobacco: Never Used   Vaping Use    Vaping Use: Never used   Substance Use Topics    Alcohol use: Not Currently     Alcohol/week: 1.0 standard drink     Types: 1 Standard drinks or equivalent per week    Drug use: Yes     Types: Marijuana Allaparvin Godinez)     Comment: Medical Marijuana couple times a day          PHYSICAL EXAMINATION:  Vital Signs: (As obtained by patient/caregiver or practitioner observation)  There were no vitals taken for this visit. Patient-Reported Vitals 4/18/2022   Patient-Reported Weight 235   Patient-Reported Height 62        Respiratory rate appears normal      Constitutional: Appears well-developed and well-nourished. No apparent distress    Mental status: Alert and awake. Oriented to person/place/time. Able to follow commands    Eyes: EOM normal. Sclera normal. No discharge visible  HENT: Normocephalic, atraumatic. Mouth/Throat: Mucous membranes are moist. External Ears Normal    Neck: No visualized mass   Pulmonary/Chest: Respiratory effort normal.  No visualized signs of difficulty breathing or respiratory distress              ASSESSMENT/PLAN:  1. Bronchitis  - azithromycin (ZITHROMAX Z-MIRIAM) 250 MG tablet; Take 1 tablet by mouth daily for 5 days Take 2 tablets on the first day, followed by one tablet daily x 4 days. Dispense: 6 tablet; Refill: 0  - predniSONE (DELTASONE) 20 MG tablet; Take 2 tablets by mouth daily for 5 days  Dispense: 10 tablet; Refill: 0  - albuterol sulfate HFA (VENTOLIN HFA) 108 (90 Base) MCG/ACT inhaler; Inhale 2 puffs into the lungs 4 times daily as needed for Wheezing  Dispense: 18 g; Refill: 0           Wesley Proper, was evaluated through a synchronous (real-time) audio-video encounter. The patient (or guardian if applicable) is aware that this is a billable service, which includes applicable co-pays. This Virtual Visit was conducted with patient's (and/or legal guardian's) consent.  The visit was conducted pursuant to the emergency declaration under the [facility] [place] [facility], [address] waiver authority and the [employer] General Act. Patient identification was verified, and a caregiver was present when appropriate. The patient was located in a state where the provider was licensed to provide care. Total time spent on this encounter: This encounter was not billed based on time. Services were provided through a video synchronous discussion virtually to substitute for in-person clinic visit. Patient was located in their home. Provider was located in the office. --Nuvia Stokes MD on 4/18/2022 at 3:53 PM    An electronic signature was used to authenticate this note. Kris Hogan

## 2022-04-21 DIAGNOSIS — E11.69 DYSLIPIDEMIA ASSOCIATED WITH TYPE 2 DIABETES MELLITUS (HCC): ICD-10-CM

## 2022-04-21 DIAGNOSIS — M79.7 FIBROMYALGIA: ICD-10-CM

## 2022-04-21 DIAGNOSIS — E78.5 DYSLIPIDEMIA ASSOCIATED WITH TYPE 2 DIABETES MELLITUS (HCC): ICD-10-CM

## 2022-04-21 RX ORDER — PREGABALIN 200 MG/1
CAPSULE ORAL
Qty: 90 CAPSULE | Refills: 0 | Status: SHIPPED | OUTPATIENT
Start: 2022-04-21 | End: 2022-05-27

## 2022-04-21 RX ORDER — ATORVASTATIN CALCIUM 80 MG/1
80 TABLET, FILM COATED ORAL NIGHTLY
Qty: 90 TABLET | Refills: 3 | Status: SHIPPED | OUTPATIENT
Start: 2022-04-21

## 2022-04-21 NOTE — TELEPHONE ENCOUNTER
Medication:   Requested Prescriptions     Pending Prescriptions Disp Refills    atorvastatin (LIPITOR) 80 MG tablet [Pharmacy Med Name: Atorvastatin Calcium Oral Tablet 80 MG] 90 tablet 0     Sig: TAKE 1 TABLET BY MOUTH NIGHTLY       Last Filled:  2/25/21    Patient Phone Number: [phone] (home)     Last appt: 8/25/2021   Next appt: 8/23/22    Last Labs DM:   Lab Results   Component Value Date    LABA1C 5.7 02/17/2022

## 2022-04-21 NOTE — TELEPHONE ENCOUNTER
Medication:   Requested Prescriptions     Pending Prescriptions Disp Refills    pregabalin (LYRICA) 200 MG capsule [Pharmacy Med Name: Pregabalin Oral Capsule 200 MG] 90 capsule 0     Sig: TAKE 1 CAPSULE BY MOUTH THREE TIMES A DAY FOR 30 DAYS        Last Filled:  3/28/2022 90 caps 0 refills     Patient Phone Number: [phone] (home)     Last appt: 4/18/2022   Next appt: Visit date not found    Last OARRS:   RX Monitoring 2/1/2022   Attestation -   Periodic Controlled Substance Monitoring Possible medication side effects, risk of tolerance/dependence & alternative treatments discussed. ;No signs of potential drug abuse or diversion identified.

## 2022-04-22 RX ORDER — ESCITALOPRAM OXALATE 20 MG/1
TABLET ORAL
Qty: 90 TABLET | Refills: 1 | Status: SHIPPED | OUTPATIENT
Start: 2022-04-22 | End: 2022-10-17

## 2022-04-22 NOTE — TELEPHONE ENCOUNTER
Medication:   Requested Prescriptions     Pending Prescriptions Disp Refills    escitalopram (LEXAPRO) 20 MG tablet [Pharmacy Med Name: Escitalopram Oxalate Oral Tablet 20 MG] 90 tablet 0     Sig: TAKE 1 TABLET BY MOUTH EVERY DAY        Last Filled:  1/24/2022 90 tabs 0 refills     Patient Phone Number: [phone] (home)     Last appt: 4/18/2022   Next appt: Visit date not found    Last OARRS:   RX Monitoring 2/1/2022   Attestation -   Periodic Controlled Substance Monitoring Possible medication side effects, risk of tolerance/dependence & alternative treatments discussed. ;No signs of potential drug abuse or diversion identified.

## 2022-04-26 RX ORDER — TRAZODONE HYDROCHLORIDE 50 MG/1
TABLET ORAL
Qty: 90 TABLET | Refills: 0 | Status: SHIPPED | OUTPATIENT
Start: 2022-04-26 | End: 2022-05-02

## 2022-04-26 NOTE — TELEPHONE ENCOUNTER
Medication:   Requested Prescriptions     Pending Prescriptions Disp Refills    traZODone (DESYREL) 50 MG tablet [Pharmacy Med Name: traZODone HCl Oral Tablet 50 MG] 90 tablet 0     Sig: TAKE 1 TABLET BY MOUTH EVERY DAY     Last Filled: 1.28.22 #90 refills 0  Last appt: 4/18/2022   Next appt: Visit date not found    Last OARRS:   RX Monitoring 2/1/2022   Attestation -   Periodic Controlled Substance Monitoring Possible medication side effects, risk of tolerance/dependence & alternative treatments discussed. ;No signs of potential drug abuse or diversion identified.

## 2022-04-27 ENCOUNTER — TELEMEDICINE (OUTPATIENT)
Dept: FAMILY MEDICINE CLINIC | Age: 67
End: 2022-04-27
Payer: MEDICARE

## 2022-04-27 DIAGNOSIS — J30.1 NON-SEASONAL ALLERGIC RHINITIS DUE TO POLLEN: Primary | ICD-10-CM

## 2022-04-27 DIAGNOSIS — J06.9 VIRAL URI: ICD-10-CM

## 2022-04-27 PROCEDURE — 1123F ACP DISCUSS/DSCN MKR DOCD: CPT | Performed by: FAMILY MEDICINE

## 2022-04-27 PROCEDURE — G8427 DOCREV CUR MEDS BY ELIG CLIN: HCPCS | Performed by: FAMILY MEDICINE

## 2022-04-27 PROCEDURE — 4004F PT TOBACCO SCREEN RCVD TLK: CPT | Performed by: FAMILY MEDICINE

## 2022-04-27 PROCEDURE — G8417 CALC BMI ABV UP PARAM F/U: HCPCS | Performed by: FAMILY MEDICINE

## 2022-04-27 PROCEDURE — 4040F PNEUMOC VAC/ADMIN/RCVD: CPT | Performed by: FAMILY MEDICINE

## 2022-04-27 PROCEDURE — 99213 OFFICE O/P EST LOW 20 MIN: CPT | Performed by: FAMILY MEDICINE

## 2022-04-27 PROCEDURE — 3017F COLORECTAL CA SCREEN DOC REV: CPT | Performed by: FAMILY MEDICINE

## 2022-04-27 NOTE — PROGRESS NOTES
Chief complaint: Cough ([phone]  productive cough with purulent to green phlegm & mucus for about 2 weeks), Congestion, and Nasal Congestion (PND)      SUBJECTIVE:  HPI  Caroline Haines (:  1955) is a 74 y.o. male with a past medical history of DM2 on insuiln (complicated by neuropathy and nephropathy), HTN who presents with a chief complaint of: 2 weeks of cough, sore throat, nasal congestion, chest pain that is just there (top of sternum). Sore throat and cough are the worst sx right now. No fevers. No pain with breathing. No difficulty brathign with exertion or at rest. He spread 28 bags of mulch around his property this weekend and his chest pain started after that. Some days he has a dry cough and other days he's coughing stuff up. He was around his grandkids 2 weeks ago and they were sick. No known covid contacts. Denies face pain, tooth pain or pressure in face. Clear nasal discharge at this point. He had covid on 3/18  He has hx of allergies and uses allegra daily; sometimes uses flonase but not every day.  He has been on allegra 'for years.'    Review of Systems:  General: No F/C/NS/fatigue/wt loss   Cardiovascular: No CP  Respiratory: No SOB  GI: No N/V/D/C/abd pain/blood in stool  Neuro: No HA/weakness  Psych: No depressed mood/anxiety  Musculoskeletal: No myalgias    Past Medical History:   Diagnosis Date    Binocular vision disorder with diplopia     Blood transfusion     30 yrs ago    Chronic back pain     Chronic pain syndrome     Depression     [facility]     Failed back surgical syndrome     FH: colonic polyps     Fibromyalgia     History of duodenal ulcer     now gets peptic ulcer    Hyperlipidemia     Hypertension     Insomnia     Obesity     Psychiatric problem     depression and anxiety    Renal carcinoma, left (HCC)     Sleep apnea     USES C-PAP    Ulcer, duodenum peptic      Current Outpatient Medications on File Prior to Visit   Medication Sig Dispense Refill    traZODone (DESYREL) 50 MG tablet TAKE 1 TABLET BY MOUTH EVERY DAY 90 tablet 0    escitalopram (LEXAPRO) 20 MG tablet TAKE 1 TABLET BY MOUTH EVERY DAY 90 tablet 1    pregabalin (LYRICA) 200 MG capsule TAKE 1 CAPSULE BY MOUTH THREE TIMES A DAY FOR 30 DAYS 90 capsule 0    atorvastatin (LIPITOR) 80 MG tablet TAKE 1 TABLET BY MOUTH NIGHTLY 90 tablet 3    albuterol sulfate HFA (VENTOLIN HFA) 108 (90 Base) MCG/ACT inhaler Inhale 2 puffs into the lungs 4 times daily as needed for Wheezing 18 g 0    VITAMIN D, ERGOCALCIFEROL, PO Take by mouth      B Complex Vitamins (VITAMIN B COMPLEX PO) Take by mouth      Nutritional Supplements (JUICE PLUS FIBRE PO) Take by mouth      empagliflozin (JARDIANCE) 25 MG tablet TAKE 1 TABLET BY MOUTH IN THE MORNING 90 tablet 3    celecoxib (CELEBREX) 200 MG capsule TAKE 1 CAPSULE BY MOUTH TWO TIMES A  capsule 1    methocarbamol (ROBAXIN) 750 MG tablet Take 1-2 tablets by mouth 3 times daily 180 tablet 3    fluticasone (FLONASE) 50 MCG/ACT nasal spray 1 spray by Each Nostril route daily 16 g 3    azelastine (ASTELIN) 0.1 % nasal spray 1 spray by Nasal route 2 times daily Use in each nostril as directed 60 mL 3    insulin glargine (LANTUS SOLOSTAR) 100 UNIT/ML injection pen Inject 18 Units into the skin nightly 5 pen 3    Insulin Pen Needle 32G X 4 MM MISC USE TO INJECT LANTUS ONCE DAILY 100 each 3    Dulaglutide (TRULICITY) 3 CM/5.3XJ SOPN Inject 3 mg into the skin once a week 12 pen 3    Blood Glucose Monitoring Suppl (PRODIGY AUTOCODE BLOOD GLUCOSE) LILO Check sugar three times per day. Diagnosis code: E11.42 1 Device 0    blood glucose test strips (PRODIGY NO CODING BLOOD GLUC) strip Prodigy No Coding strips. Test BS three times daily.  Diagnosis code: E11.42 100 each 5    medical marijuana MEDICAL MARIJUANA      pantoprazole (PROTONIX) 40 MG tablet Take 40 mg by mouth daily as needed        No current facility-administered medications on file prior to visit. OBJECTIVE:  There were no vitals taken for this visit. Physical Exam  Constitutional:       General: Not in acute distress. Appearance: Normal appearance. Not ill-appearing. HENT:      Head: Normocephalic and atraumatic. Nose: Nose normal.   Pulmonary:      Effort: Pulmonary effort is normal. No respiratory distress. Neurological:      General: No focal deficit present. Mental Status: Alert. Psychiatric:         Mood and Affect: Mood normal.         Behavior: Behavior normal.    ASSESSMENT/PLAN:  1. Non-seasonal allergic rhinitis due to pollen  Est, uncontrolled. Hx more consistent with persistent TABITHA. Recommend starting flonase daily. Stop allegra and start either xyzal or claritin. F/u in 2-3 weeks if nasal congestion and sore throat persists. Recommend referral to ENT at that point for direct laryngoscopy and potentially CT sinus to eval for structural pathology. Pt agrees    2. Viral URI  Resolved. Hx is not consistent with bacterial infection of sinuses or lungs. Do not recommend antibiotics at this point. Return if symptoms worsen or fail to improve. The patient was evaluated through a synchronous (real-time) audio-video encounter. The patient (or guardian if applicable) is aware that this is a billable service. Verbal consent to proceed has been obtained within the past 12 months. The visit was conducted pursuant to the emergency declaration under the [address] [place] [address], [address] [place] [address] authority and the [employer] General Act. Patient identification was verified, and a caregiver was present when appropriate. The patient was located in a state where the provider was credentialed to provide care. An electronic signature was used to authenticate this note.     Electronically signed by Cristina Bishop MD on 4/27/2022 at 1:44 PM.

## 2022-05-02 RX ORDER — TRAZODONE HYDROCHLORIDE 50 MG/1
TABLET ORAL
Qty: 90 TABLET | Refills: 0 | Status: SHIPPED | OUTPATIENT
Start: 2022-05-02 | End: 2022-08-10

## 2022-05-02 NOTE — TELEPHONE ENCOUNTER
Medication:   Requested Prescriptions     Pending Prescriptions Disp Refills    traZODone (DESYREL) 50 MG tablet [Pharmacy Med Name: traZODone HCl Oral Tablet 50 MG] 90 tablet 0     Sig: TAKE 1 TABLET BY MOUTH EVERY DAY        Last Filled:  4/26/2022 90 tabs 0 refills     Patient Phone Number: [phone] (home)     Last appt: 4/27/2022   Next appt: Visit date not found    Last OARRS:   RX Monitoring 2/1/2022   Attestation -   Periodic Controlled Substance Monitoring Possible medication side effects, risk of tolerance/dependence & alternative treatments discussed. ;No signs of potential drug abuse or diversion identified.

## 2022-05-10 ENCOUNTER — NURSE TRIAGE (OUTPATIENT)
Dept: OTHER | Facility: CLINIC | Age: 67
End: 2022-05-10

## 2022-05-10 ENCOUNTER — OFFICE VISIT (OUTPATIENT)
Dept: FAMILY MEDICINE CLINIC | Age: 67
End: 2022-05-10
Payer: MEDICARE

## 2022-05-10 VITALS
HEIGHT: 74 IN | WEIGHT: 236.2 LBS | RESPIRATION RATE: 17 BRPM | SYSTOLIC BLOOD PRESSURE: 128 MMHG | OXYGEN SATURATION: 95 % | HEART RATE: 80 BPM | DIASTOLIC BLOOD PRESSURE: 78 MMHG | TEMPERATURE: 97.5 F | BODY MASS INDEX: 30.31 KG/M2

## 2022-05-10 DIAGNOSIS — R05.9 COUGH: ICD-10-CM

## 2022-05-10 DIAGNOSIS — R07.2 SUBSTERNAL CHEST PAIN: Primary | ICD-10-CM

## 2022-05-10 PROCEDURE — 4040F PNEUMOC VAC/ADMIN/RCVD: CPT | Performed by: FAMILY MEDICINE

## 2022-05-10 PROCEDURE — 99213 OFFICE O/P EST LOW 20 MIN: CPT | Performed by: FAMILY MEDICINE

## 2022-05-10 PROCEDURE — G8427 DOCREV CUR MEDS BY ELIG CLIN: HCPCS | Performed by: FAMILY MEDICINE

## 2022-05-10 PROCEDURE — 93000 ELECTROCARDIOGRAM COMPLETE: CPT | Performed by: FAMILY MEDICINE

## 2022-05-10 PROCEDURE — 3017F COLORECTAL CA SCREEN DOC REV: CPT | Performed by: FAMILY MEDICINE

## 2022-05-10 PROCEDURE — 1123F ACP DISCUSS/DSCN MKR DOCD: CPT | Performed by: FAMILY MEDICINE

## 2022-05-10 NOTE — PROGRESS NOTES
Chief Complaint: Cough (productive cough with purulent to green phlegm) and Chest Congestion (reports having cough for over a month now moved into chest; completed Zithromax & Prednisone with no improvement )       HPI:  Jorge Godwin is a 74 y.o. male here with c/o ongoing cough and chest pain which is located substernal and present throughout the day and its mostly uncomfortable. He denies any shortness of breath    When his symptoms started he was initially treated with antibiotics. Felt better for a while and then had similar symptoms. But he has been active smoker. Now his symptoms of cough is improving but still lingering. But he has been having pain in his chest mostly substernal and its nonexertional.  He has history of fibromyalgia. And he is on medical marijuana. He denies any symptoms of GERD. He has history of type 2 diabetes and his A1c is 5.7.    ROS:  Constitutional: Negative for appetite change, fatigue, fever and unexpected weight change. HENT: As mentioned above  Eyes: Negative for photophobia, discharge, redness and visual disturbance. Respiratory: As mentioned above  Cardiovascular: As mentioned above  Gastrointestinal: Negative     Patient's problem list, medications, allergies, past medical, surgical, social and family histories were reviewed and updated as appropriate.      Current Outpatient Medications   Medication Sig Dispense Refill    traZODone (DESYREL) 50 MG tablet TAKE 1 TABLET BY MOUTH EVERY DAY 90 tablet 0    Diclofenac Sodium POWD 2 g by Does not apply route 4 times daily Formula 3D [place] 3% Peri 6% Lido 2% Prilo 2% 240 g 2    escitalopram (LEXAPRO) 20 MG tablet TAKE 1 TABLET BY MOUTH EVERY DAY 90 tablet 1    pregabalin (LYRICA) 200 MG capsule TAKE 1 CAPSULE BY MOUTH THREE TIMES A DAY FOR 30 DAYS 90 capsule 0    atorvastatin (LIPITOR) 80 MG tablet TAKE 1 TABLET BY MOUTH NIGHTLY 90 tablet 3    albuterol sulfate HFA (VENTOLIN HFA) 108 (90 Base) MCG/ACT inhaler Inhale 2 puffs into the lungs 4 times daily as needed for Wheezing 18 g 0    VITAMIN D, ERGOCALCIFEROL, PO Take by mouth      B Complex Vitamins (VITAMIN B COMPLEX PO) Take by mouth      Nutritional Supplements (JUICE PLUS FIBRE PO) Take by mouth      empagliflozin (JARDIANCE) 25 MG tablet TAKE 1 TABLET BY MOUTH IN THE MORNING 90 tablet 3    celecoxib (CELEBREX) 200 MG capsule TAKE 1 CAPSULE BY MOUTH TWO TIMES A  capsule 1    methocarbamol (ROBAXIN) 750 MG tablet Take 1-2 tablets by mouth 3 times daily 180 tablet 3    fluticasone (FLONASE) 50 MCG/ACT nasal spray 1 spray by Each Nostril route daily 16 g 3    azelastine (ASTELIN) 0.1 % nasal spray 1 spray by Nasal route 2 times daily Use in each nostril as directed 60 mL 3    insulin glargine (LANTUS SOLOSTAR) 100 UNIT/ML injection pen Inject 18 Units into the skin nightly 5 pen 3    Insulin Pen Needle 32G X 4 MM MISC USE TO INJECT LANTUS ONCE DAILY 100 each 3    Dulaglutide (TRULICITY) 3 GV/1.4XR SOPN Inject 3 mg into the skin once a week 12 pen 3    Blood Glucose Monitoring Suppl (PRODIGY AUTOCODE BLOOD GLUCOSE) LILO Check sugar three times per day. Diagnosis code: E11.42 1 Device 0    blood glucose test strips (PRODIGY NO CODING BLOOD GLUC) strip Prodigy No Coding strips. Test BS three times daily. Diagnosis code: E11.42 100 each 5    medical marijuana MEDICAL MARIJUANA      pantoprazole (PROTONIX) 40 MG tablet Take 40 mg by mouth daily as needed        No current facility-administered medications for this visit.        Social History     Tobacco Use    Smoking status: Current Every Day Smoker     Packs/day: 1.00     Years: 15.00     Pack years: 15.00     Types: Cigarettes    Smokeless tobacco: Never Used   Substance Use Topics    Alcohol use: Not Currently     Alcohol/week: 1.0 standard drink     Types: 1 Standard drinks or equivalent per week        Objective:     Vitals:    05/10/22 1608   BP: 128/78   Site: Left Upper Arm Position: Sitting   Cuff Size: Medium Adult   Pulse: 80   Resp: 17   Temp: 97.5 °F (36.4 °C)   TempSrc: Temporal   SpO2: 95%   Weight: 236 lb 3.2 oz (107.1 kg)   Height: 6' 2\" (1.88 m)     Body mass index is 30.33 kg/m². Wt Readings from Last 3 Encounters:   05/10/22 236 lb 3.2 oz (107.1 kg)   02/22/22 239 lb 12.8 oz (108.8 kg)   01/24/22 238 lb (108 kg)     BP Readings from Last 3 Encounters:   05/10/22 128/78   02/22/22 119/71   01/24/22 134/78       Physical exam:  Constitutional: he is oriented to person, place, and time. he appears well-developed and well-nourished. No distress. HENT:   Head: Normocephalic. Right Ear: External ear normal. Normal TM   Left Ear: External ear normal. Normal TM  Nose: Nose normal.   Mouth/Throat: Oropharynx is clear and moist. No oropharyngeal exudate. Eyes: Conjunctivae and EOM are normal. Pupils are equal, round, and reactive to light. Right eye exhibits no discharge. Left eye exhibits no discharge. No scleral icterus. Neck: Normal range of motion. No JVD present. No tracheal deviation present. No thyromegaly present. Cardiovascular: Normal rate, regular rhythm, normal heart sounds and intact distal pulses. No murmur heard. Pulmonary/Chest: Effort normal and breath sounds normal. No stridor. No respiratory distress. he has no wheezes. he has no rales. heexhibits no tenderness. Abdominal: Soft. Bowel sounds are normal. he exhibits no distension and no mass. There is no tenderness. There is no rebound and no guarding. Assessment/Plan:   1. Substernal chest pain  On exam lung sounds good   And ekg no acute changes   And reassured and advised could be due to symptoms of GERD and advised to try pantoprazole  - EKG 12 lead    2.  Cough  Advised symptomatic treatment with tessalon cap and stop smoking       Edelmira Cordon MD  5/10/2022 5:03 PM

## 2022-05-11 ENCOUNTER — TELEPHONE (OUTPATIENT)
Dept: FAMILY MEDICINE CLINIC | Age: 67
End: 2022-05-11

## 2022-05-11 NOTE — TELEPHONE ENCOUNTER
----- Message from Arturo Iqbal sent at 5/10/2022 10:02 AM EDT -----  Subject: Refill Request    QUESTIONS  Name of Medication? Other - Diclofenac Gel  Patient-reported dosage and instructions? apply 4 grams daily as directed  How many days do you have left? 0  Preferred Pharmacy? [address]  Pharmacy phone number (if available)? [phone]  ---------------------------------------------------------------------------  --------------  Valeria COLLIER  What is the best way for the office to contact you? OK to leave message on   voicemail  Preferred Call Back Phone Number? [phone]  ---------------------------------------------------------------------------  --------------  SCRIPT ANSWERS  Relationship to Patient?  Self

## 2022-05-17 ENCOUNTER — TELEPHONE (OUTPATIENT)
Dept: FAMILY MEDICINE CLINIC | Age: 67
End: 2022-05-17

## 2022-05-17 NOTE — TELEPHONE ENCOUNTER
----- Message from Tiffanie Alonzo sent at 5/17/2022 11:16 AM EDT -----  Subject: Message to Provider    QUESTIONS  Information for Provider? Patient called to follow up with nurse/provider   on chest congestion, cough, he stated he is still not feeling any better   since last appt 5/10 with provider Veronica Peters. Patient would like to be   contacted to discuss course of care.   ---------------------------------------------------------------------------  --------------  [facility]  What is the best way for the office to contact you? OK to leave message on   voicemail  Preferred Call Back Phone Number? [phone]  ---------------------------------------------------------------------------  --------------  SCRIPT ANSWERS  Relationship to Patient?  Self This document is complete and the patient is ready for discharge.

## 2022-05-24 ENCOUNTER — OFFICE VISIT (OUTPATIENT)
Dept: FAMILY MEDICINE CLINIC | Age: 67
End: 2022-05-24
Payer: MEDICARE

## 2022-05-24 VITALS
HEIGHT: 74 IN | BODY MASS INDEX: 30.39 KG/M2 | WEIGHT: 236.8 LBS | DIASTOLIC BLOOD PRESSURE: 74 MMHG | TEMPERATURE: 97.5 F | RESPIRATION RATE: 16 BRPM | SYSTOLIC BLOOD PRESSURE: 134 MMHG | OXYGEN SATURATION: 94 % | HEART RATE: 84 BPM

## 2022-05-24 DIAGNOSIS — R09.89 CHEST CONGESTION: Primary | ICD-10-CM

## 2022-05-24 PROCEDURE — 99213 OFFICE O/P EST LOW 20 MIN: CPT | Performed by: FAMILY MEDICINE

## 2022-05-24 PROCEDURE — 3017F COLORECTAL CA SCREEN DOC REV: CPT | Performed by: FAMILY MEDICINE

## 2022-05-24 PROCEDURE — 1123F ACP DISCUSS/DSCN MKR DOCD: CPT | Performed by: FAMILY MEDICINE

## 2022-05-24 PROCEDURE — G8427 DOCREV CUR MEDS BY ELIG CLIN: HCPCS | Performed by: FAMILY MEDICINE

## 2022-05-24 PROCEDURE — 4004F PT TOBACCO SCREEN RCVD TLK: CPT | Performed by: FAMILY MEDICINE

## 2022-05-24 PROCEDURE — G8417 CALC BMI ABV UP PARAM F/U: HCPCS | Performed by: FAMILY MEDICINE

## 2022-05-24 RX ORDER — BENZONATATE 200 MG/1
200 CAPSULE ORAL 3 TIMES DAILY PRN
Qty: 30 CAPSULE | Refills: 0 | Status: SHIPPED | OUTPATIENT
Start: 2022-05-24 | End: 2022-05-31

## 2022-05-24 NOTE — PROGRESS NOTES
Chief Complaint: Cough (F/U; productive with purulent to green phlegm for about 1 1/2 months ), Jaw Pain (bilateral jaw pain for 2-3 days ), and Chest Congestion (c/o pain due to congestion )       HPI:  Zully Pete is a 74 y.o. male here with c/o ongoing chest congestion and cough which has not resolved with a course of antibiotics and prednisone. He still coughs and has been bringing up sputum. Denies any fever. He has history of fibromyalgia and cervical disc degeneration and complains of pain in his neck radiating to his right hand. He is already on Lyrica 200 mg 3 times a day. And muscle relaxant Robaxin 750 mg 3 times a day    ROS:  Constitutional: Negative for appetite change, fatigue, fever and unexpected weight change. HENT: Denies any sore throat  Respiratory: As mentioned above  Cardiovascular: Negative for chest pain, palpitations and leg swelling. Gastrointestinal: Negative for abdominal pain, blood in stool, constipation, diarrhea, nausea and vomiting. Genitourinary: Negative   Musculoskeletal: As mentioned above  Patient's problem list, medications, allergies, past medical, surgical, social and family histories were reviewed and updated as appropriate.      Current Outpatient Medications   Medication Sig Dispense Refill    benzonatate (TESSALON) 200 MG capsule Take 1 capsule by mouth 3 times daily as needed for Cough 30 capsule 0    diclofenac sodium (VOLTAREN) 1 % GEL Apply 4 g topically daily 350 g 1    traZODone (DESYREL) 50 MG tablet TAKE 1 TABLET BY MOUTH EVERY DAY 90 tablet 0    Diclofenac Sodium POWD 2 g by Does not apply route 4 times daily Formula 3D Baclo Diclo 3% Peri 6% Lido 2% Prilo 2% 240 g 2    escitalopram (LEXAPRO) 20 MG tablet TAKE 1 TABLET BY MOUTH EVERY DAY 90 tablet 1    atorvastatin (LIPITOR) 80 MG tablet TAKE 1 TABLET BY MOUTH NIGHTLY 90 tablet 3    albuterol sulfate HFA (VENTOLIN HFA) 108 (90 Base) MCG/ACT inhaler Inhale 2 puffs into the lungs 4 times daily as needed for Wheezing 18 g 0    VITAMIN D, ERGOCALCIFEROL, PO Take by mouth      B Complex Vitamins (VITAMIN B COMPLEX PO) Take by mouth      Nutritional Supplements (JUICE PLUS FIBRE PO) Take by mouth      empagliflozin (JARDIANCE) 25 MG tablet TAKE 1 TABLET BY MOUTH IN THE MORNING 90 tablet 3    celecoxib (CELEBREX) 200 MG capsule TAKE 1 CAPSULE BY MOUTH TWO TIMES A  capsule 1    methocarbamol (ROBAXIN) 750 MG tablet Take 1-2 tablets by mouth 3 times daily 180 tablet 3    fluticasone (FLONASE) 50 MCG/ACT nasal spray 1 spray by Each Nostril route daily 16 g 3    azelastine (ASTELIN) 0.1 % nasal spray 1 spray by Nasal route 2 times daily Use in each nostril as directed 60 mL 3    insulin glargine (LANTUS SOLOSTAR) 100 UNIT/ML injection pen Inject 18 Units into the skin nightly 5 pen 3    Insulin Pen Needle 32G X 4 MM MISC USE TO INJECT LANTUS ONCE DAILY 100 each 3    Dulaglutide (TRULICITY) 3 LH/0.2TX SOPN Inject 3 mg into the skin once a week 12 pen 3    Blood Glucose Monitoring Suppl (PRODIGY AUTOCODE BLOOD GLUCOSE) LILO Check sugar three times per day. Diagnosis code: E11.42 1 Device 0    blood glucose test strips (PRODIGY NO CODING BLOOD GLUC) strip Prodigy No Coding strips. Test BS three times daily. Diagnosis code: E11.42 100 each 5    medical marijuana MEDICAL MARIJUANA      pantoprazole (PROTONIX) 40 MG tablet Take 40 mg by mouth daily as needed       pregabalin (LYRICA) 200 MG capsule TAKE 1 CAPSULE BY MOUTH THREE TIMES A DAY FOR 30 DAYS 90 capsule 0     No current facility-administered medications for this visit.        Social History     Tobacco Use    Smoking status: Current Every Day Smoker     Packs/day: 1.00     Years: 15.00     Pack years: 15.00     Types: Cigarettes    Smokeless tobacco: Never Used   Substance Use Topics    Alcohol use: Not Currently     Alcohol/week: 1.0 standard drink     Types: 1 Standard drinks or equivalent per week        Objective:     Vitals: 05/24/22 1542   BP: 134/74   Site: Right Upper Arm   Position: Sitting   Cuff Size: Large Adult   Pulse: 84   Resp: 16   Temp: 97.5 °F (36.4 °C)   TempSrc: Temporal   SpO2: 94%   Weight: 236 lb 12.8 oz (107.4 kg)   Height: 6' 2\" (1.88 m)     Body mass index is 30.4 kg/m². Wt Readings from Last 3 Encounters:   05/24/22 236 lb 12.8 oz (107.4 kg)   05/10/22 236 lb 3.2 oz (107.1 kg)   02/22/22 239 lb 12.8 oz (108.8 kg)     BP Readings from Last 3 Encounters:   05/24/22 134/74   05/10/22 128/78   02/22/22 119/71       Physical exam:  Constitutional: he is oriented to person, place, and time. he appears well-developed and well-nourished. No distress. HENT:   Head: Normocephalic. Right Ear: External ear normal. Normal TM   Left Ear: External ear normal. Normal TM  Nose: Nose normal.   Mouth/Throat: Oropharynx is clear and moist. No oropharyngeal exudate. Eyes: Conjunctivae and EOM are normal. Pupils are equal, round, and reactive to light. Right eye exhibits no discharge. Left eye exhibits no discharge. No scleral icterus. Neck: Normal range of motion. No JVD present. No tracheal deviation present. No thyromegaly present. Cardiovascular: Normal rate, regular rhythm, normal heart sounds and intact distal pulses. No murmur heard. Pulmonary/Chest: Effort normal and reduced breath sounds on the left lower lung  Abdominal: Soft. Bowel sounds are normal. he exhibits no distension and no mass. There is no tenderness. There is no rebound and no guarding. Musculoskeletal: Stiffness in his left paraspinal cervical region  But normal strength in upper extremities     Assessment/Plan:   1. Chest congestion  We will get a chest x-ray  Advised to continue the inhaler  We would give Tessalon capsules for the cough  - XR CHEST STANDARD (2 VW);  Future           Mervin Garcia MD  5/24/2022 4:34 PM

## 2022-05-25 ENCOUNTER — HOSPITAL ENCOUNTER (OUTPATIENT)
Dept: GENERAL RADIOLOGY | Age: 67
Discharge: HOME | End: 2022-05-25
Payer: MEDICARE

## 2022-05-25 DIAGNOSIS — R09.89 CHEST CONGESTION: ICD-10-CM

## 2022-05-25 PROCEDURE — 71046 X-RAY EXAM CHEST 2 VIEWS: CPT

## 2022-05-27 DIAGNOSIS — M79.7 FIBROMYALGIA: ICD-10-CM

## 2022-05-27 RX ORDER — PREGABALIN 200 MG/1
CAPSULE ORAL
Qty: 90 CAPSULE | Refills: 0 | Status: SHIPPED | OUTPATIENT
Start: 2022-05-27 | End: 2022-06-27 | Stop reason: SDUPTHER

## 2022-05-27 NOTE — TELEPHONE ENCOUNTER
Last OV 5/24/2022   Next OV 6/13/2022    Requested Prescriptions     Pending Prescriptions Disp Refills    pregabalin (LYRICA) 200 MG capsule [Pharmacy Med Name: Pregabalin Oral Capsule 200 MG] 90 capsule 0     Sig: TAKE 1 CAPSULE BY MOUTH THREE TIMES A DAY FOR 30 DAYS    last fill 4/21  pended

## 2022-06-09 NOTE — TELEPHONE ENCOUNTER
Evelyn Yoo at [address] [facility]. called in to clarify prescription instructions for the diclofenac sodium (VOLTAREN) 1% Gel. The patient has been applying multiple times a day, but the prescription instructions are not clear on if that's the proper way to use the gel. Please advise if patient should be applying once a day, as needed, or any other way.

## 2022-06-16 ENCOUNTER — OFFICE VISIT (OUTPATIENT)
Dept: FAMILY MEDICINE CLINIC | Age: 67
End: 2022-06-16
Payer: MEDICARE

## 2022-06-16 VITALS
HEART RATE: 75 BPM | HEIGHT: 74 IN | SYSTOLIC BLOOD PRESSURE: 150 MMHG | WEIGHT: 234.8 LBS | DIASTOLIC BLOOD PRESSURE: 88 MMHG | BODY MASS INDEX: 30.13 KG/M2 | OXYGEN SATURATION: 96 % | RESPIRATION RATE: 16 BRPM | TEMPERATURE: 97.4 F

## 2022-06-16 DIAGNOSIS — E78.5 DYSLIPIDEMIA ASSOCIATED WITH TYPE 2 DIABETES MELLITUS (HCC): ICD-10-CM

## 2022-06-16 DIAGNOSIS — E11.69 DYSLIPIDEMIA ASSOCIATED WITH TYPE 2 DIABETES MELLITUS (HCC): ICD-10-CM

## 2022-06-16 DIAGNOSIS — I10 ESSENTIAL HYPERTENSION: ICD-10-CM

## 2022-06-16 DIAGNOSIS — Z12.5 SCREENING PSA (PROSTATE SPECIFIC ANTIGEN): ICD-10-CM

## 2022-06-16 DIAGNOSIS — Z00.00 INITIAL MEDICARE ANNUAL WELLNESS VISIT: Primary | ICD-10-CM

## 2022-06-16 DIAGNOSIS — D49.512 NEOPLASM OF UNSPECIFIED BEHAVIOR OF LEFT KIDNEY: ICD-10-CM

## 2022-06-16 PROCEDURE — 3017F COLORECTAL CA SCREEN DOC REV: CPT | Performed by: FAMILY MEDICINE

## 2022-06-16 PROCEDURE — 3044F HG A1C LEVEL LT 7.0%: CPT | Performed by: FAMILY MEDICINE

## 2022-06-16 PROCEDURE — 1123F ACP DISCUSS/DSCN MKR DOCD: CPT | Performed by: FAMILY MEDICINE

## 2022-06-16 PROCEDURE — G0438 PPPS, INITIAL VISIT: HCPCS | Performed by: FAMILY MEDICINE

## 2022-06-16 ASSESSMENT — PATIENT HEALTH QUESTIONNAIRE - PHQ9
3. TROUBLE FALLING OR STAYING ASLEEP: 0
SUM OF ALL RESPONSES TO PHQ9 QUESTIONS 1 & 2: 0
6. FEELING BAD ABOUT YOURSELF - OR THAT YOU ARE A FAILURE OR HAVE LET YOURSELF OR YOUR FAMILY DOWN: 0
SUM OF ALL RESPONSES TO PHQ QUESTIONS 1-9: 0
SUM OF ALL RESPONSES TO PHQ QUESTIONS 1-9: 0
5. POOR APPETITE OR OVEREATING: 0
SUM OF ALL RESPONSES TO PHQ QUESTIONS 1-9: 0
10. IF YOU CHECKED OFF ANY PROBLEMS, HOW DIFFICULT HAVE THESE PROBLEMS MADE IT FOR YOU TO DO YOUR WORK, TAKE CARE OF THINGS AT HOME, OR GET ALONG WITH OTHER PEOPLE: 0
7. TROUBLE CONCENTRATING ON THINGS, SUCH AS READING THE NEWSPAPER OR WATCHING TELEVISION: 0
2. FEELING DOWN, DEPRESSED OR HOPELESS: 0
4. FEELING TIRED OR HAVING LITTLE ENERGY: 0
SUM OF ALL RESPONSES TO PHQ QUESTIONS 1-9: 0
8. MOVING OR SPEAKING SO SLOWLY THAT OTHER PEOPLE COULD HAVE NOTICED. OR THE OPPOSITE, BEING SO FIGETY OR RESTLESS THAT YOU HAVE BEEN MOVING AROUND A LOT MORE THAN USUAL: 0
9. THOUGHTS THAT YOU WOULD BE BETTER OFF DEAD, OR OF HURTING YOURSELF: 0
1. LITTLE INTEREST OR PLEASURE IN DOING THINGS: 0

## 2022-06-16 ASSESSMENT — LIFESTYLE VARIABLES: HOW OFTEN DO YOU HAVE A DRINK CONTAINING ALCOHOL: NEVER

## 2022-06-16 NOTE — PROGRESS NOTES
Medicare Annual Wellness Visit    Wesley Thorne is here for Medicare AWV    Assessment & Plan   Initial Medicare annual wellness visit      Recommendations for Preventive Services Due: see orders and patient instructions/AVS.  Recommended screening schedule for the next 5-10 years is provided to the patient in written form: see Patient Instructions/AVS.     Return for Medicare Annual Wellness Visit in 1 year. Subjective   The following acute and/or chronic problems were also addressed today:  See below. Patient's complete Health Risk Assessment and screening values have been reviewed and are found in Flowsheets. The following problems were reviewed today and where indicated follow up appointments were made and/or referrals ordered.     Positive Risk Factor Screenings with Interventions:         Tobacco Use:     Tobacco Use: High Risk    Smoking Tobacco Use: Current Every Day Smoker    Smokeless Tobacco Use: Never Used     E-Cigarettes/Vaping Use     Questions Responses    E-Cigarette/Vaping Use Never User    Start Date     Passive Exposure     Quit Date     Counseling Given     Comments         Substance Use - Tobacco Interventions:  tobacco cessation tips provided     Drug Use Screening:    DAST-10 Score Interpretation:  1-2: Low level - Monitor, re-assess at a later date; 3-5: Moderate level - Further Investigation; 6-8: Substantial level - Intensive Assessment; 9-10: Severe level - Intensive Assessment    Substance Use - Drug Use Interventions:  Using [facility]         General Health and ACP:  General  In general, how would you say your health is?: Good  In the past 7 days, have you experienced any of the following: New or Increased Pain, New or Increased Fatigue, Loneliness, Social Isolation, Stress or Anger?: (!) Yes  Select all that apply: (!) New or Increased Pain  Do you get the social and emotional support that you need?: Yes  Do you have a Living Will?: (!) No    Advance Directives     Power of  Living Will ACP-Advance Directive ACP-Power of     Not on File Not on File Not on File Not on File      General Health Risk Interventions:  · Pain issues: see below  · No Living Will: Advance Care Planning addressed with patient today    Health Habits/Nutrition:     Physical Activity: Sufficiently Active    Days of Exercise per Week: 5 days    Minutes of Exercise per Session: 30 min     Have you lost any weight without trying in the past 3 months?: No  Body mass index: (!) 30.14  Have you seen the dentist within the past year?: Yes    Health Habits/Nutrition Interventions:  · discussed weight loss             Objective   Vitals:    06/16/22 1233 06/16/22 1237   BP: (!) 154/89 (!) 150/88   Site: Right Upper Arm Left Upper Arm   Position: Sitting Sitting   Cuff Size: Large Adult Large Adult   Pulse: 75    Resp: 16    Temp: 97.4 °F (36.3 °C)    TempSrc: Temporal    SpO2: 96%    Weight: 234 lb 12.8 oz (106.5 kg)    Height: 6' 2\" (1.88 m)       Body mass index is 30.15 kg/m². Physical Exam  Constitutional:       General: He is not in acute distress. Appearance: He is well-developed. HENT:      Head: Normocephalic and atraumatic. Cardiovascular:      Rate and Rhythm: Normal rate and regular rhythm. Heart sounds: Normal heart sounds. No murmur heard. Pulmonary:      Effort: Pulmonary effort is normal. No respiratory distress. Breath sounds: Normal breath sounds. Skin:     General: Skin is warm and dry. Neurological:      Mental Status: He is alert. Psychiatric:         Behavior: Behavior normal.               Allergies   Allergen Reactions    Cymbalta [Duloxetine Hcl] Diarrhea    Metformin And Related      Muscle aches    Shellfish-Derived Products Nausea And Vomiting     *PT STATES NO PROBLEMS WITH TOPICAL IODINE     Prior to Visit Medications    Medication Sig Taking?  Authorizing Provider   pregabalin (LYRICA) 200 MG capsule TAKE 1 CAPSULE BY MOUTH THREE TIMES A DAY FOR 30 DAYS Yes Palma Willoughby MD   diclofenac sodium (VOLTAREN) 1 % GEL Apply 4 g topically daily Yes Viktor Bueno MD   traZODone (DESYREL) 50 MG tablet TAKE 1 TABLET BY MOUTH EVERY DAY Yes Palma Willoughby MD   Diclofenac Sodium POWD 2 g by Does not apply route 4 times daily Formula 3D Baclo Diclo 3% Peri 6% Lido 2% Prilo 2% Yes Palma Willoughby MD   escitalopram (LEXAPRO) 20 MG tablet TAKE 1 TABLET BY MOUTH EVERY DAY Yes Sarah Quijano MD   atorvastatin (LIPITOR) 80 MG tablet TAKE 1 TABLET BY MOUTH NIGHTLY Yes Kenya Garcia MD   albuterol sulfate HFA (VENTOLIN HFA) 108 (90 Base) MCG/ACT inhaler Inhale 2 puffs into the lungs 4 times daily as needed for Wheezing Yes Gracie Dominguez MD   VITAMIN D, ERGOCALCIFEROL, PO Take by mouth Yes Historical Provider, MD   B Complex Vitamins (VITAMIN B COMPLEX PO) Take by mouth Yes Historical Provider, MD   Nutritional Supplements (JUICE PLUS FIBRE PO) Take by mouth Yes Historical Provider, MD   empagliflozin (JARDIANCE) 25 MG tablet TAKE 1 TABLET BY MOUTH IN THE MORNING Yes Kenya Garcia MD   celecoxib (CELEBREX) 200 MG capsule TAKE 1 CAPSULE BY MOUTH TWO TIMES A DAY Yes Palma Willoughby MD   methocarbamol (ROBAXIN) 750 MG tablet Take 1-2 tablets by mouth 3 times daily Yes Palma Willoughby MD   fluticasone (FLONASE) 50 MCG/ACT nasal spray 1 spray by Each Nostril route daily Yes Adonis Davila MD   azelastine (ASTELIN) 0.1 % nasal spray 1 spray by Nasal route 2 times daily Use in each nostril as directed Yes Adonis Davila MD   insulin glargine (LANTUS SOLOSTAR) 100 UNIT/ML injection pen Inject 18 Units into the skin nightly Yes Kenya Garcia MD   Insulin Pen Needle 32G X 4 MM MISC USE TO INJECT LANTUS ONCE DAILY Yes Kenya Garcia MD   Dulaglutide (TRULICITY) 3 SX/7.2UQ SOPN Inject 3 mg into the skin once a week Yes Kenya Garcia MD   Blood Glucose Monitoring Suppl (PRODIGY AUTOCODE BLOOD GLUCOSE) LILO Check sugar three times per day. Diagnosis code: E11.42 Yes Queta Sainz MD   blood glucose test strips (PRODIGY NO CODING BLOOD GLUC) strip Prodigy No Coding strips. Test BS three times daily. Diagnosis code: E11.42 Yes Queta Sainz MD   medical marijuana MEDICAL MARIJUANA Yes Historical Provider, MD   pantoprazole (PROTONIX) 40 MG tablet Take 40 mg by mouth daily as needed  Yes Historical Provider, MD [place] (Including outside providers/suppliers regularly involved in providing care):   Patient Care Team:  Hilda Echavarria MD as PCP - General (Family Medicine)  Hilda Echavarria MD as PCP - [facility] Empaneled Provider  Katie Benson MD as Consulting Physician (Pain Management)  MD Baljit Lauren APRN - CNP     Reviewed and updated this visit:  Tobacco  Allergies  Meds  Med Hx  Surg Hx  Soc Hx  Fam Hx               Fibromyalgia and chronic back pain: Persistent pain. Started with symptoms in 2020. Status post evaluation by neurology and PMNR. S/p Rheumatology evaluation 1/2022 and advised nothing additional can be done- just needs to continue current meds and do PT.  No etiology has been found. [facility] is currently seeing integrative medicine for massage, acupuncture, PT. Using medical marijuana.  Also on robaxin, tumeric, lyrica, and celebrex. Patient would like me to fill these so he does not have to see PMNR again given they said there was nothing else they can do. Considering seeing Dr. Roe Patel for a back injection.      Type 2 diabetes: Diagnosed in 2017.  On insulin since April 2019.  The patient is seeing endocrinology.  History of muscle soreness with Metformin. S/p L partial pancreactomy.      HTN: Checked recently was normal. Had a lot of coffee this AM and thinks that is why BP is high. Was lisinopril 5mg. Stopped given low normal BP's. Patient prefers not to restart medication.  Agrees to have his wife monitor it and let me know if running high at home and f/u sooner then 6 mo if this is the case. BP has been at goal recently at other providers offices. Hyperlipidemia: Stable on lipitor 80mg.  Pain did not improve with lower dose of lipitor.      Hx of renal cancer:  Incidental finding in 2019 when was imaging for appendicitis. S/p surgery with complete resolution. Every 6 months he has imaging. Followed by Dr. Karlie Nur with urology.      Left adrenal adenoma on MRI: Status post a normal functional work-up in 2019.  The patient is managed by endocrinology. Has CT or US q 6 mo ordered by urology.       Binocular diplopia: The patient is status post evaluation by neurology and March 2021. Sherin Devries has seen a neuro-ophthalmologist. Krystle hook at [facility]. Has special glasses.      CHIOMA: on CPAP     Depression/anxiety: stable on lexapro. No issues.      Hx of duodenal and gastric ulcers: first was age 11.  On protonix. Stable. Last ulcer was 20+ years ago.     Hx of ETOH use:  S/p treatment and quit in 2017.       Tobacco abuse:  Smoking 1ppd and not ready to quit. Discussed wellbutrin, chantix, and nicotine replacement.      SH: 8/2021: lives with wife, daughter, and 2 grandkids.  Retired. [employer] on disability prior to alf. Jenise Burris job was . 2 [facility] 2 kids. RTC 6 mo or sooner if BP high.

## 2022-06-16 NOTE — PATIENT INSTRUCTIONS
Personalized Preventive Plan for Tameka Garnica - 6/16/2022  Medicare offers a range of preventive health benefits. Some of the tests and screenings are paid in full while other may be subject to a deductible, co-insurance, and/or copay. Some of these benefits include a comprehensive review of your medical history including lifestyle, illnesses that may run in your family, and various assessments and screenings as appropriate. After reviewing your medical record and screening and assessments performed today your provider may have ordered immunizations, labs, imaging, and/or referrals for you. A list of these orders (if applicable) as well as your Preventive Care list are included within your After Visit Summary for your review. Other Preventive Recommendations:    · A preventive eye exam performed by an eye specialist is recommended every 1-2 years to screen for glaucoma; cataracts, macular degeneration, and other eye disorders. · A preventive dental visit is recommended every 6 months. · Try to get at least 150 minutes of exercise per week or 10,000 steps per day on a pedometer . · Order or download the FREE \"Exercise & Physical Activity: Your Everyday Guide\" from [employer]. Call 7-756.842.9827 or search [employer] online. · You need 9585-7354 mg of calcium and 8497-9402 IU of vitamin D per day. It is possible to meet your calcium requirement with diet alone, but a vitamin D supplement is usually necessary to meet this goal.  · When exposed to the sun, use a sunscreen that protects against both UVA and UVB radiation with an SPF of 30 or greater. Reapply every 2 to 3 hours or after sweating, drying off with a towel, or swimming. · Always wear a seat belt when traveling in a car. Always wear a helmet when riding a bicycle or motorcycle.

## 2022-06-24 DIAGNOSIS — M79.7 FIBROMYALGIA: ICD-10-CM

## 2022-06-24 NOTE — TELEPHONE ENCOUNTER
Medication:   Requested Prescriptions     Pending Prescriptions Disp Refills    pregabalin (LYRICA) 200 MG capsule [Pharmacy Med Name: Pregabalin Oral Capsule 200 MG] 90 capsule 0     Sig: TAKE 1 CAPSULE BY MOUTH THREE TIMES A DAY FOR 30 DAYS        Last Filled:      Patient Phone Number: [phone] (home)     Last appt: 6/16/2022   Next appt: 6/24/2022    Last OARRS:   RX Monitoring 2/1/2022   Attestation -   Periodic Controlled Substance Monitoring Possible medication side effects, risk of tolerance/dependence & alternative treatments discussed. ;No signs of potential drug abuse or diversion identified.

## 2022-06-27 RX ORDER — PREGABALIN 200 MG/1
CAPSULE ORAL
Qty: 270 CAPSULE | Refills: 0 | Status: SHIPPED | OUTPATIENT
Start: 2022-06-27 | End: 2022-10-27

## 2022-06-27 RX ORDER — PREGABALIN 200 MG/1
CAPSULE ORAL
Qty: 90 CAPSULE | Refills: 0 | OUTPATIENT
Start: 2022-06-27 | End: 2022-07-27

## 2022-06-27 NOTE — TELEPHONE ENCOUNTER
PATIENT IS REQUESTING 90 DAY REFILL TAKES 3X DAILY    Medication:   Requested Prescriptions     Pending Prescriptions Disp Refills    pregabalin (LYRICA) 200 MG capsule 90 capsule 0     Sig: TAKE 1 CAPSULE BY MOUTH THREE TIMES A DAY FOR 30 DAYS     Last Filled:5.27.22 #90 REFILLS 0  Last appt: 6/16/2022   Next appt: Visit date not found    Last OARRS:   RX Monitoring 2/1/2022   Attestation -   Periodic Controlled Substance Monitoring Possible medication side effects, risk of tolerance/dependence & alternative treatments discussed. ;No signs of potential drug abuse or diversion identified.

## 2022-07-07 NOTE — TELEPHONE ENCOUNTER
Medication:   Requested Prescriptions     Pending Prescriptions Disp Refills    Diclofenac Sodium POWD 240 g 2     Si g by Does not apply route 4 times daily Formula 3D Baclo Diclo 3% Peri 6% Lido 2% Prilo 2%     Last Filled: 5.2.22 #240 g refills 2  Last appt: 2022   Next appt: Visit date not found    Last OARRS:   RX Monitoring 2022   Attestation -   Periodic Controlled Substance Monitoring Possible medication side effects, risk of tolerance/dependence & alternative treatments discussed. ;No signs of potential drug abuse or diversion identified.

## 2022-07-08 ENCOUNTER — TELEPHONE (OUTPATIENT)
Dept: FAMILY MEDICINE CLINIC | Age: 67
End: 2022-07-08

## 2022-07-08 NOTE — TELEPHONE ENCOUNTER
----- Message from Patti  sent at 7/8/2022  3:34 PM EDT -----  Subject: Refill Request    QUESTIONS  Name of Medication? diclofenac sodium (VOLTAREN) 1 % GEL  Patient-reported dosage and instructions? 4 day  How many days do you have left? 0  Preferred Pharmacy? [address] [facility] phone number (if available)? [phone]  Additional Information for Provider? Please have Dr. Marcelino Keenan up the grams   a day because he keeps running out.  ---------------------------------------------------------------------------  --------------  [address]  What is the best way for the office to contact you? OK to leave message on   voicemail  Preferred Call Back Phone Number? [phone]  ---------------------------------------------------------------------------  --------------  SCRIPT ANSWERS  Relationship to Patient?  Self

## 2022-07-12 ENCOUNTER — TELEPHONE (OUTPATIENT)
Dept: FAMILY MEDICINE CLINIC | Age: 67
End: 2022-07-12

## 2022-07-12 NOTE — TELEPHONE ENCOUNTER
Patient called because they are having issues with their insurance filling the prescription for. .. Diclofenac Sodium POWD 240 g 2 7/7/2022     Sig - Route: 2 g by Does not apply route 4 times daily Formula 3D Baclo Diclo 3% Peri 6% Lido 2% Prilo 2% - Does not apply      The fill doesn't last as long as the instructions intend for it to last so the pharmacy and patient are requesting that the script be changed to 6g instead of 2g. Patient requested a call back when/if it is approved and sent over to the pharmacy. Pharmacy is. ..    [address], [address] [facility]

## 2022-07-12 NOTE — TELEPHONE ENCOUNTER
The fill doesn't last as long as the instructions intend for it to last so the pharmacy and patient are requesting that the script be changed to 6g instead of 2g.    [facility] is saying it must be sent for the 6 g to be approved

## 2022-07-14 NOTE — TELEPHONE ENCOUNTER
[facility] called stating that they received the request for this medication (DICLOFENAC SODIUM POW) and that they do not compound medications. Requesting we find new pharmacy for patient.

## 2022-07-25 ENCOUNTER — OFFICE VISIT (OUTPATIENT)
Dept: PULMONOLOGY | Age: 67
End: 2022-07-25
Payer: MEDICARE

## 2022-07-25 VITALS
DIASTOLIC BLOOD PRESSURE: 80 MMHG | SYSTOLIC BLOOD PRESSURE: 134 MMHG | HEIGHT: 74 IN | BODY MASS INDEX: 30.03 KG/M2 | HEART RATE: 82 BPM | WEIGHT: 234 LBS | OXYGEN SATURATION: 95 %

## 2022-07-25 DIAGNOSIS — E66.3 OVERWEIGHT (BMI 25.0-29.9): ICD-10-CM

## 2022-07-25 DIAGNOSIS — I10 ESSENTIAL HYPERTENSION: ICD-10-CM

## 2022-07-25 DIAGNOSIS — G47.33 OSA (OBSTRUCTIVE SLEEP APNEA): Primary | ICD-10-CM

## 2022-07-25 DIAGNOSIS — J30.89 NON-SEASONAL ALLERGIC RHINITIS, UNSPECIFIED TRIGGER: ICD-10-CM

## 2022-07-25 PROCEDURE — 3017F COLORECTAL CA SCREEN DOC REV: CPT | Performed by: INTERNAL MEDICINE

## 2022-07-25 PROCEDURE — G8427 DOCREV CUR MEDS BY ELIG CLIN: HCPCS | Performed by: INTERNAL MEDICINE

## 2022-07-25 PROCEDURE — 1123F ACP DISCUSS/DSCN MKR DOCD: CPT | Performed by: INTERNAL MEDICINE

## 2022-07-25 PROCEDURE — G8417 CALC BMI ABV UP PARAM F/U: HCPCS | Performed by: INTERNAL MEDICINE

## 2022-07-25 PROCEDURE — 4004F PT TOBACCO SCREEN RCVD TLK: CPT | Performed by: INTERNAL MEDICINE

## 2022-07-25 PROCEDURE — 99214 OFFICE O/P EST MOD 30 MIN: CPT | Performed by: INTERNAL MEDICINE

## 2022-07-25 ASSESSMENT — SLEEP AND FATIGUE QUESTIONNAIRES
HOW LIKELY ARE YOU TO NOD OFF OR FALL ASLEEP WHILE SITTING INACTIVE IN A PUBLIC PLACE: 0
HOW LIKELY ARE YOU TO NOD OFF OR FALL ASLEEP IN A CAR, WHILE STOPPED FOR A FEW MINUTES IN TRAFFIC: 0
HOW LIKELY ARE YOU TO NOD OFF OR FALL ASLEEP WHILE SITTING AND TALKING TO SOMEONE: 0
HOW LIKELY ARE YOU TO NOD OFF OR FALL ASLEEP WHEN YOU ARE A PASSENGER IN A CAR FOR AN HOUR WITHOUT A BREAK: 0
ESS TOTAL SCORE: 2
HOW LIKELY ARE YOU TO NOD OFF OR FALL ASLEEP WHILE WATCHING TV: 0
HOW LIKELY ARE YOU TO NOD OFF OR FALL ASLEEP WHILE LYING DOWN TO REST IN THE AFTERNOON WHEN CIRCUMSTANCES PERMIT: 2
HOW LIKELY ARE YOU TO NOD OFF OR FALL ASLEEP WHILE SITTING AND READING: 0
HOW LIKELY ARE YOU TO NOD OFF OR FALL ASLEEP WHILE SITTING QUIETLY AFTER LUNCH WITHOUT ALCOHOL: 0

## 2022-07-25 NOTE — PROGRESS NOTES
PULMONARY OFFICE FOLLOW-UP VISIT    CONSULTING PHYSICIAN:  Doug Hernandez MD , No ref. provider found     REASON FOR VISIT:   Chief Complaint   Patient presents with    Sleep Apnea       DATE OF VISIT: 7/25/2022    HISTORY OF PRESENT ILLNESS: 74y.o. year old male comes into the pulmonary/sleep clinic for a follow-up. Patient reports that he has been using his CPAP therapy regularly and feels benefited from it. Still has certain mornings where he would have increased postnasal drip and he has to blow his nose multiple times. Postnasal drip does not bother him during the nighttime. He has been using azelastine and fluticasone nasal spray sporadically. Weight has been stable. Denies any mask leakage or pressure intolerance. Previously: Patient was diagnosed to moderate obstructive sleep apnea through home sleep testing. Was started on auto CPAP therapy and has been using it regularly. Feels benefited from it. Sleep symptoms have subsided. Denies any mask leakage or pressure intolerance. Does have problems with humidifier using a lot of water. Weight has been stable. Patient reports that he had a sleep study done in 2004 which showed severe obstructive sleep apnea. He was given CPAP therapy at 9 cm H2O which she has used ever since. His machine has become more than 13years old and has stopped working. He is unable to sleep without the machine and is having a hard time every night. His weight is stable. Is also been diagnosed to have fibromyalgia. Also reports postnasal drip and upper airway irritation.     Sleep Medicine 7/25/2022 1/24/2022 10/6/2021   Sitting and reading 0 0 0   Watching TV 0 0 0   Sitting, inactive in a public place (e.g. a theatre or a meeting) 0 0 0   As a passenger in a car for an hour without a break 0 0 1   Lying down to rest in the afternoon when circumstances permit 2 2 3   Sitting and talking to someone 0 0 0   Sitting quietly after a lunch without alcohol 0 0 0   In a car, while stopped for a few minutes in traffic 0 0 0   Total score 2 2 4       REVIEW OF SYSTEMS:   8 point review of system is negative except that mentioned in the HPI.     PAST MEDICAL HISTORY:   Past Medical History:   Diagnosis Date    Binocular vision disorder with diplopia     Blood transfusion     30 yrs ago    Chronic back pain     Chronic pain syndrome     Depression     Diabetes mellitus ([address]     Failed back surgical syndrome     FH: colonic polyps     Fibromyalgia     History of duodenal ulcer     now gets peptic ulcer    Hyperlipidemia     Hypertension     Insomnia     Obesity     Psychiatric problem     depression and anxiety    Renal carcinoma, left ([address]     Sleep apnea     USES C-PAP    Ulcer, duodenum peptic        PAST SURGICAL HISTORY:   Past Surgical History:   Procedure Laterality Date    BACK SURGERY       X 2    CERVICAL FUSION N/A 12/17/2019    ANTERIOR CERVICAL DISCECTOMY AND FUSION AT [facility] ([id number], [address], [address] [place], [address], [id number]) performed by Meli Hardy MD at [address] N/A 2/11/2020    COLONOSCOPY POLYPECTOMY SNARE/COLD BIOPSY performed by Damaris Mccullough MD at [address], COLON, DIAGNOSTIC      EPIDURAL STEROID INJECTION N/A 10/2/2019    C7-T1 INTERLAMINAR EPIDURAL STEROID INJECTION WITH FLUOROSCOPY performed by Jennifer Karimi MD at [address] N/A 10/30/2019    C7-T1 INTERLMINAR EPIDURAL STEROID INJECTION WITH FLUOROSCOPY performed by Jennifer Karimi MD at [facility] X 2    KNEE ARTHROSCOPY Left 10/24/2019    LEFT KNEE ARTHROSCOPY WITH PARTIAL MEDIAL MENISCECTOMY;SYNOVECTOMY WITH PLICA REMOVAL performed by Angus Carlson MD at [facility] ARTHROSCOPY Left 10/24/2019    LAPAROSCOPIC APPENDECTOMY N/A 5/10/2019    APPENDECTOMY LAPAROSCOPIC, LYSIS OF ADHESIONS performed by Jerrod Ruiz MD at [address] [place] [address] HISTORY  1965    fatty tumor removal under right arm    PARTIAL NEPHRECTOMY Left 8/6/2019    ROBOTIC LAPAROSCOPIC LEFT PARTIAL NEPHRECTOMY performed by Malachi Owens MD at [facility] HISTORY:   Social History     Tobacco Use    Smoking status: Every Day     Packs/day: 1.00     Years: 15.00     Pack years: 15.00     Types: Cigarettes    Smokeless tobacco: Never   Vaping Use    Vaping Use: Never used   Substance Use Topics    Alcohol use: Not Currently     Alcohol/week: 1.0 standard drink     Types: 1 Standard drinks or equivalent per week    Drug use: Yes     Types: Marijuana [employer])     Comment: Medical Marijuana couple times a day       FAMILY HISTORY:   Family History   Problem Relation Age of Onset    Stroke Mother     Heart Disease Father     Other Paternal Grandmother        MEDICATIONS:     Current Outpatient Medications on File Prior to Visit   Medication Sig Dispense Refill    Diclofenac Sodium POWD 6 g by Does not apply route 4 times daily Formula 3D Baclo Diclo 3% Peri 6% Lido 2% Prilo 2% 240 g 2    diclofenac sodium (VOLTAREN) 1 % GEL Apply 4 g topically 4 times daily 350 g 5    pregabalin (LYRICA) 200 MG capsule TAKE 1 CAPSULE BY MOUTH THREE TIMES A DAY FOR 30 DAYS 270 capsule 0    diclofenac sodium (VOLTAREN) 1 % GEL Apply 4 g topically daily (Patient taking differently: Apply 6 g topically in the morning.) 350 g 1    traZODone (DESYREL) 50 MG tablet TAKE 1 TABLET BY MOUTH EVERY DAY 90 tablet 0    escitalopram (LEXAPRO) 20 MG tablet TAKE 1 TABLET BY MOUTH EVERY DAY 90 tablet 1    atorvastatin (LIPITOR) 80 MG tablet TAKE 1 TABLET BY MOUTH NIGHTLY 90 tablet 3    albuterol sulfate HFA (VENTOLIN HFA) 108 (90 Base) MCG/ACT inhaler Inhale 2 puffs into the lungs 4 times daily as needed for Wheezing 18 g 0    VITAMIN D, ERGOCALCIFEROL, PO Take by mouth      B Complex Vitamins (VITAMIN B COMPLEX PO) Take by mouth      Nutritional Supplements (JUICE PLUS FIBRE PO) Take by mouth      empagliflozin (JARDIANCE) 25 MG tablet TAKE 1 TABLET BY MOUTH IN THE MORNING 90 tablet 3    celecoxib (CELEBREX) 200 MG capsule TAKE 1 CAPSULE BY MOUTH TWO TIMES A  capsule 1    methocarbamol (ROBAXIN) 750 MG tablet Take 1-2 tablets by mouth 3 times daily 180 tablet 3    fluticasone (FLONASE) 50 MCG/ACT nasal spray 1 spray by Each Nostril route daily 16 g 3    azelastine (ASTELIN) 0.1 % nasal spray 1 spray by Nasal route 2 times daily Use in each nostril as directed 60 mL 3    insulin glargine (LANTUS SOLOSTAR) 100 UNIT/ML injection pen Inject 18 Units into the skin nightly 5 pen 3    Insulin Pen Needle 32G X 4 MM MISC USE TO INJECT LANTUS ONCE DAILY 100 each 3    Dulaglutide (TRULICITY) 3 WG/2.2NW SOPN Inject 3 mg into the skin once a week 12 pen 3    Blood Glucose Monitoring Suppl (PRODIGY AUTOCODE BLOOD GLUCOSE) LILO Check sugar three times per day. Diagnosis code: E11.42 1 Device 0    blood glucose test strips (PRODIGY NO CODING BLOOD GLUC) strip Prodigy No Coding strips. Test BS three times daily. Diagnosis code: E11.42 100 each 5    medical marijuana MEDICAL MARIJUANA      pantoprazole (PROTONIX) 40 MG tablet Take 40 mg by mouth daily as needed        No current facility-administered medications on file prior to visit. ALLERGIES:   Allergies as of 07/25/2022 - Fully Reviewed 07/25/2022   Allergen Reaction Noted    Cymbalta [duloxetine hcl] Diarrhea 03/07/2018    Metformin and related  07/22/2019    Shellfish-derived products Nausea And Vomiting 04/12/2011      OBJECTIVE:   height is 6' 2\" (1.88 m) and weight is 234 lb (106.1 kg). His blood pressure is 134/80 and his pulse is 82. His oxygen saturation is 95%. PHYSICAL EXAM:    CONSTITUTIONAL: He is a 74y.o.-year-old who appears his stated age. He is alert and oriented x 3 and in no acute distress. HEENT: PERRLA, EOMI. No scleral icterus. No thrush, atraumatic, normocephalic. Mallampati class 2 airway. NECK: Supple, without cervical or supraclavicular lymphadenopathy:  CARDIOVASCULAR: S1 S2 RRR. Without murmer  RESPIRATORY & CHEST: Lungs are clear to auscultation and percussion. No wheezing, no crackles. Good air movement  GASTROINTESTINAL & ABDOMEN: Soft, nontender, positive bowel sounds in all quadrants, non-distended, without hepatosplenomegaly. GENITOURINARY: Deferred. MUSCULOSKELETAL: No tenderness to palpation of the axial skeleton. There is no clubbing. No cyanosis. No edema of the lower extremities. SKIN OF BODY: No rash or jaundice. PSYCHIATRIC EVALUATION: Normal affect. Patient answers questions appropriately. HEMATOLOGIC/LYMPHATIC/ IMMUNOLOGIC: No palpable lymphadenopathy. NEUROLOGIC: Alert and oriented x 3. Groslly non-focal. Motor strength is 5+/5 in all muscle groups. The patient has a normal sensorium globally. LABS:    Lab Summary Latest Ref Rng & Units 2/17/2022   Sodium 136 - 145 mmol/L 139   Potassium 3.5 - 5.1 mmol/L 4.4   BUN 7 - 20 mg/dL 23(H)   Creatinine 0.8 - 1.3 mg/dL 0.8   Glucose 70 - 99 mg/dL 87   Calcium 8.3 - 10.6 mg/dL 9.0   Alk Phos 40 - 129 U/L 62   Albumin 3.4 - 5.0 g/dL 4.3   Bilirubin 0.0 - 1.0 mg/dL 0.5   AST 15 - 37 U/L 39(H)   ALT 10 - 40 U/L 41(H)   HDL cholesterol 40 - 60 mg/dL 39(L)   LDL calc <100 mg/dL 79   VLDL calc Not Established mg/dL 15   Some recent data might be hidden       All labs were personally reviewed by me any my interpretation is: Chem 8 is normal.     IMAGING:    No new chest imaging for me to review.       Pulmonary Functions Testing Results:      Home sleep testing done on 10/27/2021  Overall MANOHAR: 19.5/h  Lowest oxygen saturation 83%  Time spent below an oxygen saturation of 88%: 116.5 minutes    CPAP compliance summary     1/19/2022-7/17/2022 11/23/2021-1/18/2022   Days with device usage 177/180 days  57/57 days   Average Usage 7 hours 52 minutes  7 hours 48 minutes   Days with device usage >4hrs 98%  100%   Large Leak per night 22.5 L/min  26.9 L/min   AHI 0.1  0.1   CPAP settings Auto CPAP 8-14 cmH2O  auto CPAP 8-14 cmH2O   90th percentile pressure 11.1  11.4   Mask Fullface mask  fullface mask     DME: [facility]      IMPRESSION AND RECOMMENDATIONS:     1. CHIOMA (obstructive sleep apnea)  -Patient was again diagnosed to have moderate obstructive sleep apnea. -Started on auto CPAP. -His compliance remains adequate with residual AHI within normal limits.  -I have advised the patient to use his nasal spray more consistently in order to reduce morning postnasal drip.  -No other changes made to the settings today.  -Encourage patient to continue staying compliant with the therapy to achieve maximum benefit. 2. Essential hypertension  -Currently not on any antihypertensives. 3. Overweight (BMI 25.0-29.9)  -I strongly advised the patient to make efforts to lose weight. I discussed various modalities including moderate intensity intermittent exercises, diet control and bariatric surgery. If the patient loses even 10 to 15% of current body weight, it will be beneficial in improving the overall health. 4. Non-seasonal allergic rhinitis, unspecified trigger  -I will start the patient on Flonase and azelastine nasal spray. Return in about 6 months (around 1/25/2023) for chioma. Rickey Forde MD  Pulmonary Critical Care and Sleep Medicine  7/25/2022, 8:43 AM    This note was completed using dragon medical speech recognition software. Grammatical errors, random word insertions, pronoun errors and incomplete sentences are occasional consequences of this technology due to software limitations. If there are questions or concerns about the content of this note of information contained within the body of this dictation they should be addressed with the provider for clarification.

## 2022-08-10 DIAGNOSIS — E11.65 UNCONTROLLED TYPE 2 DIABETES MELLITUS WITH HYPERGLYCEMIA, WITH LONG-TERM CURRENT USE OF INSULIN (HCC): ICD-10-CM

## 2022-08-10 DIAGNOSIS — Z79.4 UNCONTROLLED TYPE 2 DIABETES MELLITUS WITH HYPERGLYCEMIA, WITH LONG-TERM CURRENT USE OF INSULIN (HCC): ICD-10-CM

## 2022-08-10 RX ORDER — DULAGLUTIDE 3 MG/.5ML
INJECTION, SOLUTION SUBCUTANEOUS
Qty: 6 ML | Refills: 0 | Status: SHIPPED | OUTPATIENT
Start: 2022-08-10

## 2022-08-10 RX ORDER — TRAZODONE HYDROCHLORIDE 50 MG/1
TABLET ORAL
Qty: 90 TABLET | Refills: 0 | Status: SHIPPED | OUTPATIENT
Start: 2022-08-10

## 2022-08-10 NOTE — TELEPHONE ENCOUNTER
Medication:   Requested Prescriptions     Pending Prescriptions Disp Refills    traZODone (DESYREL) 50 MG tablet [Pharmacy Med Name: traZODone HCl Oral Tablet 50 MG] 90 tablet 0     Sig: TAKE 1 TABLET BY MOUTH EVERY DAY        Last Filled:  5/2/2022 90 tabs 0 refills     Patient Phone Number: [phone] (home)     Last appt: 6/16/2022   Next appt: Visit date not found    Last OARRS:   RX Monitoring 2/1/2022   Attestation -   Periodic Controlled Substance Monitoring Possible medication side effects, risk of tolerance/dependence & alternative treatments discussed. ;No signs of potential drug abuse or diversion identified.

## 2022-08-10 NOTE — TELEPHONE ENCOUNTER
Requested Refill:   Requested Prescriptions     Pending Prescriptions Disp Refills    TRULICITY 3 AG/8.3BS SOPN [Pharmacy Med Name: Trulicity Subcutaneous Solution Pen-injector 3 MG/0.5ML] 6 mL 0     Sig: inject the contents of one prefilled pen subcutaneously once a week     Last refilled: 8/25/2021    Last Appt: 2/22/2022  Next Appt: 8/23/2022

## 2022-08-15 DIAGNOSIS — M79.7 FIBROMYALGIA: ICD-10-CM

## 2022-08-15 DIAGNOSIS — G89.4 CHRONIC PAIN SYNDROME: ICD-10-CM

## 2022-08-15 RX ORDER — METHOCARBAMOL 750 MG/1
TABLET, FILM COATED ORAL
Qty: 180 TABLET | Refills: 0 | Status: SHIPPED | OUTPATIENT
Start: 2022-08-15 | End: 2022-09-12

## 2022-08-19 DIAGNOSIS — D49.512 NEOPLASM OF UNSPECIFIED BEHAVIOR OF LEFT KIDNEY: ICD-10-CM

## 2022-08-19 DIAGNOSIS — E78.5 DYSLIPIDEMIA ASSOCIATED WITH TYPE 2 DIABETES MELLITUS (HCC): ICD-10-CM

## 2022-08-19 DIAGNOSIS — E11.42 CONTROLLED TYPE 2 DIABETES MELLITUS WITH DIABETIC POLYNEUROPATHY, WITH LONG-TERM CURRENT USE OF INSULIN (HCC): ICD-10-CM

## 2022-08-19 DIAGNOSIS — Z79.4 CONTROLLED TYPE 2 DIABETES MELLITUS WITH DIABETIC POLYNEUROPATHY, WITH LONG-TERM CURRENT USE OF INSULIN (HCC): ICD-10-CM

## 2022-08-19 DIAGNOSIS — Z12.5 SCREENING PSA (PROSTATE SPECIFIC ANTIGEN): ICD-10-CM

## 2022-08-19 DIAGNOSIS — E11.69 DYSLIPIDEMIA ASSOCIATED WITH TYPE 2 DIABETES MELLITUS (HCC): ICD-10-CM

## 2022-08-19 LAB
A/G RATIO: 2 (ref 1.1–2.2)
ALBUMIN SERPL-MCNC: 4.1 G/DL (ref 3.4–5)
ALP BLD-CCNC: 61 U/L (ref 40–129)
ALT SERPL-CCNC: 24 U/L (ref 10–40)
ANION GAP SERPL CALCULATED.3IONS-SCNC: 12 MMOL/L (ref 3–16)
AST SERPL-CCNC: 29 U/L (ref 15–37)
BILIRUB SERPL-MCNC: 0.4 MG/DL (ref 0–1)
BUN BLDV-MCNC: 27 MG/DL (ref 7–20)
CALCIUM SERPL-MCNC: 9.7 MG/DL (ref 8.3–10.6)
CHLORIDE BLD-SCNC: 103 MMOL/L (ref 99–110)
CO2: 25 MMOL/L (ref 21–32)
CREAT SERPL-MCNC: 0.8 MG/DL (ref 0.8–1.3)
CREATININE URINE: 116 MG/DL (ref 39–259)
GFR AFRICAN AMERICAN: >60
GFR NON-AFRICAN AMERICAN: >60
GLUCOSE BLD-MCNC: 76 MG/DL (ref 70–99)
MICROALBUMIN UR-MCNC: <1.2 MG/DL
MICROALBUMIN/CREAT UR-RTO: NORMAL MG/G (ref 0–30)
POTASSIUM SERPL-SCNC: 4.6 MMOL/L (ref 3.5–5.1)
PROSTATE SPECIFIC ANTIGEN: 2.06 NG/ML (ref 0–4)
SODIUM BLD-SCNC: 140 MMOL/L (ref 136–145)
TOTAL PROTEIN: 6.2 G/DL (ref 6.4–8.2)

## 2022-08-20 LAB
ESTIMATED AVERAGE GLUCOSE: 111.2 MG/DL
HBA1C MFR BLD: 5.5 %

## 2022-08-23 ENCOUNTER — OFFICE VISIT (OUTPATIENT)
Dept: ENDOCRINOLOGY | Age: 67
End: 2022-08-23
Payer: MEDICARE

## 2022-08-23 VITALS
SYSTOLIC BLOOD PRESSURE: 108 MMHG | WEIGHT: 226.6 LBS | DIASTOLIC BLOOD PRESSURE: 77 MMHG | BODY MASS INDEX: 29.08 KG/M2 | OXYGEN SATURATION: 95 % | HEIGHT: 74 IN | HEART RATE: 78 BPM

## 2022-08-23 DIAGNOSIS — Z79.4 CONTROLLED TYPE 2 DIABETES MELLITUS WITHOUT COMPLICATION, WITH LONG-TERM CURRENT USE OF INSULIN (HCC): ICD-10-CM

## 2022-08-23 DIAGNOSIS — Z79.4 CONTROLLED TYPE 2 DIABETES MELLITUS WITH DIABETIC POLYNEUROPATHY, WITH LONG-TERM CURRENT USE OF INSULIN (HCC): Primary | ICD-10-CM

## 2022-08-23 DIAGNOSIS — E78.5 DYSLIPIDEMIA ASSOCIATED WITH TYPE 2 DIABETES MELLITUS (HCC): ICD-10-CM

## 2022-08-23 DIAGNOSIS — E11.42 CONTROLLED TYPE 2 DIABETES MELLITUS WITH DIABETIC POLYNEUROPATHY, WITH LONG-TERM CURRENT USE OF INSULIN (HCC): Primary | ICD-10-CM

## 2022-08-23 DIAGNOSIS — E11.69 DYSLIPIDEMIA ASSOCIATED WITH TYPE 2 DIABETES MELLITUS (HCC): ICD-10-CM

## 2022-08-23 DIAGNOSIS — I10 ESSENTIAL HYPERTENSION: ICD-10-CM

## 2022-08-23 DIAGNOSIS — E11.9 CONTROLLED TYPE 2 DIABETES MELLITUS WITHOUT COMPLICATION, WITH LONG-TERM CURRENT USE OF INSULIN (HCC): ICD-10-CM

## 2022-08-23 PROCEDURE — 3044F HG A1C LEVEL LT 7.0%: CPT | Performed by: INTERNAL MEDICINE

## 2022-08-23 PROCEDURE — G8417 CALC BMI ABV UP PARAM F/U: HCPCS | Performed by: INTERNAL MEDICINE

## 2022-08-23 PROCEDURE — 1123F ACP DISCUSS/DSCN MKR DOCD: CPT | Performed by: INTERNAL MEDICINE

## 2022-08-23 PROCEDURE — 99214 OFFICE O/P EST MOD 30 MIN: CPT | Performed by: INTERNAL MEDICINE

## 2022-08-23 PROCEDURE — 3017F COLORECTAL CA SCREEN DOC REV: CPT | Performed by: INTERNAL MEDICINE

## 2022-08-23 PROCEDURE — G8427 DOCREV CUR MEDS BY ELIG CLIN: HCPCS | Performed by: INTERNAL MEDICINE

## 2022-08-23 PROCEDURE — 4004F PT TOBACCO SCREEN RCVD TLK: CPT | Performed by: INTERNAL MEDICINE

## 2022-08-23 PROCEDURE — 2022F DILAT RTA XM EVC RTNOPTHY: CPT | Performed by: INTERNAL MEDICINE

## 2022-08-23 NOTE — PROGRESS NOTES
Patient ID:   Vidal Blair is a 74 y.o. male    Chief Complaint:   Vidal Blair presents for an evaluation of Type 2 Diabetes Mellitus , Hyperlipidemia and hypertension. Subjective:   Type 2 Diabetes Mellitus diagnosed in 2017  On insulin since April 2019   Metformin caused muscle soreness     Has received steroid shots for trigger finger back pain. None recent. Has some muscle pains (diffuse). Back is really an issue. Saw ortho. Lost 13 lbs since last visit     Lantus 18 units daily at night. TTT of . Jardiance 74FT daily   Trulicity 3.0 mg weekly on Wednesdays     No BS readings today    Checks blood sugars once a week. Reportedly 107-110    AM:   Lunch:  Supper:   HS:     Hypoglycemias: None     Meals: Three, may be protein shake for breakfast. Dinner is bigger. Late night snack (cup of mixed nuts). Soda once a day, diet. No juices. Exercise: Limited due to back problems. Exercise with the bands. Denies chest pain, exertional dyspnea. Family history of CAD: Dad at age 64. Mother had stroke in her 66's. Smokes 1 PPD. Counselled for smoking cessation.      Currently not on ASA, recommend baby aspirin if okay with gastroenterologist or PCP      The following portions of the patient's history were reviewed and updated as appropriate:       Family History   Problem Relation Age of Onset    Stroke Mother     Heart Disease Father     Other Paternal Grandmother          Social History     Socioeconomic History    Marital status:      Spouse name: Not on file    Number of children: Not on file    Years of education: Not on file    Highest education level: Not on file   Occupational History    Occupation: disability   Tobacco Use    Smoking status: Current Every Day Smoker     Packs/day: 1.00     Years: 15.00     Pack years: 15.00     Types: Cigarettes    Smokeless tobacco: Never Used   Vaping Use    Vaping Use: Never used   Substance and Sexual Activity    Alcohol use: Not Currently     Alcohol/week: 1.0 standard drink     Types: 1 Standard drinks or equivalent per week    Drug use: Yes     Types: Marijuana [place])     Comment: Medical Marijuana couple times a day    Sexual activity: Yes     Partners: Female   Other Topics Concern    Not on file   Social History Narrative    ** Merged History Encounter **          Social Determinants of Health     Financial Resource Strain: Low Risk     Difficulty of Paying Living Expenses: Not hard at all   Food Insecurity: No Food Insecurity    Worried About [address] in the Last Year: Never true    [facility] in the Last Year: Never true   Transportation Needs: No Transportation Needs    Lack of Transportation (Medical): No    Lack of Transportation (Non-Medical): No   Physical Activity: Sufficiently Active    Days of Exercise per Week: 7 days    Minutes of Exercise per Session: 60 min   Stress: No Stress Concern Present    Feeling of Stress : Not at all   Social Connections:  Moderately Isolated    Frequency of Communication with Friends and Family: More than three times a week    Frequency of Social Gatherings with Friends and Family: More than three times a week    Attends Evangelical Services: Never    Active Member of Clubs or Organizations: No    Attends Club or Organization Meetings: Never    Marital Status:    Intimate Partner Violence:     Fear of Current or Ex-Partner: Not on file    Emotionally Abused: Not on file    Physically Abused: Not on file    Sexually Abused: Not on file   Housing Stability: [address] to Pay for Housing in the Last Year: No    Number of [place] in the Last Year: 1    Unstable Housing in the Last Year: No       Past Medical History:   Diagnosis Date    Binocular vision disorder with diplopia     Blood transfusion     30 yrs ago    Chronic back pain     Chronic pain syndrome     Depression     Diabetes mellitus ([facility] [address]     Failed back surgical syndrome     FH: colonic polyps Fibromyalgia     History of duodenal ulcer     now gets peptic ulcer    Hyperlipidemia     Hypertension     Insomnia     Obesity     Psychiatric problem     depression and anxiety    Renal carcinoma, left ([address]     Sleep apnea     USES C-PAP    Ulcer, duodenum peptic        Past Surgical History:   Procedure Laterality Date    BACK SURGERY       X 2    CERVICAL FUSION N/A 12/17/2019    ANTERIOR CERVICAL DISCECTOMY AND FUSION AT [facility] [facility] ([address], [address], [address] [place], [address], [address]) performed by Anais Watson MD at [facility] N/A 2/11/2020    COLONOSCOPY POLYPECTOMY SNARE/COLD BIOPSY performed by Ashley Dnucan MD at [address] [place] [address], COLON, DIAGNOSTIC      EPIDURAL STEROID INJECTION N/A 10/2/2019    C7-T1 INTERLAMINAR EPIDURAL STEROID INJECTION WITH FLUOROSCOPY performed by Rhonda Fitzgerald MD at [address] [employer] 10/30/2019    C7-T1 INTERLMINAR EPIDURAL STEROID INJECTION WITH FLUOROSCOPY performed [facility]    KNEE ARTHROSCOPY Left 10/24/2019    LEFT KNEE ARTHROSCOPY WITH PARTIAL MEDIAL MENISCECTOMY;SYNOVECTOMY WITH PLICA REMOVAL performed by Anirudh Jerome MD at [facility] ARTHROSCOPY Left 10/24/2019    LAPAROSCOPIC APPENDECTOMY N/A 5/10/2019    APPENDECTOMY LAPAROSCOPIC, LYSIS OF ADHESIONS performed by Ángela Gonzalez MD at [place] [address] tumor removal under right arm    PARTIAL NEPHRECTOMY Left 8/6/2019    ROBOTIC LAPAROSCOPIC LEFT PARTIAL NEPHRECTOMY performed by Deny Nye MD at [place] [address] ENDOSCOPY           Allergies   Allergen Reactions    Cymbalta [Duloxetine Hcl] Diarrhea    Metformin And Related      Muscle aches    Shellfish-Derived Products Nausea And Vomiting     *PT STATES NO PROBLEMS WITH TOPICAL IODINE         Current Outpatient Medications:     VITAMIN D, ERGOCALCIFEROL, PO, Take by mouth, Disp: , Rfl:     B Complex Vitamins (VITAMIN B COMPLEX PO), Take by mouth, Disp: , Rfl:     Nutritional Supplements (JUICE PLUS FIBRE PO), Take by mouth, Disp: , Rfl:     empagliflozin (JARDIANCE) 25 MG tablet, TAKE 1 TABLET BY MOUTH IN THE MORNING, Disp: 90 tablet, Rfl: 3    celecoxib (CELEBREX) 200 MG capsule, TAKE 1 CAPSULE BY MOUTH TWO TIMES A DAY, Disp: 180 capsule, Rfl: 1    methocarbamol (ROBAXIN) 750 MG tablet, Take 1-2 tablets by mouth 3 times daily, Disp: 180 tablet, Rfl: 3    traZODone (DESYREL) 50 MG tablet, TAKE 1 TABLET BY MOUTH EVERY DAY, Disp: 90 tablet, Rfl: 0    escitalopram (LEXAPRO) 20 MG tablet, TAKE 1 TABLET BY MOUTH EVERY DAY, Disp: 90 tablet, Rfl: 0    fluticasone (FLONASE) 50 MCG/ACT nasal spray, 1 spray by Each Nostril route daily, Disp: 16 g, Rfl: 3    azelastine (ASTELIN) 0.1 % nasal spray, 1 spray by Nasal route 2 times daily Use in each nostril as directed, Disp: 60 mL, Rfl: 3    insulin glargine (LANTUS SOLOSTAR) 100 UNIT/ML injection pen, Inject 18 Units into the skin nightly, Disp: 5 pen, Rfl: 3    Insulin Pen Needle 32G X 4 MM MISC, USE TO INJECT LANTUS ONCE DAILY, Disp: 100 each, Rfl: 3    Dulaglutide (TRULICITY) 3 HX/4.1HY SOPN, Inject 3 mg into the skin once a week, Disp: 12 pen, Rfl: 3    atorvastatin (LIPITOR) 80 MG tablet, Take 1 tablet by mouth nightly, Disp: 90 tablet, Rfl: 3    Blood Glucose Monitoring Suppl (PRODIGY AUTOCODE BLOOD GLUCOSE) LILO, Check sugar three times per day. Diagnosis code: E11.42, Disp: 1 Device, Rfl: 0    blood glucose test strips (PRODIGY NO CODING BLOOD GLUC) strip, [employer] No Coding strips. Test BS three times daily. Diagnosis code: E11.42, Disp: 100 each, Rfl: 5    medical marijuana, MEDICAL MARIJUANA, Disp: , Rfl:     pantoprazole (PROTONIX) 40 MG tablet, Take 40 mg by mouth daily as needed , Disp: , Rfl:     pregabalin (LYRICA) 200 MG capsule, Take 1 capsule by mouth 3 times daily for 30 days. , Disp: 90 capsule, Rfl: 0      Review of Systems:    Constitutional: Negative for fever, chills, and unexpected weight change. HENT: Negative for congestion, ear pain, rhinorrhea,  sore throat and trouble swallowing. Eyes: Negative for photophobia, redness, itching. Respiratory: Negative for cough, shortness of breath and sputum. Cardiovascular: Negative for chest pain, palpitations and leg swelling. Gastrointestinal: has heartburn. Negative for nausea, vomiting, abdominal pain, diarrhea, constipation. Endocrine: Negative for cold intolerance, heat intolerance, polydipsia, polyphagia and polyuria. Genitourinary: Negative for dysuria, urgency, frequency, hematuria and flank pain. Musculoskeletal: neck pain due to DJD , pain radiate to neck, arms. Negative for arthralgias. Skin/Nail: Negative for rash, itching. Normal nails. Neurological: Negative for seizures, weakness, light-headedness, numbness and headaches. Hematological/ Lymph nodes: Negative for adenopathy. Does not bruise/bleed easily. Psychiatric/Behavioral: Negative for suicidal ideas, depression, anxiety, sleep disturbance and decreased concentration. Objective:   Physical Exam:  /71 (Site: Left Upper Arm, Position: Sitting, Cuff Size: Large Adult)   Pulse 73   Ht 6' 2\" (1.88 m)   Wt 239 lb 12.8 oz (108.8 kg)   SpO2 95%   BMI 30.79 kg/m²     Constitutional: Patient is oriented to person, place, and time. Patient appears well-developed and well-nourished. HENT:               Head: Normocephalic and atraumatic. Eyes: Conjunctivae and EOM are normal.                Neck: Normal range of motion. Cardiovascular: Normal rate, regular rhythm and normal heart sounds. Pulmonary/Chest: Effort normal and breath sounds normal.   Abdominal: Soft. Bowel sounds are normal.   Musculoskeletal: Normal range of motion. Neurological: Patient is alert and oriented to person, place, and time. Patient has normal reflexes.    Skin: Skin is warm and dry. Psychiatric: Patient has a normal mood and affect.  Patient behavior is normal.       Lab Review:    Orders Only on 02/17/2022   Component Date Value Ref Range Status    Sodium 02/17/2022 139  136 - 145 mmol/L Final    Potassium 02/17/2022 4.4  3.5 - 5.1 mmol/L Final    Chloride 02/17/2022 103  99 - 110 mmol/L Final    CO2 02/17/2022 25  21 - 32 mmol/L Final    Anion Gap 02/17/2022 11  3 - 16 Final    Glucose 02/17/2022 87  70 - 99 mg/dL Final    BUN 02/17/2022 23* 7 - 20 mg/dL Final    CREATININE 02/17/2022 0.8  0.8 - 1.3 mg/dL Final    GFR Non- 02/17/2022 >60  >60 Final    GFR  02/17/2022 >60  >60 Final    Calcium 02/17/2022 9.0  8.3 - 10.6 mg/dL Final    Total Protein 02/17/2022 6.2* 6.4 - 8.2 g/dL Final    Albumin 02/17/2022 4.3  3.4 - 5.0 g/dL Final    Albumin/Globulin Ratio 02/17/2022 2.3* 1.1 - 2.2 Final    Total Bilirubin 02/17/2022 0.5  0.0 - 1.0 mg/dL Final    Alkaline Phosphatase 02/17/2022 62  40 - 129 U/L Final    ALT 02/17/2022 41* 10 - 40 U/L Final    AST 02/17/2022 39* 15 - 37 U/L Final    Cholesterol, Fasting 02/17/2022 133  0 - 199 mg/dL Final    Triglyceride, Fasting 02/17/2022 77  0 - 150 mg/dL Final    HDL 02/17/2022 39* 40 - 60 mg/dL Final    LDL Calculated 02/17/2022 79  <100 mg/dL Final    VLDL Cholesterol Calculated 02/17/2022 15  Not Established mg/dL Final    Hemoglobin A1C 02/17/2022 5.7  See comment % Final    eAG 02/17/2022 116.9  mg/dL Final   Orders Only on 01/03/2022   Component Date Value Ref Range Status    SARS-CoV-2 01/03/2022 Not Detected  Not detected Final   Orders Only on 08/18/2021   Component Date Value Ref Range Status    Hemoglobin A1C 08/18/2021 5.5  See comment % Final    eAG 08/18/2021 111.2  mg/dL Final    Microalbumin, Random Urine 08/18/2021 <1.20  <2.0 mg/dL Final    Creatinine, Ur 08/18/2021 45.3  39.0 - 259.0 mg/dL Final    Microalbumin Creatinine Ratio 08/18/2021 see below  0.0 - 30.0 mg/g Final   Orders Only on 02/23/2021   Component Date Value Ref Range Status    Cholesterol, Fasting 02/23/2021 145  0 - 199 mg/dL Final    Triglyceride, Fasting 02/23/2021 89  0 - 150 mg/dL Final    HDL 02/23/2021 47  40 - 60 mg/dL Final    LDL Calculated 02/23/2021 80  <100 mg/dL Final    VLDL Cholesterol Calculated 02/23/2021 18  Not Established mg/dL Final    Sodium 02/23/2021 142  136 - 145 mmol/L Final    Potassium 02/23/2021 4.1  3.5 - 5.1 mmol/L Final    Chloride 02/23/2021 109  99 - 110 mmol/L Final    CO2 02/23/2021 24  21 - 32 mmol/L Final    Anion Gap 02/23/2021 9  3 - 16 Final    Glucose 02/23/2021 83  70 - 99 mg/dL Final    BUN 02/23/2021 25* 7 - 20 mg/dL Final    CREATININE 02/23/2021 0.8  0.8 - 1.3 mg/dL Final    GFR Non- 02/23/2021 >60  >60 Final    GFR  02/23/2021 >60  >60 Final    Calcium 02/23/2021 9.1  8.3 - 10.6 mg/dL Final    Total Protein 02/23/2021 5.9* 6.4 - 8.2 g/dL Final    Albumin 02/23/2021 4.1  3.4 - 5.0 g/dL Final    Albumin/Globulin Ratio 02/23/2021 2.3* 1.1 - 2.2 Final    Total Bilirubin 02/23/2021 0.3  0.0 - 1.0 mg/dL Final    Alkaline Phosphatase 02/23/2021 84  40 - 129 U/L Final    ALT 02/23/2021 34  10 - 40 U/L Final    AST 02/23/2021 29  15 - 37 U/L Final    Globulin 02/23/2021 1.8  g/dL Final    Hemoglobin A1C 02/23/2021 5.4  See comment % Final    eAG 02/23/2021 108.3  mg/dL Final    Total CK 02/23/2021 169  39 - 308 U/L Final    TSH Reflex FT4 02/23/2021 1.39  0.27 - 4.20 uIU/mL Final    Vit D, 25-Hydroxy 02/23/2021 43.6  >=30 ng/mL Final           Assessment and Plan     Romelia Benites was seen today for diabetes. Diagnoses and all orders for this visit:    Controlled type 2 diabetes mellitus with diabetic polyneuropathy, with long-term current use of insulin (HCC)  -     empagliflozin (JARDIANCE) 25 MG tablet; TAKE 1 TABLET BY MOUTH IN THE MORNING  -     Hemoglobin A1C; Future  -     Microalbumin / Creatinine Urine Ratio;  Future  -     HM DIABETES FOOT EXAM    Dyslipidemia associated with type 2 diabetes mellitus ([facility] [address]    Essential hypertension          1: Type 2 DM complicated with neuropathy   Controlled A1C 5.5% < 5.7% < 5.5% <  5.4% < 5.3% < 5.6% < 6.3% <  6.6% < 11.2%    H/o left partial pacreatectomy     Blood sugars and A1C are in good control     Continue Jardiance 13ON daily   C/w Trulicity 7.1ZV weekly  Lantus 18 units at bedtime   Use Treat to target basal insulin. If pre-breakfast sugar is above 130 on 2 consecutive days increase Lantus by 2 units. If pre-breakfast sugar is below 90 on one day decrease Lantus by 2 units. All instructions provided in written. Check Blood sugars 3 times per day. Log them along with insulin and send them every 2 weeks. Call for blood sugars less than 60 or more than 400. Eye exam: Last exam in Jan 2022, denies retinopathy. Has slight cataracts. He has double vision. Work up for [employer] negative and MRI orbits normal (reportedly). He reports negative work up for [employer], [place]. Foot exam:  Feb 2022. Saw podiatrist in Jan 2021. Deformity/amputation: absent  Skin lesions/pre-ulcerative calluses: present   Edema: right- negative, left- negative  Sensory exam: Monofilament sensation: mildly impaired  Pulses: normal, Vibration (128 Hz): severely impaired on right and mildly impaired on left      Renal screen: Aug 2022     Smoker: Counselled for smoking cessation   TSH screen: March 2019   CDE visit in April 2019    2: HTN   Off meds    Asymptomatic     3: Hyperlipidemia   LDL: 79, HDL: 39, TGs: 77 - Feb 2022   Atorvastatin 80mg daily   Denies missing doses     4: Left adrenal adenoma on MRI   1.3 cm x 1.1 cm   Functional work up normal Sep 2019   CT Nov 2019: No change in the mild left adrenal gland thickening. Pancreatic cyst 0.7 x 0.9 cm cystic lesion     CT scan Nov 2020:   Pancreatic lesions not seen   Left adrenal looks stable-- I have images reviewed myself. There is no reprot on adrenal adenoma.  It appears to be 1.3 x 1.5 cms. CT scan Dec 2021: Mild adreniform thickening bilaterally, which can be seen with adrenal hyperplasia. Getting CT scans or US every 12 months after kidney surgery     6: Muscle pains  Normal CK, TSH, Vit D today - Feb 2021     RTC in 6 months A1C, lipids         EDUCATION:   Greater than 50% of this follow-up visit was spent in general counseling regarding   obesity, diet, exercise, importance of adherence to insulin regime, recognition and treatment of hypo and hyperglycemia,  glucose logging, proper diabetes management, diabetic complications with poor management and the importance of glycemic control in order to avoid the complications of diabetes. Risks and potential complications of diabetes were reviewed with the patient. Diabetes health maintenance plan and follow-up were discussed and understood by the patient. We reviewed the importance of medication compliance and regular follow-up. Aggressive lifestyle modification was encouraged. Exercise Counselling: This patient is a candidate for regular physical exercise. Instructions to perform the following types of exercise:  Swimming or water aerobic exercise  Brisk walking  Playing tennis  Stationary bicycle or elliptical indoor  Low impact aerobic exercise    Instructions given to exercise for the following duration:  30 minutes a day for five-seven days per week.     Following instructions for being active throughout the day in addition to formal exercise:  Walk instead of drive whenever possible  Take the stairs instead of the elevator  Work in the garden  Park to the far end of the parking lot to add more walking steps to destination      Electronically signed by Muna Beltran MD on 2/22/2022 at 12:14 PM

## 2022-08-25 DIAGNOSIS — E11.65 UNCONTROLLED TYPE 2 DIABETES MELLITUS WITH HYPERGLYCEMIA, WITH LONG-TERM CURRENT USE OF INSULIN (HCC): ICD-10-CM

## 2022-08-25 DIAGNOSIS — Z79.4 UNCONTROLLED TYPE 2 DIABETES MELLITUS WITH HYPERGLYCEMIA, WITH LONG-TERM CURRENT USE OF INSULIN (HCC): ICD-10-CM

## 2022-08-26 ENCOUNTER — HOSPITAL ENCOUNTER (OUTPATIENT)
Dept: GENERAL RADIOLOGY | Age: 67
Discharge: HOME | End: 2022-08-26
Payer: MEDICARE

## 2022-08-26 DIAGNOSIS — M54.6 PAIN IN THORACIC SPINE: ICD-10-CM

## 2022-08-26 PROCEDURE — 72070 X-RAY EXAM THORAC SPINE 2VWS: CPT

## 2022-08-26 RX ORDER — INSULIN GLARGINE 100 [IU]/ML
INJECTION, SOLUTION SUBCUTANEOUS
Qty: 15 ML | Refills: 1 | Status: SHIPPED | OUTPATIENT
Start: 2022-08-26

## 2022-08-26 NOTE — TELEPHONE ENCOUNTER
Requested Refill:   Requested Prescriptions     Pending Prescriptions Disp Refills    LANTUS SOLOSTAR 100 UNIT/ML injection pen [Pharmacy Med Name: Lantus SoloStar Subcutaneous Solution Pen-injector 100 UNIT/ML] 15 mL 0     Sig: inject 18 units subcutaneously one time a day at night     Last refilled:  8/25/2021    Last Appt: 8/23/2022  Next Appt: 2/23/2022

## 2022-09-12 DIAGNOSIS — G89.4 CHRONIC PAIN SYNDROME: ICD-10-CM

## 2022-09-12 DIAGNOSIS — M79.7 FIBROMYALGIA: ICD-10-CM

## 2022-09-12 RX ORDER — METHOCARBAMOL 750 MG/1
TABLET, FILM COATED ORAL
Qty: 180 TABLET | Refills: 0 | Status: SHIPPED | OUTPATIENT
Start: 2022-09-12 | End: 2022-10-10

## 2022-09-12 NOTE — TELEPHONE ENCOUNTER
Medication:   Requested Prescriptions     Pending Prescriptions Disp Refills    methocarbamol (ROBAXIN) 750 MG tablet [Pharmacy Med Name: Methocarbamol Oral Tablet 750 MG] 180 tablet 0     Sig: TAKE 1 OR 2 TABLETS BY MOUTH THREE TIMES A DAY        Last Filled:      Patient Phone Number: [phone] (home)     Last appt: 6/16/2022   Next appt: Visit date not found    Last OARRS:   RX Monitoring 2/1/2022   Attestation -   Periodic Controlled Substance Monitoring Possible medication side effects, risk of tolerance/dependence & alternative treatments discussed. ;No signs of potential drug abuse or diversion identified.

## 2022-09-15 NOTE — PROGRESS NOTES
PATIENT REACHED   YES____NO____    PREOP INSTRUCTIONS LEFT ON VM NUMBER____LAM_________      LIAW__[id number]___ TIME__1330___ARRIVAL TIME_1230  ______PLACE__[facility]__________  NOTHING TO EAT OR DRINK  AFTER MIDNIGHT THE EVENING PRIOR OR AS INSTRUCTED BY YOUR DR.  Haider Martinez NEED A RESPONSIBLE ADULT AGE 18 OR OLDER TO DRIVE YOU HOME  PLEASE BRING INSURANCE CARD. PICTURE ID AND COMPLETE LIST OF MEDS  WEAR LOOSE COMFORTABLE CLOTHING  FOLLOW ANY INSTRUCTIONS YOUR DRS OFFICE HAS GIVEN YOU,INCLUDING WHAT MEDICATIONS TO TAKE THE AM OF PROCEDURE AND WHEN AND IF YOU NEED TO STOP ANY BLOOD THINNERS. IF YOU HAVE QUESTIONS REGARDING THIS CALL THE OFFICE  THE GOAL BLOOD SUGAR THE AM OF PROCEDURE  OR LESS ABOVE THAT THE PROCEDURE MAY BE CANCELLED  ANY QUESTIONS CALL YOUR DOCTOR. ALSO,PLEASE READ THE INSTRUCTION PACKET FROM YOUR DR IF YOU RECEIVED ONE. SPINE INTERVENTION NUMBER [phone]      OTHER___________________________________      VISITOR POLICY(subject to change)    Current policy is 2 visitors per patient. No children. Masks are required.

## 2022-09-21 ENCOUNTER — HOSPITAL ENCOUNTER (OUTPATIENT)
Age: 67
Setting detail: OUTPATIENT SURGERY
Discharge: HOME | End: 2022-09-21
Attending: PHYSICAL MEDICINE & REHABILITATION | Admitting: PHYSICAL MEDICINE & REHABILITATION
Payer: MEDICARE

## 2022-09-21 ENCOUNTER — APPOINTMENT (OUTPATIENT)
Dept: GENERAL RADIOLOGY | Age: 67
End: 2022-09-21
Attending: PHYSICAL MEDICINE & REHABILITATION
Payer: MEDICARE

## 2022-09-21 VITALS
OXYGEN SATURATION: 98 % | HEART RATE: 67 BPM | DIASTOLIC BLOOD PRESSURE: 85 MMHG | WEIGHT: 212 LBS | HEIGHT: 74 IN | BODY MASS INDEX: 27.21 KG/M2 | RESPIRATION RATE: 16 BRPM | TEMPERATURE: 96.9 F | SYSTOLIC BLOOD PRESSURE: 124 MMHG

## 2022-09-21 LAB
GLUCOSE BLD-MCNC: 88 MG/DL (ref 70–99)
PERFORMED ON: NORMAL

## 2022-09-21 PROCEDURE — 3610000059 HC PAIN LEVEL 5 ADDL 15 MIN (NON-OR): Performed by: PHYSICAL MEDICINE & REHABILITATION

## 2022-09-21 PROCEDURE — 2500000003 HC RX 250 WO HCPCS: Performed by: PHYSICAL MEDICINE & REHABILITATION

## 2022-09-21 PROCEDURE — 3610000058 HC PAIN LEVEL 5 BASE (NON-OR): Performed by: PHYSICAL MEDICINE & REHABILITATION

## 2022-09-21 PROCEDURE — 77003 FLUOROGUIDE FOR SPINE INJECT: CPT

## 2022-09-21 PROCEDURE — 2709999900 HC NON-CHARGEABLE SUPPLY: Performed by: PHYSICAL MEDICINE & REHABILITATION

## 2022-09-21 RX ORDER — LIDOCAINE HYDROCHLORIDE 10 MG/ML
INJECTION, SOLUTION EPIDURAL; INFILTRATION; INTRACAUDAL; PERINEURAL
Status: COMPLETED | OUTPATIENT
Start: 2022-09-21 | End: 2022-09-21

## 2022-09-21 RX ORDER — BUPIVACAINE HYDROCHLORIDE 5 MG/ML
INJECTION, SOLUTION EPIDURAL; INTRACAUDAL
Status: COMPLETED | OUTPATIENT
Start: 2022-09-21 | End: 2022-09-21

## 2022-09-21 ASSESSMENT — PAIN - FUNCTIONAL ASSESSMENT
PAIN_FUNCTIONAL_ASSESSMENT: 0-10
PAIN_FUNCTIONAL_ASSESSMENT: PREVENTS OR INTERFERES SOME ACTIVE ACTIVITIES AND ADLS

## 2022-09-21 ASSESSMENT — PAIN DESCRIPTION - DESCRIPTORS: DESCRIPTORS: ACHING;BURNING

## 2022-09-21 NOTE — PROGRESS NOTES
Procedural dressing dry and intact. Bilateral upper & lower extremities equal in strength. Discharge instructions reviewed with patient or responsible adult, signed and copy given. All home medications have been reviewed. All questions answered and patient or responsible adult verbalized understanding.

## 2022-09-21 NOTE — OP NOTE
PATIENT:  Lorie Odonnell  AGE:  74 yrs  MEDICAL RECORD #:  [id number]  YOB: 1955     DATE:  9/21/2022  PHYSICIAN: Gladis Loza M.D. PROCEDURE: Bilateral T4, T5, T6 diagnostic medial branch blocks under fluoroscopy. PRE-OP DIAGNOSIS:  Low Back Pain/ Lumbar Spondylosis     POST-OP DIAGNOSIS:  same     HISTORY OF PRESENT ILLNESS:  See office notes. Patient has failed previous less-invasive treatments. ALLERGIES:  Cymbalta [duloxetine hcl], Metformin and related, and Shellfish-derived products     MEDICATIONS:    No current facility-administered medications for this encounter. PHYSICAL EXAMINATION:              General:  Awake, alert              Heart:  No audible murmurs, extremities well perfused              Lungs:  No increased WOB or audible wheezing              Extremities:  Normal tone. No swelling. Anesthesia: None    Estimated blood loss: None    DESCRIPTION OF PROCEDURE:     Components of the procedure were again reviewed with the patient prior to the procedure. He is aware of risks including infection, bleeding, allergic reaction, and nerve injury. He had ample opportunity for additional questions. He elected to proceed with treatment. The patient was placed in the prone position. Utilizing fluoroscopy, the T4,5,6 areas were identified, target points for the diagnostic medial branch blocks were at the roots of the transverse processes and the superior articular processes with the exception of the L5 dorsal ramus at the sacral ala. Appropriate entry sites were selected over the skin. The skin was prepared in sterile fashion. Local anesthesia was carried out at each of the  6  entry sites with 1-2 ml lidocaine 1%. Utilizing a 22 gauge needle x 3.5 inch under fluoroscopy, each of the  6  target sites were approached. AP and oblique viewpoints were utilized to ensure appropriate needle localization.  Each of the 6 diagnostic blocks were carried out utilizing 0.5 cc of Marcaine 0.5%. The patient tolerated the procedure well. DISPOSITION:  The patient was transported to recovery. The patient was monitored for 15 to 20 minutes post-procedure. Precautions were discussed and written instructions provided. .  Patient was provided and reminded to keep a pain diary and follow-up in the clinic as previously arranged to discuss efficacy and future treatments. Comment: Breathing stable throughout procedure and immediately following.

## 2022-09-21 NOTE — DISCHARGE INSTRUCTIONS
Erika CHEEMA    Continue medications are previously ordered. Use Pain Diary as instructed    Do not drive today for 8 hours if no sedation was given. Be up and moving around to see if pain is relieved with block    May apply ice for 15 minutes at a time 4-5 times a day at the injection site. Follow up with provider who referred you for this procedure. Keep the site dry for 24 hours, then remove bandaids    Call Dr Aburto Read if you have a temp greater than 101 degrees    Call his office at [phone] for any questions or problems    Follow up with the provider who referred you for this procedure.

## 2022-09-21 NOTE — H&P
HISTORY AND PHYSICAL/PRE-SEDATION ASSESSMENT    Patient:  Brian Macias   :  1955  Medical Record No.:  [id number]   Date:  2022  Physician:  Cary Connolly MD  Facility: [address] [place] [address]    HISTORY OF PRESENT ILLNESS:                 The patient is a 74 y.o. male whom presents with thoracic back pain. Review of the imaging and physical exam of the patient confirmed the pre-procedure diagnosis. After a thorough discussion of risks, benefits and alternatives informed consent was obtained.     Diagnosis:  Thoracic back pain, unspecified back pain laterality, unspecified chronicity [M54.6]    Past Medical History:   Past Medical History:   Diagnosis Date    Binocular vision disorder with diplopia     Blood transfusion     30 yrs ago    Chronic back pain     Chronic pain syndrome     Depression     Diabetes mellitus ([address]     Failed back surgical syndrome     FH: colonic polyps     Fibromyalgia     History of duodenal ulcer     now gets peptic ulcer    Hyperlipidemia     Hypertension     Insomnia     Obesity     Psychiatric problem     depression and anxiety    Renal carcinoma, left ([address]     Sleep apnea     USES C-PAP    Ulcer, duodenum peptic         Past Surgical History:     Past Surgical History:   Procedure Laterality Date    BACK SURGERY       X 2    CERVICAL FUSION N/A 2019    ANTERIOR CERVICAL DISCECTOMY AND FUSION AT [facility] ([address], [address], [id number], [id number], [id number]) performed by Fredick Hatchet, MD at [address] N/A 2020    COLONOSCOPY POLYPECTOMY SNARE/COLD BIOPSY performed by Darleen Doherty MD at [address], COLON, DIAGNOSTIC      EPIDURAL STEROID INJECTION N/A 10/2/2019    C7-T1 INTERLAMINAR EPIDURAL STEROID INJECTION WITH FLUOROSCOPY performed by Cary Connolly MD at [address] 10/30/2019    C7-T1 INTERLMINAR EPIDURAL STEROID INJECTION WITH FLUOROSCOPY performed by Jose Ramon Barth MD [facility]    KNEE ARTHROSCOPY Left 10/24/2019    LEFT KNEE ARTHROSCOPY WITH PARTIAL MEDIAL MENISCECTOMY;SYNOVECTOMY WITH PLICA REMOVAL performed by Hayley Bazzi MD at [facility] 26 ARTHROSCOPY Left 10/24/2019    LAPAROSCOPIC APPENDECTOMY N/A 5/10/2019    APPENDECTOMY LAPAROSCOPIC, LYSIS OF ADHESIONS performed by Preeti Newell MD at [address] [place] [address]    fatty tumor removal under right arm    PARTIAL NEPHRECTOMY Left 8/6/2019    ROBOTIC LAPAROSCOPIC LEFT PARTIAL NEPHRECTOMY performed by Shad Preston MD at [place] [address]         Current Medications:   Prior to Admission medications    Medication Sig Start Date End Date Taking? Authorizing Provider   methocarbamol (ROBAXIN) 750 MG tablet TAKE 1 OR 2 TABLETS BY MOUTH THREE TIMES A DAY 9/12/22   MD CRESCENCIO MustafaUS SOLOSTAR 100 UNIT/ML injection pen inject 18 units subcutaneously one time a day at night 8/26/22   Hesham Alvarado MD   NONFORMULARY Compound Drug- 3fwd-baclo2/diclo3/gaba6/lido2/prilo2% grams    Historical Provider, MD   traZODone (DESYREL) 50 MG tablet TAKE 1 TABLET BY MOUTH EVERY DAY 8/10/22   Neyda Spangler MD   [facility] 3 NK/5.3MN SOPN inject the contents of one prefilled pen subcutaneously once a week  Patient taking differently:  Weds 8/10/22   Hesham Alvarado MD   diclofenac sodium (VOLTAREN) 1 % GEL Apply 4 g topically 4 times daily  Patient taking differently: Apply 6 g topically 4 times daily 7/8/22 10/6/22  Sagar Lin MD   pregabalin (LYRICA) 200 MG capsule TAKE 1 CAPSULE BY MOUTH THREE TIMES A DAY FOR 30 DAYS 6/27/22 9/27/22  Melba Gamez MD   escitalopram (LEXAPRO) 20 MG tablet TAKE 1 TABLET BY MOUTH EVERY DAY 4/22/22   Dino Cottrell MD   atorvastatin (LIPITOR) 80 MG tablet TAKE 1 TABLET BY MOUTH NIGHTLY 4/21/22   Hesham Alvarado MD   albuterol sulfate HFA (VENTOLIN HFA) 108 (90 Base) MCG/ACT inhaler Inhale 2 puffs into the lungs 4 times daily as needed for Wheezing 4/18/22   Mary Grace Hollingsworth MD   VITAMIN D, ERGOCALCIFEROL, PO Take by mouth    Historical Provider, MD   B Complex Vitamins (VITAMIN B COMPLEX PO) Take by mouth    Historical Provider, MD   Nutritional Supplements (JUICE PLUS FIBRE PO) Take by mouth    Historical Provider, MD   empagliflozin (JARDIANCE) 25 MG tablet TAKE 1 TABLET BY MOUTH IN THE MORNING 2/22/22   Le Brown MD   celecoxib (CELEBREX) 200 MG capsule TAKE 1 CAPSULE BY MOUTH TWO TIMES A DAY 2/1/22   Doug Hernandez MD   fluticasone (FLONASE) 50 MCG/ACT nasal spray 1 spray by Each Nostril route daily 10/6/21   Freddie Nye MD   azelastine (ASTELIN) 0.1 % nasal spray 1 spray by Nasal route 2 times daily Use in each nostril as directed 10/6/21   Freddie Nye MD   Insulin Pen Needle 32G X 4 MM MISC USE TO INJECT LANTUS ONCE DAILY 8/25/21   Le Brown MD   Blood Glucose Monitoring Suppl (PRODIGY AUTOCODE BLOOD GLUCOSE) LILO Check sugar three times per day. Diagnosis code: E11.42 6/2/20   Le Brown MD   blood glucose test strips (PRODIGY NO CODING BLOOD GLUC) strip Prodigy No Coding strips. Test BS three times daily. Diagnosis code: E11.42 6/2/20   Le Brown MD   medical marijuana MEDICAL MARIJUANA 5/31/19   Historical Provider, MD   pantoprazole (PROTONIX) 40 MG tablet Take 40 mg by mouth daily as needed     Historical Provider, MD       Allergies:  Cymbalta [duloxetine hcl], Metformin and related, and Shellfish-derived products    Social History:    reports that he has been smoking cigarettes. He has a 15.00 pack-year smoking history. He has never used smokeless tobacco. He reports that he does not currently use alcohol after a past usage of about 1.0 standard drink per week. He reports current drug use. Drug: Marijuana Margaret Tremaine).     Family History:   Family History   Problem Relation Age of Onset    Stroke Mother     Heart Disease Father     Other Paternal Grandmother         Vitals: There were no vitals taken for this visit. PHYSICAL EXAM:  HENT: Airway patent and reviewed  Cardiovascular: Normal rate, regular rhythm, normal heart sounds. Pulmonary/Chest: No wheezes. No rhonchi. No rales. Abdominal: Soft. Bowel sounds are normal. No distension. Extremities: Moves all extremities equally  Lumbar Spine: Painful range of motion, no midline tenderness     ASA CLASS:         []   I. Normal, healthy adult           [x]   II.  Mild systemic disease            []   III. Severe systemic disease    Mallampati: Mallampati Class II - (soft palate, fauces & uvula are visible)      Sedation plan:   []  Local              [x]  Minimal                  []  General anesthesia    Treatment plan:  Patient's condition acceptable for planned procedure/sedation. Proceed with planned procedure   Post Procedure Plan   Return to same level of care   ______________________     The risks and benefits as well as alternatives to the procedure have been discussed with the patient and or family. The patient and/or next of kin understands and agrees to proceed.     Alfredo Soto MD

## 2022-10-10 DIAGNOSIS — G89.4 CHRONIC PAIN SYNDROME: ICD-10-CM

## 2022-10-10 DIAGNOSIS — M79.7 FIBROMYALGIA: ICD-10-CM

## 2022-10-10 RX ORDER — METHOCARBAMOL 750 MG/1
TABLET, FILM COATED ORAL
Qty: 180 TABLET | Refills: 0 | Status: SHIPPED | OUTPATIENT
Start: 2022-10-10

## 2022-10-17 RX ORDER — ESCITALOPRAM OXALATE 20 MG/1
TABLET ORAL
Qty: 90 TABLET | Refills: 0 | Status: SHIPPED | OUTPATIENT
Start: 2022-10-17

## 2022-10-17 NOTE — TELEPHONE ENCOUNTER
Medication:   Requested Prescriptions     Pending Prescriptions Disp Refills    escitalopram (LEXAPRO) 20 MG tablet [Pharmacy Med Name: Escitalopram Oxalate Oral Tablet 20 MG] 90 tablet 0     Sig: TAKE 1 TABLET BY MOUTH EVERY DAY        Last Filled:  4/22/2022 90 tabs 1 refill     Patient Phone Number: [phone] (home)     Last appt: 6/16/2022   Next appt: Visit date not found    Last OARRS:   RX Monitoring 2/1/2022   Attestation -   Periodic Controlled Substance Monitoring Possible medication side effects, risk of tolerance/dependence & alternative treatments discussed. ;No signs of potential drug abuse or diversion identified.

## 2022-10-19 DIAGNOSIS — E11.9 CONTROLLED TYPE 2 DIABETES MELLITUS WITHOUT COMPLICATION, WITH LONG-TERM CURRENT USE OF INSULIN (HCC): ICD-10-CM

## 2022-10-19 DIAGNOSIS — Z79.4 CONTROLLED TYPE 2 DIABETES MELLITUS WITH DIABETIC POLYNEUROPATHY, WITH LONG-TERM CURRENT USE OF INSULIN (HCC): ICD-10-CM

## 2022-10-19 DIAGNOSIS — Z79.4 CONTROLLED TYPE 2 DIABETES MELLITUS WITHOUT COMPLICATION, WITH LONG-TERM CURRENT USE OF INSULIN (HCC): ICD-10-CM

## 2022-10-19 DIAGNOSIS — E11.42 CONTROLLED TYPE 2 DIABETES MELLITUS WITH DIABETIC POLYNEUROPATHY, WITH LONG-TERM CURRENT USE OF INSULIN (HCC): ICD-10-CM

## 2022-10-19 LAB
ANION GAP SERPL CALCULATED.3IONS-SCNC: 13 MMOL/L (ref 3–16)
APTT: 29.9 SEC (ref 23–34.3)
BASOPHILS ABSOLUTE: 0 K/UL (ref 0–0.2)
BASOPHILS RELATIVE PERCENT: 0.5 %
BUN BLDV-MCNC: 23 MG/DL (ref 7–20)
CALCIUM SERPL-MCNC: 9.3 MG/DL (ref 8.3–10.6)
CHLORIDE BLD-SCNC: 106 MMOL/L (ref 99–110)
CO2: 24 MMOL/L (ref 21–32)
CREAT SERPL-MCNC: 0.9 MG/DL (ref 0.8–1.3)
EOSINOPHILS ABSOLUTE: 0.3 K/UL (ref 0–0.6)
EOSINOPHILS RELATIVE PERCENT: 4 %
GFR SERPL CREATININE-BSD FRML MDRD: >60 ML/MIN/{1.73_M2}
GLUCOSE BLD-MCNC: 116 MG/DL (ref 70–99)
HCT VFR BLD CALC: 44.5 % (ref 40.5–52.5)
HEMOGLOBIN: 14.9 G/DL (ref 13.5–17.5)
INR BLD: 1.05 (ref 0.87–1.14)
LYMPHOCYTES ABSOLUTE: 1.7 K/UL (ref 1–5.1)
LYMPHOCYTES RELATIVE PERCENT: 26 %
MCH RBC QN AUTO: 31.5 PG (ref 26–34)
MCHC RBC AUTO-ENTMCNC: 33.5 G/DL (ref 31–36)
MCV RBC AUTO: 94.3 FL (ref 80–100)
MONOCYTES ABSOLUTE: 0.5 K/UL (ref 0–1.3)
MONOCYTES RELATIVE PERCENT: 7.1 %
NEUTROPHILS ABSOLUTE: 4.2 K/UL (ref 1.7–7.7)
NEUTROPHILS RELATIVE PERCENT: 62.4 %
PDW BLD-RTO: 14.6 % (ref 12.4–15.4)
PLATELET # BLD: 164 K/UL (ref 135–450)
PMV BLD AUTO: 8.8 FL (ref 5–10.5)
POTASSIUM SERPL-SCNC: 4.1 MMOL/L (ref 3.5–5.1)
PROTHROMBIN TIME: 13.6 SEC (ref 11.7–14.5)
RBC # BLD: 4.72 M/UL (ref 4.2–5.9)
SODIUM BLD-SCNC: 143 MMOL/L (ref 136–145)
WBC # BLD: 6.6 K/UL (ref 4–11)

## 2022-10-26 NOTE — PROGRESS NOTES
PATIENT REACHED   YES__X__NO____    PREOP INSTRUCTIONS LEFT ON VM NUMBER_______________      DATE__11/2/22_______ TIME__1445_______ARRIVAL_1345_______PLACE_[address]___________  NOTHING TO EAT OR DRINK  AFTER MIDNIGHT THE EVENING PRIOR OR AS INSTRUCTED BY YOUR DR.  Garnell Castleman NEED A RESPONSIBLE ADULT AGE 18 OR OLDER TO DRIVE YOU HOME  PLEASE BRING INSURANCE CARD. PICTURE ID AND COMPLETE LIST OF MEDS  WEAR LOOSE COMFORTABLE CLOTHING  FOLLOW ANY INSTRUCTIONS YOUR DRS OFFICE HAS GIVEN YOU,INCLUDING WHAT MEDICATIONS TO TAKE THE AM OF PROCEDURE AND WHEN AND IF YOU NEED TO STOP ANY BLOOD THINNERS. IF YOU HAVE QUESTIONS REGARDING THIS CALL THE OFFICE  THE GOAL BLOOD SUGAR THE AM OF PROCEDURE  OR LESS ABOVE THAT THE PROCEDURE MAY BE CANCELLED  ANY QUESTIONS CALL YOUR DOCTOR. ALSO,PLEASE READ THE INSTRUCTION PACKET FROM YOUR DR IF YOU RECEIVED ONE. SPINE INTERVENTION NUMBER [phone]      OTHER___________________________________      VISITOR POLICY(subject to change)    Current policy is 2 visitors per patient. No children. Masks are required.

## 2022-10-27 DIAGNOSIS — M79.7 FIBROMYALGIA: ICD-10-CM

## 2022-10-27 RX ORDER — PREGABALIN 200 MG/1
CAPSULE ORAL
Qty: 270 CAPSULE | Refills: 0 | Status: SHIPPED | OUTPATIENT
Start: 2022-10-27 | End: 2023-01-27

## 2022-10-27 NOTE — TELEPHONE ENCOUNTER
Medication:   Requested Prescriptions     Pending Prescriptions Disp Refills    pregabalin (LYRICA) 200 MG capsule [Pharmacy Med Name: Pregabalin Oral Capsule 200 MG] 270 capsule 0     Sig: TAKE 1 CAPSULE BY MOUTH THREE TIMES A DAY     Last Filled:  9.27.22 #270 refills 0    Last appt: 6/16/2022   Next appt: Visit date not found    Last OARRS:   RX Monitoring 2/1/2022   Attestation -   Periodic Controlled Substance Monitoring Possible medication side effects, risk of tolerance/dependence & alternative treatments discussed. ;No signs of potential drug abuse or diversion identified.

## 2022-11-02 ENCOUNTER — APPOINTMENT (OUTPATIENT)
Dept: GENERAL RADIOLOGY | Age: 67
End: 2022-11-02
Attending: PHYSICAL MEDICINE & REHABILITATION
Payer: MEDICARE

## 2022-11-02 ENCOUNTER — HOSPITAL ENCOUNTER (OUTPATIENT)
Age: 67
Setting detail: OUTPATIENT SURGERY
Discharge: HOME HEALTH | End: 2022-11-02
Attending: PHYSICAL MEDICINE & REHABILITATION | Admitting: PHYSICAL MEDICINE & REHABILITATION
Payer: MEDICARE

## 2022-11-02 VITALS
HEART RATE: 66 BPM | HEIGHT: 74 IN | TEMPERATURE: 97.2 F | SYSTOLIC BLOOD PRESSURE: 145 MMHG | WEIGHT: 213 LBS | OXYGEN SATURATION: 98 % | DIASTOLIC BLOOD PRESSURE: 98 MMHG | RESPIRATION RATE: 18 BRPM | BODY MASS INDEX: 27.34 KG/M2

## 2022-11-02 LAB
GLUCOSE BLD-MCNC: 65 MG/DL (ref 70–99)
GLUCOSE BLD-MCNC: 94 MG/DL (ref 70–99)
PERFORMED ON: ABNORMAL
PERFORMED ON: NORMAL

## 2022-11-02 PROCEDURE — 3610000058 HC PAIN LEVEL 5 BASE (NON-OR): Performed by: PHYSICAL MEDICINE & REHABILITATION

## 2022-11-02 PROCEDURE — 3610000059 HC PAIN LEVEL 5 ADDL 15 MIN (NON-OR): Performed by: PHYSICAL MEDICINE & REHABILITATION

## 2022-11-02 PROCEDURE — 2500000003 HC RX 250 WO HCPCS: Performed by: PHYSICAL MEDICINE & REHABILITATION

## 2022-11-02 PROCEDURE — 2709999900 HC NON-CHARGEABLE SUPPLY: Performed by: PHYSICAL MEDICINE & REHABILITATION

## 2022-11-02 PROCEDURE — 77003 FLUOROGUIDE FOR SPINE INJECT: CPT

## 2022-11-02 RX ORDER — BUPIVACAINE HYDROCHLORIDE 5 MG/ML
INJECTION, SOLUTION EPIDURAL; INTRACAUDAL
Status: COMPLETED | OUTPATIENT
Start: 2022-11-02 | End: 2022-11-02

## 2022-11-02 RX ORDER — LIDOCAINE HYDROCHLORIDE 10 MG/ML
INJECTION, SOLUTION EPIDURAL; INFILTRATION; INTRACAUDAL; PERINEURAL
Status: COMPLETED | OUTPATIENT
Start: 2022-11-02 | End: 2022-11-02

## 2022-11-02 ASSESSMENT — PAIN SCALES - GENERAL: PAINLEVEL_OUTOF10: 0

## 2022-11-02 ASSESSMENT — PAIN - FUNCTIONAL ASSESSMENT: PAIN_FUNCTIONAL_ASSESSMENT: 0-10

## 2022-11-02 NOTE — OP NOTE
utilizing 0.5 cc of Marcaine 0.5%. The patient tolerated the procedure well. DISPOSITION:  The patient was transported to recovery. The patient was monitored for 15 to 20 minutes post-procedure. Precautions were discussed and written instructions provided. .  Patient was provided and reminded to keep a pain diary and follow-up in the clinic as previously arranged to discuss efficacy and future treatments.     Comment: None

## 2022-11-02 NOTE — DISCHARGE INSTRUCTIONS
Erika CHEEMA    Continue medications are previously ordered. Use Pain Diary as instructed    Do not drive today for 8 hours if no sedation was given. Be up and moving around to see if pain is relieved with block    May apply ice for 15 minutes at a time 4-5 times a day at the injection site. Follow up with provider who referred you for this procedure. Keep the site dry for 24 hours, then remove bandaids    Call Dr Aury Elizabteh if you have a temp greater than 101 degrees    Call his office at [phone] for any questions or problems    Follow up with the provider who referred you for this procedure.

## 2022-11-02 NOTE — H&P
HISTORY AND PHYSICAL/PRE-SEDATION ASSESSMENT    Patient:  Joshua Hartley   :  1955  Medical Record No.:  [id number]   Date:  2022  Physician:  Tony Gutierrez MD  Facility: [address] [place] [address]    HISTORY OF PRESENT ILLNESS:                 The patient is a 74 y.o. male whom presents with thoracic back pain. Review of the imaging and physical exam of the patient confirmed the pre-procedure diagnosis. After a thorough discussion of risks, benefits and alternatives informed consent was obtained.     Diagnosis:  Thoracic spondylosis [M47.814]    Past Medical History:   Past Medical History:   Diagnosis Date    Binocular vision disorder with diplopia     Blood transfusion     30 yrs ago    Chronic back pain     Chronic pain syndrome     Depression     Diabetes mellitus ([address]     Failed back surgical syndrome     FH: colonic polyps     Fibromyalgia     History of duodenal ulcer     now gets peptic ulcer    Hyperlipidemia     Hypertension     Insomnia     Obesity     Psychiatric problem     depression and anxiety    Renal carcinoma, left ([facility] [address]     Sleep apnea     USES C-PAP    Ulcer, duodenum peptic         Past Surgical History:     Past Surgical History:   Procedure Laterality Date    BACK SURGERY       X 2    CERVICAL FUSION N/A 2019    ANTERIOR CERVICAL DISCECTOMY AND FUSION AT [facility] ([address], [address], [id number], [address], [address]) performed by Ritika Holguin MD at [facility] N/A 2020    COLONOSCOPY POLYPECTOMY SNARE/COLD BIOPSY performed by Shanda Moreno MD at [address] [place] [address], COLON, DIAGNOSTIC      EPIDURAL STEROID INJECTION N/A 10/2/2019    C7-T1 INTERLAMINAR EPIDURAL STEROID INJECTION WITH FLUOROSCOPY performed by Tony Gutierrez MD at [address] [facility] [address] 10/30/2019    C7-T1 INTERLMINAR EPIDURAL STEROID INJECTION WITH FLUOROSCOPY performed by Tony Gutierrez MD at [facility] INGUINAL HERNIA REPAIR      RIGHT X 2    KNEE ARTHROSCOPY Left 10/24/2019    LEFT KNEE ARTHROSCOPY WITH PARTIAL MEDIAL MENISCECTOMY;SYNOVECTOMY WITH PLICA REMOVAL [facility] ARTHROSCOPY Left 10/24/2019    LAPAROSCOPIC APPENDECTOMY N/A 5/10/2019    APPENDECTOMY LAPAROSCOPIC, LYSIS OF ADHESIONS performed by Manny Seaman MD at [facility]    fatty tumor removal under right arm    PAIN MANAGEMENT PROCEDURE Bilateral 9/21/2022    BILATERAL T4, T5, T6 MEDIAL BRANCH BLOCK WITH FLUOROSCOPY performed by Jose Elias Mckay MD at [address] [place] [address] Rd Left 8/6/2019    ROBOTIC LAPAROSCOPIC LEFT PARTIAL NEPHRECTOMY performed by Madhu Cazares MD at [address] [place] [address]         Current Medications:   Prior to Admission medications    Medication Sig Start Date End Date Taking?  Authorizing Provider   pregabalin (LYRICA) 200 MG capsule TAKE 1 CAPSULE BY MOUTH THREE TIMES A DAY 10/27/22 1/27/23  Judith Lopez MD   escitalopram (LEXAPRO) 20 MG tablet TAKE 1 TABLET BY MOUTH EVERY DAY 10/17/22   Judith Lopez MD   methocarbamol (ROBAXIN) 750 MG tablet TAKE 1 OR 2 TABLETS BY MOUTH THREE TIMES A DAY 10/10/22   Judith Lopez MD   LANTUS SOLOSTAR 100 UNIT/ML injection pen inject 18 units subcutaneously one time a day at night 8/26/22   Mo Gorman MD   NONFORMULARY Compound Drug- 3fwd-baclo2/diclo3/gaba6/lido2/prilo2% grams    Historical Provider, MD   traZODone (DESYREL) 50 MG tablet TAKE 1 TABLET BY MOUTH EVERY DAY 8/10/22   Stella Mays MD   TRULICITY 3 YE/6.3HZ SOPN inject the contents of one prefilled pen subcutaneously once a week 8/10/22   Mo Gorman MD   diclofenac sodium (VOLTAREN) 1 % GEL Apply 4 g topically 4 times daily 7/8/22 10/6/22  Mary Smith MD   atorvastatin (LIPITOR) 80 MG tablet TAKE 1 TABLET BY MOUTH NIGHTLY 4/21/22   Mo Gorman MD   albuterol sulfate HFA (VENTOLIN HFA) 108 (90 Base) MCG/ACT inhaler Inhale 2 puffs into the lungs 4 times daily as needed for Wheezing 4/18/22   Nya Dumont MD   VITAMIN D, ERGOCALCIFEROL, PO Take by mouth    Historical Provider, MD   B Complex Vitamins (VITAMIN B COMPLEX PO) Take by mouth    Historical Provider, MD   Nutritional Supplements (JUICE PLUS FIBRE PO) Take by mouth    Historical Provider, MD   empagliflozin (JARDIANCE) 25 MG tablet TAKE 1 TABLET BY MOUTH IN THE MORNING 2/22/22   Nimesh Garcia MD   celecoxib (CELEBREX) 200 MG capsule TAKE 1 CAPSULE BY MOUTH TWO TIMES A DAY 2/1/22   Edd Flores MD   fluticasone (FLONASE) 50 MCG/ACT nasal spray 1 spray by Each Nostril route daily 10/6/21   Kash Collins MD   azelastine (ASTELIN) 0.1 % nasal spray 1 spray by Nasal route 2 times daily Use in each nostril as directed 10/6/21   Kash Collins MD   Insulin Pen Needle 32G X 4 MM MISC USE TO INJECT LANTUS ONCE DAILY 8/25/21   Nimesh Garcia MD   Blood Glucose Monitoring Suppl (PRODIGY AUTOCODE BLOOD GLUCOSE) LILO Check sugar three times per day. Diagnosis code: E11.42 6/2/20   Nimesh Garica MD   blood glucose test strips (PRODIGY NO CODING BLOOD GLUC) strip Prodigy No Coding strips. Test BS three times daily. Diagnosis code: E11.42 6/2/20   Nimesh Garcia MD   medical marijuana MEDICAL MARIJUANA 5/31/19   Historical Provider, MD   pantoprazole (PROTONIX) 40 MG tablet Take 40 mg by mouth daily as needed     Historical Provider, MD       Allergies:  Cymbalta [duloxetine hcl], Metformin and related, and Shellfish-derived products    Social History:    reports that he has been smoking cigarettes. He has a 15.00 pack-year smoking history. He has never used smokeless tobacco. He reports that he does not currently use alcohol after a past usage of about 1.0 standard drink per week. He reports current drug use. Drug: Marijuana Deadra Tonny).     Family History:   Family History   Problem Relation Age of Onset    Stroke Mother     Heart Disease Father     Other Paternal Grandmother         Vitals: There were no vitals taken for this visit. PHYSICAL EXAM:  HENT: Airway patent and reviewed  Cardiovascular: Normal rate, regular rhythm, normal heart sounds. Pulmonary/Chest: No wheezes. No rhonchi. No rales. Abdominal: Soft. Bowel sounds are normal. No distension. Extremities: Moves all extremities equally  Lumbar Spine: Painful range of motion, no midline tenderness     ASA CLASS:         []   I. Normal, healthy adult           [x]   II.  Mild systemic disease            []   III. Severe systemic disease    Mallampati: Mallampati Class II - (soft palate, fauces & uvula are visible)      Sedation plan:   []  Local              [x]  Minimal                  []  General anesthesia    Treatment plan:  Patient's condition acceptable for planned procedure/sedation. Proceed with planned procedure   Post Procedure Plan   Return to same level of care   ______________________     The risks and benefits as well as alternatives to the procedure have been discussed with the patient and or family. The patient and/or next of kin understands and agrees to proceed.     Ariane Rubi MD

## 2022-11-02 NOTE — PROGRESS NOTES
Blood glucose check and was 65. Did report this to Dr Nikos Ochoa. Did want patient to have some juice.

## 2022-11-16 ENCOUNTER — TELEPHONE (OUTPATIENT)
Dept: FAMILY MEDICINE CLINIC | Age: 67
End: 2022-11-16

## 2022-11-16 DIAGNOSIS — E11.65 UNCONTROLLED TYPE 2 DIABETES MELLITUS WITH HYPERGLYCEMIA, WITH LONG-TERM CURRENT USE OF INSULIN (HCC): ICD-10-CM

## 2022-11-16 DIAGNOSIS — Z79.4 UNCONTROLLED TYPE 2 DIABETES MELLITUS WITH HYPERGLYCEMIA, WITH LONG-TERM CURRENT USE OF INSULIN (HCC): ICD-10-CM

## 2022-11-16 RX ORDER — DULAGLUTIDE 3 MG/.5ML
INJECTION, SOLUTION SUBCUTANEOUS
Qty: 6 ML | Refills: 1 | Status: SHIPPED | OUTPATIENT
Start: 2022-11-16

## 2022-11-16 NOTE — TELEPHONE ENCOUNTER
Pt is requesting a refill on his compound medication to [facility], but requesting a larger amount to be filled. He is mixing his voltaren with the rest of his compound medication. It is only lasting him 1 month.

## 2022-11-16 NOTE — TELEPHONE ENCOUNTER
Requested Refill:   Requested Prescriptions     Pending Prescriptions Disp Refills    TRULICITY 3 XJ/9.4HP SOPN [Pharmacy Med Name: Trulicity Subcutaneous Solution Pen-injector 3 MG/0.5ML] 6 mL 0     Sig: inject the contents of one prefilled pen subcutaneously once a week     Last refilled: 8/10/2022 # 6 ml no refills     Last Appt: 8/23/2022  Next Appt: 2/23/2023

## 2022-11-17 NOTE — TELEPHONE ENCOUNTER
[id number] Deepa Eller- please pend the [facility] cream he has been gtting or please call [facility] an authorize a refill of what he has been getting in the largest size available.

## 2022-11-17 NOTE — TELEPHONE ENCOUNTER
Patient stated that he was given the smaller size at his pharmacy and it has run out. He's asking that the larger size be resent. Please advise.

## 2022-11-18 NOTE — TELEPHONE ENCOUNTER
I'm not sure what the cream is. Please pend whichever cream it is he is asking for and if not able to find it please call [facility]- see message below. Thanks.

## 2022-11-21 DIAGNOSIS — G89.4 CHRONIC PAIN SYNDROME: ICD-10-CM

## 2022-11-21 DIAGNOSIS — Z79.4 UNCONTROLLED TYPE 2 DIABETES MELLITUS WITH HYPERGLYCEMIA, WITH LONG-TERM CURRENT USE OF INSULIN (HCC): ICD-10-CM

## 2022-11-21 DIAGNOSIS — M79.7 FIBROMYALGIA: ICD-10-CM

## 2022-11-21 DIAGNOSIS — E11.65 UNCONTROLLED TYPE 2 DIABETES MELLITUS WITH HYPERGLYCEMIA, WITH LONG-TERM CURRENT USE OF INSULIN (HCC): ICD-10-CM

## 2022-11-22 RX ORDER — PEN NEEDLE, DIABETIC 32GX 5/32"
NEEDLE, DISPOSABLE MISCELLANEOUS
Qty: 100 EACH | Refills: 1 | Status: SHIPPED | OUTPATIENT
Start: 2022-11-22

## 2022-11-22 RX ORDER — METHOCARBAMOL 750 MG/1
TABLET, FILM COATED ORAL
Qty: 180 TABLET | Refills: 3 | Status: SHIPPED | OUTPATIENT
Start: 2022-11-22

## 2022-11-22 NOTE — TELEPHONE ENCOUNTER
Medication:   Requested Prescriptions     Pending Prescriptions Disp Refills    methocarbamol (ROBAXIN) 750 MG tablet [Pharmacy Med Name: Methocarbamol Oral Tablet 750 MG] 180 tablet 0     Sig: TAKE 1 TO 2 TABLETS BY MOUTH THREE TIMES A DAY     Last Filled:  10.10.22 #180 REFILLS 0    Last appt: 6/16/2022   Next appt: Visit date not found    Last OARRS:   RX Monitoring 2/1/2022   Attestation -   Periodic Controlled Substance Monitoring Possible medication side effects, risk of tolerance/dependence & alternative treatments discussed. ;No signs of potential drug abuse or diversion identified.

## 2022-12-02 ENCOUNTER — TELEPHONE (OUTPATIENT)
Dept: FAMILY MEDICINE CLINIC | Age: 67
End: 2022-12-02

## 2022-12-02 NOTE — TELEPHONE ENCOUNTER
Called and spoke with the patient about his Robaxin not being on the formulary. Patient states that he had already picked up the medication on 11/22/2022 and he is unsure why we received this letter.   Please advise

## 2022-12-07 DIAGNOSIS — K21.9 GASTROESOPHAGEAL REFLUX DISEASE WITHOUT ESOPHAGITIS: ICD-10-CM

## 2022-12-07 RX ORDER — PANTOPRAZOLE SODIUM 40 MG/1
TABLET, DELAYED RELEASE ORAL
Qty: 90 TABLET | Refills: 0 | Status: SHIPPED | OUTPATIENT
Start: 2022-12-07

## 2022-12-07 NOTE — TELEPHONE ENCOUNTER
Medication:   Requested Prescriptions     Pending Prescriptions Disp Refills    pantoprazole (PROTONIX) 40 MG tablet [Pharmacy Med Name: Pantoprazole Sodium Oral Tablet Delayed Release 40 MG] 90 tablet 0     Sig: TAKE 1 TABLET BY MOUTH EVERY DAY        Last Filled:  do not see where we have prescribed this medication before     Patient Phone Number: [phone] (home)     Last appt: 6/16/2022   Next appt: Visit date not found    Last OARRS:   RX Monitoring 2/1/2022   Attestation -   Periodic Controlled Substance Monitoring Possible medication side effects, risk of tolerance/dependence & alternative treatments discussed. ;No signs of potential drug abuse or diversion identified.

## 2022-12-14 ENCOUNTER — TELEPHONE (OUTPATIENT)
Dept: ENDOCRINOLOGY | Age: 67
End: 2022-12-14

## 2022-12-14 DIAGNOSIS — E11.65 UNCONTROLLED TYPE 2 DIABETES MELLITUS WITH HYPERGLYCEMIA, WITH LONG-TERM CURRENT USE OF INSULIN (HCC): ICD-10-CM

## 2022-12-14 DIAGNOSIS — Z79.4 UNCONTROLLED TYPE 2 DIABETES MELLITUS WITH HYPERGLYCEMIA, WITH LONG-TERM CURRENT USE OF INSULIN (HCC): ICD-10-CM

## 2022-12-14 RX ORDER — EXENATIDE 2 MG/.85ML
2 INJECTION, SUSPENSION, EXTENDED RELEASE SUBCUTANEOUS
Qty: 4 ADJUSTABLE DOSE PRE-FILLED PEN SYRINGE | Refills: 3 | Status: SHIPPED | OUTPATIENT
Start: 2022-12-14

## 2022-12-14 NOTE — TELEPHONE ENCOUNTER
Call from patient stating that his pharmacy informed him that his Rx Trulicity 3 mg is on back order   Pt stated that he has taken his last dose today   Pt is wanting to know if there is a alternative that Dr. Lj Parkinson could prescribe him?     Please advise   CB# [phone]

## 2022-12-15 RX ORDER — LIRAGLUTIDE 6 MG/ML
1.8 INJECTION SUBCUTANEOUS DAILY
Qty: 3 ADJUSTABLE DOSE PRE-FILLED PEN SYRINGE | Refills: 3 | Status: SHIPPED | OUTPATIENT
Start: 2022-12-15

## 2022-12-20 NOTE — PROGRESS NOTES
PATIENT REACHED   YES____NO_X___    PREOP INSTRUCTIONS LEFT ON VM [phone]______________      DATE_12/21/2022________ TIME_2:45p________ARRIVAL_1:45p_______PLACE_[address], [place]___________    NOTHING TO EAT OR DRINK  AFTER MIDNIGHT THE EVENING PRIOR OR AS INSTRUCTED BY YOUR DR.  Concepción Newberry NEED A RESPONSIBLE ADULT AGE 25 OR OLDER TO DRIVE YOU HOME  PLEASE BRING INSURANCE CARD. PICTURE ID AND COMPLETE LIST OF MEDS  WEAR LOOSE COMFORTABLE CLOTHING  FOLLOW ANY INSTRUCTIONS YOUR DRS OFFICE HAS GIVEN YOU,INCLUDING WHAT MEDICATIONS TO TAKE THE AM OF PROCEDURE AND WHEN AND IF YOU NEED TO STOP ANY BLOOD THINNERS. IF YOU HAVE QUESTIONS REGARDING THIS CALL THE OFFICE  THE GOAL BLOOD SUGAR THE AM OF PROCEDURE  OR LESS ABOVE THAT THE PROCEDURE MAY BE CANCELLED  ANY QUESTIONS CALL YOUR DOCTOR. ALSO,PLEASE READ THE INSTRUCTION PACKET FROM YOUR DR IF YOU RECEIVED ONE. SPINE INTERVENTION NUMBER [phone]      OTHER___________________________________      VISITOR POLICY(subject to change)    Current policy is 2 visitors per patient. No children. Masks are required.

## 2022-12-21 ENCOUNTER — HOSPITAL ENCOUNTER (OUTPATIENT)
Age: 67
Setting detail: OUTPATIENT SURGERY
Discharge: HOME | End: 2022-12-21
Attending: PHYSICAL MEDICINE & REHABILITATION | Admitting: PHYSICAL MEDICINE & REHABILITATION
Payer: MEDICARE

## 2022-12-21 ENCOUNTER — APPOINTMENT (OUTPATIENT)
Dept: GENERAL RADIOLOGY | Age: 67
End: 2022-12-21
Attending: PHYSICAL MEDICINE & REHABILITATION
Payer: MEDICARE

## 2022-12-21 VITALS
OXYGEN SATURATION: 98 % | DIASTOLIC BLOOD PRESSURE: 97 MMHG | HEIGHT: 74 IN | BODY MASS INDEX: 27.34 KG/M2 | WEIGHT: 213 LBS | HEART RATE: 68 BPM | TEMPERATURE: 98.2 F | RESPIRATION RATE: 16 BRPM | SYSTOLIC BLOOD PRESSURE: 145 MMHG

## 2022-12-21 LAB
GLUCOSE BLD-MCNC: 76 MG/DL (ref 70–99)
PERFORMED ON: NORMAL

## 2022-12-21 PROCEDURE — 99152 MOD SED SAME PHYS/QHP 5/>YRS: CPT | Performed by: PHYSICAL MEDICINE & REHABILITATION

## 2022-12-21 PROCEDURE — 99153 MOD SED SAME PHYS/QHP EA: CPT | Performed by: PHYSICAL MEDICINE & REHABILITATION

## 2022-12-21 PROCEDURE — 3209999900 FLUORO FOR SURGICAL PROCEDURES

## 2022-12-21 PROCEDURE — 6360000002 HC RX W HCPCS: Performed by: PHYSICAL MEDICINE & REHABILITATION

## 2022-12-21 PROCEDURE — 2500000003 HC RX 250 WO HCPCS: Performed by: PHYSICAL MEDICINE & REHABILITATION

## 2022-12-21 PROCEDURE — 3610000059 HC PAIN LEVEL 5 ADDL 15 MIN (NON-OR): Performed by: PHYSICAL MEDICINE & REHABILITATION

## 2022-12-21 PROCEDURE — 2709999900 HC NON-CHARGEABLE SUPPLY: Performed by: PHYSICAL MEDICINE & REHABILITATION

## 2022-12-21 PROCEDURE — 3610000058 HC PAIN LEVEL 5 BASE (NON-OR): Performed by: PHYSICAL MEDICINE & REHABILITATION

## 2022-12-21 RX ORDER — FENTANYL CITRATE 50 UG/ML
INJECTION, SOLUTION INTRAMUSCULAR; INTRAVENOUS
Status: COMPLETED | OUTPATIENT
Start: 2022-12-21 | End: 2022-12-21

## 2022-12-21 RX ORDER — BUPIVACAINE HYDROCHLORIDE 5 MG/ML
INJECTION, SOLUTION EPIDURAL; INTRACAUDAL
Status: COMPLETED | OUTPATIENT
Start: 2022-12-21 | End: 2022-12-21

## 2022-12-21 RX ORDER — MIDAZOLAM HYDROCHLORIDE 1 MG/ML
INJECTION INTRAMUSCULAR; INTRAVENOUS
Status: COMPLETED | OUTPATIENT
Start: 2022-12-21 | End: 2022-12-21

## 2022-12-21 RX ORDER — LIDOCAINE HYDROCHLORIDE 20 MG/ML
INJECTION, SOLUTION INFILTRATION; PERINEURAL
Status: COMPLETED | OUTPATIENT
Start: 2022-12-21 | End: 2022-12-21

## 2022-12-21 RX ORDER — DEXAMETHASONE SODIUM PHOSPHATE 10 MG/ML
INJECTION INTRAMUSCULAR; INTRAVENOUS
Status: COMPLETED | OUTPATIENT
Start: 2022-12-21 | End: 2022-12-21

## 2022-12-21 RX ORDER — LIDOCAINE HYDROCHLORIDE 10 MG/ML
INJECTION, SOLUTION EPIDURAL; INFILTRATION; INTRACAUDAL; PERINEURAL
Status: COMPLETED | OUTPATIENT
Start: 2022-12-21 | End: 2022-12-21

## 2022-12-21 ASSESSMENT — PAIN DESCRIPTION - DESCRIPTORS: DESCRIPTORS: SORE

## 2022-12-21 ASSESSMENT — PAIN DESCRIPTION - LOCATION: LOCATION: BACK

## 2022-12-21 ASSESSMENT — PAIN SCALES - GENERAL: PAINLEVEL_OUTOF10: 3

## 2022-12-21 ASSESSMENT — PAIN DESCRIPTION - ORIENTATION: ORIENTATION: MID;RIGHT;LEFT

## 2022-12-21 ASSESSMENT — PAIN - FUNCTIONAL ASSESSMENT: PAIN_FUNCTIONAL_ASSESSMENT: 0-10

## 2022-12-21 ASSESSMENT — PAIN DESCRIPTION - PAIN TYPE: TYPE: ACUTE PAIN

## 2022-12-21 ASSESSMENT — PAIN DESCRIPTION - FREQUENCY: FREQUENCY: CONTINUOUS

## 2022-12-21 NOTE — DISCHARGE INSTRUCTIONS
[facility]    Continue medications are previously ordered. YOU MAY NOT SEE RELIEF FOR 10 -14 DAYS    Do not drive, Drink alcohol or sign any legal documents today. Do not drive today for 8 hours if no sedation was given. Take it easy the first few days    May apply ice for 15 minutes at a time 4-5 times a day at the injection site. No heat to your back for 48 hours.     Keep the site dry for 24 hours, then remove bandaids    Call Dr Eileen Morrison if you have a temp greater than 101 degrees    Call his office at [phone] for any questions or problems     Follow up with the provider who referred you for this procedure

## 2022-12-21 NOTE — H&P
HISTORY AND PHYSICAL/PRE-SEDATION ASSESSMENT    Patient:  Susanne Smith   :  1955  Medical Record No.:  [id number]   Date:  2022  Physician:  Vidal Perry MD  Facility: [address] [place] [address]    HISTORY OF PRESENT ILLNESS:                 The patient is a 69 y.o. male whom presents with thoracic back pain. Review of the imaging and physical exam of the patient confirmed the pre-procedure diagnosis. After a thorough discussion of risks, benefits and alternatives informed consent was obtained.     Diagnosis:  Thoracic spondylosis [M47.814]    Past Medical History:   Past Medical History:   Diagnosis Date    Binocular vision disorder with diplopia     Blood transfusion     30 yrs ago    Chronic back pain     Chronic pain syndrome     Depression     Diabetes mellitus ([facility] [address]     Failed back surgical syndrome     FH: colonic polyps     Fibromyalgia     History of duodenal ulcer     now gets peptic ulcer    Hyperlipidemia     Hypertension     Insomnia     Obesity     Psychiatric problem     depression and anxiety    Renal carcinoma, left ([facility] [address]     Sleep apnea     USES C-PAP    Ulcer, duodenum peptic         Past Surgical History:     Past Surgical History:   Procedure Laterality Date    BACK SURGERY       X 2    CERVICAL FUSION N/A 2019    ANTERIOR CERVICAL DISCECTOMY AND FUSION AT [address] [facility] ([address], [address], [id number], [address], [id number]) performed by Ravindra Newell MD at [place] [address] N/A 2020    COLONOSCOPY POLYPECTOMY SNARE/COLD BIOPSY performed by Merna Garnica MD at [address] [place] [address], COLON, DIAGNOSTIC      EPIDURAL STEROID INJECTION N/A 10/2/2019    C7-T1 INTERLAMINAR EPIDURAL STEROID INJECTION WITH FLUOROSCOPY performed by Vidal Perry MD at [address] 10/30/2019    C7-T1 INTERLMINAR EPIDURAL STEROID INJECTION WITH FLUOROSCOPY performed by Vidal Perry MD at [address] [facility] INGUINAL HERNIA REPAIR      RIGHT X 2    KNEE ARTHROSCOPY Left 10/24/2019    LEFT KNEE ARTHROSCOPY WITH PARTIAL MEDIAL MENISCECTOMY;SYNOVECTOMY WITH PLICA REMOVAL performed by Maricruz Bermudez MD at [address] ARTHROSCOPY Left 10/24/2019    LAPAROSCOPIC APPENDECTOMY N/A 5/10/2019    APPENDECTOMY LAPAROSCOPIC, LYSIS OF ADHESIONS performed by Isabela Hernandez MD at [facility]    fatty tumor removal under right arm    PAIN MANAGEMENT PROCEDURE Bilateral 9/21/2022    BILATERAL T4, T5, T6 MEDIAL BRANCH BLOCK WITH FLUOROSCOPY performed by Quinten Morris MD at [address] Bilateral 11/2/2022    BILATERAL T4, T5, T6 MEDIAL BRANCH BLOCK WITH FLUOROSCOPY performed by Quinten Morris MD at [address] [facility] Left 8/6/2019    ROBOTIC LAPAROSCOPIC LEFT PARTIAL NEPHRECTOMY performed by Rula Jacobsen MD at [facility]         Current Medications:   Prior to Admission medications    Medication Sig Start Date End Date Taking?  Authorizing Provider   Liraglutide (VICTOZA) 18 MG/3ML SOPN SC injection Inject 1.8 mg into the skin daily 12/15/22   Grace Thornton MD   Exenatide ER (BYDUREON BCISE) 2 MG/0.85ML injection Inject 0.85 mLs into the skin every 7 days 12/14/22   Grace Thornton MD   pantoprazole (PROTONIX) 40 MG tablet TAKE 1 TABLET BY MOUTH EVERY DAY 12/7/22   Glendy Mccarthy MD   methocarbamol (ROBAXIN) 750 MG tablet TAKE 1 TO 2 TABLETS BY MOUTH THREE TIMES A DAY 11/22/22   Lacey Russo MD   Insulin Pen Needle (BD PEN NEEDLE ROSAURA 2ND GEN) 32G X 4 MM MISC use to inject lantus once daily 11/22/22   Grace Thornton MD   TRULICITY 3 IP/8.7NV SOPN inject the contents of one prefilled pen subcutaneously once a week 11/16/22   Grace Thornton MD   pregabalin (LYRICA) 200 MG capsule TAKE 1 CAPSULE BY MOUTH THREE TIMES A DAY 10/27/22 1/27/23  Kaylynn Smith MD   escitalopram (LEXAPRO) 20 MG tablet TAKE 1 TABLET BY MOUTH EVERY DAY 10/17/22   Connor Dey MD   LANTUS SOLOSTAR 100 UNIT/ML injection pen inject 18 units subcutaneously one time a day at night 8/26/22   Charis Montes De Oca MD   NONFORMULARY Compound Drug- 3fwd-baclo2/diclo3/gaba6/lido2/prilo2% grams    Historical Provider, MD   traZODone (DESYREL) 50 MG tablet TAKE 1 TABLET BY MOUTH EVERY DAY 8/10/22   Eliz Madera MD   diclofenac sodium (VOLTAREN) 1 % GEL Apply 4 g topically 4 times daily 7/8/22 11/2/22  Laila Gunderson MD   atorvastatin (LIPITOR) 80 MG tablet TAKE 1 TABLET BY MOUTH NIGHTLY 4/21/22   Charis Montes De Oca MD   albuterol sulfate HFA (VENTOLIN HFA) 108 (90 Base) MCG/ACT inhaler Inhale 2 puffs into the lungs 4 times daily as needed for Wheezing 4/18/22   Laila Gunderson MD   VITAMIN D, ERGOCALCIFEROL, PO Take by mouth    Historical Provider, MD   B Complex Vitamins (VITAMIN B COMPLEX PO) Take by mouth    Historical Provider, MD   Nutritional Supplements (JUICE PLUS FIBRE PO) Take by mouth    Historical Provider, MD   empagliflozin (JARDIANCE) 25 MG tablet TAKE 1 TABLET BY MOUTH IN THE MORNING 2/22/22   Charis Montes De Oca MD   celecoxib (CELEBREX) 200 MG capsule TAKE 1 CAPSULE BY MOUTH TWO TIMES A DAY 2/1/22   Connor Dey MD   fluticasone (FLONASE) 50 MCG/ACT nasal spray 1 spray by Each Nostril route daily 10/6/21   Davon Torres MD   azelastine (ASTELIN) 0.1 % nasal spray 1 spray by Nasal route 2 times daily Use in each nostril as directed 10/6/21   Davon Torres MD   Blood Glucose Monitoring Suppl (PRODIGY AUTOCODE BLOOD GLUCOSE) LILO Check sugar three times per day. Diagnosis code: E11.42 6/2/20   Charis Montes De Oca MD   blood glucose test strips (PRODIGY NO CODING BLOOD GLUC) strip Prodigy No Coding strips. Test BS three times daily.  Diagnosis code: E11.42 6/2/20   Charis Montes De Oca MD   medical marijuana MEDICAL MARIJUANA 5/31/19   Historical Provider, MD Allergies:  Cymbalta [duloxetine hcl], Metformin and related, and Shellfish-derived products    Social History:    reports that he has been smoking cigarettes. He has a 15.00 pack-year smoking history. He has never used smokeless tobacco. He reports that he does not currently use alcohol after a past usage of about 1.0 standard drink per week. He reports current drug use. Drug: Marijuana James Passy). Family History:   Family History   Problem Relation Age of Onset    Stroke Mother     Heart Disease Father     Other Paternal Grandmother         Vitals: There were no vitals taken for this visit. PHYSICAL EXAM:  HENT: Airway patent and reviewed  Cardiovascular: Normal rate, regular rhythm, normal heart sounds. Pulmonary/Chest: No wheezes. No rhonchi. No rales. Abdominal: Soft. Bowel sounds are normal. No distension. Extremities: Moves all extremities equally  Lumbar Spine: Painful range of motion, no midline tenderness     ASA CLASS:         []   I. Normal, healthy adult           [x]   II.  Mild systemic disease            []   III. Severe systemic disease    Mallampati: Mallampati Class II - (soft palate, fauces & uvula are visible)      Sedation plan:   []  Local              [x]  Minimal                  []  General anesthesia    Treatment plan:  Patient's condition acceptable for planned procedure/sedation. Proceed with planned procedure   Post Procedure Plan   Return to same level of care   ______________________     The risks and benefits as well as alternatives to the procedure have been discussed with the patient and or family. The patient and/or next of kin understands and agrees to proceed.     Chip Redd MD

## 2022-12-21 NOTE — PROGRESS NOTES
IV discontinued, catheter intact, and dressing applied. Procedural dressing dry and intact. Bilateral upper and lower extremities equal in strength. Discharge instructions reviewed with patient or responsible adult, signed and copy given. All home medications have been reviewed. All questions answered and patient or responsible adult verbalized understanding.

## 2022-12-21 NOTE — OP NOTE
PATIENT:  Joshua Hartley  AGE:  1 yrs  MEDICAL RECORD #:  [id number]  YOB: 1955     DATE:  12/21/2022  PHYSICIAN: Dinora Currie M.D. PROCEDURE: Bilateral T4,5,6 radiofrequency ablation/neurotomy under fluoroscopy. PRE-OP DIAGNOSIS:  Low Back Pain/ Lumbar Spondylosis     POST-OP DIAGNOSIS:  same     HISTORY OF PRESENT ILLNESS:  See office notes. Patient has failed previous less-invasive treatments. Appropriate response to previous medial branch blocks at the same anatomic locations. ALLERGIES:  Cymbalta [duloxetine hcl], Metformin and related, and Shellfish-derived products     MEDICATIONS:    No current facility-administered medications for this encounter. PHYSICAL EXAMINATION:              General:  Awake, alert              Heart:  No audible murmurs, extremities well perfused              Lungs:  No increased WOB or audible wheezing              Extremities:  Normal tone. Warm. No swelling. Anesthesia: 1 mcg Versed 50 mg Fentanyl    Estimated blood loss: None  Complications: None  Specimen: None    DESCRIPTION OF PROCEDURE:     Components of the procedure were again reviewed with the patient prior to the procedure. He is aware of risks including infection, bleeding, allergic reaction, and nerve injury. He had ample opportunity for additional questions. He elected to proceed with treatment. The patient was placed in the prone position. Utilizing fluoroscopy, the T4,5,6 areas were identified, target points for the accompanying medial branch nerves were identified. Appropriate entry sites were selected over the skin. The skin was prepared in an aseptic fashion. Local anesthesia was carried out at each of the  6  entry sites with 1-2 ml lidocaine 1%.      Under fluoroscopic guidance (AP and oblique views) a curved 20 gauge 5 cm RFA spinal needle was advanced to the bilateral T4,5,6 medial branches (at junction of SAP and transverse process, L5 dorsal ramus at sacral ala).  The RFA probe was then inserted through the cannula and impedances were checked. Then motor (2Hz) testing was done and did not cause muscle contractions in the limb. Next, 0.5 ml of lidocaine 2% was injected at each site and 60 seconds were observed prior to ablation initiation. Radiofrequency ablation was then done at 80° for 90 seconds. The needles were then minorly rotated ensuring bony contact at all times and a repeat radiofrequency ablation was then done at 80° for 90 seconds. Following the procedure and prior to needle retraction, 0.5 ml of a combination of 0.5% bupivacaine (2ml), 1% lidocaine (3ml), and 10 mg dexamethasone was injected at each site. The patient tolerated the procedure well. DISPOSITION:  The patient was transported to recovery. The patient was monitored for 15 to 20 minutes post-procedure. Precautions were discussed and written instructions provided.     Comment: None

## 2022-12-23 LAB — DIABETIC RETINOPATHY: NEGATIVE

## 2023-01-09 ENCOUNTER — OFFICE VISIT (OUTPATIENT)
Dept: PULMONOLOGY | Age: 68
End: 2023-01-09
Payer: MEDICARE

## 2023-01-09 VITALS
OXYGEN SATURATION: 99 % | HEART RATE: 73 BPM | BODY MASS INDEX: 28.75 KG/M2 | DIASTOLIC BLOOD PRESSURE: 64 MMHG | SYSTOLIC BLOOD PRESSURE: 122 MMHG | WEIGHT: 224 LBS | HEIGHT: 74 IN

## 2023-01-09 DIAGNOSIS — E66.3 OVERWEIGHT (BMI 25.0-29.9): ICD-10-CM

## 2023-01-09 DIAGNOSIS — J30.89 NON-SEASONAL ALLERGIC RHINITIS, UNSPECIFIED TRIGGER: ICD-10-CM

## 2023-01-09 DIAGNOSIS — I10 ESSENTIAL HYPERTENSION: ICD-10-CM

## 2023-01-09 DIAGNOSIS — G47.33 OSA (OBSTRUCTIVE SLEEP APNEA): Primary | ICD-10-CM

## 2023-01-09 PROCEDURE — 3017F COLORECTAL CA SCREEN DOC REV: CPT | Performed by: INTERNAL MEDICINE

## 2023-01-09 PROCEDURE — G8417 CALC BMI ABV UP PARAM F/U: HCPCS | Performed by: INTERNAL MEDICINE

## 2023-01-09 PROCEDURE — 99214 OFFICE O/P EST MOD 30 MIN: CPT | Performed by: INTERNAL MEDICINE

## 2023-01-09 PROCEDURE — 3078F DIAST BP <80 MM HG: CPT | Performed by: INTERNAL MEDICINE

## 2023-01-09 PROCEDURE — G8484 FLU IMMUNIZE NO ADMIN: HCPCS | Performed by: INTERNAL MEDICINE

## 2023-01-09 PROCEDURE — 4004F PT TOBACCO SCREEN RCVD TLK: CPT | Performed by: INTERNAL MEDICINE

## 2023-01-09 PROCEDURE — G8427 DOCREV CUR MEDS BY ELIG CLIN: HCPCS | Performed by: INTERNAL MEDICINE

## 2023-01-09 PROCEDURE — 3074F SYST BP LT 130 MM HG: CPT | Performed by: INTERNAL MEDICINE

## 2023-01-09 PROCEDURE — 1123F ACP DISCUSS/DSCN MKR DOCD: CPT | Performed by: INTERNAL MEDICINE

## 2023-01-09 RX ORDER — TRAZODONE HYDROCHLORIDE 50 MG/1
TABLET ORAL
Qty: 90 TABLET | Refills: 0 | Status: SHIPPED | OUTPATIENT
Start: 2023-01-09

## 2023-01-09 RX ORDER — ESCITALOPRAM OXALATE 20 MG/1
TABLET ORAL
Qty: 90 TABLET | Refills: 0 | Status: SHIPPED | OUTPATIENT
Start: 2023-01-09

## 2023-01-09 ASSESSMENT — SLEEP AND FATIGUE QUESTIONNAIRES
HOW LIKELY ARE YOU TO NOD OFF OR FALL ASLEEP WHILE SITTING QUIETLY AFTER LUNCH WITHOUT ALCOHOL: 0
HOW LIKELY ARE YOU TO NOD OFF OR FALL ASLEEP WHILE WATCHING TV: 0
HOW LIKELY ARE YOU TO NOD OFF OR FALL ASLEEP WHILE SITTING INACTIVE IN A PUBLIC PLACE: 0
HOW LIKELY ARE YOU TO NOD OFF OR FALL ASLEEP WHILE SITTING AND TALKING TO SOMEONE: 0
HOW LIKELY ARE YOU TO NOD OFF OR FALL ASLEEP WHEN YOU ARE A PASSENGER IN A CAR FOR AN HOUR WITHOUT A BREAK: 0
ESS TOTAL SCORE: 3
HOW LIKELY ARE YOU TO NOD OFF OR FALL ASLEEP WHILE SITTING AND READING: 0
HOW LIKELY ARE YOU TO NOD OFF OR FALL ASLEEP IN A CAR, WHILE STOPPED FOR A FEW MINUTES IN TRAFFIC: 0
HOW LIKELY ARE YOU TO NOD OFF OR FALL ASLEEP WHILE LYING DOWN TO REST IN THE AFTERNOON WHEN CIRCUMSTANCES PERMIT: 3

## 2023-01-09 NOTE — PROGRESS NOTES
PULMONARY OFFICE FOLLOW-UP VISIT    CONSULTING PHYSICIAN:  Bertha Mcgarry MD     REASON FOR VISIT:   Chief Complaint   Patient presents with    Sleep Apnea       DATE OF VISIT: 1/9/2023    HISTORY OF PRESENT ILLNESS: 69y.o. year old male comes into the pulmonary/sleep clinic for a follow-up. She continues to use his CPAP therapy regularly and feels benefited from it. Denies any mask leakage or pressure intolerance. Has been regularly changing his mask interface. Still having some postnasal drip. Has not been using his nasal spray regularly. Weight remains stable. Previously: Patient reports that he has been using his CPAP therapy regularly and feels benefited from it. Still has certain mornings where he would have increased postnasal drip and he has to blow his nose multiple times. Postnasal drip does not bother him during the nighttime. He has been using azelastine and fluticasone nasal spray sporadically. Weight has been stable. Denies any mask leakage or pressure intolerance. Patient was diagnosed to moderate obstructive sleep apnea through home sleep testing. Was started on auto CPAP therapy and has been using it regularly. Feels benefited from it. Sleep symptoms have subsided. Denies any mask leakage or pressure intolerance. Does have problems with humidifier using a lot of water. Weight has been stable. Patient reports that he had a sleep study done in 2004 which showed severe obstructive sleep apnea. He was given CPAP therapy at 9 cm H2O which she has used ever since. His machine has become more than 13years old and has stopped working. He is unable to sleep without the machine and is having a hard time every night. His weight is stable. Is also been diagnosed to have fibromyalgia. Also reports postnasal drip and upper airway irritation.     Sleep Medicine 7/25/2022 1/24/2022 10/6/2021   Sitting and reading 0 0 0   Watching TV 0 0 0   Sitting, inactive in a public place (e.g. a theatre or a meeting) 0 0 0   As a passenger in a car for an hour without a break 0 0 1   Lying down to rest in the afternoon when circumstances permit 2 2 3   Sitting and talking to someone 0 0 0   Sitting quietly after a lunch without alcohol 0 0 0   In a car, while stopped for a few minutes in traffic 0 0 0   Flanagan Sleepiness Score 2 2 4       REVIEW OF SYSTEMS:   8 point review of system is negative except that mentioned in the HPI.     PAST MEDICAL HISTORY:   Past Medical History:   Diagnosis Date    Binocular vision disorder with diplopia     Blood transfusion     30 yrs ago    Chronic back pain     Chronic pain syndrome     Depression     Diabetes mellitus ([facility] [address]     Failed back surgical syndrome     FH: colonic polyps     Fibromyalgia     History of duodenal ulcer     now gets peptic ulcer    Hyperlipidemia     Hypertension     Insomnia     Obesity     Psychiatric problem     depression and anxiety    Renal carcinoma, left ([facility] [address]     Sleep apnea     USES C-PAP    Ulcer, duodenum peptic        PAST SURGICAL HISTORY:   Past Surgical History:   Procedure Laterality Date    BACK SURGERY       X 2    CERVICAL FUSION N/A 12/17/2019    ANTERIOR CERVICAL DISCECTOMY AND FUSION AT [address] [facility] ([address], [address], [id number], [id number], [address]) performed by Mayo Vincent MD at [facility] N/A 2/11/2020    COLONOSCOPY POLYPECTOMY SNARE/COLD BIOPSY performed by Tyrell Devine MD at [address], [place], DIAGNOSTIC      EPIDURAL STEROID INJECTION N/A 10/2/2019    C7-T1 INTERLAMINAR EPIDURAL STEROID INJECTION WITH FLUOROSCOPY performed by Tania Gutiérrez MD at [address] 10/30/2019    C7-T1 INTERLMINAR EPIDURAL STEROID INJECTION WITH FLUOROSCOPY performed [facility]rez [facility]    KNEE ARTHROSCOPY Left 10/24/2019    LEFT KNEE ARTHROSCOPY WITH PARTIAL MEDIAL MENISCECTOMY;SYNOVECTOMY WITH PLICA REMOVAL performed by Angus Carlson MD at [address] ARTHROSCOPY Left 10/24/2019    LAPAROSCOPIC APPENDECTOMY N/A 5/10/2019    APPENDECTOMY LAPAROSCOPIC, LYSIS OF ADHESIONS performed by Jerrod Ruiz MD at [facility] Bilateral 12/21/2022    BILATERAL T4, T5, T6 RADIOFREQUENCY ABLATION WITH FLUOROSCOPY performed by Jennifer Karimi MD at [facility]    fatty tumor removal under right arm    PAIN MANAGEMENT PROCEDURE Bilateral 9/21/2022    BILATERAL T4, T5, T6 MEDIAL BRANCH BLOCK WITH FLUOROSCOPY performed by Jennifer Karimi MD at [facility] Bilateral 11/2/2022    BILATERAL T4, T5, T6 MEDIAL BRANCH BLOCK WITH FLUOROSCOPY performed by Jennifer Karimi MD at [facility] Left 8/6/2019    ROBOTIC LAPAROSCOPIC LEFT PARTIAL NEPHRECTOMY performed by Dc Patino MD at [address] [place] [address] HISTORY:   Social History     Tobacco Use    Smoking status: Every Day     Packs/day: 1.00     Years: 15.00     Pack years: 15.00     Types: Cigarettes    Smokeless tobacco: Never   Vaping Use    Vaping Use: Every day   Substance Use Topics    Alcohol use: Not Currently     Alcohol/week: 1.0 standard drink     Types: 1 Standard drinks or equivalent per week    Drug use: Yes     Types: Marijuana Lissette Jan)     Comment: Medical Marijuana couple times a day       FAMILY HISTORY:   Family History   Problem Relation Age of Onset    Stroke Mother     Heart Disease Father     Other Paternal Grandmother        MEDICATIONS:     Current Outpatient Medications on File Prior to Visit   Medication Sig Dispense Refill    Liraglutide (VICTOZA) 18 MG/3ML SOPN SC injection Inject 1.8 mg into the skin daily 3 Adjustable Dose Pre-filled Pen Syringe 3    Exenatide ER (BYDUREON BCISE) 2 MG/0.85ML injection Inject 0.85 mLs into the skin every 7 days (Patient not taking: Reported on 12/21/2022) 4 Adjustable Dose Pre-filled Pen Syringe 3    pantoprazole (PROTONIX) 40 MG tablet TAKE 1 TABLET BY MOUTH EVERY DAY 90 tablet 0    methocarbamol (ROBAXIN) 750 MG tablet TAKE 1 TO 2 TABLETS BY MOUTH THREE TIMES A  tablet 3    Insulin Pen Needle (BD PEN NEEDLE ROSAURA 2ND GEN) 32G X 4 MM MISC use to inject lantus once daily 522 each 1    TRULICITY 3 YY/4.8VO SOPN inject the contents of one prefilled pen subcutaneously once a week 6 mL 1    pregabalin (LYRICA) 200 MG capsule TAKE 1 CAPSULE BY MOUTH THREE TIMES A  capsule 0    escitalopram (LEXAPRO) 20 MG tablet TAKE 1 TABLET BY MOUTH EVERY DAY 90 tablet 0    LANTUS SOLOSTAR 100 UNIT/ML injection pen inject 18 units subcutaneously one time a day at night 15 mL 1    NONFORMULARY Compound Drug- 3fwd-baclo2/diclo3/gaba6/lido2/prilo2% grams      traZODone (DESYREL) 50 MG tablet TAKE 1 TABLET BY MOUTH EVERY DAY 90 tablet 0    diclofenac sodium (VOLTAREN) 1 % GEL Apply 4 g topically 4 times daily 350 g 5    atorvastatin (LIPITOR) 80 MG tablet TAKE 1 TABLET BY MOUTH NIGHTLY 90 tablet 3    albuterol sulfate HFA (VENTOLIN HFA) 108 (90 Base) MCG/ACT inhaler Inhale 2 puffs into the lungs 4 times daily as needed for Wheezing 18 g 0    VITAMIN D, ERGOCALCIFEROL, PO Take by mouth      B Complex Vitamins (VITAMIN B COMPLEX PO) Take by mouth      Nutritional Supplements (JUICE PLUS FIBRE PO) Take by mouth      empagliflozin (JARDIANCE) 25 MG tablet TAKE 1 TABLET BY MOUTH IN THE MORNING 90 tablet 3    celecoxib (CELEBREX) 200 MG capsule TAKE 1 CAPSULE BY MOUTH TWO TIMES A  capsule 1    fluticasone (FLONASE) 50 MCG/ACT nasal spray 1 spray by Each Nostril route daily 16 g 3    azelastine (ASTELIN) 0.1 % nasal spray 1 spray by Nasal route 2 times daily Use in each nostril as directed 60 mL 3    Blood Glucose Monitoring Suppl (PRODIGY AUTOCODE BLOOD GLUCOSE) LILO Check sugar three times per day.  Diagnosis code: E11.42 1 Device 0    blood glucose test strips (PRODIGY NO CODING BLOOD GLUC) strip Prodigy No Coding strips. Test BS three times daily. Diagnosis code: E11.42 100 each 5    medical marijuana MEDICAL MARIJUANA       No current facility-administered medications on file prior to visit. ALLERGIES:   Allergies as of 01/09/2023 - Fully Reviewed 01/09/2023   Allergen Reaction Noted    Cymbalta [duloxetine hcl] Diarrhea 03/07/2018    Metformin and related  07/22/2019    Shellfish-derived products Nausea And Vomiting 04/12/2011      OBJECTIVE:   height is 6' 2\" (1.88 m) and weight is 224 lb (101.6 kg). His blood pressure is 122/64 and his pulse is 73. His oxygen saturation is 99%. PHYSICAL EXAM:    CONSTITUTIONAL: He is a 69y.o.-year-old who appears his stated age. He is alert and oriented x 3 and in no acute distress. HEENT: PERRLA, EOMI. No scleral icterus. No thrush, atraumatic, normocephalic. Mallampati class 2 airway. NECK: Supple, without cervical or supraclavicular lymphadenopathy:  CARDIOVASCULAR: S1 S2 RRR. Without murmer  RESPIRATORY & CHEST: Lungs are clear to auscultation and percussion. No wheezing, no crackles. Good air movement  GASTROINTESTINAL & ABDOMEN: Soft, nontender, positive bowel sounds in all quadrants, non-distended, without hepatosplenomegaly. GENITOURINARY: Deferred. MUSCULOSKELETAL: No tenderness to palpation of the axial skeleton. There is no clubbing. No cyanosis. No edema of the lower extremities. SKIN OF BODY: No rash or jaundice. PSYCHIATRIC EVALUATION: Normal affect. Patient answers questions appropriately. HEMATOLOGIC/LYMPHATIC/ IMMUNOLOGIC: No palpable lymphadenopathy. NEUROLOGIC: Alert and oriented x 3. Groslly non-focal. Motor strength is 5+/5 in all muscle groups. The patient has a normal sensorium globally.       LABS:    Lab Summary Latest Ref Rng & Units 10/19/2022 8/19/2022   WBC 4.0 - 11.0 K/uL 6.6 -   Hgb 13.5 - 17.5 g/dL 14.9 -   Hct 40.5 - 52.5 % 44.5 -   Platelets 715 - 476 K/uL 164 - Sodium 136 - 145 mmol/L 143 140   Potassium 3.5 - 5.1 mmol/L 4.1 4.6   BUN 7 - 20 mg/dL 23(H) 27(H)   Creatinine 0.8 - 1.3 mg/dL 0.9 0.8   Glucose 70 - 99 mg/dL 116(H) 76   Calcium 8.3 - 10.6 mg/dL 9.3 9.7   Alk Phos 40 - 129 U/L - 61   Albumin 3.4 - 5.0 g/dL - 4.1   Bilirubin 0.0 - 1.0 mg/dL - 0.4   AST 15 - 37 U/L - 29   ALT 10 - 40 U/L - 24   Some recent data might be hidden       All labs were personally reviewed by me any my interpretation is: Chem 8 is normal.     IMAGING:    No new chest imaging for me to review. Pulmonary Functions Testing Results:      Home sleep testing done on 10/27/2021  Overall MANOHAR: 19.5/h  Lowest oxygen saturation 83%  Time spent below an oxygen saturation of 88%: 116.5 minutes    CPAP compliance summary     7/8/2022 -1/3/2023 1/19/2022-7/17/2022 11/23/2021-1/18/2022   Days with device usage 180/180 days 177/180 days  57/57 days   Average Usage 7 hours 55 minutes 7 hours 52 minutes  7 hours 48 minutes   Days with device usage >4hrs 98% 98%  100%   Large Leak per night 26.7 L/min 22.5 L/min  26.9 L/min   AHI 0.2 0.1  0.1   CPAP settings Auto CPAP 8-14 cmH2O Auto CPAP 8-14 cmH2O  auto CPAP 8-14 cmH2O   90th percentile pressure 9.6 11.1  11.4   Mask Fullface mask Fullface mask  fullface mask     DME: [facility]      IMPRESSION AND RECOMMENDATIONS:     1. CHIOMA (obstructive sleep apnea)  -Patient continues to use CPAP therapy for his moderate obstructive sleep apnea. -I personally reviewed his CPAP download today.  -His compliance remains adequate with residual AHI within normal limits.  -I have advised the patient to use his nasal spray more consistently in order to reduce morning postnasal drip.  -No other changes made to the settings today.  -Encourage patient to continue staying compliant with the therapy to achieve maximum benefit. 2. Essential hypertension  -Currently not on any antihypertensives.     3. Overweight (BMI 25.0-29.9)  -I strongly advised the patient to make efforts to lose weight. I discussed various modalities including moderate intensity intermittent exercises, diet control and bariatric surgery. If the patient loses even 10 to 15% of current body weight, it will be beneficial in improving the overall health. 4. Non-seasonal allergic rhinitis, unspecified trigger  -I will continue him on Flonase and azelastine nasal spray. -Advised him to use them regularly during the winter season to reduce his symptoms. Return in about 6 months (around 7/9/2023) for Allergic rhinitis, luis manuel. Carrie Xie MD  Pulmonary Critical Care and Sleep Medicine  1/9/2023, 9:10 AM    This note was completed using dragon medical speech recognition software. Grammatical errors, random word insertions, pronoun errors and incomplete sentences are occasional consequences of this technology due to software limitations. If there are questions or concerns about the content of this note of information contained within the body of this dictation they should be addressed with the provider for clarification.

## 2023-01-09 NOTE — TELEPHONE ENCOUNTER
Medication:   Requested Prescriptions     Pending Prescriptions Disp Refills    traZODone (DESYREL) 50 MG tablet [Pharmacy Med Name: traZODone HCl Oral Tablet 50 MG] 90 tablet 0     Sig: TAKE 1 TABLET BY MOUTH EVERY DAY     Last Filled:  8.10.22 #90 refills 0    Last appt: 6/16/2022   Next appt: Visit date not found    Last OARRS:   RX Monitoring 2/1/2022   Attestation -   Periodic Controlled Substance Monitoring Possible medication side effects, risk of tolerance/dependence & alternative treatments discussed. ;No signs of potential drug abuse or diversion identified.

## 2023-01-09 NOTE — TELEPHONE ENCOUNTER
Medication:   Requested Prescriptions     Pending Prescriptions Disp Refills    escitalopram (LEXAPRO) 20 MG tablet [Pharmacy Med Name: Escitalopram Oxalate Oral Tablet 20 MG] 90 tablet 0     Sig: TAKE 1 TABLET BY MOUTH EVERY DAY     Last Filled:  10.17.22 #90 refills 0    Last appt: 6/16/2022   Next appt: 1/9/2023    Last OARRS:   RX Monitoring 2/1/2022   Attestation -   Periodic Controlled Substance Monitoring Possible medication side effects, risk of tolerance/dependence & alternative treatments discussed. ;No signs of potential drug abuse or diversion identified.

## 2023-01-19 DIAGNOSIS — M79.7 FIBROMYALGIA: ICD-10-CM

## 2023-01-19 NOTE — TELEPHONE ENCOUNTER
Medication:   Requested Prescriptions     Pending Prescriptions Disp Refills    pregabalin (LYRICA) 200 MG capsule [Pharmacy Med Name: Pregabalin Oral Capsule 200 MG] 270 capsule 0     Sig: TAKE 1 CAPSULE BY MOUTH THREE TIMES A DAY        Last Filled:  10/27/2022 #270 0rf    Patient Phone Number: [phone] (home)     Last appt: 6/16/2022   Next appt: Visit date not found    Last OARRS:   RX Monitoring 2/1/2022   Attestation -   Periodic Controlled Substance Monitoring Possible medication side effects, risk of tolerance/dependence & alternative treatments discussed. ;No signs of potential drug abuse or diversion identified.

## 2023-01-20 RX ORDER — PREGABALIN 200 MG/1
CAPSULE ORAL
Qty: 270 CAPSULE | Refills: 0 | OUTPATIENT
Start: 2023-01-20 | End: 2023-02-19

## 2023-01-24 ENCOUNTER — TELEMEDICINE (OUTPATIENT)
Dept: FAMILY MEDICINE CLINIC | Age: 68
End: 2023-01-24
Payer: MEDICARE

## 2023-01-24 DIAGNOSIS — M79.7 FIBROMYALGIA: ICD-10-CM

## 2023-01-24 DIAGNOSIS — E78.5 DYSLIPIDEMIA ASSOCIATED WITH TYPE 2 DIABETES MELLITUS (HCC): ICD-10-CM

## 2023-01-24 DIAGNOSIS — F10.21 CHRONIC ALCOHOLISM IN REMISSION (HCC): ICD-10-CM

## 2023-01-24 DIAGNOSIS — E11.69 DYSLIPIDEMIA ASSOCIATED WITH TYPE 2 DIABETES MELLITUS (HCC): ICD-10-CM

## 2023-01-24 DIAGNOSIS — E27.8 LEFT ADRENAL MASS (HCC): ICD-10-CM

## 2023-01-24 DIAGNOSIS — F33.1 MODERATE EPISODE OF RECURRENT MAJOR DEPRESSIVE DISORDER (HCC): ICD-10-CM

## 2023-01-24 PROCEDURE — 3017F COLORECTAL CA SCREEN DOC REV: CPT | Performed by: FAMILY MEDICINE

## 2023-01-24 PROCEDURE — G8417 CALC BMI ABV UP PARAM F/U: HCPCS | Performed by: FAMILY MEDICINE

## 2023-01-24 PROCEDURE — 4004F PT TOBACCO SCREEN RCVD TLK: CPT | Performed by: FAMILY MEDICINE

## 2023-01-24 PROCEDURE — G8484 FLU IMMUNIZE NO ADMIN: HCPCS | Performed by: FAMILY MEDICINE

## 2023-01-24 PROCEDURE — 99214 OFFICE O/P EST MOD 30 MIN: CPT | Performed by: FAMILY MEDICINE

## 2023-01-24 PROCEDURE — 3046F HEMOGLOBIN A1C LEVEL >9.0%: CPT | Performed by: FAMILY MEDICINE

## 2023-01-24 PROCEDURE — 1123F ACP DISCUSS/DSCN MKR DOCD: CPT | Performed by: FAMILY MEDICINE

## 2023-01-24 PROCEDURE — G8427 DOCREV CUR MEDS BY ELIG CLIN: HCPCS | Performed by: FAMILY MEDICINE

## 2023-01-24 PROCEDURE — 2022F DILAT RTA XM EVC RTNOPTHY: CPT | Performed by: FAMILY MEDICINE

## 2023-01-24 RX ORDER — PREGABALIN 200 MG/1
CAPSULE ORAL
Qty: 270 CAPSULE | Refills: 0 | Status: SHIPPED | OUTPATIENT
Start: 2023-01-24 | End: 2023-04-26

## 2023-01-24 NOTE — PROGRESS NOTES
TELEHEALTH EVALUATION -- Audio/Visual     Yuri Patrick   YOB: 1955    Date of Visit:  1/24/2023    Allergies   Allergen Reactions    Cymbalta [Duloxetine Hcl] Diarrhea    Metformin And Related      Muscle aches    Shellfish-Derived Products Nausea And Vomiting     *PT STATES NO PROBLEMS WITH TOPICAL IODINE     Current Outpatient Medications on File Prior to Visit   Medication Sig Dispense Refill    escitalopram (LEXAPRO) 20 MG tablet TAKE 1 TABLET BY MOUTH EVERY DAY 90 tablet 0    traZODone (DESYREL) 50 MG tablet TAKE 1 TABLET BY MOUTH EVERY DAY 90 tablet 0    Liraglutide (VICTOZA) 18 MG/3ML SOPN SC injection Inject 1.8 mg into the skin daily 3 Adjustable Dose Pre-filled Pen Syringe 3    pantoprazole (PROTONIX) 40 MG tablet TAKE 1 TABLET BY MOUTH EVERY DAY 90 tablet 0    methocarbamol (ROBAXIN) 750 MG tablet TAKE 1 TO 2 TABLETS BY MOUTH THREE TIMES A  tablet 3    Insulin Pen Needle (BD PEN NEEDLE ROSAURA 2ND GEN) 32G X 4 MM MISC use to inject lantus once daily 100 each 1    LANTUS SOLOSTAR 100 UNIT/ML injection pen inject 18 units subcutaneously one time a day at night 15 mL 1    diclofenac sodium (VOLTAREN) 1 % GEL Apply 4 g topically 4 times daily 350 g 5    atorvastatin (LIPITOR) 80 MG tablet TAKE 1 TABLET BY MOUTH NIGHTLY 90 tablet 3    albuterol sulfate HFA (VENTOLIN HFA) 108 (90 Base) MCG/ACT inhaler Inhale 2 puffs into the lungs 4 times daily as needed for Wheezing 18 g 0    VITAMIN D, ERGOCALCIFEROL, PO Take by mouth      B Complex Vitamins (VITAMIN B COMPLEX PO) Take by mouth      Nutritional Supplements (JUICE PLUS FIBRE PO) Take by mouth      empagliflozin (JARDIANCE) 25 MG tablet TAKE 1 TABLET BY MOUTH IN THE MORNING 90 tablet 3    celecoxib (CELEBREX) 200 MG capsule TAKE 1 CAPSULE BY MOUTH TWO TIMES A  capsule 1    fluticasone (FLONASE) 50 MCG/ACT nasal spray 1 spray by Each Nostril route daily 16 g 3    azelastine (ASTELIN) 0.1 % nasal spray 1 spray by Nasal route 2 times daily Use in each nostril as directed 60 mL 3    medical marijuana MEDICAL MARIJUANA      TRULICITY 3 LP/5.0AO SOPN inject the contents of one prefilled pen subcutaneously once a week (Patient not taking: Reported on 2023) 6 mL 1    NONFORMULARY Compound Drug- 3fwd-baclo2/diclo3/gaba6/lido2/prilo2% grams      Blood Glucose Monitoring Suppl (PRODIGY AUTOCODE BLOOD GLUCOSE) LILO Check sugar three times per day. Diagnosis code: E11.42 1 Device 0    blood glucose test strips (PRODIGY NO CODING BLOOD GLUC) strip Prodigy No Coding strips. Test BS three times daily. Diagnosis code: E11.42 100 each 5     No current facility-administered medications on file prior to visit. Wt Readings from Last 3 Encounters:   23 224 lb (101.6 kg)   22 213 lb (96.6 kg)   22 213 lb (96.6 kg)     BP Readings from Last 3 Encounters:   23 122/64   22 (!) 145/97   22 (!) 145/98          Keri Hernandez (:  1955) has requested to and consented to an audio/video evaluation for the concerns or medical issues discussed below. Chief Complaint   Patient presents with    Medication Refill     [phone]       Assessment/Plan     1. Fibromyalgia  Stable. Continue current regimen. Controlled Substance Monitoring:    Acute and Chronic Pain Monitoring:   RX Monitoring 2023   Attestation -   Periodic Controlled Substance Monitoring Possible medication side effects, risk of tolerance/dependence & alternative treatments discussed. ;No signs of potential drug abuse or diversion identified. - pregabalin (LYRICA) 200 MG capsule; TAKE 1 CAPSULE BY MOUTH THREE TIMES A DAY  Dispense: 270 capsule; Refill: 0    2. Chronic alcoholism in remission (HCC)  Stable. Continue current regimen. 3. Moderate episode of recurrent major depressive disorder (HCC)  Stable. Continue current regimen. Doing well on lexapro.      4. Dyslipidemia associated with type 2 diabetes mellitus ([facility] [address]  Stable. Continue current regimen. Will recheck labs prior to up coming visit with endo. 5. Left adrenal mass (HCC)  Stable. Continue current regimen. S/p endo eval.       Discussed medications with patient, who voiced understanding of their use and indications. All questions answered. Return in about 6 months (around 7/24/2023) for Annual Wellness Visit, Medication Refill. HPI       Fibromyalgia and chronic back pain: S/p ablation upper back with Dr. Mindy Young and lower back Dr. Keturah Oswald. Still needs to have more work done but it has improved. Partially related to bad discs and arthritis. Started with symptoms in 2020. Status post evaluation by neurology and PMNR. S/p Rheumatology evaluation 1/2022 and advised nothing additional can be done- just needs to continue current meds and do PT. Hx of seeing integrative medicine for massage, acupuncture, PT but it didn't help. Using medical marijuana. Also on robaxin, tumeric, lyrica, and celebrex (has had to hold recently with all his procedures). Patient would like me to fill these so he does not have to see PMNR again given they said there was nothing else they can do. Type 2 diabetes: Diagnosed in 2017. On insulin since April 2019. The patient is seeing endocrinology. History of muscle soreness with Metformin. S/p L partial pancreactomy. HTN: Checked recently was normal. Had a lot of coffee this AM and thinks that is why BP is high. Was lisinopril 5mg. Stopped given low normal BP's. Patient prefers not to restart medication. Agrees to have his wife monitor it and let me know if running high at home and f/u sooner then 6 mo if this is the case. BP has been at goal recently at other providers offices. Hyperlipidemia: Stable on lipitor 80mg. Pain did not improve with lower dose of lipitor. Hx of renal cancer:  Incidental finding in 2019 when was imaging for appendicitis. S/p surgery with complete resolution.  Every 6 months he has imaging. Followed by Dr. Herbert with urology.      Left adrenal adenoma on MRI: Status post a normal functional work-up in 2019.  The patient is managed by endocrinology. Has CT or US q 6 mo ordered by urology.       Binocular diplopia: The patient is status post evaluation by neurology and March 2021.  He has seen a neuro-ophthalmologist.  Seeing optho at [facility].  Has special glasses.      CHIOMA: on CPAP and sees sleep medicine.      Depression/anxiety: stable on lexapro. No issues.      Hx of duodenal and gastric ulcers: first was age 13.  On protonix. Stable. Last ulcer was 20+ years ago.     Hx of ETOH use:  S/p treatment and quit in 2017.       Tobacco abuse:  Smoking 1ppd and not ready to quit. Discussed wellbutrin, chantix, and nicotine replacement but patient is not ready as of 1/24/23.       SH: 8/2021: lives with wife, daughter, and 2 grandkids.  Retired.  Was on disability prior to shelter.  Last job was .  Has 2 kids.         Social History     Socioeconomic History    Marital status:      Spouse name: Not on file    Number of children: Not on file    Years of education: Not on file    Highest education level: Not on file   Occupational History    Occupation: disability   Tobacco Use    Smoking status: Every Day     Packs/day: 1.00     Years: 15.00     Pack years: 15.00     Types: Cigarettes    Smokeless tobacco: Never   Vaping Use    Vaping Use: Every day   Substance and Sexual Activity    Alcohol use: Not Currently     Alcohol/week: 1.0 standard drink     Types: 1 Standard drinks or equivalent per week    Drug use: Yes     Types: Marijuana (Weed)     Comment: Medical Marijuana couple times a day    Sexual activity: Yes     Partners: Female   Other Topics Concern    Not on file   Social History Narrative    ** Merged History Encounter **          Social Determinants of Health     Financial Resource Strain: Low Risk     Difficulty of Paying Living  Expenses: Not hard at all   Food Insecurity: No Food Insecurity    Worried About [address] in the Last Year: Never true    Ran Out of Food in the Last Year: Never true   Transportation Needs: No Transportation Needs    Lack of Transportation (Medical): No    Lack of Transportation (Non-Medical): No   Physical Activity: Sufficiently Active    Days of Exercise per Week: 5 days    Minutes of Exercise per Session: 30 min   Stress: No Stress Concern Present    Feeling of Stress : Not at all   Social Connections: Moderately Isolated    Frequency of Communication with Friends and Family: More than three times a week    Frequency of Social Gatherings with Friends and Family: More than three times a week    Attends Latter-day Services: Never    Active Member of Clubs or Organizations: No    Attends Club or Organization Meetings: Never    Marital Status:    Intimate Partner Violence: Not on file   Housing Stability: [address] to Pay for Housing in the Last Year: No    Number of [place] in the Last Year: 1    Unstable Housing in the Last Year: No         Review of Systems  As documented in the HPI. Currently no complaints of CP or RONI. Vital Signs: (As obtained by patient/caregiver or practitioner observation)    There were no vitals taken for this visit. Patient-Reported Vitals 1/24/2023   Patient-Reported Weight 215 lb   Patient-Reported Height 6'2\"        Physical Exam  Constitutional:       General: He is not in acute distress. Appearance: Normal appearance. He is not ill-appearing. HENT:      Head: Normocephalic and atraumatic. Nose: Nose normal.   Pulmonary:      Effort: Pulmonary effort is normal. No respiratory distress. Neurological:      General: No focal deficit present. Mental Status: He is alert.    Psychiatric:         Mood and Affect: Mood normal.         Behavior: Behavior normal.               Sofia Parraw, was evaluated through a synchronous (real-time) audio-video encounter. The patient (or guardian if applicable) is aware that this is a billable service, which includes applicable co-pays. This Virtual Visit was conducted with patient's (and/or legal guardian's) consent. The visit was conducted pursuant to the emergency declaration under the [address] [place] [address], [address] [place] [address] authority and the [employer] General Act. Patient identification was verified, and a caregiver was present when appropriate. The patient was located at Home: [address] [place] [address]. Provider was located at Home ([address]: [place]. Patient identification was verified at the start of the visit: Yes    Total time spent on this encounter: Not billed by time. Services were provided through a video synchronous discussion virtually to substitute for in-person clinic visit. Documentation was done using voice recognition dragon software. Efforts were made to ensure accuracy; however, inadvertent, unintentional computerized transcription errors may be present. --Swapna Vazquez MD on 1/24/2023    An electronic signature was used to authenticate this note.

## 2023-01-26 ENCOUNTER — TELEPHONE (OUTPATIENT)
Dept: ENDOCRINOLOGY | Age: 68
End: 2023-01-26

## 2023-01-26 DIAGNOSIS — E11.65 UNCONTROLLED TYPE 2 DIABETES MELLITUS WITH HYPERGLYCEMIA, WITH LONG-TERM CURRENT USE OF INSULIN (HCC): ICD-10-CM

## 2023-01-26 DIAGNOSIS — Z79.4 UNCONTROLLED TYPE 2 DIABETES MELLITUS WITH HYPERGLYCEMIA, WITH LONG-TERM CURRENT USE OF INSULIN (HCC): ICD-10-CM

## 2023-01-26 RX ORDER — PEN NEEDLE, DIABETIC 32GX 5/32"
NEEDLE, DISPOSABLE MISCELLANEOUS
Qty: 100 EACH | Refills: 1 | Status: SHIPPED | OUTPATIENT
Start: 2023-01-26

## 2023-01-26 NOTE — TELEPHONE ENCOUNTER
Patient says he uses more pen needles since being put on Victoza.   He is requesting a refill of his Insulin Pen Needle (BD PEN NEEDLE ROSAURA 2ND GEN) 32G X 4 MM [facility] [address] [place] [address] [address] -  [phone] - F [phone]   [address] [place] [address], [address] [address]   Phone:  [phone]  Fax:  [phone]

## 2023-01-30 ENCOUNTER — TELEPHONE (OUTPATIENT)
Dept: ENDOCRINOLOGY | Age: 68
End: 2023-01-30

## 2023-01-30 DIAGNOSIS — Z79.4 UNCONTROLLED TYPE 2 DIABETES MELLITUS WITH HYPERGLYCEMIA, WITH LONG-TERM CURRENT USE OF INSULIN (HCC): Primary | ICD-10-CM

## 2023-01-30 DIAGNOSIS — E11.65 UNCONTROLLED TYPE 2 DIABETES MELLITUS WITH HYPERGLYCEMIA, WITH LONG-TERM CURRENT USE OF INSULIN (HCC): Primary | ICD-10-CM

## 2023-01-30 RX ORDER — BLOOD-GLUCOSE CONTROL, LOW
EACH MISCELLANEOUS
Qty: 150 EACH | Refills: 5 | Status: SHIPPED | OUTPATIENT
Start: 2023-01-30

## 2023-01-30 NOTE — TELEPHONE ENCOUNTER
Fax received from Philip [facility] 26 requesting diagnosis code on RX for Prodigy lancets to [facility]

## 2023-02-19 DIAGNOSIS — M79.7 FIBROMYALGIA: ICD-10-CM

## 2023-02-21 LAB
CHOLESTEROL, TOTAL: 153 MG/DL (ref 0–199)
HDLC SERPL-MCNC: 55 MG/DL (ref 40–60)
LDL CHOLESTEROL CALCULATED: 87 MG/DL
TRIGL SERPL-MCNC: 57 MG/DL (ref 0–150)
VLDLC SERPL CALC-MCNC: 11 MG/DL

## 2023-02-21 RX ORDER — PREGABALIN 200 MG/1
CAPSULE ORAL
Qty: 270 CAPSULE | Refills: 0 | OUTPATIENT
Start: 2023-02-21

## 2023-02-22 LAB
ESTIMATED AVERAGE GLUCOSE: 111.2 MG/DL
HBA1C MFR BLD: 5.5 %

## 2023-02-23 ENCOUNTER — OFFICE VISIT (OUTPATIENT)
Dept: ENDOCRINOLOGY | Age: 68
End: 2023-02-23
Payer: MEDICARE

## 2023-02-23 VITALS
BODY MASS INDEX: 29.13 KG/M2 | OXYGEN SATURATION: 96 % | SYSTOLIC BLOOD PRESSURE: 137 MMHG | DIASTOLIC BLOOD PRESSURE: 83 MMHG | HEART RATE: 76 BPM | WEIGHT: 227 LBS | HEIGHT: 74 IN

## 2023-02-23 DIAGNOSIS — E78.5 DYSLIPIDEMIA ASSOCIATED WITH TYPE 2 DIABETES MELLITUS (HCC): ICD-10-CM

## 2023-02-23 DIAGNOSIS — E11.69 DYSLIPIDEMIA ASSOCIATED WITH TYPE 2 DIABETES MELLITUS (HCC): ICD-10-CM

## 2023-02-23 DIAGNOSIS — G89.4 CHRONIC PAIN SYNDROME: ICD-10-CM

## 2023-02-23 DIAGNOSIS — M79.7 FIBROMYALGIA: ICD-10-CM

## 2023-02-23 DIAGNOSIS — I10 ESSENTIAL HYPERTENSION: ICD-10-CM

## 2023-02-23 DIAGNOSIS — Z79.4 CONTROLLED TYPE 2 DIABETES MELLITUS WITH DIABETIC POLYNEUROPATHY, WITH LONG-TERM CURRENT USE OF INSULIN (HCC): Primary | ICD-10-CM

## 2023-02-23 DIAGNOSIS — E11.42 CONTROLLED TYPE 2 DIABETES MELLITUS WITH DIABETIC POLYNEUROPATHY, WITH LONG-TERM CURRENT USE OF INSULIN (HCC): Primary | ICD-10-CM

## 2023-02-23 PROBLEM — E11.65 UNCONTROLLED TYPE 2 DIABETES MELLITUS WITH HYPERGLYCEMIA, WITH LONG-TERM CURRENT USE OF INSULIN (HCC): Status: RESOLVED | Noted: 2019-04-11 | Resolved: 2023-02-23

## 2023-02-23 PROCEDURE — 4004F PT TOBACCO SCREEN RCVD TLK: CPT | Performed by: INTERNAL MEDICINE

## 2023-02-23 PROCEDURE — 3079F DIAST BP 80-89 MM HG: CPT | Performed by: INTERNAL MEDICINE

## 2023-02-23 PROCEDURE — 3017F COLORECTAL CA SCREEN DOC REV: CPT | Performed by: INTERNAL MEDICINE

## 2023-02-23 PROCEDURE — 99214 OFFICE O/P EST MOD 30 MIN: CPT | Performed by: INTERNAL MEDICINE

## 2023-02-23 PROCEDURE — 1123F ACP DISCUSS/DSCN MKR DOCD: CPT | Performed by: INTERNAL MEDICINE

## 2023-02-23 PROCEDURE — 3077F SYST BP >= 140 MM HG: CPT | Performed by: INTERNAL MEDICINE

## 2023-02-23 PROCEDURE — 3044F HG A1C LEVEL LT 7.0%: CPT | Performed by: INTERNAL MEDICINE

## 2023-02-23 PROCEDURE — G8484 FLU IMMUNIZE NO ADMIN: HCPCS | Performed by: INTERNAL MEDICINE

## 2023-02-23 PROCEDURE — G8417 CALC BMI ABV UP PARAM F/U: HCPCS | Performed by: INTERNAL MEDICINE

## 2023-02-23 PROCEDURE — 2022F DILAT RTA XM EVC RTNOPTHY: CPT | Performed by: INTERNAL MEDICINE

## 2023-02-23 PROCEDURE — G8427 DOCREV CUR MEDS BY ELIG CLIN: HCPCS | Performed by: INTERNAL MEDICINE

## 2023-02-23 NOTE — PROGRESS NOTES
Patient ID:   Anusha Duff is a 69 y.o. male    Chief Complaint:   Anusha Duff presents for an evaluation of Type 2 Diabetes Mellitus , Hyperlipidemia and hypertension. Subjective:   Type 2 Diabetes Mellitus diagnosed in 2017  On insulin since April 2019   Metformin caused muscle soreness     Has received steroid shots for trigger finger back pain. None recent. Has some muscle pains (diffuse). Back is really an issue. Saw ortho. Gained 14 lbs in last 2 months      Lantus 20 units daily at night. TTT of . Jardiance 99YZ daily   Trulicity 3.0 mg weekly on backorder   Victoza 1.8 mg daily in am     No BS readings today    Checks blood sugars once every 2 days. Reportedly 119-120 in am.     AM:   Lunch:  Supper:   HS:     Hypoglycemias: None     Meals: Three, may be protein shake for breakfast. Dinner is bigger. Late night snack (cup of mixed nuts). Soda once a day, diet. No juices. Exercise: Limited due to back problems. Exercise with the bands. Denies chest pain, exertional dyspnea. Family history of CAD: Dad at age 64. Mother had stroke in her 66's. Smokes 1 PPD. Counselled for smoking cessation.      Currently not on ASA, recommend baby aspirin if okay with gastroenterologist or PCP      The following portions of the patient's history were reviewed and updated as appropriate:         Family History   Problem Relation Age of Onset    Stroke Mother     Heart Disease Father     Other Paternal Grandmother            Social History     Socioeconomic History    Marital status:      Spouse name: Not on file    Number of children: Not on file    Years of education: Not on file    Highest education level: Not on file   Occupational History    Occupation: disability   Tobacco Use    Smoking status: Every Day     Packs/day: 1.00     Years: 15.00     Pack years: 15.00     Types: Cigarettes    Smokeless tobacco: Never   Vaping Use    Vaping Use: Every day   Substance and Sexual Activity    Alcohol use: Not Currently     Alcohol/week: 1.0 standard drink     Types: 1 Standard drinks or equivalent per week    Drug use: Yes     Types: Marijuana Bernie Jed)     Comment: Medical Marijuana couple times a day    Sexual activity: Yes     Partners: Female   Other Topics Concern    Not on file   Social History Narrative    ** Merged History Encounter **          Social Determinants of Health     Financial Resource Strain: Not on file   Food Insecurity: Not on file   Transportation Needs: Not on file   Physical Activity: Sufficiently Active    Days of Exercise per Week: 5 days    Minutes of Exercise per Session: 30 min   Stress: Not on file   Social Connections: Not on file   Intimate Partner Violence: Not on file   Housing Stability: Not on file       Past Medical History:   Diagnosis Date    Binocular vision disorder with diplopia     Blood transfusion     30 yrs ago    Chronic back pain     Chronic pain syndrome     Depression     Diabetes mellitus ([facility] [address]     Failed back surgical syndrome     FH: colonic polyps     Fibromyalgia     History of duodenal ulcer     now gets peptic ulcer    Hyperlipidemia     Hypertension     Insomnia     Obesity     Psychiatric problem     depression and anxiety    Renal carcinoma, left ([facility] [address]     Sleep apnea     USES C-PAP    Ulcer, duodenum peptic        Past Surgical History:   Procedure Laterality Date    BACK SURGERY       X 2    CERVICAL FUSION N/A 12/17/2019    ANTERIOR CERVICAL DISCECTOMY AND FUSION AT [address] [place] [address] ([id number], [address], [id number], [id number]) performed by Ravindra Newell MD at [facility] N/A 2/11/2020    COLONOSCOPY POLYPECTOMY SNARE/COLD BIOPSY performed by Merna Garnica MD at [address] [place] [address], COLON, DIAGNOSTIC      EPIDURAL STEROID INJECTION N/A 10/2/2019    C7-T1 INTERLAMINAR EPIDURAL STEROID INJECTION WITH FLUOROSCOPY performed by Vidal Perry MD at [address] [place] [address] N/A 10/30/2019    C7-T1 INTERLMINAR EPIDURAL STEROID INJECTION WITH FLUOROSCOPY performed by Guillermo Humphreys MD at [facility] X 2    KNEE ARTHROSCOPY Left 10/24/2019    LEFT KNEE ARTHROSCOPY WITH PARTIAL MEDIAL MENISCECTOMY;SYNOVECTOMY WITH PLICA REMOVAL performed by Deb Ervin MD at [address] ARTHROSCOPY Left 10/24/2019    LAPAROSCOPIC APPENDECTOMY N/A 5/10/2019    APPENDECTOMY LAPAROSCOPIC, LYSIS OF ADHESIONS performed by Ruby Elizabeth MD at [facility] Bilateral 12/21/2022    BILATERAL T4, T5, T6 RADIOFREQUENCY ABLATION WITH FLUOROSCOPY performed by Guillermo Humphreys MD at [facility]    fatty tumor removal under right arm    PAIN MANAGEMENT PROCEDURE Bilateral 9/21/2022    BILATERAL T4, T5, T6 MEDIAL BRANCH BLOCK WITH FLUOROSCOPY performed by Guillermo Humphreys MD at [facility] Bilateral 11/2/2022    BILATERAL T4, T5, T6 MEDIAL BRANCH BLOCK WITH FLUOROSCOPY performed by Guillermo Humphreys MD at [facility] Left 8/6/2019    ROBOTIC LAPAROSCOPIC LEFT PARTIAL NEPHRECTOMY performed by Brenda To MD at [facility] ENDOSCOPY             Allergies   Allergen Reactions    Cymbalta [Duloxetine Hcl] Diarrhea    Metformin And Related      Muscle aches    Shellfish-Derived Products Nausea And Vomiting     *PT STATES NO PROBLEMS WITH TOPICAL IODINE         Current Outpatient Medications:     Semaglutide, 1 MG/DOSE, 4 MG/3ML SOPN, 1Mg weekly, Disp: 3 mL, Rfl: 5    Insulin Pen Needle (BD PEN NEEDLE ROSAURA 2ND GEN) 32G X 4 MM MISC, use twice daily, Disp: 150 each, Rfl: 1    Prodigy Lancets 28G MISC, Use twice daily with CGM Diagnosis Code: E11.65, Disp: 150 each, Rfl: 5    pregabalin (LYRICA) 200 MG capsule, TAKE 1 CAPSULE BY MOUTH THREE TIMES A DAY, Disp: 270 capsule, Rfl: 0    escitalopram (LEXAPRO) 20 MG tablet, TAKE 1 TABLET BY MOUTH EVERY DAY, Disp: 90 tablet, Rfl: 0    traZODone (DESYREL) 50 MG tablet, TAKE 1 TABLET BY MOUTH EVERY DAY, Disp: 90 tablet, Rfl: 0    Liraglutide (VICTOZA) 18 MG/3ML SOPN SC injection, Inject 1.8 mg into the skin daily, Disp: 3 Adjustable Dose Pre-filled Pen Syringe, Rfl: 3    pantoprazole (PROTONIX) 40 MG tablet, TAKE 1 TABLET BY MOUTH EVERY DAY, Disp: 90 tablet, Rfl: 0    methocarbamol (ROBAXIN) 750 MG tablet, TAKE 1 TO 2 TABLETS BY MOUTH THREE TIMES A DAY (Patient taking differently: Take 750 mg by mouth 2-3 times daily), Disp: 180 tablet, Rfl: 3    LANTUS SOLOSTAR 100 UNIT/ML injection pen, inject 18 units subcutaneously one time a day at night (Patient taking differently: Inject 20 Units into the skin nightly), Disp: 15 mL, Rfl: 1    NONFORMULARY, Compound Drug- 3fwd-baclo2/diclo3/gaba6/lido2/prilo2% grams, Disp: , Rfl:     atorvastatin (LIPITOR) 80 MG tablet, TAKE 1 TABLET BY MOUTH NIGHTLY, Disp: 90 tablet, Rfl: 3    albuterol sulfate HFA (VENTOLIN HFA) 108 (90 Base) MCG/ACT inhaler, Inhale 2 puffs into the lungs 4 times daily as needed for Wheezing, Disp: 18 g, Rfl: 0    VITAMIN D, ERGOCALCIFEROL, PO, Take by mouth, Disp: , Rfl:     B Complex Vitamins (VITAMIN B COMPLEX PO), Take by mouth, Disp: , Rfl:     Nutritional Supplements (JUICE PLUS FIBRE PO), Take by mouth, Disp: , Rfl:     empagliflozin (JARDIANCE) 25 MG tablet, TAKE 1 TABLET BY MOUTH IN THE MORNING, Disp: 90 tablet, Rfl: 3    celecoxib (CELEBREX) 200 MG capsule, TAKE 1 CAPSULE BY MOUTH TWO TIMES A DAY, Disp: 180 capsule, Rfl: 1    fluticasone (FLONASE) 50 MCG/ACT nasal spray, 1 spray by Each Nostril route daily, Disp: 16 g, Rfl: 3    azelastine (ASTELIN) 0.1 % nasal spray, 1 spray by Nasal route 2 times daily Use in each nostril as directed, Disp: 60 mL, Rfl: 3    Blood Glucose Monitoring Suppl (PRODIGY AUTOCODE BLOOD GLUCOSE) LILO, Check sugar three times per day.  Diagnosis code: E11.42, Disp: 1 Device, Rfl: 0    blood glucose test strips (PRODIGY NO CODING BLOOD GLUC) strip, Prodigy No Coding strips. Test BS three times daily. Diagnosis code: E11.42, Disp: 100 each, Rfl: 5    medical marijuana, MEDICAL MARIJUANA, Disp: , Rfl:     diclofenac sodium (VOLTAREN) 1 % GEL, Apply 4 g topically 4 times daily, Disp: 350 g, Rfl: 5      Review of Systems:    Constitutional: Negative for fever, chills, and unexpected weight change. HENT: Negative for congestion, ear pain, rhinorrhea,  sore throat and trouble swallowing. Eyes: Negative for photophobia, redness, itching. Respiratory: Negative for cough, shortness of breath and sputum. Cardiovascular: Negative for chest pain, palpitations and leg swelling. Gastrointestinal: has heartburn. Negative for nausea, vomiting, abdominal pain, diarrhea, constipation. Endocrine: Negative for cold intolerance, heat intolerance, polydipsia, polyphagia and polyuria. Genitourinary: Negative for dysuria, urgency, frequency, hematuria and flank pain. Musculoskeletal: neck pain due to DJD , pain radiate to neck, arms. Negative for arthralgias. Skin/Nail: Negative for rash, itching. Normal nails. Neurological: Negative for seizures, weakness, light-headedness, numbness and headaches. Hematological/ Lymph nodes: Negative for adenopathy. Does not bruise/bleed easily. Psychiatric/Behavioral: Negative for suicidal ideas, depression, anxiety, sleep disturbance and decreased concentration. Objective:   Physical Exam:  BP (!) 141/84 (Site: Left Upper Arm, Position: Sitting, Cuff Size: Large Adult)   Pulse 76   Ht 6' 2\" (1.88 m)   Wt 227 lb (103 kg)   SpO2 96%   BMI 29.15 kg/m²     Constitutional: Patient is oriented to person, place, and time. Patient appears well-developed and well-nourished. HENT:               Head: Normocephalic and atraumatic. Eyes: Conjunctivae and EOM are normal.                Neck: Normal range of motion. Cardiovascular: Normal rate, regular rhythm and normal heart sounds. Pulmonary/Chest: Effort normal and breath sounds normal.    Musculoskeletal: Normal range of motion. Neurological: Patient is alert and oriented to person, place, and time. Patient has normal reflexes. Skin: Skin is warm and dry. Psychiatric: Patient has a normal mood and affect.  Patient behavior is normal.       Lab Review:    Office Visit on 02/23/2023   Component Date Value Ref Range Status    Diabetic Retinopathy 12/23/2022 Negative   Final   Orders Only on 02/21/2023   Component Date Value Ref Range Status    Hemoglobin A1C 02/21/2023 5.5  See comment % Final    eAG 02/21/2023 111.2  mg/dL Final   Orders Only on 02/21/2023   Component Date Value Ref Range Status    Cholesterol, Total 02/21/2023 153  0 - 199 mg/dL Final    Triglycerides 02/21/2023 57  0 - 150 mg/dL Final    HDL 02/21/2023 55  40 - 60 mg/dL Final    LDL Calculated 02/21/2023 87  <100 mg/dL Final    VLDL Cholesterol Calculated 02/21/2023 11  Not Established mg/dL Final   Orders Only on 01/05/2023   Component Date Value Ref Range Status    Color, UA 01/05/2023 Yellow  Straw/Yellow Final    Clarity, UA 01/05/2023 Clear  Clear Final    Glucose, Ur 01/05/2023 >=1000 (A)  Negative mg/dL Final    Bilirubin Urine 01/05/2023 Negative  Negative Final    Ketones, Urine 01/05/2023 Negative  Negative mg/dL Final    Specific Gravity, UA 01/05/2023 1.033  1.005 - 1.030 Final    Blood, Urine 01/05/2023 Negative  Negative Final    pH, UA 01/05/2023 5.5  5.0 - 8.0 Final    Protein, UA 01/05/2023 Negative  Negative mg/dL Final    Urobilinogen, Urine 01/05/2023 0.2  <2.0 E.U./dL Final    Nitrite, Urine 01/05/2023 Negative  Negative Final    Leukocyte Esterase, Urine 01/05/2023 Negative  Negative Final    Microscopic Examination 01/05/2023 Not Indicated   Final    Urine Type 01/05/2023 NotGiven   Final    Bacteria, UA 01/05/2023 None Seen  None Seen /HPF Final    Hyaline Casts, UA 01/05/2023 0  0 - 8 /LPF Final    WBC, UA 01/05/2023 0  0 - 5 /HPF Final RBC, UA 01/05/2023 4  0 - 4 /HPF Final    Epithelial Cells, UA 01/05/2023 1  0 - 5 /HPF Final   Admission on 12/21/2022, Discharged on 12/21/2022   Component Date Value Ref Range Status    POC Glucose 12/21/2022 76  70 - 99 mg/dl Final    Performed on 12/21/2022 ACCU-CHEK   Final   Admission on 11/02/2022, Discharged on 11/02/2022   Component Date Value Ref Range Status    POC Glucose 11/02/2022 65 (A)  70 - 99 mg/dl Final    Performed on 11/02/2022 ACCU-CHEK   Final    POC Glucose 11/02/2022 94  70 - 99 mg/dl Final    Performed on 11/02/2022 ACCU-CHEK   Final   Orders Only on 10/19/2022   Component Date Value Ref Range Status    Protime 10/19/2022 13.6  11.7 - 14.5 sec Final    INR 10/19/2022 1.05  0.87 - 1.14 Final    aPTT 10/19/2022 29.9  23.0 - 34.3 sec Final    Sodium 10/19/2022 143  136 - 145 mmol/L Final    Potassium 10/19/2022 4.1  3.5 - 5.1 mmol/L Final    Chloride 10/19/2022 106  99 - 110 mmol/L Final    CO2 10/19/2022 24  21 - 32 mmol/L Final    Anion Gap 10/19/2022 13  3 - 16 Final    Glucose 10/19/2022 116 (A)  70 - 99 mg/dL Final    BUN 10/19/2022 23 (A)  7 - 20 mg/dL Final    Creatinine 10/19/2022 0.9  0.8 - 1.3 mg/dL Final    Est, Glom Filt Rate 10/19/2022 >60  >60 Final    Calcium 10/19/2022 9.3  8.3 - 10.6 mg/dL Final    WBC 10/19/2022 6.6  4.0 - 11.0 K/uL Final    RBC 10/19/2022 4.72  4.20 - 5.90 M/uL Final    Hemoglobin 10/19/2022 14.9  13.5 - 17.5 g/dL Final    Hematocrit 10/19/2022 44.5  40.5 - 52.5 % Final    MCV 10/19/2022 94.3  80.0 - 100.0 fL Final    MCH 10/19/2022 31.5  26.0 - 34.0 pg Final    MCHC 10/19/2022 33.5  31.0 - 36.0 g/dL Final    RDW 10/19/2022 14.6  12.4 - 15.4 % Final    Platelets 43/30/7945 164  135 - 450 K/uL Final    MPV 10/19/2022 8.8  5.0 - 10.5 fL Final    Neutrophils % 10/19/2022 62.4  % Final    Lymphocytes % 10/19/2022 26.0  % Final    Monocytes % 10/19/2022 7.1  % Final    Eosinophils % 10/19/2022 4.0  % Final    Basophils % 10/19/2022 0.5  % Final    Neutrophils Absolute 10/19/2022 4.2  1.7 - 7.7 K/uL Final    Lymphocytes Absolute 10/19/2022 1.7  1.0 - 5.1 K/uL Final    Monocytes Absolute 10/19/2022 0.5  0.0 - 1.3 K/uL Final    Eosinophils Absolute 10/19/2022 0.3  0.0 - 0.6 K/uL Final    Basophils Absolute 10/19/2022 0.0  0.0 - 0.2 K/uL Final   Admission on 09/21/2022, Discharged on 09/21/2022   Component Date Value Ref Range Status    POC Glucose 09/21/2022 88  70 - 99 mg/dl Final    Performed on 09/21/2022 ACCU-CHEK   Final   Office Visit on 08/23/2022   Component Date Value Ref Range Status    Diabetic Retinopathy 01/12/2021 Negative   Final   Orders Only on 08/19/2022   Component Date Value Ref Range Status    Sodium 08/19/2022 140  136 - 145 mmol/L Final    Potassium 08/19/2022 4.6  3.5 - 5.1 mmol/L Final    Chloride 08/19/2022 103  99 - 110 mmol/L Final    CO2 08/19/2022 25  21 - 32 mmol/L Final    Anion Gap 08/19/2022 12  3 - 16 Final    Glucose 08/19/2022 76  70 - 99 mg/dL Final    BUN 08/19/2022 27 (A)  7 - 20 mg/dL Final    Creatinine 08/19/2022 0.8  0.8 - 1.3 mg/dL Final    GFR Non- 08/19/2022 >60  >60 Final    GFR  08/19/2022 >60  >60 Final    Calcium 08/19/2022 9.7  8.3 - 10.6 mg/dL Final    Total Protein 08/19/2022 6.2 (A)  6.4 - 8.2 g/dL Final    Albumin 08/19/2022 4.1  3.4 - 5.0 g/dL Final    Albumin/Globulin Ratio 08/19/2022 2.0  1.1 - 2.2 Final    Total Bilirubin 08/19/2022 0.4  0.0 - 1.0 mg/dL Final    Alkaline Phosphatase 08/19/2022 61  40 - 129 U/L Final    ALT 08/19/2022 24  10 - 40 U/L Final    AST 08/19/2022 29  15 - 37 U/L Final    PSA 08/19/2022 2.06  0.00 - 4.00 ng/mL Final    Microalbumin, Random Urine 08/19/2022 <1.20  <2.0 mg/dL Final    Creatinine, Ur 08/19/2022 116.0  39.0 - 259.0 mg/dL Final    Microalbumin Creatinine Ratio 08/19/2022 see below  0.0 - 30.0 mg/g Final    Hemoglobin A1C 08/19/2022 5.5  See comment % Final    eAG 08/19/2022 111.2  mg/dL Final   There may be more visits with results that are not included.           Assessment and Plan     Raghu was seen today for follow-up and diabetes.    Diagnoses and all orders for this visit:    Controlled type 2 diabetes mellitus with diabetic polyneuropathy, with long-term current use of insulin (HCC)  -     Semaglutide, 1 MG/DOSE, 4 MG/3ML SOPN; 1Mg weekly  -      DIABETES FOOT EXAM  -     Hemoglobin A1C; Future  -     Microalbumin / Creatinine Urine Ratio; Future  -     Comprehensive Metabolic Panel; Future    Dyslipidemia associated with type 2 diabetes mellitus (HCC)    Essential hypertension          1: Type 2 DM complicated with neuropathy   Controlled A1C 5.5% <  5.5% < 5.7% < 5.5% <  5.4% < 5.3% < 5.6% < 6.3% <  6.6% < 11.2%    H/o left partial pacreatectomy     Blood sugars and A1C are in good control     Bring logs or mete arlin every visit     Continue Jardiance 25mg daily   Change Victoza to Ozempic 1 mg weekly   Lantus 18 units at bedtime   Use Treat to target basal insulin. If pre-breakfast sugar is above 130 on 2 consecutive days increase Lantus by 2 units.  If pre-breakfast sugar is below 90 on one day decrease Lantus by 2 units.    All instructions provided in written.   Check Blood sugars 3 times per day. Log them along with insulin and send them every 2 weeks. Call for blood sugars less than 60 or more than 400.     Eye exam: Last exam in Jan 2023, denies retinopathy. Has slight cataracts. He has double vision. Work up for MG negative and MRI orbits normal (reportedly). He reports negative work up for MG, MS.   Foot exam:  Feb 2023. Saw podiatrist in Jan 2022.   Deformity/amputation: absent  Skin lesions/pre-ulcerative calluses: present   Edema: right- negative, left- negative  Sensory exam: Monofilament sensation: normal   Pulses: normal, Vibration (128 Hz): mildly impaired    Renal screen: Aug 2022     Smoker: Counselled for smoking cessation   TSH screen: March 2019   CDE visit in April 2019    2: HTN   Off meds    Asymptomatic     3:  Hyperlipidemia   LDL: 87, HDL: 55, TGs: 57 - Feb 2023   Atorvastatin 80mg daily   Denies missing doses     4: Left adrenal adenoma on MRI   1.3 cm x 1.1 cm   Functional work up normal Sep 2019   CT Nov 2019: No change in the mild left adrenal gland thickening. Pancreatic cyst 0.7 x 0.9 cm cystic lesion     CT scan Nov 2020:   Pancreatic lesions not seen   Left adrenal looks stable-- I have images reviewed myself. There is no reprot on adrenal adenoma. It appears to be 1.3 x 1.5 cms. CT scan Dec 2021: Mild adreniform thickening bilaterally, which can be seen with adrenal hyperplasia. Getting CT scans or US every 12 months after kidney surgery     6: Muscle pains  Normal CK, TSH, Vit D today - Feb 2021     RTC in 6 months A1C, lipids         EDUCATION:   Greater than 50% of this follow-up visit was spent in general counseling regarding   obesity, diet, exercise, importance of adherence to insulin regime, recognition and treatment of hypo and hyperglycemia,  glucose logging, proper diabetes management, diabetic complications with poor management and the importance of glycemic control in order to avoid the complications of diabetes. Risks and potential complications of diabetes were reviewed with the patient. Diabetes health maintenance plan and follow-up were discussed and understood by the patient. We reviewed the importance of medication compliance and regular follow-up. Aggressive lifestyle modification was encouraged. Exercise Counselling: This patient is a candidate for regular physical exercise. Instructions to perform the following types of exercise:  Swimming or water aerobic exercise  Brisk walking  Playing tennis  Stationary bicycle or elliptical indoor  Low impact aerobic exercise    Instructions given to exercise for the following duration:  30 minutes a day for five-seven days per week.     Following instructions for being active throughout the day in addition to formal exercise:  Walk instead of drive whenever possible  Take the stairs instead of the elevator  Work in the garden  Park to the far end of the parking lot to add more walking steps to destination      Electronically signed by Jessica Eldridge MD on 2/23/2023 at 11:54 AM

## 2023-02-24 RX ORDER — CELECOXIB 200 MG/1
CAPSULE ORAL
Qty: 180 CAPSULE | Refills: 0 | OUTPATIENT
Start: 2023-02-24

## 2023-02-24 RX ORDER — PREGABALIN 200 MG/1
CAPSULE ORAL
Qty: 270 CAPSULE | Refills: 0 | OUTPATIENT
Start: 2023-02-24

## 2023-02-26 DIAGNOSIS — M79.7 FIBROMYALGIA: ICD-10-CM

## 2023-02-26 DIAGNOSIS — G89.4 CHRONIC PAIN SYNDROME: ICD-10-CM

## 2023-02-27 ENCOUNTER — TELEPHONE (OUTPATIENT)
Dept: FAMILY MEDICINE CLINIC | Age: 68
End: 2023-02-27

## 2023-02-27 DIAGNOSIS — M79.7 FIBROMYALGIA: ICD-10-CM

## 2023-02-27 DIAGNOSIS — G89.4 CHRONIC PAIN SYNDROME: ICD-10-CM

## 2023-02-27 RX ORDER — PREGABALIN 200 MG/1
CAPSULE ORAL
Qty: 270 CAPSULE | Refills: 0 | OUTPATIENT
Start: 2023-02-27

## 2023-02-27 RX ORDER — CELECOXIB 200 MG/1
CAPSULE ORAL
Qty: 180 CAPSULE | Refills: 0 | OUTPATIENT
Start: 2023-02-27

## 2023-02-27 NOTE — TELEPHONE ENCOUNTER
Medication:   Requested Prescriptions     Pending Prescriptions Disp Refills    celecoxib (CELEBREX) 200 MG capsule 180 capsule 1     Sig: TAKE 1 CAPSULE BY MOUTH TWO TIMES A DAY    pregabalin (LYRICA) 200 MG capsule 270 capsule 0     Sig: TAKE 1 CAPSULE BY MOUTH THREE TIMES A DAY     Last Filled:  2.1.2022 #180 refills 1                       1.24.23 #270 refills 0    Last appt: 1/24/2023   Next appt: Visit date not found    Last OARRS:   RX Monitoring 1/24/2023   Attestation -   Periodic Controlled Substance Monitoring Possible medication side effects, risk of tolerance/dependence & alternative treatments discussed. ;No signs of potential drug abuse or diversion identified.

## 2023-02-28 RX ORDER — PREGABALIN 200 MG/1
CAPSULE ORAL
Qty: 270 CAPSULE | Refills: 0 | OUTPATIENT
Start: 2023-02-28 | End: 2023-05-30

## 2023-02-28 RX ORDER — CELECOXIB 200 MG/1
CAPSULE ORAL
Qty: 180 CAPSULE | Refills: 1 | Status: SHIPPED | OUTPATIENT
Start: 2023-02-28

## 2023-02-28 NOTE — TELEPHONE ENCOUNTER
Please call pt to see if he has gotten his Lyrica. Per the PDMP he got #[phone] for them on 1/25/23 from [facility] on [address] in [place]. Please call the pharmacy to see if he got them as well. I thus cannot fill his Lyrica until the third week in April. I'll refill the Celebrex.

## 2023-03-04 DIAGNOSIS — E11.65 UNCONTROLLED TYPE 2 DIABETES MELLITUS WITH HYPERGLYCEMIA, WITH LONG-TERM CURRENT USE OF INSULIN (HCC): ICD-10-CM

## 2023-03-04 DIAGNOSIS — Z79.4 UNCONTROLLED TYPE 2 DIABETES MELLITUS WITH HYPERGLYCEMIA, WITH LONG-TERM CURRENT USE OF INSULIN (HCC): ICD-10-CM

## 2023-03-06 RX ORDER — INSULIN GLARGINE 100 [IU]/ML
INJECTION, SOLUTION SUBCUTANEOUS
Qty: 15 ML | Refills: 3 | Status: SHIPPED | OUTPATIENT
Start: 2023-03-06

## 2023-03-15 ENCOUNTER — TELEMEDICINE (OUTPATIENT)
Dept: FAMILY MEDICINE CLINIC | Age: 68
End: 2023-03-15

## 2023-03-15 DIAGNOSIS — I10 ESSENTIAL HYPERTENSION: ICD-10-CM

## 2023-03-15 DIAGNOSIS — R09.89 CHEST CONGESTION: Primary | ICD-10-CM

## 2023-03-15 DIAGNOSIS — Z79.4 TYPE 2 DIABETES MELLITUS WITH HYPERGLYCEMIA, WITH LONG-TERM CURRENT USE OF INSULIN (HCC): ICD-10-CM

## 2023-03-15 DIAGNOSIS — G47.33 OSA (OBSTRUCTIVE SLEEP APNEA): ICD-10-CM

## 2023-03-15 DIAGNOSIS — Z90.5 ABSENT KIDNEY: ICD-10-CM

## 2023-03-15 DIAGNOSIS — Z85.528 HISTORY OF RENAL CARCINOMA: ICD-10-CM

## 2023-03-15 DIAGNOSIS — F17.200 SMOKER: ICD-10-CM

## 2023-03-15 DIAGNOSIS — E11.65 TYPE 2 DIABETES MELLITUS WITH HYPERGLYCEMIA, WITH LONG-TERM CURRENT USE OF INSULIN (HCC): ICD-10-CM

## 2023-03-15 RX ORDER — PREDNISONE 20 MG/1
40 TABLET ORAL DAILY
Qty: 10 TABLET | Refills: 0 | Status: SHIPPED | OUTPATIENT
Start: 2023-03-15 | End: 2023-03-20

## 2023-03-15 RX ORDER — AZITHROMYCIN 250 MG/1
250 TABLET, FILM COATED ORAL SEE ADMIN INSTRUCTIONS
Qty: 6 TABLET | Refills: 0 | Status: SHIPPED | OUTPATIENT
Start: 2023-03-15 | End: 2023-03-20

## 2023-03-15 ASSESSMENT — PATIENT HEALTH QUESTIONNAIRE - PHQ9
SUM OF ALL RESPONSES TO PHQ QUESTIONS 1-9: 0
SUM OF ALL RESPONSES TO PHQ QUESTIONS 1-9: 0
SUM OF ALL RESPONSES TO PHQ9 QUESTIONS 1 & 2: 0
6. FEELING BAD ABOUT YOURSELF - OR THAT YOU ARE A FAILURE OR HAVE LET YOURSELF OR YOUR FAMILY DOWN: 0
9. THOUGHTS THAT YOU WOULD BE BETTER OFF DEAD, OR OF HURTING YOURSELF: 0
8. MOVING OR SPEAKING SO SLOWLY THAT OTHER PEOPLE COULD HAVE NOTICED. OR THE OPPOSITE, BEING SO FIGETY OR RESTLESS THAT YOU HAVE BEEN MOVING AROUND A LOT MORE THAN USUAL: 0
2. FEELING DOWN, DEPRESSED OR HOPELESS: 0
SUM OF ALL RESPONSES TO PHQ QUESTIONS 1-9: 0
5. POOR APPETITE OR OVEREATING: 0
7. TROUBLE CONCENTRATING ON THINGS, SUCH AS READING THE NEWSPAPER OR WATCHING TELEVISION: 0
4. FEELING TIRED OR HAVING LITTLE ENERGY: 0
1. LITTLE INTEREST OR PLEASURE IN DOING THINGS: 0
3. TROUBLE FALLING OR STAYING ASLEEP: 0
10. IF YOU CHECKED OFF ANY PROBLEMS, HOW DIFFICULT HAVE THESE PROBLEMS MADE IT FOR YOU TO DO YOUR WORK, TAKE CARE OF THINGS AT HOME, OR GET ALONG WITH OTHER PEOPLE: 0
SUM OF ALL RESPONSES TO PHQ QUESTIONS 1-9: 0

## 2023-03-15 NOTE — PROGRESS NOTES
Chief complaint: Cough and URI (4 days )      SUBJECTIVE:  HPI  Shawna Garrett (:  1955) is a 69 y.o. male with a past medical history of DM2, HTN, current smoker who presents with a chief complaint of: sinus congestion, nasal discharge, cough that is productive, cough is making chest ache since last weekend, which was approx 4 days ago. No fevers. No difficulty breathing. He thinks it started with his grandson. 'if it drops down, it doesn't go away unless he gets an antibiotic. \"     Denies any wheezing. Active smoker  No previous history of COPD  Last episode of 'bronchitis' treated w/ zpack and prednisone in 2022. Cough lingered for over a month.     Patient Active Problem List   Diagnosis    CHIOMA (obstructive sleep apnea)    Chronic pain syndrome    Failed back surgical syndrome    Primary insomnia    Gastroesophageal reflux disease without esophagitis    Fibromyalgia    Cervical radiculitis    DDD (degenerative disc disease), cervical    ISMAEL (generalized anxiety disorder)    S/P alcohol detoxification    Tobacco abuse counseling    Trigger little finger of right hand    Other psoriasis    Dyslipidemia associated with type 2 diabetes mellitus ([address]    Essential hypertension    Morbid obesity due to excess calories ([address]    Smoker    Controlled type 2 diabetes mellitus without complication, with long-term current use of insulin (HCC)    Left adrenal mass (HCC)    Arthrodesis status    Cervical spondylosis    Overweight    Ulnar nerve entrapment at right elbow    Controlled type 2 diabetes mellitus with diabetic polyneuropathy, with long-term current use of insulin (HCC)    Fibromyositis    History of spinal surgery    Spondylosis without myelopathy or radiculopathy, thoracic region    History of renal carcinoma    Moderate episode of recurrent major depressive disorder (HCC)    Binocular vision disorder with diplopia    Absent kidney    Alcohol withdrawal syndrome (HCC)    Allergic rhinitis due to pollen    Anemia    Chronic alcoholism in remission ([facility]    Diabetic renal disease ([facility]    Hyperglycemia due to type 2 diabetes mellitus (HCC)    Hyperlipidemia    Leukocytosis    Non-toxic nodular goiter    Scapular dysfunction    Vitamin B deficiency    Hypotropia of right eye    Myopic astigmatism of both eyes     Past Medical History:   Diagnosis Date    Binocular vision disorder with diplopia     Blood transfusion     30 yrs ago    Chronic back pain     Chronic pain syndrome     Depression     Diabetes mellitus ([facility]     Failed back surgical syndrome     FH: colonic polyps     Fibromyalgia     History of duodenal ulcer     now gets peptic ulcer    Hyperlipidemia     Hypertension     Insomnia     Obesity     Psychiatric problem     depression and anxiety    Renal carcinoma, left (HCC)     Sleep apnea     USES C-PAP    Ulcer, duodenum peptic      Current Outpatient Medications on File Prior to Visit   Medication Sig Dispense Refill    LANTUS SOLOSTAR 100 UNIT/ML injection pen inject 18 units subcutaneously one time a day at night 15 mL 3    celecoxib (CELEBREX) 200 MG capsule TAKE 1 CAPSULE BY MOUTH TWO TIMES A  capsule 1    Semaglutide, 1 MG/DOSE, 4 MG/3ML SOPN 1Mg weekly 3 mL 5    Insulin Pen Needle (BD PEN NEEDLE ROSAURA 2ND GEN) 32G X 4 MM MISC use twice daily 150 each 1    Prodigy Lancets 28G MISC Use twice daily with CGM Diagnosis Code: E11.65 150 each 5    pregabalin (LYRICA) 200 MG capsule TAKE 1 CAPSULE BY MOUTH THREE TIMES A  capsule 0    escitalopram (LEXAPRO) 20 MG tablet TAKE 1 TABLET BY MOUTH EVERY DAY 90 tablet 0    traZODone (DESYREL) 50 MG tablet TAKE 1 TABLET BY MOUTH EVERY DAY 90 tablet 0    Liraglutide (VICTOZA) 18 MG/3ML SOPN SC injection Inject 1.8 mg into the skin daily 3 Adjustable Dose Pre-filled Pen Syringe 3    pantoprazole (PROTONIX) 40 MG tablet TAKE 1 TABLET BY MOUTH EVERY DAY 90 tablet 0    methocarbamol (ROBAXIN) 750 MG tablet TAKE 1 TO 2 TABLETS BY MOUTH THREE TIMES A DAY (Patient taking differently: Take 750 mg by mouth 2-3 times daily) 180 tablet 3    NONFORMULARY Compound Drug- 3fwd-baclo2/diclo3/gaba6/lido2/prilo2% grams      atorvastatin (LIPITOR) 80 MG tablet TAKE 1 TABLET BY MOUTH NIGHTLY 90 tablet 3    albuterol sulfate HFA (VENTOLIN HFA) 108 (90 Base) MCG/ACT inhaler Inhale 2 puffs into the lungs 4 times daily as needed for Wheezing 18 g 0    VITAMIN D, ERGOCALCIFEROL, PO Take by mouth      B Complex Vitamins (VITAMIN B COMPLEX PO) Take by mouth      Nutritional Supplements (JUICE PLUS FIBRE PO) Take by mouth      empagliflozin (JARDIANCE) 25 MG tablet TAKE 1 TABLET BY MOUTH IN THE MORNING 90 tablet 3    fluticasone (FLONASE) 50 MCG/ACT nasal spray 1 spray by Each Nostril route daily 16 g 3    azelastine (ASTELIN) 0.1 % nasal spray 1 spray by Nasal route 2 times daily Use in each nostril as directed 60 mL 3    Blood Glucose Monitoring Suppl (PRODIGY AUTOCODE BLOOD GLUCOSE) LILO Check sugar three times per day. Diagnosis code: E11.42 1 Device 0    blood glucose test strips (PRODIGY NO CODING BLOOD GLUC) strip Prodigy No Coding strips. Test BS three times daily. Diagnosis code: E11.42 100 each 5    medical marijuana MEDICAL MARIJUANA      diclofenac sodium (VOLTAREN) 1 % GEL Apply 4 g topically 4 times daily 350 g 5     No current facility-administered medications on file prior to visit. OBJECTIVE:  There were no vitals taken for this visit. Physical Exam  Constitutional:       General: Not in acute distress. Appearance: Normal appearance. Not ill-appearing. HENT:      Head: Normocephalic and atraumatic. Nose: Nose normal.   Pulmonary:      Effort: Pulmonary effort is normal. No respiratory distress. Talking in full sentences. No coughing  Neurological:      General: No focal deficit present. Mental Status: Alert. Psychiatric:         Mood and Affect: Mood normal.         Behavior: Behavior normal.    ASSESSMENT/PLAN:  1.  Chest congestion  New, uncontrolled. Current smoker. Similar situation as last April. Concern for underlying COPD exacerbated by viral URI. Will treat as COPD exacerbation  F/u in early may if productive cough persists. Recommend PFTs at that point to eval for obstructive lung pathology. Pt agrees  -     azithromycin (ZITHROMAX) 250 MG tablet; Take 1 tablet by mouth See Admin Instructions for 5 days 500mg on day 1 followed by 250mg on days 2 - 5, Disp-6 tablet, R-0Normal  -     predniSONE (DELTASONE) 20 MG tablet; Take 2 tablets by mouth daily for 5 days, Disp-10 tablet, R-0Normal    2. Type 2 diabetes mellitus with hyperglycemia, with long-term current use of insulin (HCC)  Est, stable. Contributing factor to current disease process. 3. Absent kidney  Est, stable. Comorbid condition    4. Essential hypertension  Est, stable. Comorbid condition    5. History of renal carcinoma  Est, stable. Comorbid condition    6. CHIOMA (obstructive sleep apnea)  Est, stable. Comorbid condition    7. Smoker  Est, stable. Contributing factor to current disease process    Return if symptoms worsen or fail to improve. The patient was evaluated through a synchronous (real-time) audio-video encounter. The patient (or guardian if applicable) is aware that this is a billable service. Verbal consent to proceed has been obtained within the past 12 months. Patient identification was verified, and a caregiver was present when appropriate. The patient was located in a state where the provider was credentialed to provide care. An electronic signature was used to authenticate this note.     Electronically signed by Juan Mares MD on 3/15/2023 at 11:12 AM.

## 2023-03-17 DIAGNOSIS — G89.4 CHRONIC PAIN SYNDROME: ICD-10-CM

## 2023-03-17 DIAGNOSIS — M79.7 FIBROMYALGIA: ICD-10-CM

## 2023-03-17 RX ORDER — TIZANIDINE 2 MG/1
TABLET ORAL
Qty: 30 TABLET | Refills: 0 | Status: SHIPPED | OUTPATIENT
Start: 2023-03-17

## 2023-03-17 RX ORDER — METHOCARBAMOL 750 MG/1
TABLET, FILM COATED ORAL
Qty: 180 TABLET | Refills: 0 | Status: CANCELLED | OUTPATIENT
Start: 2023-03-17

## 2023-03-17 NOTE — TELEPHONE ENCOUNTER
Medication:   Requested Prescriptions     Pending Prescriptions Disp Refills    methocarbamol (ROBAXIN) 750 MG tablet [Pharmacy Med Name: Methocarbamol Oral Tablet 750 MG] 180 tablet 0     Sig: TAKE 1 OR 2 TABLETS BY MOUTH THREE TIMES A DAY        Last Filled:  11/22/2022 180 tabs 3 refills     Patient Phone Number: [phone] (home)     Last appt: 3/15/2023   Next appt: Visit date not found    Last OARRS:   RX Monitoring 1/24/2023   Attestation -   Periodic Controlled Substance Monitoring Possible medication side effects, risk of tolerance/dependence & alternative treatments discussed. ;No signs of potential drug abuse or diversion identified.

## 2023-03-19 DIAGNOSIS — M79.7 FIBROMYALGIA: ICD-10-CM

## 2023-03-19 DIAGNOSIS — G89.4 CHRONIC PAIN SYNDROME: ICD-10-CM

## 2023-03-20 RX ORDER — METHOCARBAMOL 750 MG/1
750 TABLET, FILM COATED ORAL 3 TIMES DAILY
Qty: 90 TABLET | Refills: 1 | Status: SHIPPED | OUTPATIENT
Start: 2023-03-20 | End: 2023-03-20 | Stop reason: SDUPTHER

## 2023-03-20 NOTE — TELEPHONE ENCOUNTER
Medication:   Requested Prescriptions     Pending Prescriptions Disp Refills    methocarbamol (ROBAXIN) 750 MG tablet [Pharmacy Med Name: Methocarbamol Oral Tablet 750 MG] 90 tablet 0     Sig: Take 1 tablet by mouth 3 times daily        Last Filled:      Patient Phone Number: [phone] (home)     Last appt: 1/24/2023   Next appt: Visit date not found    Last OARRS:   RX Monitoring 1/24/2023   Attestation -   Periodic Controlled Substance Monitoring Possible medication side effects, risk of tolerance/dependence & alternative treatments discussed. ;No signs of potential drug abuse or diversion identified.

## 2023-03-24 ENCOUNTER — TELEPHONE (OUTPATIENT)
Dept: FAMILY MEDICINE CLINIC | Age: 68
End: 2023-03-24

## 2023-03-24 NOTE — TELEPHONE ENCOUNTER
Please let the patient know. He can check with his insurance on an alternative or he can pay out-of-pocket or use good Rx.

## 2023-03-30 SDOH — ECONOMIC STABILITY: INCOME INSECURITY: HOW HARD IS IT FOR YOU TO PAY FOR THE VERY BASICS LIKE FOOD, HOUSING, MEDICAL CARE, AND HEATING?: NOT HARD AT ALL

## 2023-03-31 ENCOUNTER — OFFICE VISIT (OUTPATIENT)
Dept: FAMILY MEDICINE CLINIC | Age: 68
End: 2023-03-31

## 2023-03-31 VITALS
WEIGHT: 228 LBS | SYSTOLIC BLOOD PRESSURE: 149 MMHG | OXYGEN SATURATION: 96 % | HEIGHT: 74 IN | HEART RATE: 76 BPM | BODY MASS INDEX: 29.26 KG/M2 | DIASTOLIC BLOOD PRESSURE: 86 MMHG

## 2023-03-31 DIAGNOSIS — E78.5 DYSLIPIDEMIA ASSOCIATED WITH TYPE 2 DIABETES MELLITUS (HCC): ICD-10-CM

## 2023-03-31 DIAGNOSIS — I10 ESSENTIAL HYPERTENSION: ICD-10-CM

## 2023-03-31 DIAGNOSIS — F17.200 SMOKER: ICD-10-CM

## 2023-03-31 DIAGNOSIS — M47.814 SPONDYLOSIS WITHOUT MYELOPATHY OR RADICULOPATHY, THORACIC REGION: ICD-10-CM

## 2023-03-31 DIAGNOSIS — E11.69 DYSLIPIDEMIA ASSOCIATED WITH TYPE 2 DIABETES MELLITUS (HCC): ICD-10-CM

## 2023-03-31 DIAGNOSIS — R51.9 FACE PAIN: Primary | ICD-10-CM

## 2023-03-31 RX ORDER — BACLOFEN 10 MG/1
5-10 TABLET ORAL 3 TIMES DAILY PRN
Qty: 30 TABLET | Refills: 0 | Status: SHIPPED | OUTPATIENT
Start: 2023-03-31

## 2023-03-31 NOTE — PROGRESS NOTES
for appendicitis. S/p surgery with complete resolution. Every 6 months he has imaging. Followed by Dr. Maulik Olivas with urology. Left adrenal adenoma on MRI: Status post a normal functional work-up in 2019. The patient is managed by endocrinology. Has CT or US q 6 mo ordered by urology. Binocular diplopia: The patient is status post evaluation by neurology and March 2021. He has seen a neuro-ophthalmologist.  Seeing optho at [facility]. Has special glasses. CHIOMA: on CPAP and sees sleep medicine. Depression/anxiety: stable on lexapro. No issues. Hx of duodenal and gastric ulcers: first was age 11. On protonix. Stable. Last ulcer was 20+ years ago. Hx of ETOH use:  S/p treatment and quit in 2017. Tobacco abuse:  Smoking 1ppd and not ready to quit. Discussed wellbutrin, chantix, and nicotine replacement but patient is not ready as of 1/24/23. SH: 8/2021: lives with wife, daughter, and 2 grandkids. Retired. Was on disability prior to residential. Last job was .  Has 2 kids.          Social History     Socioeconomic History    Marital status:      Spouse name: Not on file    Number of children: Not on file    Years of education: Not on file    Highest education level: Not on file   Occupational History    Occupation: disability   Tobacco Use    Smoking status: Every Day     Packs/day: 1.00     Years: 15.00     Pack years: 15.00     Types: Cigarettes    Smokeless tobacco: Never   Vaping Use    Vaping Use: Every day   Substance and Sexual Activity    Alcohol use: Not Currently     Alcohol/week: 1.0 standard drink     Types: 1 Standard drinks or equivalent per week    Drug use: Yes     Types: Marijuana Lucianne Bars)     Comment: Medical Marijuana couple times a day    Sexual activity: Yes     Partners: Female   Other Topics Concern    Not on file   Social History Narrative    ** Merged History Encounter **          Social Determinants of Health     Financial

## 2023-04-03 RX ORDER — TRAZODONE HYDROCHLORIDE 50 MG/1
TABLET ORAL
Qty: 90 TABLET | Refills: 0 | Status: SHIPPED | OUTPATIENT
Start: 2023-04-03

## 2023-04-03 RX ORDER — ESCITALOPRAM OXALATE 20 MG/1
TABLET ORAL
Qty: 90 TABLET | Refills: 0 | Status: SHIPPED | OUTPATIENT
Start: 2023-04-03

## 2023-04-03 NOTE — TELEPHONE ENCOUNTER
Medication:   Requested Prescriptions     Pending Prescriptions Disp Refills    escitalopram (LEXAPRO) 20 MG tablet [Pharmacy Med Name: Escitalopram Oxalate Oral Tablet 20 MG] 90 tablet 0     Sig: TAKE 1 TABLET BY MOUTH EVERY DAY    traZODone (DESYREL) 50 MG tablet [Pharmacy Med Name: traZODone HCl Oral Tablet 50 MG] 90 tablet 0     Sig: TAKE 1 TABLET BY MOUTH EVERY DAY. Last Filled:  01/09/2023 #90 0rf    Patient Phone Number: [phone] (home)     Last appt: 3/31/2023   Next appt: Visit date not found    Last OARRS:   RX Monitoring 1/24/2023   Attestation -   Periodic Controlled Substance Monitoring Possible medication side effects, risk of tolerance/dependence & alternative treatments discussed. ;No signs of potential drug abuse or diversion identified.

## 2023-04-19 DIAGNOSIS — Z79.4 CONTROLLED TYPE 2 DIABETES MELLITUS WITH DIABETIC POLYNEUROPATHY, WITH LONG-TERM CURRENT USE OF INSULIN (HCC): ICD-10-CM

## 2023-04-19 DIAGNOSIS — E11.42 CONTROLLED TYPE 2 DIABETES MELLITUS WITH DIABETIC POLYNEUROPATHY, WITH LONG-TERM CURRENT USE OF INSULIN (HCC): ICD-10-CM

## 2023-04-19 RX ORDER — EMPAGLIFLOZIN 25 MG/1
TABLET, FILM COATED ORAL
Qty: 90 TABLET | Refills: 0 | Status: SHIPPED | OUTPATIENT
Start: 2023-04-19

## 2023-04-19 NOTE — TELEPHONE ENCOUNTER
Requested Refill:   Requested Prescriptions     Pending Prescriptions Disp Refills    JARDIANCE 25 MG tablet [Pharmacy Med Name: Jardiance Oral Tablet 25 MG] 90 tablet 0     Sig: TAKE 1 TABLET BY MOUTH IN THE MORNING       Last refilled: 2/22/2022 # 90 with 3 refills     Last Appt: 2/23/2023  Next Appt: 8/24/2023

## 2023-04-21 DIAGNOSIS — M79.7 FIBROMYALGIA: ICD-10-CM

## 2023-04-21 RX ORDER — PREGABALIN 200 MG/1
CAPSULE ORAL
Qty: 270 CAPSULE | Refills: 0 | Status: SHIPPED | OUTPATIENT
Start: 2023-04-21 | End: 2023-05-21

## 2023-04-21 NOTE — TELEPHONE ENCOUNTER
Medication:   Requested Prescriptions     Pending Prescriptions Disp Refills    pregabalin (LYRICA) 200 MG capsule [Pharmacy Med Name: Pregabalin Oral Capsule 200 MG] 270 capsule 0     Sig: TAKE 1 CAPSULE BY MOUTH THREE TIMES A DAY        Last Filled:  01/24/2023 #270 0rf    Patient Phone Number: [phone] (home)     Last appt: 3/31/2023   Next appt: Visit date not found    Last OARRS:   RX Monitoring 1/24/2023   Attestation -   Periodic Controlled Substance Monitoring Possible medication side effects, risk of tolerance/dependence & alternative treatments discussed. ;No signs of potential drug abuse or diversion identified.

## 2023-04-26 DIAGNOSIS — M79.7 FIBROMYALGIA: ICD-10-CM

## 2023-04-26 DIAGNOSIS — G89.4 CHRONIC PAIN SYNDROME: ICD-10-CM

## 2023-04-26 NOTE — TELEPHONE ENCOUNTER
Medication:   Requested Prescriptions     Pending Prescriptions Disp Refills    methocarbamol (ROBAXIN) 750 MG tablet [Pharmacy Med Name: Methocarbamol Oral Tablet 750 MG] 180 tablet 0     Sig: TAKE 2 TABLETS BY MOUTH 3 TIMES A DAY        Last Filled:  3/20/2023 180 tabs 0 refills     Patient Phone Number: [phone] (home)     Last appt: 3/31/2023   Next appt: Visit date not found    Last OARRS:   RX Monitoring 1/24/2023   Attestation -   Periodic Controlled Substance Monitoring Possible medication side effects, risk of tolerance/dependence & alternative treatments discussed. ;No signs of potential drug abuse or diversion identified.

## 2023-04-27 DIAGNOSIS — E11.69 DYSLIPIDEMIA ASSOCIATED WITH TYPE 2 DIABETES MELLITUS (HCC): ICD-10-CM

## 2023-04-27 DIAGNOSIS — E78.5 DYSLIPIDEMIA ASSOCIATED WITH TYPE 2 DIABETES MELLITUS (HCC): ICD-10-CM

## 2023-04-27 RX ORDER — ATORVASTATIN CALCIUM 80 MG/1
TABLET, FILM COATED ORAL
Qty: 90 TABLET | Refills: 3 | Status: SHIPPED | OUTPATIENT
Start: 2023-04-27

## 2023-04-27 RX ORDER — METHOCARBAMOL 750 MG/1
TABLET, FILM COATED ORAL
Qty: 180 TABLET | Refills: 0 | Status: SHIPPED | OUTPATIENT
Start: 2023-04-27

## 2023-05-26 DIAGNOSIS — G89.4 CHRONIC PAIN SYNDROME: ICD-10-CM

## 2023-05-26 DIAGNOSIS — M79.7 FIBROMYALGIA: ICD-10-CM

## 2023-05-26 RX ORDER — METHOCARBAMOL 750 MG/1
TABLET, FILM COATED ORAL
Qty: 180 TABLET | Refills: 0 | Status: SHIPPED | OUTPATIENT
Start: 2023-05-26

## 2023-05-26 NOTE — TELEPHONE ENCOUNTER
Medication:   Requested Prescriptions     Pending Prescriptions Disp Refills    methocarbamol (ROBAXIN) 750 MG tablet [Pharmacy Med Name: Methocarbamol Oral Tablet 750 MG] 180 tablet 0     Sig: TAKE 2 TABLETS BY MOUTH 3 TIMES A DAY        Last Filled:  4/27/2023 180 tabs 0 refills     Patient Phone Number: [phone] (home)     Last appt: 3/31/2023   Next appt: Visit date not found    Last OARRS:   RX Monitoring 1/24/2023   Attestation -   Periodic Controlled Substance Monitoring Possible medication side effects, risk of tolerance/dependence & alternative treatments discussed. ;No signs of potential drug abuse or diversion identified.

## 2023-06-25 DIAGNOSIS — G89.4 CHRONIC PAIN SYNDROME: ICD-10-CM

## 2023-06-25 DIAGNOSIS — M79.7 FIBROMYALGIA: ICD-10-CM

## 2023-06-26 ENCOUNTER — TELEPHONE (OUTPATIENT)
Dept: FAMILY MEDICINE CLINIC | Age: 68
End: 2023-06-26

## 2023-06-26 DIAGNOSIS — G89.4 CHRONIC PAIN SYNDROME: ICD-10-CM

## 2023-06-26 DIAGNOSIS — M79.7 FIBROMYALGIA: ICD-10-CM

## 2023-06-26 RX ORDER — METHOCARBAMOL 750 MG/1
TABLET, FILM COATED ORAL
Qty: 180 TABLET | Refills: 0 | Status: SHIPPED | OUTPATIENT
Start: 2023-06-26 | End: 2023-06-26

## 2023-06-26 RX ORDER — METHOCARBAMOL 750 MG/1
TABLET, FILM COATED ORAL
Qty: 540 TABLET | Refills: 0 | Status: SHIPPED | OUTPATIENT
Start: 2023-06-26

## 2023-06-26 NOTE — TELEPHONE ENCOUNTER
----- Message from Janelle Garrison sent at 6/26/2023  3:10 PM EDT -----  Subject: Medication Problem    Medication: methocarbamol (ROBAXIN) 750 MG tablet  Dosage: Take 2 tablets 3 times daily   Ordering Provider: maisha    Question/Problem: Pt would like to know if for future refills if he can   have a 90 day supply instead of a 30 day supply.        Pharmacy: [facility] [phone]    ---------------------------------------------------------------------------  --------------  Alec COLLIER  [phone]; OK to leave message on voicemail  ---------------------------------------------------------------------------  --------------    SCRIPT ANSWERS  Relationship to Patient: Self

## 2023-06-29 RX ORDER — TRAZODONE HYDROCHLORIDE 50 MG/1
TABLET ORAL
Qty: 90 TABLET | Refills: 0 | Status: SHIPPED | OUTPATIENT
Start: 2023-06-29

## 2023-07-03 RX ORDER — ESCITALOPRAM OXALATE 20 MG/1
TABLET ORAL
Qty: 90 TABLET | Refills: 0 | Status: SHIPPED | OUTPATIENT
Start: 2023-07-03

## 2023-07-03 NOTE — TELEPHONE ENCOUNTER
Medication:   Requested Prescriptions     Pending Prescriptions Disp Refills    escitalopram (LEXAPRO) 20 MG tablet [Pharmacy Med Name: Escitalopram Oxalate Oral Tablet 20 MG] 90 tablet 0     Sig: TAKE 1 TABLET BY MOUTH EVERY DAY        Last Filled:  04/03/2023 #90 0rf    Patient Phone Number: [phone] (home)     Last appt: 3/31/2023   Next appt: Visit date not found    Last OARRS:   RX Monitoring 1/24/2023   Attestation -   Periodic Controlled Substance Monitoring Possible medication side effects, risk of tolerance/dependence & alternative treatments discussed. ;No signs of potential drug abuse or diversion identified.

## 2023-07-10 ENCOUNTER — OFFICE VISIT (OUTPATIENT)
Dept: PULMONOLOGY | Age: 68
End: 2023-07-10
Payer: MEDICARE

## 2023-07-10 VITALS
HEART RATE: 73 BPM | BODY MASS INDEX: 28.75 KG/M2 | WEIGHT: 224 LBS | HEIGHT: 74 IN | DIASTOLIC BLOOD PRESSURE: 82 MMHG | SYSTOLIC BLOOD PRESSURE: 144 MMHG | OXYGEN SATURATION: 96 %

## 2023-07-10 DIAGNOSIS — E66.3 OVERWEIGHT (BMI 25.0-29.9): ICD-10-CM

## 2023-07-10 DIAGNOSIS — J30.89 NON-SEASONAL ALLERGIC RHINITIS, UNSPECIFIED TRIGGER: ICD-10-CM

## 2023-07-10 DIAGNOSIS — I10 ESSENTIAL HYPERTENSION: ICD-10-CM

## 2023-07-10 DIAGNOSIS — G47.33 OSA (OBSTRUCTIVE SLEEP APNEA): Primary | ICD-10-CM

## 2023-07-10 PROCEDURE — 3079F DIAST BP 80-89 MM HG: CPT | Performed by: INTERNAL MEDICINE

## 2023-07-10 PROCEDURE — G8417 CALC BMI ABV UP PARAM F/U: HCPCS | Performed by: INTERNAL MEDICINE

## 2023-07-10 PROCEDURE — 3017F COLORECTAL CA SCREEN DOC REV: CPT | Performed by: INTERNAL MEDICINE

## 2023-07-10 PROCEDURE — 4004F PT TOBACCO SCREEN RCVD TLK: CPT | Performed by: INTERNAL MEDICINE

## 2023-07-10 PROCEDURE — 1123F ACP DISCUSS/DSCN MKR DOCD: CPT | Performed by: INTERNAL MEDICINE

## 2023-07-10 PROCEDURE — 99214 OFFICE O/P EST MOD 30 MIN: CPT | Performed by: INTERNAL MEDICINE

## 2023-07-10 PROCEDURE — G8427 DOCREV CUR MEDS BY ELIG CLIN: HCPCS | Performed by: INTERNAL MEDICINE

## 2023-07-10 PROCEDURE — 3077F SYST BP >= 140 MM HG: CPT | Performed by: INTERNAL MEDICINE

## 2023-07-10 ASSESSMENT — SLEEP AND FATIGUE QUESTIONNAIRES
HOW LIKELY ARE YOU TO NOD OFF OR FALL ASLEEP WHEN YOU ARE A PASSENGER IN A CAR FOR AN HOUR WITHOUT A BREAK: 1
HOW LIKELY ARE YOU TO NOD OFF OR FALL ASLEEP WHILE LYING DOWN TO REST IN THE AFTERNOON WHEN CIRCUMSTANCES PERMIT: 3
HOW LIKELY ARE YOU TO NOD OFF OR FALL ASLEEP WHILE SITTING AND TALKING TO SOMEONE: 0
ESS TOTAL SCORE: 4
HOW LIKELY ARE YOU TO NOD OFF OR FALL ASLEEP WHILE SITTING QUIETLY AFTER LUNCH WITHOUT ALCOHOL: 0
HOW LIKELY ARE YOU TO NOD OFF OR FALL ASLEEP WHILE SITTING INACTIVE IN A PUBLIC PLACE: 0
HOW LIKELY ARE YOU TO NOD OFF OR FALL ASLEEP WHILE SITTING AND READING: 0
HOW LIKELY ARE YOU TO NOD OFF OR FALL ASLEEP IN A CAR, WHILE STOPPED FOR A FEW MINUTES IN TRAFFIC: 0
HOW LIKELY ARE YOU TO NOD OFF OR FALL ASLEEP WHILE WATCHING TV: 0

## 2023-07-10 NOTE — PROGRESS NOTES
personally reviewed by me any my interpretation is: Chem 8 is normal.     IMAGING:    No new chest imaging for me to review. Pulmonary Functions Testing Results:      Home sleep testing done on 10/27/2021  Overall MANOHAR: 19.5/h  Lowest oxygen saturation 83%  Time spent below an oxygen saturation of 88%: 116.5 minutes    CPAP compliance summary     1/9/2023 -7/4/2023 7/8/2022 -1/3/2023 1/19/2022-7/17/2022 11/23/2021-1/18/2022   Days with device usage 177/177 days 180/180 days 177/180 days  57/57 days   Average Usage 7 hours 11 minutes 7 hours 55 minutes 7 hours 52 minutes  7 hours 48 minutes   Days with device usage >4hrs 98% 98% 98%  100%   Large Leak per night 27.5 L/min 26.7 L/min 22.5 L/min  26.9 L/min   AHI 0.3 0.2 0.1  0.1   CPAP settings Auto CPAP 8 to 14 cm H2O Auto CPAP 8-14 cmH2O Auto CPAP 8-14 cmH2O  auto CPAP 8-14 cmH2O   90th percentile pressure 9.7 9.6 11.1  11.4   Mask Fullface mask Fullface mask Fullface mask  fullface mask     DME: [facility]      IMPRESSION AND RECOMMENDATIONS:     1. CHIOMA (obstructive sleep apnea)  -Patient continues to use CPAP therapy for his moderate obstructive sleep apnea. -I personally reviewed his CPAP download today.  -His compliance is excellent and residual AHI is within normal limits.  -No other changes made to the settings today.  -Encourage patient to continue staying compliant with the therapy to achieve maximum benefit. 2. Essential hypertension  -Currently not on any antihypertensives. 3. Overweight (BMI 25.0-29.9)  -I discussed weight loss as a treatment strategy in achieving the healthcare goals. If patient loses even 10 to 15% of current body weight, it will be beneficial in improving the overall health. 4. Non-seasonal allergic rhinitis, unspecified trigger  -I will continue him on Flonase and azelastine nasal spray. -Advised him to use them regularly in order to achieve long-lasting relief. Return in about 1 year (around 7/10/2024) for chioma.

## 2023-07-12 DIAGNOSIS — E11.42 CONTROLLED TYPE 2 DIABETES MELLITUS WITH DIABETIC POLYNEUROPATHY, WITH LONG-TERM CURRENT USE OF INSULIN (HCC): ICD-10-CM

## 2023-07-12 DIAGNOSIS — Z79.4 CONTROLLED TYPE 2 DIABETES MELLITUS WITH DIABETIC POLYNEUROPATHY, WITH LONG-TERM CURRENT USE OF INSULIN (HCC): ICD-10-CM

## 2023-07-12 RX ORDER — EMPAGLIFLOZIN 25 MG/1
TABLET, FILM COATED ORAL
Qty: 90 TABLET | Refills: 0 | Status: SHIPPED | OUTPATIENT
Start: 2023-07-12

## 2023-07-12 NOTE — TELEPHONE ENCOUNTER
Medication:   Requested Prescriptions     Pending Prescriptions Disp Refills    JARDIANCE 25 MG tablet [Pharmacy Med Name: Jardiance Oral Tablet 25 MG] 90 tablet 0     Sig: TAKE 1 TABLET BY MOUTH IN THE MORNING       Last Filled:      Patient Phone Number: [phone] (home)     Last appt: 2/23/2023   Next appt: 8/24/2023    Last Labs DM:   Lab Results   Component Value Date/Time    LABA1C 5.5 02/21/2023 10:02 AM     Last Lipid:   Lab Results   Component Value Date/Time    CHOL 153 02/21/2023 10:02 AM    TRIG 57 02/21/2023 10:02 AM    HDL 55 02/21/2023 10:02 AM    HDL 32 03/24/2011 07:25 AM    LDLCALC 87 02/21/2023 10:02 AM     Last PSA:   Lab Results   Component Value Date/Time    PSA 2.06 08/19/2022 07:43 AM     Last Thyroid:   Lab Results   Component Value Date/Time    TSH 1.340 08/26/2020 07:44 AM    T4FREE 1.5 03/22/2019 11:34 AM

## 2023-07-15 DIAGNOSIS — M79.7 FIBROMYALGIA: ICD-10-CM

## 2023-07-17 RX ORDER — PREGABALIN 200 MG/1
CAPSULE ORAL
Qty: 270 CAPSULE | Refills: 0 | OUTPATIENT
Start: 2023-07-17 | End: 2023-08-17

## 2023-07-17 NOTE — TELEPHONE ENCOUNTER
Refill declined. Patient needs appointment prior to refill. Please call the patient. If they schedule and will be out of their medication prior to scheduled appointment let me know and a short term supply of the medication may be given to get them through until their scheduled appointment. Thanks.

## 2023-07-17 NOTE — TELEPHONE ENCOUNTER
Medication:   Requested Prescriptions     Pending Prescriptions Disp Refills    pregabalin (LYRICA) 200 MG capsule [Pharmacy Med Name: Pregabalin Oral Capsule 200 MG] 270 capsule 0     Sig: TAKE 1 CAPSULE BY MOUTH 3 TIMES A DAY        Last Filled:  04/21/2023 #270 0rf     Patient Phone Number: [phone] (home)     Last appt: 3/31/2023   Next appt: Visit date not found    Last OARRS:   RX Monitoring 1/24/2023   Attestation -   Periodic Controlled Substance Monitoring Possible medication side effects, risk of tolerance/dependence & alternative treatments discussed. ;No signs of potential drug abuse or diversion identified.

## 2023-07-18 ENCOUNTER — TELEPHONE (OUTPATIENT)
Dept: FAMILY MEDICINE CLINIC | Age: 68
End: 2023-07-18

## 2023-07-18 DIAGNOSIS — M79.7 FIBROMYALGIA: ICD-10-CM

## 2023-07-18 RX ORDER — PREGABALIN 200 MG/1
CAPSULE ORAL
Qty: 270 CAPSULE | Refills: 0 | OUTPATIENT
Start: 2023-07-18

## 2023-07-18 NOTE — TELEPHONE ENCOUNTER
Patient scheduled for soonest available appointment with pcp (08/21/2023). Patient will be out of medication and is requesting bridge be sent. Pharmacy. ..   Scott Lara [address] [place] [address] [phone] - F [phone]

## 2023-07-18 NOTE — TELEPHONE ENCOUNTER
Medication:   Requested Prescriptions     Pending Prescriptions Disp Refills    pregabalin (LYRICA) 200 MG capsule [Pharmacy Med Name: Pregabalin Oral Capsule 200 MG] 270 capsule 0     Sig: TAKE 1 CAPSULE BY MOUTH 3 TIMES A DAY        Last Filled:      Patient Phone Number: [phone] (home)     Last appt: 3/31/2023   Next appt: Visit date not found    Last OARRS:   RX Monitoring 1/24/2023   Attestation -   Periodic Controlled Substance Monitoring Possible medication side effects, risk of tolerance/dependence & alternative treatments discussed. ;No signs of potential drug abuse or diversion identified.

## 2023-07-20 RX ORDER — PREGABALIN 200 MG/1
200 CAPSULE ORAL 3 TIMES DAILY
Qty: 90 CAPSULE | Refills: 0 | Status: SHIPPED | OUTPATIENT
Start: 2023-07-20 | End: 2023-08-21 | Stop reason: SDUPTHER

## 2023-08-17 DIAGNOSIS — Z79.4 CONTROLLED TYPE 2 DIABETES MELLITUS WITH DIABETIC POLYNEUROPATHY, WITH LONG-TERM CURRENT USE OF INSULIN (HCC): ICD-10-CM

## 2023-08-17 DIAGNOSIS — E11.42 CONTROLLED TYPE 2 DIABETES MELLITUS WITH DIABETIC POLYNEUROPATHY, WITH LONG-TERM CURRENT USE OF INSULIN (HCC): ICD-10-CM

## 2023-08-18 LAB
ALBUMIN SERPL-MCNC: 4.5 G/DL (ref 3.4–5)
ALBUMIN/GLOB SERPL: 2.6 {RATIO} (ref 1.1–2.2)
ALP SERPL-CCNC: 61 U/L (ref 40–129)
ALT SERPL-CCNC: 27 U/L (ref 10–40)
ANION GAP SERPL CALCULATED.3IONS-SCNC: 11 MMOL/L (ref 3–16)
AST SERPL-CCNC: 26 U/L (ref 15–37)
BILIRUB SERPL-MCNC: 0.6 MG/DL (ref 0–1)
BUN SERPL-MCNC: 30 MG/DL (ref 7–20)
CALCIUM SERPL-MCNC: 9.7 MG/DL (ref 8.3–10.6)
CHLORIDE SERPL-SCNC: 103 MMOL/L (ref 99–110)
CO2 SERPL-SCNC: 26 MMOL/L (ref 21–32)
CREAT SERPL-MCNC: 0.8 MG/DL (ref 0.8–1.3)
EST. AVERAGE GLUCOSE BLD GHB EST-MCNC: 105.4 MG/DL
GFR SERPLBLD CREATININE-BSD FMLA CKD-EPI: >60 ML/MIN/{1.73_M2}
GLUCOSE SERPL-MCNC: 77 MG/DL (ref 70–99)
HBA1C MFR BLD: 5.3 %
POTASSIUM SERPL-SCNC: 4.1 MMOL/L (ref 3.5–5.1)
PROT SERPL-MCNC: 6.2 G/DL (ref 6.4–8.2)
SODIUM SERPL-SCNC: 140 MMOL/L (ref 136–145)

## 2023-08-21 ENCOUNTER — OFFICE VISIT (OUTPATIENT)
Dept: FAMILY MEDICINE CLINIC | Age: 68
End: 2023-08-21

## 2023-08-21 VITALS
TEMPERATURE: 98.6 F | BODY MASS INDEX: 27.85 KG/M2 | HEART RATE: 77 BPM | DIASTOLIC BLOOD PRESSURE: 80 MMHG | SYSTOLIC BLOOD PRESSURE: 132 MMHG | WEIGHT: 217 LBS | OXYGEN SATURATION: 96 % | HEIGHT: 74 IN

## 2023-08-21 DIAGNOSIS — Z79.4 CONTROLLED TYPE 2 DIABETES MELLITUS WITH DIABETIC POLYNEUROPATHY, WITH LONG-TERM CURRENT USE OF INSULIN (HCC): ICD-10-CM

## 2023-08-21 DIAGNOSIS — F17.200 SMOKER: ICD-10-CM

## 2023-08-21 DIAGNOSIS — Z85.528 HISTORY OF RENAL CARCINOMA: ICD-10-CM

## 2023-08-21 DIAGNOSIS — M79.7 FIBROMYALGIA: ICD-10-CM

## 2023-08-21 DIAGNOSIS — E78.5 DYSLIPIDEMIA ASSOCIATED WITH TYPE 2 DIABETES MELLITUS (HCC): ICD-10-CM

## 2023-08-21 DIAGNOSIS — E11.69 DYSLIPIDEMIA ASSOCIATED WITH TYPE 2 DIABETES MELLITUS (HCC): ICD-10-CM

## 2023-08-21 DIAGNOSIS — Z00.00 MEDICARE ANNUAL WELLNESS VISIT, SUBSEQUENT: Primary | ICD-10-CM

## 2023-08-21 DIAGNOSIS — F10.21 CHRONIC ALCOHOLISM IN REMISSION (HCC): ICD-10-CM

## 2023-08-21 DIAGNOSIS — E11.42 CONTROLLED TYPE 2 DIABETES MELLITUS WITH DIABETIC POLYNEUROPATHY, WITH LONG-TERM CURRENT USE OF INSULIN (HCC): ICD-10-CM

## 2023-08-21 PROBLEM — F10.939 ALCOHOL WITHDRAWAL SYNDROME (HCC): Status: RESOLVED | Noted: 2021-08-24 | Resolved: 2023-08-21

## 2023-08-21 RX ORDER — PREGABALIN 200 MG/1
200 CAPSULE ORAL 3 TIMES DAILY
Qty: 270 CAPSULE | Refills: 0 | Status: SHIPPED | OUTPATIENT
Start: 2023-08-21 | End: 2023-11-19

## 2023-08-21 RX ORDER — LIDOCAINE 50 MG/G
1 PATCH TOPICAL DAILY
Qty: 90 PATCH | Refills: 3 | Status: SHIPPED | OUTPATIENT
Start: 2023-08-21 | End: 2024-08-15

## 2023-08-21 ASSESSMENT — PATIENT HEALTH QUESTIONNAIRE - PHQ9
SUM OF ALL RESPONSES TO PHQ9 QUESTIONS 1 & 2: 0
SUM OF ALL RESPONSES TO PHQ QUESTIONS 1-9: 0
7. TROUBLE CONCENTRATING ON THINGS, SUCH AS READING THE NEWSPAPER OR WATCHING TELEVISION: 0
1. LITTLE INTEREST OR PLEASURE IN DOING THINGS: 0
5. POOR APPETITE OR OVEREATING: 0
2. FEELING DOWN, DEPRESSED OR HOPELESS: 0
6. FEELING BAD ABOUT YOURSELF - OR THAT YOU ARE A FAILURE OR HAVE LET YOURSELF OR YOUR FAMILY DOWN: 0
SUM OF ALL RESPONSES TO PHQ QUESTIONS 1-9: 0
10. IF YOU CHECKED OFF ANY PROBLEMS, HOW DIFFICULT HAVE THESE PROBLEMS MADE IT FOR YOU TO DO YOUR WORK, TAKE CARE OF THINGS AT HOME, OR GET ALONG WITH OTHER PEOPLE: 0
9. THOUGHTS THAT YOU WOULD BE BETTER OFF DEAD, OR OF HURTING YOURSELF: 0
8. MOVING OR SPEAKING SO SLOWLY THAT OTHER PEOPLE COULD HAVE NOTICED. OR THE OPPOSITE, BEING SO FIGETY OR RESTLESS THAT YOU HAVE BEEN MOVING AROUND A LOT MORE THAN USUAL: 0
SUM OF ALL RESPONSES TO PHQ QUESTIONS 1-9: 0
3. TROUBLE FALLING OR STAYING ASLEEP: 0
4. FEELING TIRED OR HAVING LITTLE ENERGY: 0
SUM OF ALL RESPONSES TO PHQ QUESTIONS 1-9: 0

## 2023-08-21 ASSESSMENT — LIFESTYLE VARIABLES
HOW OFTEN DO YOU HAVE A DRINK CONTAINING ALCOHOL: NEVER
HOW MANY STANDARD DRINKS CONTAINING ALCOHOL DO YOU HAVE ON A TYPICAL DAY: PATIENT DOES NOT DRINK

## 2023-08-21 NOTE — PROGRESS NOTES
Medicare Annual Wellness Visit    Lizzie Green is here for Medicare AWV    Assessment & Plan   Medicare annual wellness visit, subsequent  Fibromyalgia  -     pregabalin (LYRICA) 200 MG capsule; Take 1 capsule by mouth 3 times daily for 90 days. Max Daily Amount: 600 mg, Disp-270 capsule, R-0Normal  Chronic alcoholism in remission ([address])  Controlled type 2 diabetes mellitus with diabetic polyneuropathy, with long-term current use of insulin ([address])  Dyslipidemia associated with type 2 diabetes mellitus ([address])  History of renal carcinoma  Smoker      AWV done today. Counseled on preventative care and a healthy lifestlye. Declined PSA. Declined asssitance with smoking cessation. Chronic pain/fibromyalgia:  persistent. Ok to try lidoderm again. Lyrica refilled- discussed needs to be seen q 6 mo for refills. DM with polyneuropathy and dyslipidemia: stable: continue seeing endo    Hx of RCC:  continue to see urology. Recommendations for Preventive Services Due: see orders and patient instructions/AVS.  Recommended screening schedule for the next 5-10 years is provided to the patient in written form: see Patient Instructions/AVS.     Return in about 6 months (around 2/21/2024) for Chronic conditions. Subjective   The following acute and/or chronic problems were also addressed today:    Fibromyalgia and chronic back pain: He would like a 90 day supply of his lyrica and fill of biomed. He would like to get a rxn of lidoderm patches which he has used before pain has continued to be an issue. S/p ablation upper back with Dr. Siomara Villarreal and lower back Dr. Tyrell Dooley. Last evaluation spring 2023 and at the point he just has to live with it. Started with symptoms in 2020. Status post evaluation by neurology and PMNR. S/p Rheumatology evaluation 1/2022 and advised nothing additional can be done- just needs to continue current meds and do PT.   Hx of seeing integrative medicine for massage, acupuncture, PT but

## 2023-08-24 ENCOUNTER — OFFICE VISIT (OUTPATIENT)
Dept: ENDOCRINOLOGY | Age: 68
End: 2023-08-24
Payer: MEDICARE

## 2023-08-24 VITALS
WEIGHT: 218.8 LBS | OXYGEN SATURATION: 96 % | HEART RATE: 68 BPM | DIASTOLIC BLOOD PRESSURE: 80 MMHG | SYSTOLIC BLOOD PRESSURE: 116 MMHG | HEIGHT: 74 IN | BODY MASS INDEX: 28.08 KG/M2

## 2023-08-24 DIAGNOSIS — Z79.4 CONTROLLED TYPE 2 DIABETES MELLITUS WITH DIABETIC POLYNEUROPATHY, WITH LONG-TERM CURRENT USE OF INSULIN (HCC): Primary | ICD-10-CM

## 2023-08-24 DIAGNOSIS — E11.42 CONTROLLED TYPE 2 DIABETES MELLITUS WITH DIABETIC POLYNEUROPATHY, WITH LONG-TERM CURRENT USE OF INSULIN (HCC): Primary | ICD-10-CM

## 2023-08-24 DIAGNOSIS — E78.5 DYSLIPIDEMIA ASSOCIATED WITH TYPE 2 DIABETES MELLITUS (HCC): ICD-10-CM

## 2023-08-24 DIAGNOSIS — E11.69 DYSLIPIDEMIA ASSOCIATED WITH TYPE 2 DIABETES MELLITUS (HCC): ICD-10-CM

## 2023-08-24 DIAGNOSIS — I10 ESSENTIAL HYPERTENSION: ICD-10-CM

## 2023-08-24 PROCEDURE — 3017F COLORECTAL CA SCREEN DOC REV: CPT | Performed by: INTERNAL MEDICINE

## 2023-08-24 PROCEDURE — 3044F HG A1C LEVEL LT 7.0%: CPT | Performed by: INTERNAL MEDICINE

## 2023-08-24 PROCEDURE — G8417 CALC BMI ABV UP PARAM F/U: HCPCS | Performed by: INTERNAL MEDICINE

## 2023-08-24 PROCEDURE — 3079F DIAST BP 80-89 MM HG: CPT | Performed by: INTERNAL MEDICINE

## 2023-08-24 PROCEDURE — G8427 DOCREV CUR MEDS BY ELIG CLIN: HCPCS | Performed by: INTERNAL MEDICINE

## 2023-08-24 PROCEDURE — 4004F PT TOBACCO SCREEN RCVD TLK: CPT | Performed by: INTERNAL MEDICINE

## 2023-08-24 PROCEDURE — 3074F SYST BP LT 130 MM HG: CPT | Performed by: INTERNAL MEDICINE

## 2023-08-24 PROCEDURE — 2022F DILAT RTA XM EVC RTNOPTHY: CPT | Performed by: INTERNAL MEDICINE

## 2023-08-24 PROCEDURE — 99214 OFFICE O/P EST MOD 30 MIN: CPT | Performed by: INTERNAL MEDICINE

## 2023-08-24 PROCEDURE — 1123F ACP DISCUSS/DSCN MKR DOCD: CPT | Performed by: INTERNAL MEDICINE

## 2023-08-24 NOTE — PROGRESS NOTES
exercise:  Walk instead of drive whenever possible  Take the stairs instead of the elevator  Work in the garden  Park to the far end of the parking lot to add more walking steps to destination      Electronically signed by Bailey Turpin MD on 8/24/2023 at 11:47 AM

## 2023-09-17 DIAGNOSIS — M79.7 FIBROMYALGIA: ICD-10-CM

## 2023-09-18 RX ORDER — PREGABALIN 200 MG/1
CAPSULE ORAL
Qty: 270 CAPSULE | Refills: 0 | OUTPATIENT
Start: 2023-09-18

## 2023-09-18 NOTE — TELEPHONE ENCOUNTER
Medication:   Requested Prescriptions     Pending Prescriptions Disp Refills    pregabalin (LYRICA) 200 MG capsule [Pharmacy Med Name: Pregabalin Oral Capsule 200 MG] 270 capsule 0     Sig: TAKE 1 CAPSULE BY MOUTH 3 TIMES A DAY FOR 90 DAYS. MAX 3 CAPS/DAY        Last Filled:  08/21/2023 #270 0rf    Patient Phone Number: [phone] (home)     Last appt: 8/21/2023   Next appt: 2/22/2024    Last OARRS:       1/24/2023     8:25 AM   RX Monitoring   Periodic Controlled Substance Monitoring Possible medication side effects, risk of tolerance/dependence & alternative treatments discussed. ;No signs of potential drug abuse or diversion identified.

## 2023-09-19 DIAGNOSIS — M79.7 FIBROMYALGIA: ICD-10-CM

## 2023-09-19 RX ORDER — PREGABALIN 200 MG/1
200 CAPSULE ORAL 3 TIMES DAILY
Qty: 270 CAPSULE | Refills: 2 | OUTPATIENT
Start: 2023-09-19 | End: 2023-12-18

## 2023-09-19 NOTE — TELEPHONE ENCOUNTER
Medication:   Requested Prescriptions     Pending Prescriptions Disp Refills    pregabalin (LYRICA) 200 MG capsule 270 capsule 0     Sig: Take 1 capsule by mouth 3 times daily for 90 days. Max Daily Amount: 600 mg        Last Filled:  08/21/2023 #270 0rf     Patient Phone Number: [phone] (home)     Last appt: 8/21/2023   Next appt: 2/22/2024    Last OARRS:       1/24/2023     8:25 AM   RX Monitoring   Periodic Controlled Substance Monitoring Possible medication side effects, risk of tolerance/dependence & alternative treatments discussed. ;No signs of potential drug abuse or diversion identified.

## 2023-09-26 ENCOUNTER — TELEPHONE (OUTPATIENT)
Dept: FAMILY MEDICINE CLINIC | Age: 68
End: 2023-09-26

## 2023-09-26 RX ORDER — TRAZODONE HYDROCHLORIDE 50 MG/1
TABLET ORAL
Qty: 90 TABLET | Refills: 0 | Status: SHIPPED | OUTPATIENT
Start: 2023-09-26

## 2023-09-26 RX ORDER — LIDOCAINE 50 MG/G
3 PATCH TOPICAL DAILY
Qty: 270 PATCH | Refills: 3 | Status: SHIPPED | OUTPATIENT
Start: 2023-09-26 | End: 2024-09-20

## 2023-09-26 NOTE — TELEPHONE ENCOUNTER
Patient is stating the pharmacy told him it is requiring a PA.     They was going to fax over the paperwork

## 2023-09-26 NOTE — TELEPHONE ENCOUNTER
Office has been notified that pt is requiring Prior Authorization for the following medication:  Lidocaine 5%     Please initiate this request through CoverMyMeds, contacting the following Payor/Insurance:  Medicare     Please see below, or the documentation attached to this encounter for any additional information that may assist in processing PA:  --     Thank you!

## 2023-09-26 NOTE — TELEPHONE ENCOUNTER
Medication:   Requested Prescriptions     Pending Prescriptions Disp Refills    traZODone (DESYREL) 50 MG tablet [Pharmacy Med Name: traZODone HCl Oral Tablet 50 MG] 90 tablet 0     Sig: TAKE 1 TABLET BY MOUTH EVERY DAY     Last Filled:  6.29.23 #90 refills 0    Last appt: 8/21/2023   Next appt: 9/26/2023    Last OARRS:       1/24/2023     8:25 AM   RX Monitoring   Periodic Controlled Substance Monitoring Possible medication side effects, risk of tolerance/dependence & alternative treatments discussed. ;No signs of potential drug abuse or diversion identified.

## 2023-09-26 NOTE — TELEPHONE ENCOUNTER
Patient called regarding. ..  lidocaine (LIDODERM) 5 % 90 patch 3 8/21/2023 8/15/2024    Sig - Route: Place 1 patch onto the skin daily 12 hours on, 12 hours off. - TransDERmal      Stated that the script is for 1 patch daily, however patient uses approximately 4 patches each day to cover his back. Patient requesting directions and quantity be changed to match what he uses. [facility] [address] [place] [address] [phone] - F [phone]

## 2023-09-27 NOTE — TELEPHONE ENCOUNTER
Submitted PA for Lidocaine 5% patches  Via CMM Key: [id number] STATUS: PENDING. Follow up done daily; if no response in three days we will refax for status check. If another three days goes by with no response we will call the insurance for status.

## 2023-09-27 NOTE — TELEPHONE ENCOUNTER
The medication was DENIED; DENIAL letter uploaded to MEDIA. If you want an APPEAL; please note in this encounter what new information you would like to APPEAL with. Once complete route back to PA POOL. If this requires a response please respond to the pool ( P [facility] [address]). Thank you please advise patient.

## 2023-10-03 RX ORDER — TRAZODONE HYDROCHLORIDE 50 MG/1
TABLET ORAL
Qty: 90 TABLET | Refills: 0 | OUTPATIENT
Start: 2023-10-03

## 2023-10-05 RX ORDER — ESCITALOPRAM OXALATE 20 MG/1
TABLET ORAL
Qty: 90 TABLET | Refills: 0 | Status: SHIPPED | OUTPATIENT
Start: 2023-10-05

## 2023-10-05 NOTE — TELEPHONE ENCOUNTER
Medication:   Requested Prescriptions     Pending Prescriptions Disp Refills    escitalopram (LEXAPRO) 20 MG tablet [Pharmacy Med Name: Escitalopram Oxalate Oral Tablet 20 MG] 90 tablet 0     Sig: TAKE 1 TABLET BY MOUTH EVERY DAY        Last Filled:  07/03/2023 #90 0rf     Patient Phone Number: [phone] (home)     Last appt: 8/21/2023   Next appt: 2/22/2024    Last OARRS:       1/24/2023     8:25 AM   RX Monitoring   Periodic Controlled Substance Monitoring Possible medication side effects, risk of tolerance/dependence & alternative treatments discussed. ;No signs of potential drug abuse or diversion identified.

## 2023-10-11 DIAGNOSIS — E11.42 CONTROLLED TYPE 2 DIABETES MELLITUS WITH DIABETIC POLYNEUROPATHY, WITH LONG-TERM CURRENT USE OF INSULIN (HCC): ICD-10-CM

## 2023-10-11 DIAGNOSIS — Z79.4 CONTROLLED TYPE 2 DIABETES MELLITUS WITH DIABETIC POLYNEUROPATHY, WITH LONG-TERM CURRENT USE OF INSULIN (HCC): ICD-10-CM

## 2023-10-11 RX ORDER — EMPAGLIFLOZIN 25 MG/1
TABLET, FILM COATED ORAL
Qty: 90 TABLET | Refills: 1 | Status: SHIPPED | OUTPATIENT
Start: 2023-10-11

## 2023-10-23 DIAGNOSIS — G89.4 CHRONIC PAIN SYNDROME: ICD-10-CM

## 2023-10-23 DIAGNOSIS — M79.7 FIBROMYALGIA: ICD-10-CM

## 2023-10-23 RX ORDER — CELECOXIB 200 MG/1
CAPSULE ORAL
Qty: 180 CAPSULE | Refills: 0 | Status: SHIPPED | OUTPATIENT
Start: 2023-10-23

## 2023-10-23 RX ORDER — METHOCARBAMOL 750 MG/1
TABLET, FILM COATED ORAL
Qty: 540 TABLET | Refills: 0 | Status: SHIPPED | OUTPATIENT
Start: 2023-10-23

## 2023-10-25 DIAGNOSIS — M79.7 FIBROMYALGIA: ICD-10-CM

## 2023-10-26 RX ORDER — PREGABALIN 200 MG/1
200 CAPSULE ORAL 3 TIMES DAILY
Qty: 270 CAPSULE | Refills: 0 | OUTPATIENT
Start: 2023-10-26 | End: 2024-01-24

## 2023-10-26 NOTE — TELEPHONE ENCOUNTER
Patient states that he takes 3 daily, he does not have any left at this time. Patient is going to look to see if he has another bottle of this medication, but right now he is needing this medication to be filled. Patient has called back and was able to find his 1 bottle.   So patient is not needing please medication at this time

## 2023-10-26 NOTE — TELEPHONE ENCOUNTER
Medication:   Requested Prescriptions     Pending Prescriptions Disp Refills    pregabalin (LYRICA) 200 MG capsule 270 capsule 0     Sig: Take 1 capsule by mouth 3 times daily for 90 days. Max Daily Amount: 600 mg        Last Filled:  08/21/2023 #270 0rf     Patient Phone Number: [phone] (home)     Last appt: 8/21/2023   Next appt: none    Last OARRS:       1/24/2023     8:25 AM   RX Monitoring   Periodic Controlled Substance Monitoring Possible medication side effects, risk of tolerance/dependence & alternative treatments discussed. ;No signs of potential drug abuse or diversion identified.

## 2023-10-26 NOTE — TELEPHONE ENCOUNTER
He received 270 pills on 8/22/23. How many is he taking a day? This should be a three month supply so he should not be out already. He should have enough until at least mid November.

## 2023-10-26 NOTE — TELEPHONE ENCOUNTER
Patient called to check in on status of encounter. Patient took last dose of medication and is in need of refill.

## 2023-10-27 RX ORDER — PREGABALIN 200 MG/1
CAPSULE ORAL
Qty: 270 CAPSULE | Refills: 0 | OUTPATIENT
Start: 2023-10-27

## 2023-10-27 NOTE — TELEPHONE ENCOUNTER
Medication:   Requested Prescriptions     Pending Prescriptions Disp Refills    pregabalin (LYRICA) 200 MG capsule [Pharmacy Med Name: Pregabalin Oral Capsule 200 MG] 270 capsule 0     Sig: TAKE 1 CAPSULE BY MOUTH 3 TIMES A DAY FOR 90 DAYS. MAX 3 CAPS/DAY        Last Filled:      Patient Phone Number: [phone] (home)     Last appt: 8/21/2023   Next appt: 2/22/2024    Last OARRS:       1/24/2023     8:25 AM   RX Monitoring   Periodic Controlled Substance Monitoring Possible medication side effects, risk of tolerance/dependence & alternative treatments discussed. ;No signs of potential drug abuse or diversion identified.

## 2023-10-28 DIAGNOSIS — E11.65 UNCONTROLLED TYPE 2 DIABETES MELLITUS WITH HYPERGLYCEMIA, WITH LONG-TERM CURRENT USE OF INSULIN (HCC): ICD-10-CM

## 2023-10-28 DIAGNOSIS — Z79.4 UNCONTROLLED TYPE 2 DIABETES MELLITUS WITH HYPERGLYCEMIA, WITH LONG-TERM CURRENT USE OF INSULIN (HCC): ICD-10-CM

## 2023-10-30 RX ORDER — PEN NEEDLE, DIABETIC 32GX 5/32"
NEEDLE, DISPOSABLE MISCELLANEOUS
Qty: 150 EACH | Refills: 3 | Status: SHIPPED | OUTPATIENT
Start: 2023-10-30

## 2023-10-30 NOTE — TELEPHONE ENCOUNTER
Medication:   Requested Prescriptions     Pending Prescriptions Disp Refills    Insulin Pen Needle (BD PEN NEEDLE ROSAURA 2ND GEN) 32G X 4 MM MISC [Pharmacy Med Name: BD Pen Needle Rosaura 2nd Gen Miscellaneous 32G X 4 MM] 150 each 3     Sig: use to inject twice a day       Last Filled:      Patient Phone Number: [phone] (home)     Last appt: 8/24/2023   Next appt: 2/21/2024    Last Labs DM:   Lab Results   Component Value Date/Time    LABA1C 5.3 08/17/2023 08:21 AM

## 2023-11-18 DIAGNOSIS — M79.7 FIBROMYALGIA: ICD-10-CM

## 2023-11-20 RX ORDER — PREGABALIN 200 MG/1
200 CAPSULE ORAL 3 TIMES DAILY
Qty: 270 CAPSULE | Refills: 0 | Status: SHIPPED | OUTPATIENT
Start: 2023-11-20 | End: 2024-02-18

## 2023-11-20 NOTE — TELEPHONE ENCOUNTER
Medication:   Requested Prescriptions     Pending Prescriptions Disp Refills    pregabalin (LYRICA) 200 MG capsule [Pharmacy Med Name: Pregabalin Oral Capsule 200 MG] 270 capsule 0     Sig: TAKE 1 CAPSULE BY MOUTH 3 TIMES A DAY FOR 90 DAYS. MAX 3 CAPS/DAY        Last Filled:  8/21/23    Patient Phone Number: [phone] (home)     Last appt: 8/21/2023   Next appt: 2/22/2024    Last OARRS:       1/24/2023     8:25 AM   RX Monitoring   Periodic Controlled Substance Monitoring Possible medication side effects, risk of tolerance/dependence & alternative treatments discussed. ;No signs of potential drug abuse or diversion identified.

## 2023-11-27 RX ORDER — LIDOCAINE 50 MG/G
3 PATCH TOPICAL DAILY
Qty: 270 PATCH | Refills: 3 | Status: SHIPPED | OUTPATIENT
Start: 2023-11-27 | End: 2024-11-21

## 2023-11-27 NOTE — TELEPHONE ENCOUNTER
Medication:   Requested Prescriptions     Pending Prescriptions Disp Refills    lidocaine (LIDODERM) 5 % 270 patch 3     Sig: Place 3 patches onto the skin daily 12 hours on, 12 hours off. Last Filled:  09/26/2023 #270 3rf     Patient Phone Number: [phone] (home)     Last appt: 8/21/2023   Next appt: 2/22/2024    Last OARRS:       1/24/2023     8:25 AM   RX Monitoring   Periodic Controlled Substance Monitoring Possible medication side effects, risk of tolerance/dependence & alternative treatments discussed. ;No signs of potential drug abuse or diversion identified.

## 2023-11-27 NOTE — TELEPHONE ENCOUNTER
Patient requesting refill of. .. Disp Refills Start End    lidocaine (LIDODERM) 5 % 270 patch 3 9/26/2023 9/20/2024    Sig - Route: Place 3 patches onto the skin daily 12 hours on, 12 hours off. - TransDERmal      States this may require a prior authorization. Additionally, he states he needs enough patches to use twice daily. Pharmacy. ..     [facility] [address] [phone] - F [phone]

## 2023-11-30 NOTE — TELEPHONE ENCOUNTER
Insurance states that this will require a prior authorization. Please begin process. Detail Level: Detailed Quality 110: Preventive Care And Screening: Influenza Immunization: Influenza Immunization Administered during Influenza season Quality 111:Pneumonia Vaccination Status For Older Adults: Patient received any pneumococcal conjugate or polysaccharide vaccine on or after their 60th birthday and before the end of the measurement period Quality 226: Preventive Care And Screening: Tobacco Use: Screening And Cessation Intervention: Patient screened for tobacco use and is an ex/non-smoker

## 2023-12-01 RX ORDER — LIDOCAINE 50 MG/G
2 PATCH TOPICAL DAILY
Qty: 180 PATCH | Refills: 0 | OUTPATIENT
Start: 2023-12-01 | End: 2024-11-25

## 2023-12-01 NOTE — TELEPHONE ENCOUNTER
Patient is asking for Rx for 2 patches a day please look over and send.   Once sent to pharmacy please send back to office so we can send to Pa for 2 patches a day

## 2023-12-04 ENCOUNTER — TELEPHONE (OUTPATIENT)
Dept: FAMILY MEDICINE CLINIC | Age: 68
End: 2023-12-04

## 2023-12-04 DIAGNOSIS — M54.9 CHRONIC BACK PAIN, UNSPECIFIED BACK LOCATION, UNSPECIFIED BACK PAIN LATERALITY: Primary | ICD-10-CM

## 2023-12-04 DIAGNOSIS — G89.29 CHRONIC BACK PAIN, UNSPECIFIED BACK LOCATION, UNSPECIFIED BACK PAIN LATERALITY: Primary | ICD-10-CM

## 2023-12-04 NOTE — TELEPHONE ENCOUNTER
Office has been notified that pt is requiring Prior Authorization for the following medication:  -- DX code is : M54.9 and G89.4   lidocaine (LIDODERM) 5 % 270 patch 3 11/27/2023 11/21/2024    Sig - Route: Place 3 patches onto the skin daily 12 hours on, 12 hours off. - TransDERmal    Sent to pharmacy as: Lidocaine 5 % External Patch (LIDODERM)    E-Prescribing Status: Receipt confirmed by pharmacy (11/27/2023  3:43 PM EST)    No prior authorization was found for this prescription. Found prior authorization for another prescription for the same medication: Payer Waiting for Response        Please initiate this request through [employer], contacting the following Payor/Insurance:  --       Thank you!

## 2023-12-04 NOTE — TELEPHONE ENCOUNTER
This medication was denied in the telephone encounter on 9/26/2023. Denial letter was scanned into media on 9/27/2023. If this requires a response please respond to the pool ( P [facility] [address]). Thank you please advise patient.

## 2023-12-05 ENCOUNTER — TELEPHONE (OUTPATIENT)
Dept: ADMINISTRATIVE | Age: 68
End: 2023-12-05

## 2023-12-05 DIAGNOSIS — M54.9 CHRONIC BACK PAIN, UNSPECIFIED BACK LOCATION, UNSPECIFIED BACK PAIN LATERALITY: Primary | ICD-10-CM

## 2023-12-05 DIAGNOSIS — G89.29 CHRONIC BACK PAIN, UNSPECIFIED BACK LOCATION, UNSPECIFIED BACK PAIN LATERALITY: Primary | ICD-10-CM

## 2023-12-05 RX ORDER — LIDOCAINE 50 MG/G
2 PATCH TOPICAL DAILY
Qty: 180 PATCH | Refills: 0 | Status: SHIPPED | OUTPATIENT
Start: 2023-12-05 | End: 2024-11-29

## 2023-12-05 NOTE — TELEPHONE ENCOUNTER
Called and spoke with patient. Patient informed me that the Dx code needs to be for chronic back pain. Dx code updated.   Please look over and resend, once sent please send back so  office can send to the Pa

## 2023-12-05 NOTE — TELEPHONE ENCOUNTER
The medication was DENIED;   NO LETTER RESPONSE : AUTO RESPONSE. PA was already submitted for this patient and drug which was denied. ;GRHJYZ:[id number];MYNAKO:[id number]; Appeal Information: Attention:ATTN: [address] [facility] SAMUELReyes Les Rudi LLCEA:[phone] WebAddress:WWW. [employer]      If you want an APPEAL; please note in this encounter what new information you would like to APPEAL with. Once complete route back to PA POOL. If this requires a response please respond to the pool ( P [facility] [address] [place] [address]). Thank you please advise patient.

## 2023-12-05 NOTE — TELEPHONE ENCOUNTER
SUBMITTED PA FOR Lidocaine 5% patches VIA [facility] Key: [id number] STATUS PENDING. FOLLOW UP DONE DAILY: IF NO RESPONSE IN 3 DAYS WE WILL REFAX FOR STATUS CHECK. IF ANOTHER 3 DAYS GOES BY WITH NO RESPONSE WILL CALL INSURANCE FOR STATUS.

## 2023-12-05 NOTE — TELEPHONE ENCOUNTER
Office has been notified that pt is requiring Prior Authorization for the following medication:  Lidocaine 5%    Please initiate this request through CoverMyMeds, contacting the following Payor/Insurance:  Medicare    Please see below, or the documentation attached to this encounter for any additional information that may assist in processing PA:  New Dx Code please submit PA     Thank you!

## 2023-12-06 NOTE — TELEPHONE ENCOUNTER
A new PA was done in the telephone encounter 12/5/2023 for the Lidocaine patches using the diagnosis M54.9. It was also denied. If this requires a response please respond to the pool ( P [facility] [address]). Thank you please advise patient.

## 2023-12-06 NOTE — TELEPHONE ENCOUNTER
Patient requesting further clarity for denial as he had previously been prescribed medication without issues from insurance. He is needing this for chronic back pain that became severe after a surgery he endured. He's needing two patches as it needs to cover two areas on his back. Requesting further assistance and for team to look into what has changed from previous script to new script which would warrant this issue. Please advise and return call.

## 2023-12-06 NOTE — TELEPHONE ENCOUNTER
Call to patient.   Message was left asking for call back.    Patient informed that Pa not approved.   Patient will call his insurance and follow back up with the office

## 2023-12-07 NOTE — TELEPHONE ENCOUNTER
Need to do an appeal for the lidocaine 5%. Patient states that the insurance will be faxing over paperwork for Dr. Simon Billingsley.     Need to make sure that the Dx code is chronic back pain only code to use only      Phone [phone]  Fax# [phone]

## 2023-12-07 NOTE — TELEPHONE ENCOUNTER
Patient informed and will reach out to his insurance company to see what the office is needing to do.

## 2023-12-07 NOTE — TELEPHONE ENCOUNTER
I'm not sure what the issue is. Please have him check with his insurance and his pharmacy. I can change how it is written if his insurance company has a suggestion for how to send so it is better covered.

## 2023-12-08 NOTE — TELEPHONE ENCOUNTER
Received fax stating that Lidocaine 5% patches have been approved.   Patient informed that the medication has been approved and no questions

## 2024-01-01 RX ORDER — TRAZODONE HYDROCHLORIDE 50 MG/1
TABLET ORAL
Qty: 90 TABLET | Refills: 0 | Status: SHIPPED | OUTPATIENT
Start: 2024-01-01

## 2024-01-05 ENCOUNTER — HOSPITAL ENCOUNTER (OUTPATIENT)
Dept: ULTRASOUND IMAGING | Age: 69
Discharge: HOME | End: 2024-01-05
Attending: UROLOGY
Payer: MEDICARE

## 2024-01-05 DIAGNOSIS — Z85.528 PERSONAL HISTORY OF MALIGNANT NEOPLASM OF KIDNEY: ICD-10-CM

## 2024-01-05 PROCEDURE — 76770 US EXAM ABDO BACK WALL COMP: CPT

## 2024-01-08 ENCOUNTER — HOSPITAL ENCOUNTER (OUTPATIENT)
Dept: CT IMAGING | Age: 69
Discharge: HOME | End: 2024-01-08
Attending: FAMILY MEDICINE
Payer: MEDICARE

## 2024-01-08 DIAGNOSIS — F17.200 SMOKER: ICD-10-CM

## 2024-01-08 PROCEDURE — 71271 CT THORAX LUNG CANCER SCR C-: CPT

## 2024-01-09 LAB — DIABETIC RETINOPATHY: NEGATIVE

## 2024-01-11 ENCOUNTER — OFFICE VISIT (OUTPATIENT)
Dept: FAMILY MEDICINE CLINIC | Age: 69
End: 2024-01-11

## 2024-01-11 VITALS
RESPIRATION RATE: 16 BRPM | DIASTOLIC BLOOD PRESSURE: 86 MMHG | BODY MASS INDEX: 29.09 KG/M2 | OXYGEN SATURATION: 95 % | HEART RATE: 70 BPM | WEIGHT: 226.6 LBS | TEMPERATURE: 96.9 F | SYSTOLIC BLOOD PRESSURE: 134 MMHG

## 2024-01-11 DIAGNOSIS — M25.552 PAIN IN LEFT HIP: ICD-10-CM

## 2024-01-11 DIAGNOSIS — G89.29 CHRONIC LEFT-SIDED LOW BACK PAIN WITH LEFT-SIDED SCIATICA: Primary | ICD-10-CM

## 2024-01-11 DIAGNOSIS — M54.42 CHRONIC LEFT-SIDED LOW BACK PAIN WITH LEFT-SIDED SCIATICA: Primary | ICD-10-CM

## 2024-01-11 DIAGNOSIS — Z85.528 HISTORY OF RENAL CARCINOMA: ICD-10-CM

## 2024-01-11 DIAGNOSIS — K21.9 GASTROESOPHAGEAL REFLUX DISEASE WITHOUT ESOPHAGITIS: ICD-10-CM

## 2024-01-11 RX ORDER — METHYLPREDNISOLONE 4 MG/1
TABLET ORAL
Qty: 21 TABLET | Refills: 0 | Status: SHIPPED | OUTPATIENT
Start: 2024-01-11 | End: 2024-01-17

## 2024-01-11 RX ORDER — ESCITALOPRAM OXALATE 20 MG/1
TABLET ORAL
Qty: 90 TABLET | Refills: 0 | Status: SHIPPED | OUTPATIENT
Start: 2024-01-11

## 2024-01-11 NOTE — PATIENT INSTRUCTIONS
Stop celebrex. Start medrol. Take protonix while on the medrol dose back.    Get Xrays.    Call ortho

## 2024-01-11 NOTE — PROGRESS NOTES
Chief complaint: Hip Pain (Left hip pain)      SUBJECTIVE:  HPI  Raghu Mcnamara (:  1955) is a 68 y.o. male with a past medical history of Left RCC s/p partial nephrectomy in 2019, GERD, chronic low back pain who presents with a chief complaint of: 2 weeks of daily low back pain, buttocks and left hip/groin pain. Constant. Getting worse. So bad the other night that he couldn't stand to go to the bathroom. Took 3 attempts to get himself up - had to put every ounce of effort to get up because he was so weak and in pain.  Pain in the left hip (points to front) and lower back.  Limp in left side d/t pain.  Shooting pain down left leg at times ( points to front of shin)  Hx of back pain and surgeries  Has never had pain like this going down left leg except prior to surgery, never even at that point had weakness and pain as bad as it was   6mos ago had XR of left hip w/ beacon and it was 'ok.' ?no arthritis  Failed fusion and broken hardware in low back; back specialist said he should do surgery - may lose function in legs.    Hx of left RCC in 2019 w/ partial nephrectomy; recent renal US wnl.  CT chest on 24 - wnl    Medical marijuana use    Patient Active Problem List   Diagnosis    CHIOMA (obstructive sleep apnea)    Chronic pain syndrome    Failed back surgical syndrome    Primary insomnia    Gastroesophageal reflux disease without esophagitis    Fibromyalgia    Cervical radiculitis    DDD (degenerative disc disease), cervical    ISMAEL (generalized anxiety disorder)    S/P alcohol detoxification    Tobacco abuse counseling    Trigger little finger of right hand    Other psoriasis    Dyslipidemia associated with type 2 diabetes mellitus (HCC)    Essential hypertension    Morbid obesity due to excess calories (HCC)    Smoker    Controlled type 2 diabetes mellitus without complication, with long-term current use of insulin (HCC)    Left adrenal mass (HCC)    Arthrodesis status    Cervical spondylosis

## 2024-01-11 NOTE — TELEPHONE ENCOUNTER
Medication:   Requested Prescriptions     Pending Prescriptions Disp Refills    escitalopram (LEXAPRO) 20 MG tablet [Pharmacy Med Name: Escitalopram Oxalate Oral Tablet 20 MG] 90 tablet 0     Sig: TAKE 1 TABLET BY MOUTH EVERY DAY        Last Filled:  10/05/2023 #90 0rf     Patient Phone Number: [phone] (home)     Last appt: 8/21/2023   Next appt: 1/11/2024    Last OARRS:       1/24/2023     8:25 AM   RX Monitoring   Periodic Controlled Substance Monitoring Possible medication side effects, risk of tolerance/dependence & alternative treatments discussed.;No signs of potential drug abuse or diversion identified.

## 2024-01-12 ENCOUNTER — HOSPITAL ENCOUNTER (OUTPATIENT)
Dept: GENERAL RADIOLOGY | Age: 69
Discharge: HOME | End: 2024-01-12
Payer: MEDICARE

## 2024-01-12 DIAGNOSIS — G89.29 CHRONIC LEFT-SIDED LOW BACK PAIN WITH LEFT-SIDED SCIATICA: ICD-10-CM

## 2024-01-12 DIAGNOSIS — M54.42 CHRONIC LEFT-SIDED LOW BACK PAIN WITH LEFT-SIDED SCIATICA: ICD-10-CM

## 2024-01-12 PROCEDURE — 73502 X-RAY EXAM HIP UNI 2-3 VIEWS: CPT

## 2024-01-12 PROCEDURE — 72100 X-RAY EXAM L-S SPINE 2/3 VWS: CPT

## 2024-01-17 ENCOUNTER — OFFICE VISIT (OUTPATIENT)
Dept: FAMILY MEDICINE CLINIC | Age: 69
End: 2024-01-17

## 2024-01-17 VITALS
HEART RATE: 81 BPM | TEMPERATURE: 96.8 F | BODY MASS INDEX: 28.66 KG/M2 | SYSTOLIC BLOOD PRESSURE: 138 MMHG | OXYGEN SATURATION: 95 % | DIASTOLIC BLOOD PRESSURE: 82 MMHG | WEIGHT: 223.2 LBS | RESPIRATION RATE: 16 BRPM

## 2024-01-17 DIAGNOSIS — F10.21 CHRONIC ALCOHOLISM IN REMISSION (HCC): ICD-10-CM

## 2024-01-17 DIAGNOSIS — K21.9 GASTROESOPHAGEAL REFLUX DISEASE WITHOUT ESOPHAGITIS: ICD-10-CM

## 2024-01-17 DIAGNOSIS — F17.200 CURRENT EVERY DAY SMOKER: ICD-10-CM

## 2024-01-17 DIAGNOSIS — S32.010A COMPRESSION FRACTURE OF L1 VERTEBRA, INITIAL ENCOUNTER (HCC): Primary | ICD-10-CM

## 2024-01-17 NOTE — PROGRESS NOTES
MG/DOSE, 4 MG/3ML SOPN 1Mg weekly- Sundays 9 mL 3    Diclofenac Sodium POWD 2 g by Does not apply route 4 times daily Formula 3D Baclo Diclo 3% Peri 6% Lido 2% Prilo 2% 240 g 3    atorvastatin (LIPITOR) 80 MG tablet TAKE 1 TABLET BY MOUTH EVERY night 90 tablet 3    LANTUS SOLOSTAR 100 UNIT/ML injection pen inject 18 units subcutaneously one time a day at night 15 mL 3    Prodigy Lancets 28G MISC Use twice daily with CGM Diagnosis Code: E11.65 150 each 5    pantoprazole (PROTONIX) 40 MG tablet TAKE 1 TABLET BY MOUTH EVERY DAY (Patient taking differently: as needed) 90 tablet 0    NONFORMULARY Compound Drug- 3fwd-baclo2/diclo3/gaba6/lido2/prilo2% grams      albuterol sulfate HFA (VENTOLIN HFA) 108 (90 Base) MCG/ACT inhaler Inhale 2 puffs into the lungs 4 times daily as needed for Wheezing 18 g 0    VITAMIN D, ERGOCALCIFEROL, PO Take by mouth      B Complex Vitamins (VITAMIN B COMPLEX PO) Take by mouth      fluticasone (FLONASE) 50 MCG/ACT nasal spray 1 spray by Each Nostril route daily 16 g 3    azelastine (ASTELIN) 0.1 % nasal spray 1 spray by Nasal route 2 times daily Use in each nostril as directed 60 mL 3    Blood Glucose Monitoring Suppl (PRODIGY AUTOCODE BLOOD GLUCOSE) LILO Check sugar three times per day. Diagnosis code: E11.42 1 Device 0    blood glucose test strips (PRODIGY NO CODING BLOOD GLUC) strip Prodigy No Coding strips. Test BS three times daily. Diagnosis code: E11.42 100 each 5    medical marijuana MEDICAL MARIJUANA      diclofenac sodium (VOLTAREN) 1 % GEL Apply 4 g topically 4 times daily 350 g 5     No current facility-administered medications on file prior to visit.       OBJECTIVE:  /82   Pulse 81   Temp 96.8 °F (36 °C) (Temporal)   Resp 16   Wt 101.2 kg (223 lb 3.2 oz)   SpO2 95%   BMI 28.66 kg/m²      Physical EXAM:  afebrile, vitals reviewed  Gen:  No acute distress, A/Ox3, pleasant; mentating appropriately  Eyes:  Sclerae clear, EOM intact  Neck:  No obvious thyromegaly.  Heart:

## 2024-01-24 ENCOUNTER — HOSPITAL ENCOUNTER (OUTPATIENT)
Dept: GENERAL RADIOLOGY | Age: 69
Discharge: HOME | End: 2024-01-24
Attending: FAMILY MEDICINE
Payer: MEDICARE

## 2024-01-24 DIAGNOSIS — F17.200 CURRENT EVERY DAY SMOKER: ICD-10-CM

## 2024-01-24 DIAGNOSIS — S32.010A COMPRESSION FRACTURE OF L1 VERTEBRA, INITIAL ENCOUNTER (HCC): ICD-10-CM

## 2024-01-24 DIAGNOSIS — K21.9 GASTROESOPHAGEAL REFLUX DISEASE WITHOUT ESOPHAGITIS: ICD-10-CM

## 2024-01-24 DIAGNOSIS — F10.21 CHRONIC ALCOHOLISM IN REMISSION (HCC): ICD-10-CM

## 2024-01-24 PROCEDURE — 77080 DXA BONE DENSITY AXIAL: CPT

## 2024-01-30 ENCOUNTER — TELEPHONE (OUTPATIENT)
Dept: FAMILY MEDICINE CLINIC | Age: 69
End: 2024-01-30

## 2024-01-30 NOTE — TELEPHONE ENCOUNTER
Patient requesting results for DEXA scan he had on 01/24/2024. Order is still marking as active in chart. Patient is concerned as he has not heard back.  Please review and advise.

## 2024-01-30 NOTE — TELEPHONE ENCOUNTER
Patient had Dexa done at [facility], it looks like the results are incomplete.  Patient wife informed once office receives the results will call the patient

## 2024-02-07 ENCOUNTER — TELEPHONE (OUTPATIENT)
Dept: FAMILY MEDICINE CLINIC | Age: 69
End: 2024-02-07

## 2024-02-09 ENCOUNTER — TELEPHONE (OUTPATIENT)
Dept: ENDOCRINOLOGY | Age: 69
End: 2024-02-09

## 2024-02-09 NOTE — TELEPHONE ENCOUNTER
Mr. Mcnamara informed fax received from [employer] stating plan will no longer cover the BD Sachi 2 gen pen needles in 2024.    Mr. Mcnamara will check with his plan for covered pen needles and will call back with name for new RX

## 2024-02-12 DIAGNOSIS — Z79.4 CONTROLLED TYPE 2 DIABETES MELLITUS WITH DIABETIC POLYNEUROPATHY, WITH LONG-TERM CURRENT USE OF INSULIN (HCC): ICD-10-CM

## 2024-02-12 DIAGNOSIS — I10 ESSENTIAL HYPERTENSION: ICD-10-CM

## 2024-02-12 DIAGNOSIS — E11.42 CONTROLLED TYPE 2 DIABETES MELLITUS WITH DIABETIC POLYNEUROPATHY, WITH LONG-TERM CURRENT USE OF INSULIN (HCC): ICD-10-CM

## 2024-02-12 DIAGNOSIS — E78.5 DYSLIPIDEMIA ASSOCIATED WITH TYPE 2 DIABETES MELLITUS (HCC): ICD-10-CM

## 2024-02-12 DIAGNOSIS — E11.69 DYSLIPIDEMIA ASSOCIATED WITH TYPE 2 DIABETES MELLITUS (HCC): ICD-10-CM

## 2024-02-12 LAB
CHOLEST SERPL-MCNC: 172 MG/DL (ref 0–199)
CREAT UR-MCNC: 105.4 MG/DL (ref 39–259)
HDLC SERPL-MCNC: 54 MG/DL (ref 40–60)
LDL CHOLESTEROL CALCULATED: 105 MG/DL
MICROALBUMIN UR DL<=1MG/L-MCNC: <1.2 MG/DL
MICROALBUMIN/CREAT UR: NORMAL MG/G (ref 0–30)
TRIGL SERPL-MCNC: 64 MG/DL (ref 0–150)
VLDLC SERPL CALC-MCNC: 13 MG/DL

## 2024-02-13 LAB
EST. AVERAGE GLUCOSE BLD GHB EST-MCNC: 111.2 MG/DL
HBA1C MFR BLD: 5.5 %

## 2024-02-15 DIAGNOSIS — E11.65 UNCONTROLLED TYPE 2 DIABETES MELLITUS WITH HYPERGLYCEMIA, WITH LONG-TERM CURRENT USE OF INSULIN (HCC): ICD-10-CM

## 2024-02-15 DIAGNOSIS — M79.7 FIBROMYALGIA: ICD-10-CM

## 2024-02-15 DIAGNOSIS — Z79.4 UNCONTROLLED TYPE 2 DIABETES MELLITUS WITH HYPERGLYCEMIA, WITH LONG-TERM CURRENT USE OF INSULIN (HCC): ICD-10-CM

## 2024-02-15 RX ORDER — INSULIN GLARGINE 100 [IU]/ML
INJECTION, SOLUTION SUBCUTANEOUS
Qty: 15 ML | Refills: 0 | Status: SHIPPED | OUTPATIENT
Start: 2024-02-15

## 2024-02-15 NOTE — TELEPHONE ENCOUNTER
Medication:   Requested Prescriptions     Pending Prescriptions Disp Refills    pregabalin (LYRICA) 200 MG capsule [Pharmacy Med Name: Pregabalin Oral Capsule 200 MG] 270 capsule 0     Sig: TAKE 1 CAPSULE BY MOUTH 3 TIMES A DAY FOR 90 DAYS. MAX 600MG (3 CAPSULES) DAILY        Last Filled:      Patient Phone Number: [phone] (home)     Last appt: 1/17/2024   Next appt: 2/22/2024    Last OARRS:       1/24/2023     8:25 AM   RX Monitoring   Periodic Controlled Substance Monitoring Possible medication side effects, risk of tolerance/dependence & alternative treatments discussed.;No signs of potential drug abuse or diversion identified.

## 2024-02-16 RX ORDER — PREGABALIN 200 MG/1
CAPSULE ORAL
Qty: 270 CAPSULE | Refills: 0 | Status: SHIPPED | OUTPATIENT
Start: 2024-02-16 | End: 2024-03-17

## 2024-02-21 ENCOUNTER — OFFICE VISIT (OUTPATIENT)
Dept: ENDOCRINOLOGY | Age: 69
End: 2024-02-21

## 2024-02-21 VITALS
WEIGHT: 232 LBS | SYSTOLIC BLOOD PRESSURE: 154 MMHG | HEART RATE: 80 BPM | BODY MASS INDEX: 29.77 KG/M2 | HEIGHT: 74 IN | DIASTOLIC BLOOD PRESSURE: 80 MMHG | OXYGEN SATURATION: 93 %

## 2024-02-21 DIAGNOSIS — E11.42 CONTROLLED TYPE 2 DIABETES MELLITUS WITH DIABETIC POLYNEUROPATHY, WITH LONG-TERM CURRENT USE OF INSULIN (HCC): Primary | ICD-10-CM

## 2024-02-21 DIAGNOSIS — E27.8 LEFT ADRENAL MASS (HCC): ICD-10-CM

## 2024-02-21 DIAGNOSIS — E78.5 DYSLIPIDEMIA ASSOCIATED WITH TYPE 2 DIABETES MELLITUS (HCC): ICD-10-CM

## 2024-02-21 DIAGNOSIS — I10 ESSENTIAL HYPERTENSION: ICD-10-CM

## 2024-02-21 DIAGNOSIS — Z79.4 CONTROLLED TYPE 2 DIABETES MELLITUS WITH DIABETIC POLYNEUROPATHY, WITH LONG-TERM CURRENT USE OF INSULIN (HCC): Primary | ICD-10-CM

## 2024-02-21 DIAGNOSIS — E11.69 DYSLIPIDEMIA ASSOCIATED WITH TYPE 2 DIABETES MELLITUS (HCC): ICD-10-CM

## 2024-02-21 RX ORDER — DULAGLUTIDE 0.75 MG/.5ML
0.75 INJECTION, SOLUTION SUBCUTANEOUS WEEKLY
COMMUNITY

## 2024-02-21 RX ORDER — MIRABEGRON 50 MG/1
50 TABLET, FILM COATED, EXTENDED RELEASE ORAL DAILY
COMMUNITY
Start: 2024-01-18

## 2024-02-21 NOTE — PROGRESS NOTES
Trulicity 1.5 mg weekly. If it snot strong enough, use two shots at a time.   Lantus 18 units at bedtime   Use Treat to target basal insulin. If pre-breakfast sugar is above 130 on 2 consecutive days increase Lantus by 2 units.  If pre-breakfast sugar is below 90 on one day decrease Lantus by 2 units.    All instructions provided in written.   Check Blood sugars 3 times per day. Log them along with insulin and send them every 2 weeks. Call for blood sugars less than 60 or more than 400.     Eye exam: Last exam in Jan 2024. Note reviewed. No DR. No ME. Has slight cataracts. He has double vision. Work up for MG negative and MRI orbits normal (reportedly). He reports negative work up for MG, MS.   Foot exam:  Feb 2023. Saw podiatrist in Jan 2022.   Deformity/amputation: absent  Skin lesions/pre-ulcerative calluses: present   Edema: right- negative, left- negative  Sensory exam: Monofilament sensation: normal   Pulses: normal, Vibration (128 Hz): mildly impaired    Renal screen: Feb 2024     Smoker: Counselled for smoking cessation   TSH screen: March 2019   CDE visit in April 2019    2: HTN   Off meds    Asymptomatic     3: Hyperlipidemia   LDL: 105 < 87, HDL: 54, TGs: 64 - Feb 2024   Atorvastatin 80mg daily   Denies missing doses   Need to work on diet   If LDL > 100 add zetia     4: Left adrenal adenoma on MRI   1.3 cm x 1.1 cm   Functional work up normal Sep 2019   CT Nov 2019: No change in the mild left adrenal gland thickening.  Pancreatic cyst 0.7 x 0.9 cm cystic lesion     CT scan Nov 2020:   Pancreatic lesions not seen   Left adrenal looks stable-- I have images reviewed myself. There is no report on adrenal adenoma. It appears to be 1.3 x 1.5 cms.     CT scan Dec 2021: Mild adreniform thickening bilaterally, which can be seen with adrenal hyperplasia.      [facility] - CT abd/pelvis Jan 2023: Mild bilateral adrenal gland thickening, unchanged and can be seen with adrenal hyperplasia.    Now will get Ct scan every 3

## 2024-02-22 ENCOUNTER — OFFICE VISIT (OUTPATIENT)
Dept: FAMILY MEDICINE CLINIC | Age: 69
End: 2024-02-22

## 2024-02-22 ENCOUNTER — TELEPHONE (OUTPATIENT)
Dept: FAMILY MEDICINE CLINIC | Age: 69
End: 2024-02-22

## 2024-02-22 VITALS
SYSTOLIC BLOOD PRESSURE: 136 MMHG | OXYGEN SATURATION: 96 % | HEART RATE: 73 BPM | HEIGHT: 74 IN | DIASTOLIC BLOOD PRESSURE: 80 MMHG | WEIGHT: 231 LBS | BODY MASS INDEX: 29.65 KG/M2

## 2024-02-22 DIAGNOSIS — E11.9 CONTROLLED TYPE 2 DIABETES MELLITUS WITHOUT COMPLICATION, WITH LONG-TERM CURRENT USE OF INSULIN (HCC): ICD-10-CM

## 2024-02-22 DIAGNOSIS — Z71.6 TOBACCO ABUSE COUNSELING: ICD-10-CM

## 2024-02-22 DIAGNOSIS — F33.1 MODERATE EPISODE OF RECURRENT MAJOR DEPRESSIVE DISORDER (HCC): ICD-10-CM

## 2024-02-22 DIAGNOSIS — H26.9 CATARACT OF RIGHT EYE, UNSPECIFIED CATARACT TYPE: ICD-10-CM

## 2024-02-22 DIAGNOSIS — E27.8 LEFT ADRENAL MASS (HCC): ICD-10-CM

## 2024-02-22 DIAGNOSIS — G47.33 OSA (OBSTRUCTIVE SLEEP APNEA): ICD-10-CM

## 2024-02-22 DIAGNOSIS — E78.5 HYPERLIPIDEMIA, UNSPECIFIED HYPERLIPIDEMIA TYPE: ICD-10-CM

## 2024-02-22 DIAGNOSIS — M79.7 FIBROMYALGIA: ICD-10-CM

## 2024-02-22 DIAGNOSIS — Z79.4 CONTROLLED TYPE 2 DIABETES MELLITUS WITHOUT COMPLICATION, WITH LONG-TERM CURRENT USE OF INSULIN (HCC): ICD-10-CM

## 2024-02-22 DIAGNOSIS — L40.8 OTHER PSORIASIS: ICD-10-CM

## 2024-02-22 DIAGNOSIS — F10.21 CHRONIC ALCOHOLISM IN REMISSION (HCC): ICD-10-CM

## 2024-02-22 DIAGNOSIS — Z01.818 PREOPERATIVE EXAMINATION: Primary | ICD-10-CM

## 2024-02-22 DIAGNOSIS — Z85.528 HISTORY OF RENAL CARCINOMA: ICD-10-CM

## 2024-02-22 DIAGNOSIS — Z86.2 HISTORY OF ANEMIA: ICD-10-CM

## 2024-02-22 DIAGNOSIS — I10 ESSENTIAL HYPERTENSION: ICD-10-CM

## 2024-02-22 DIAGNOSIS — G89.4 CHRONIC PAIN SYNDROME: ICD-10-CM

## 2024-02-22 PROBLEM — E11.22 TYPE 2 DIABETES MELLITUS WITH CHRONIC KIDNEY DISEASE (HCC): Status: ACTIVE | Noted: 2024-02-22

## 2024-02-22 SDOH — ECONOMIC STABILITY: FOOD INSECURITY: WITHIN THE PAST 12 MONTHS, YOU WORRIED THAT YOUR FOOD WOULD RUN OUT BEFORE YOU GOT MONEY TO BUY MORE.: NEVER TRUE

## 2024-02-22 SDOH — ECONOMIC STABILITY: FOOD INSECURITY: WITHIN THE PAST 12 MONTHS, THE FOOD YOU BOUGHT JUST DIDN'T LAST AND YOU DIDN'T HAVE MONEY TO GET MORE.: NEVER TRUE

## 2024-02-22 SDOH — ECONOMIC STABILITY: INCOME INSECURITY: HOW HARD IS IT FOR YOU TO PAY FOR THE VERY BASICS LIKE FOOD, HOUSING, MEDICAL CARE, AND HEATING?: NOT HARD AT ALL

## 2024-02-22 ASSESSMENT — PATIENT HEALTH QUESTIONNAIRE - PHQ9
SUM OF ALL RESPONSES TO PHQ9 QUESTIONS 1 & 2: 0
8. MOVING OR SPEAKING SO SLOWLY THAT OTHER PEOPLE COULD HAVE NOTICED. OR THE OPPOSITE, BEING SO FIGETY OR RESTLESS THAT YOU HAVE BEEN MOVING AROUND A LOT MORE THAN USUAL: 0
SUM OF ALL RESPONSES TO PHQ QUESTIONS 1-9: 0
SUM OF ALL RESPONSES TO PHQ QUESTIONS 1-9: 0
5. POOR APPETITE OR OVEREATING: 0
2. FEELING DOWN, DEPRESSED OR HOPELESS: 0
1. LITTLE INTEREST OR PLEASURE IN DOING THINGS: 0
10. IF YOU CHECKED OFF ANY PROBLEMS, HOW DIFFICULT HAVE THESE PROBLEMS MADE IT FOR YOU TO DO YOUR WORK, TAKE CARE OF THINGS AT HOME, OR GET ALONG WITH OTHER PEOPLE: 0
9. THOUGHTS THAT YOU WOULD BE BETTER OFF DEAD, OR OF HURTING YOURSELF: 0
4. FEELING TIRED OR HAVING LITTLE ENERGY: 0
6. FEELING BAD ABOUT YOURSELF - OR THAT YOU ARE A FAILURE OR HAVE LET YOURSELF OR YOUR FAMILY DOWN: 0
SUM OF ALL RESPONSES TO PHQ QUESTIONS 1-9: 0
SUM OF ALL RESPONSES TO PHQ QUESTIONS 1-9: 0
7. TROUBLE CONCENTRATING ON THINGS, SUCH AS READING THE NEWSPAPER OR WATCHING TELEVISION: 0
3. TROUBLE FALLING OR STAYING ASLEEP: 0

## 2024-02-22 NOTE — TELEPHONE ENCOUNTER
Patient was seen for a pre-op and was asked to obtain [facility]'s fax number.     Their fax number is [phone].

## 2024-02-22 NOTE — PROGRESS NOTES
Conjunctivae and EOM are normal. Pupils are equal, round, and reactive to light.   Neck: Trachea normal and normal range of motion. Neck supple. No mass and no thyromegaly present.   Cardiovascular: Normal rate, regular rhythm, normal heart sounds. No M/R/G  Pulmonary/Chest: Effort normal and breath sounds normal. No respiratory distress. She has no wheezes. She has no rales.   Abdominal: Soft. Bowel sounds are normal. No distension and no mass. No tenderness.   Musculoskeletal: No edema  Neurological: Alert and oriented to person, place, and time. No cranial nerve deficit   Skin: Skin is warm and dry. No rash noted. No erythema.   Psychiatric: Normal mood and affect. Her behavior is normal.          Assessment:       The patient presents for evaluation prior to planned procedure mentioned above.  Known risk factors for perioperative complications: Diabetes mellitus, Hypertension, Obstructive sleep apnea, Tobacco abuse       Plan:     Preoperative workup as follows: none    Change in medication regimen before surgery: Discontinue NSAIDs (celebrex) 7 days before surgery    Prophylaxis for cardiac events with perioperative beta-blockers: Not indicated  ACC/AHA indications for pre-operative beta-blocker use:    Vascular surgery with history of postitive stress test  Intermediate or high risk surgery with history of CAD   Intermediate or high risk surgery with multiple clinical predictors of CAD- 2 of the following: history of compensated or prior heart failure, history of cerebrovascular disease, DM, or renal insufficiency    Routine administration of higher-dose, long-acting metoprolol in beta-blocker-naïve patients on the day of surgery, and in the absence of dose titration is associated with an overall increase in mortality.  Beta-blockers should be started days to weeks prior to surgery and titrated to pulse < 70.    Deep vein thrombosis prophylaxis: regimen to be chosen by surgical team    Pre-Operative Risk

## 2024-02-26 NOTE — TELEPHONE ENCOUNTER
Physical Therapy Visit    Visit Type: Daily Treatment Note  Visit: Visit count could not be calculated. Make sure you are using a visit which is associated with an episode.  Referring Provider: Amanda Ingram PA-C  Medical Diagnosis (from order): [unfilled]     SUBJECTIVE                                                                                                               1REturns. Feeling much ebtter. Looser. Consistent with HEP.       OBJECTIVE                                                                                                                                     Treatment     Therapeutic Exercise  Access Code: [email or web address]  URL: [email or web address]  Date: 02/23/2024  Prepared by: Dean Scibelli    Exercises  - Thoracic Mobilization on Foam Roll  - 1 x daily - 7 x weekly - 1 sets - 10 reps - 2 hold  - Supine Lower Trunk Rotation  - 1 x daily - 5-7 x weekly - 1 sets - 10 reps - 5 hold  - Supine Posterior Pelvic Tilt  - 1 x daily - 7 x weekly - 1 sets - 10 reps - 2 hold  - Supine Hip Adduction Isometric with Ball  - 1 x daily - 7 x weekly - 1 sets - 3 reps - 7 hold  - Supine Double Knee to Chest  - 1 x daily - 5-7 x weekly - 1 sets - 3 reps - 30 hold  - Pelvic Muscle Energy Technique With Flexion and Extension  - 1 x daily - 7 x weekly - 1 sets - 3 reps - 7 hold      Access Code: [email or web address]  URL: [email or web address]  Date: 02/26/2024  Prepared by: Dean Scibelli    Exercises  - Supine Posterior Pelvic Tilt  - 1 x daily - 7 x weekly - 1 sets - 10 reps - 2 hold  - Cat Cow  - 1 x daily - 7 x weekly - 1 sets - 10 reps - 2 hold  - Quadruped Alternating Arm Lift  - 1 x daily - 7 x weekly - 1 sets - 10 reps - 2 hold  - Supine Hip Hike  - 1 x daily - 7 x weekly - 1 sets - 10 reps - 2 hold        ASSESSMENT                                                                                                            Worked into more core stabilization. Will  Spoke with patient. Informed him that he scheduled to  HST on 10/25/21 with Shana [facility] in [facility]. Patient states he did not remember that he had actually scheduled this. continue her past exercise program as her normal routine. SHe will start every other day core strengthening going forward.        Therapy procedure time and total treatment time can be found documented on the Time Entry flowsheet

## 2024-02-29 ENCOUNTER — TELEPHONE (OUTPATIENT)
Dept: FAMILY MEDICINE CLINIC | Age: 69
End: 2024-02-29

## 2024-02-29 NOTE — TELEPHONE ENCOUNTER
This pt is having surgery in a week however there was no pre op specific appt. It appears it was tied into his 6m dm appt. It appears a pre op exam was done but there is no paperwork. Do I need to call the pt and schedule him for a pre op appt?

## 2024-04-02 RX ORDER — TRAZODONE HYDROCHLORIDE 50 MG/1
50 TABLET ORAL DAILY
Qty: 90 TABLET | Refills: 0 | Status: SHIPPED | OUTPATIENT
Start: 2024-04-02

## 2024-04-02 RX ORDER — TRAZODONE HYDROCHLORIDE 50 MG/1
TABLET ORAL
Qty: 90 TABLET | Refills: 0 | OUTPATIENT
Start: 2024-04-02

## 2024-04-02 NOTE — TELEPHONE ENCOUNTER
Medication:   Requested Prescriptions     Pending Prescriptions Disp Refills    traZODone (DESYREL) 50 MG tablet 90 tablet 0     Sig: Take 1 tablet by mouth daily        Last Filled:      Patient Phone Number: [phone] (home)     Last appt: 2/22/2024   Next appt: Visit date not found    Last OARRS:       2/22/2024     9:04 AM   RX Monitoring   Periodic Controlled Substance Monitoring Possible medication side effects, risk of tolerance/dependence & alternative treatments discussed.;No signs of potential drug abuse or diversion identified.

## 2024-04-06 DIAGNOSIS — E11.69 DYSLIPIDEMIA ASSOCIATED WITH TYPE 2 DIABETES MELLITUS (HCC): ICD-10-CM

## 2024-04-06 DIAGNOSIS — E78.5 DYSLIPIDEMIA ASSOCIATED WITH TYPE 2 DIABETES MELLITUS (HCC): ICD-10-CM

## 2024-04-07 DIAGNOSIS — E11.42 CONTROLLED TYPE 2 DIABETES MELLITUS WITH DIABETIC POLYNEUROPATHY, WITH LONG-TERM CURRENT USE OF INSULIN (HCC): ICD-10-CM

## 2024-04-07 DIAGNOSIS — Z79.4 CONTROLLED TYPE 2 DIABETES MELLITUS WITH DIABETIC POLYNEUROPATHY, WITH LONG-TERM CURRENT USE OF INSULIN (HCC): ICD-10-CM

## 2024-04-08 RX ORDER — EMPAGLIFLOZIN 25 MG/1
TABLET, FILM COATED ORAL
Qty: 90 TABLET | Refills: 2 | Status: SHIPPED | OUTPATIENT
Start: 2024-04-08

## 2024-04-08 RX ORDER — ATORVASTATIN CALCIUM 80 MG/1
TABLET, FILM COATED ORAL
Qty: 90 TABLET | Refills: 2 | Status: SHIPPED | OUTPATIENT
Start: 2024-04-08

## 2024-04-08 NOTE — TELEPHONE ENCOUNTER
Medication:   Requested Prescriptions     Pending Prescriptions Disp Refills    JARDIANCE 25 MG tablet [Pharmacy Med Name: Jardiance Oral Tablet 25 MG] 90 tablet 2     Sig: TAKE 1 TABLET BY MOUTH IN THE MORNING       Last Filled:      Patient Phone Number: [phone] (home)     Last appt: 2/21/2024   Next appt: 8/21/2024    Last Labs DM:   Lab Results   Component Value Date/Time    LABA1C 5.5 02/12/2024 09:15 AM

## 2024-04-08 NOTE — TELEPHONE ENCOUNTER
Medication:   Requested Prescriptions     Pending Prescriptions Disp Refills    atorvastatin (LIPITOR) 80 MG tablet [Pharmacy Med Name: Atorvastatin Calcium Oral Tablet 80 MG] 90 tablet 2     Sig: TAKE 1 TABLET BY MOUTH EVERY night       Last Filled:      Patient Phone Number: [phone] (home)     Last appt: 2/21/2024   Next appt: 8/21/2024    Last Lipid:   Lab Results   Component Value Date/Time    CHOL 153 02/21/2023 10:02 AM    TRIG 57 02/21/2023 10:02 AM    HDL 54 02/12/2024 09:15 AM    HDL 32 03/24/2011 07:25 AM    LDLCALC 105 02/12/2024 09:15 AM

## 2024-04-09 NOTE — TELEPHONE ENCOUNTER
Medication:   Requested Prescriptions     Pending Prescriptions Disp Refills    diclofenac sodium (VOLTAREN) 1 % GEL [Pharmacy Med Name: Diclofenac Sodium External Gel 1 %] 350 g 0     Sig: Apply 4 grams topically 4 times daily        Last Filled:      Patient Phone Number: [phone] (home)     Last appt: 2/22/2024   Next appt: Visit date not found    Last OARRS:       2/22/2024     9:04 AM   RX Monitoring   Periodic Controlled Substance Monitoring Possible medication side effects, risk of tolerance/dependence & alternative treatments discussed.;No signs of potential drug abuse or diversion identified.         
9

## 2024-04-15 RX ORDER — ESCITALOPRAM OXALATE 20 MG/1
TABLET ORAL
Qty: 90 TABLET | Refills: 1 | Status: SHIPPED | OUTPATIENT
Start: 2024-04-15

## 2024-04-15 NOTE — TELEPHONE ENCOUNTER
Medication:   Requested Prescriptions     Pending Prescriptions Disp Refills    escitalopram (LEXAPRO) 20 MG tablet [Pharmacy Med Name: Escitalopram Oxalate Oral Tablet 20 MG] 90 tablet 0     Sig: TAKE 1 TABLET BY MOUTH EVERY DAY        Last Filled:      Patient Phone Number: [phone] (home)     Last appt: 1/17/24  Next appt: Visit date not found    Last OARRS:       2/22/2024     9:04 AM   RX Monitoring   Periodic Controlled Substance Monitoring Possible medication side effects, risk of tolerance/dependence & alternative treatments discussed.;No signs of potential drug abuse or diversion identified.

## 2024-04-25 DIAGNOSIS — G89.4 CHRONIC PAIN SYNDROME: ICD-10-CM

## 2024-04-25 DIAGNOSIS — M79.7 FIBROMYALGIA: ICD-10-CM

## 2024-04-25 RX ORDER — CELECOXIB 200 MG/1
CAPSULE ORAL
Qty: 180 CAPSULE | Refills: 0 | Status: SHIPPED | OUTPATIENT
Start: 2024-04-25

## 2024-04-25 NOTE — TELEPHONE ENCOUNTER
Medication:   Requested Prescriptions     Pending Prescriptions Disp Refills    celecoxib (CELEBREX) 200 MG capsule [Pharmacy Med Name: Celecoxib Oral Capsule 200 MG] 180 capsule 0     Sig: TAKE 1 CAPSULE BY MOUTH 2 TIMES A DAY        Last Filled:  10/23/23 #180 0rf     Patient Phone Number: [phone] (home)     Last appt: 2/22/2024   Next appt: Visit date not found    Last OARRS:       2/22/2024     9:04 AM   RX Monitoring   Periodic Controlled Substance Monitoring Possible medication side effects, risk of tolerance/dependence & alternative treatments discussed.;No signs of potential drug abuse or diversion identified.

## 2024-04-29 DIAGNOSIS — G89.4 CHRONIC PAIN SYNDROME: ICD-10-CM

## 2024-04-29 DIAGNOSIS — M79.7 FIBROMYALGIA: ICD-10-CM

## 2024-04-29 RX ORDER — METHOCARBAMOL 750 MG/1
TABLET, FILM COATED ORAL
Qty: 540 TABLET | Refills: 0 | Status: SHIPPED | OUTPATIENT
Start: 2024-04-29

## 2024-04-29 NOTE — TELEPHONE ENCOUNTER
Medication:   Requested Prescriptions     Pending Prescriptions Disp Refills    methocarbamol (ROBAXIN) 750 MG tablet [Pharmacy Med Name: Methocarbamol Oral Tablet 750 MG] 540 tablet 0     Sig: TAKE 2 TABLETS BY MOUTH 3 TIMES A DAY        Last Filled:  10/23/2023 #540 0rf     Patient Phone Number: [phone] (home)     Last appt: 2/22/2024   Next appt: Visit date not found    Last OARRS:       2/22/2024     9:04 AM   RX Monitoring   Periodic Controlled Substance Monitoring Possible medication side effects, risk of tolerance/dependence & alternative treatments discussed.;No signs of potential drug abuse or diversion identified.

## 2024-05-08 ENCOUNTER — OFFICE VISIT (OUTPATIENT)
Dept: FAMILY MEDICINE CLINIC | Age: 69
End: 2024-05-08

## 2024-05-08 ENCOUNTER — TELEPHONE (OUTPATIENT)
Dept: CARDIOLOGY CLINIC | Age: 69
End: 2024-05-08

## 2024-05-08 VITALS
BODY MASS INDEX: 28.26 KG/M2 | TEMPERATURE: 97.3 F | OXYGEN SATURATION: 94 % | HEART RATE: 83 BPM | HEIGHT: 74 IN | RESPIRATION RATE: 16 BRPM | DIASTOLIC BLOOD PRESSURE: 84 MMHG | SYSTOLIC BLOOD PRESSURE: 136 MMHG | WEIGHT: 220.2 LBS

## 2024-05-08 DIAGNOSIS — F17.200 CURRENT EVERY DAY SMOKER: ICD-10-CM

## 2024-05-08 DIAGNOSIS — R94.31 ABNORMAL EKG: ICD-10-CM

## 2024-05-08 DIAGNOSIS — Z86.2 HISTORY OF ANEMIA: ICD-10-CM

## 2024-05-08 DIAGNOSIS — I10 ESSENTIAL HYPERTENSION: ICD-10-CM

## 2024-05-08 DIAGNOSIS — G89.4 CHRONIC PAIN SYNDROME: ICD-10-CM

## 2024-05-08 DIAGNOSIS — Z79.4 CONTROLLED TYPE 2 DIABETES MELLITUS WITH DIABETIC POLYNEUROPATHY, WITH LONG-TERM CURRENT USE OF INSULIN (HCC): ICD-10-CM

## 2024-05-08 DIAGNOSIS — Z85.528 HISTORY OF RENAL CARCINOMA: ICD-10-CM

## 2024-05-08 DIAGNOSIS — E11.42 CONTROLLED TYPE 2 DIABETES MELLITUS WITH DIABETIC POLYNEUROPATHY, WITH LONG-TERM CURRENT USE OF INSULIN (HCC): ICD-10-CM

## 2024-05-08 DIAGNOSIS — E78.5 DYSLIPIDEMIA ASSOCIATED WITH TYPE 2 DIABETES MELLITUS (HCC): ICD-10-CM

## 2024-05-08 DIAGNOSIS — F10.21 CHRONIC ALCOHOLISM IN REMISSION (HCC): ICD-10-CM

## 2024-05-08 DIAGNOSIS — Z79.4 TYPE 2 DIABETES MELLITUS WITH HYPERGLYCEMIA, WITH LONG-TERM CURRENT USE OF INSULIN (HCC): ICD-10-CM

## 2024-05-08 DIAGNOSIS — E11.65 TYPE 2 DIABETES MELLITUS WITH HYPERGLYCEMIA, WITH LONG-TERM CURRENT USE OF INSULIN (HCC): ICD-10-CM

## 2024-05-08 DIAGNOSIS — E78.5 HYPERLIPIDEMIA, UNSPECIFIED HYPERLIPIDEMIA TYPE: ICD-10-CM

## 2024-05-08 DIAGNOSIS — Z01.810 PREOPERATIVE CARDIOVASCULAR EXAMINATION: Primary | ICD-10-CM

## 2024-05-08 DIAGNOSIS — E11.69 DYSLIPIDEMIA ASSOCIATED WITH TYPE 2 DIABETES MELLITUS (HCC): ICD-10-CM

## 2024-05-08 PROBLEM — E11.9 CONTROLLED TYPE 2 DIABETES MELLITUS WITHOUT COMPLICATION, WITH LONG-TERM CURRENT USE OF INSULIN (HCC): Status: RESOLVED | Noted: 2019-06-25 | Resolved: 2024-05-08

## 2024-05-08 PROBLEM — E11.21 DIABETIC RENAL DISEASE (HCC): Status: RESOLVED | Noted: 2021-08-24 | Resolved: 2024-05-08

## 2024-05-08 PROBLEM — E11.22 TYPE 2 DIABETES MELLITUS WITH CHRONIC KIDNEY DISEASE (HCC): Status: RESOLVED | Noted: 2024-02-22 | Resolved: 2024-05-08

## 2024-05-08 PROBLEM — E66.01 MORBID OBESITY DUE TO EXCESS CALORIES (HCC): Status: RESOLVED | Noted: 2019-04-11 | Resolved: 2024-05-08

## 2024-05-08 LAB
ALBUMIN SERPL-MCNC: 4.2 G/DL (ref 3.4–5)
ALBUMIN/GLOB SERPL: 1.9 {RATIO} (ref 1.1–2.2)
ALP SERPL-CCNC: 76 U/L (ref 40–129)
ALT SERPL-CCNC: 21 U/L (ref 10–40)
ANION GAP SERPL CALCULATED.3IONS-SCNC: 12 MMOL/L (ref 3–16)
AST SERPL-CCNC: 27 U/L (ref 15–37)
BASOPHILS # BLD: 0 K/UL (ref 0–0.2)
BASOPHILS NFR BLD: 0.4 %
BILIRUB SERPL-MCNC: 0.5 MG/DL (ref 0–1)
BUN SERPL-MCNC: 20 MG/DL (ref 7–20)
CALCIUM SERPL-MCNC: 9.6 MG/DL (ref 8.3–10.6)
CHLORIDE SERPL-SCNC: 101 MMOL/L (ref 99–110)
CO2 SERPL-SCNC: 26 MMOL/L (ref 21–32)
CREAT SERPL-MCNC: 0.6 MG/DL (ref 0.8–1.3)
DEPRECATED RDW RBC AUTO: 15 % (ref 12.4–15.4)
EOSINOPHIL # BLD: 0.2 K/UL (ref 0–0.6)
EOSINOPHIL NFR BLD: 2.1 %
GFR SERPLBLD CREATININE-BSD FMLA CKD-EPI: >90 ML/MIN/{1.73_M2}
GLUCOSE SERPL-MCNC: 81 MG/DL (ref 70–99)
HCT VFR BLD AUTO: 47.2 % (ref 40.5–52.5)
HGB BLD-MCNC: 15.9 G/DL (ref 13.5–17.5)
LYMPHOCYTES # BLD: 1.4 K/UL (ref 1–5.1)
LYMPHOCYTES NFR BLD: 15.8 %
MCH RBC QN AUTO: 31.1 PG (ref 26–34)
MCHC RBC AUTO-ENTMCNC: 33.7 G/DL (ref 31–36)
MCV RBC AUTO: 92.4 FL (ref 80–100)
MONOCYTES # BLD: 1.2 K/UL (ref 0–1.3)
MONOCYTES NFR BLD: 13.8 %
NEUTROPHILS # BLD: 5.9 K/UL (ref 1.7–7.7)
NEUTROPHILS NFR BLD: 67.9 %
PLATELET # BLD AUTO: 149 K/UL (ref 135–450)
PMV BLD AUTO: 9.4 FL (ref 5–10.5)
POTASSIUM SERPL-SCNC: 3.6 MMOL/L (ref 3.5–5.1)
PROT SERPL-MCNC: 6.4 G/DL (ref 6.4–8.2)
RBC # BLD AUTO: 5.11 M/UL (ref 4.2–5.9)
SODIUM SERPL-SCNC: 139 MMOL/L (ref 136–145)
WBC # BLD AUTO: 8.7 K/UL (ref 4–11)

## 2024-05-08 NOTE — PROGRESS NOTES
PROCEDURE Bilateral 11/2/2022    BILATERAL T4, T5, T6 MEDIAL BRANCH BLOCK WITH FLUOROSCOPY performed by Kelvin Rahman MD at [facility] SIC    PARTIAL NEPHRECTOMY Left 8/6/2019    ROBOTIC LAPAROSCOPIC LEFT PARTIAL NEPHRECTOMY performed by Fawad Herbert MD at [facility] OR    UPPER GASTROINTESTINAL ENDOSCOPY       Social History     Socioeconomic History    Marital status:      Spouse name: Not on file    Number of children: Not on file    Years of education: Not on file    Highest education level: Not on file   Occupational History    Occupation: disability   Tobacco Use    Smoking status: Every Day     Current packs/day: 1.00     Average packs/day: 1 pack/day for 22.0 years (22.0 ttl pk-yrs)     Types: Cigarettes     Passive exposure: Past    Smokeless tobacco: Never   Vaping Use    Vaping Use: Every day   Substance and Sexual Activity    Alcohol use: Not Currently     Alcohol/week: 1.0 standard drink of alcohol     Types: 1 Standard drinks or equivalent per week    Drug use: Yes     Types: Marijuana (Weed)     Comment: Medical Marijuana couple times a day    Sexual activity: Yes     Partners: Female   Other Topics Concern    Not on file   Social History Narrative    ** Merged History Encounter **          Social Determinants of Health     Financial Resource Strain: Low Risk  (2/22/2024)    Overall Financial Resource Strain (CARDIA)     Difficulty of Paying Living Expenses: Not hard at all   Food Insecurity: No Food Insecurity (2/22/2024)    Hunger Vital Sign     Worried About Running Out of Food in the Last Year: Never true     Ran Out of Food in the Last Year: Never true   Transportation Needs: Unknown (2/22/2024)    PRAPARE - Transportation     Lack of Transportation (Medical): Not on file     Lack of Transportation (Non-Medical): No   Physical Activity: Sufficiently Active (8/21/2023)    Exercise Vital Sign     Days of Exercise per Week: 5 days     Minutes of Exercise per Session: 70 min   Stress: No Stress

## 2024-05-08 NOTE — TELEPHONE ENCOUNTER
Patient was referred by Dr. Makenzie Alvarado  to the [facility] location with Dr. Gayle.  Patient has been scheduled for 05/30 at 10:00am (am/pm).  Patient verbalized understanding of appointment instructions.  Call complete.

## 2024-05-14 DIAGNOSIS — M79.7 FIBROMYALGIA: ICD-10-CM

## 2024-05-15 NOTE — TELEPHONE ENCOUNTER
Medication:   Requested Prescriptions     Pending Prescriptions Disp Refills    pregabalin (LYRICA) 200 MG capsule [Pharmacy Med Name: Pregabalin Oral Capsule 200 MG] 270 capsule 0     Sig: TAKE 1 CAPSULE BY MOUTH 3 TIMES A DAY FOR 90 DAYS. MAX 600MG (3 CAPSULES) DAILY        Last Filled:  02/16/2024 #270 0rf     Patient Phone Number: [phone] (home)     Last appt: 5/8/2024   Next appt: Visit date not found    Last OARRS:       2/22/2024     9:04 AM   RX Monitoring   Periodic Controlled Substance Monitoring Possible medication side effects, risk of tolerance/dependence & alternative treatments discussed.;No signs of potential drug abuse or diversion identified.

## 2024-05-16 DIAGNOSIS — M79.7 FIBROMYALGIA: ICD-10-CM

## 2024-05-17 ENCOUNTER — TELEPHONE (OUTPATIENT)
Dept: FAMILY MEDICINE CLINIC | Age: 69
End: 2024-05-17

## 2024-05-17 DIAGNOSIS — M79.7 FIBROMYALGIA: ICD-10-CM

## 2024-05-17 RX ORDER — PREGABALIN 200 MG/1
CAPSULE ORAL
Qty: 270 CAPSULE | Refills: 0 | OUTPATIENT
Start: 2024-05-17 | End: 2024-06-16

## 2024-05-17 RX ORDER — PREGABALIN 200 MG/1
CAPSULE ORAL
Qty: 270 CAPSULE | Refills: 0 | Status: SHIPPED | OUTPATIENT
Start: 2024-05-17 | End: 2024-06-16

## 2024-05-17 NOTE — TELEPHONE ENCOUNTER
Medication:   Requested Prescriptions     Pending Prescriptions Disp Refills    pregabalin (LYRICA) 200 MG capsule 270 capsule 0        Last Filled:  02/16/2024 #270 0rf     Patient Phone Number: [phone] (home)     Last appt: 5/8/2024   Next appt: Visit date not found    Last OARRS:       2/22/2024     9:04 AM   RX Monitoring   Periodic Controlled Substance Monitoring Possible medication side effects, risk of tolerance/dependence & alternative treatments discussed.;No signs of potential drug abuse or diversion identified.

## 2024-05-17 NOTE — TELEPHONE ENCOUNTER
Medication:   Requested Prescriptions     Pending Prescriptions Disp Refills    pregabalin (LYRICA) 200 MG capsule 270 capsule 0     Sig: TAKE 1 CAPSULE BY MOUTH 3 TIMES A DAY FOR 90 DAYS. MAX 600MG (3 CAPSULES) DAILY        Last Filled:  05/17/2024    Duplicate request

## 2024-05-20 DIAGNOSIS — Z79.4 UNCONTROLLED TYPE 2 DIABETES MELLITUS WITH HYPERGLYCEMIA, WITH LONG-TERM CURRENT USE OF INSULIN (HCC): ICD-10-CM

## 2024-05-20 DIAGNOSIS — E11.65 UNCONTROLLED TYPE 2 DIABETES MELLITUS WITH HYPERGLYCEMIA, WITH LONG-TERM CURRENT USE OF INSULIN (HCC): ICD-10-CM

## 2024-05-20 RX ORDER — INSULIN GLARGINE 100 [IU]/ML
INJECTION, SOLUTION SUBCUTANEOUS
Qty: 15 ML | Refills: 2 | Status: SHIPPED | OUTPATIENT
Start: 2024-05-20

## 2024-05-20 NOTE — TELEPHONE ENCOUNTER
Medication:   Requested Prescriptions     Pending Prescriptions Disp Refills    LANTUS SOLOSTAR 100 UNIT/ML injection pen [Pharmacy Med Name: Lantus SoloStar Subcutaneous Solution Pen-injector 100 UNIT/ML] 15 mL 2     Sig: INJECT 18 UNITS SUBCUTANEOUSLY ONE TIME DAILY AT NIGHT       Last Filled:      Patient Phone Number: [phone] (home)     Last appt: 2/21/2024   Next appt: 8/21/2024    Last Labs DM:   Lab Results   Component Value Date/Time    LABA1C 5.5 02/12/2024 09:15 AM

## 2024-05-28 PROBLEM — Z01.810 PREOPERATIVE CARDIOVASCULAR EXAMINATION: Status: ACTIVE | Noted: 2024-05-28

## 2024-05-28 PROBLEM — Z72.0 TOBACCO ABUSE: Status: ACTIVE | Noted: 2024-05-28

## 2024-05-28 NOTE — PROGRESS NOTES
for allowing me to participate in the care of this patient.      Scribe's Attestation: This note was scribed in the presence of Dr.Victoria Irwin LEÓN by Ludy Kinsey, RN       Physician Attestation: The scribe's documentation has been prepared under my direction and personally reviewed by me in its entirety. I confirm that the note above accurately reflects all work, treatment, procedures, and medical decision making performed by me.

## 2024-05-28 NOTE — ASSESSMENT & PLAN NOTE
Longstanding, controlled diabetes with A1C 5.5  Lipid panel reviewed, on statin therapy  Normotensive blood pressure  Smoking cessation encouraged  No anginal symptoms, denies exercise intolerance and reports being fairly active

## 2024-05-29 NOTE — TELEPHONE ENCOUNTER
Medication:   Requested Prescriptions     Pending Prescriptions Disp Refills    diclofenac sodium (VOLTAREN) 1 %  g 1     Sig: Apply 4 grams topically 4 times daily        Last Filled:  04/09/2024 #350g 1rf     Patient Phone Number: [phone] (home)     Last appt: 5/8/2024   Next appt: Visit date not found    Last OARRS:       2/22/2024     9:04 AM   RX Monitoring   Periodic Controlled Substance Monitoring Possible medication side effects, risk of tolerance/dependence & alternative treatments discussed.;No signs of potential drug abuse or diversion identified.         
1

## 2024-05-30 ENCOUNTER — OFFICE VISIT (OUTPATIENT)
Dept: CARDIOLOGY CLINIC | Age: 69
End: 2024-05-30
Payer: MEDICARE

## 2024-05-30 VITALS
SYSTOLIC BLOOD PRESSURE: 124 MMHG | OXYGEN SATURATION: 95 % | HEIGHT: 74 IN | WEIGHT: 224.1 LBS | BODY MASS INDEX: 28.76 KG/M2 | HEART RATE: 73 BPM | DIASTOLIC BLOOD PRESSURE: 80 MMHG

## 2024-05-30 DIAGNOSIS — I10 ESSENTIAL HYPERTENSION: ICD-10-CM

## 2024-05-30 DIAGNOSIS — R94.31 ABNORMAL EKG: ICD-10-CM

## 2024-05-30 DIAGNOSIS — E78.5 DYSLIPIDEMIA ASSOCIATED WITH TYPE 2 DIABETES MELLITUS (HCC): ICD-10-CM

## 2024-05-30 DIAGNOSIS — G47.33 OSA (OBSTRUCTIVE SLEEP APNEA): ICD-10-CM

## 2024-05-30 DIAGNOSIS — I25.10 CORONARY ARTERY CALCIFICATION: ICD-10-CM

## 2024-05-30 DIAGNOSIS — I25.84 CORONARY ARTERY CALCIFICATION: ICD-10-CM

## 2024-05-30 DIAGNOSIS — Z71.89 CARDIAC RISK COUNSELING: ICD-10-CM

## 2024-05-30 DIAGNOSIS — Z01.810 PREOPERATIVE CARDIOVASCULAR EXAMINATION: Primary | ICD-10-CM

## 2024-05-30 DIAGNOSIS — Z72.0 TOBACCO ABUSE: ICD-10-CM

## 2024-05-30 DIAGNOSIS — E11.9 TYPE 2 DIABETES MELLITUS WITHOUT COMPLICATION, WITHOUT LONG-TERM CURRENT USE OF INSULIN (HCC): ICD-10-CM

## 2024-05-30 DIAGNOSIS — E11.69 DYSLIPIDEMIA ASSOCIATED WITH TYPE 2 DIABETES MELLITUS (HCC): ICD-10-CM

## 2024-05-30 DIAGNOSIS — E78.2 MIXED HYPERLIPIDEMIA: ICD-10-CM

## 2024-05-30 PROCEDURE — 3017F COLORECTAL CA SCREEN DOC REV: CPT | Performed by: INTERNAL MEDICINE

## 2024-05-30 PROCEDURE — 4004F PT TOBACCO SCREEN RCVD TLK: CPT | Performed by: INTERNAL MEDICINE

## 2024-05-30 PROCEDURE — G8427 DOCREV CUR MEDS BY ELIG CLIN: HCPCS | Performed by: INTERNAL MEDICINE

## 2024-05-30 PROCEDURE — 99204 OFFICE O/P NEW MOD 45 MIN: CPT | Performed by: INTERNAL MEDICINE

## 2024-05-30 PROCEDURE — 3044F HG A1C LEVEL LT 7.0%: CPT | Performed by: INTERNAL MEDICINE

## 2024-05-30 PROCEDURE — 3074F SYST BP LT 130 MM HG: CPT | Performed by: INTERNAL MEDICINE

## 2024-05-30 PROCEDURE — 1123F ACP DISCUSS/DSCN MKR DOCD: CPT | Performed by: INTERNAL MEDICINE

## 2024-05-30 PROCEDURE — G8417 CALC BMI ABV UP PARAM F/U: HCPCS | Performed by: INTERNAL MEDICINE

## 2024-05-30 PROCEDURE — 2022F DILAT RTA XM EVC RTNOPTHY: CPT | Performed by: INTERNAL MEDICINE

## 2024-05-30 PROCEDURE — 3079F DIAST BP 80-89 MM HG: CPT | Performed by: INTERNAL MEDICINE

## 2024-05-30 PROCEDURE — 93000 ELECTROCARDIOGRAM COMPLETE: CPT | Performed by: INTERNAL MEDICINE

## 2024-05-30 ASSESSMENT — ENCOUNTER SYMPTOMS: BACK PAIN: 1

## 2024-05-30 NOTE — ASSESSMENT & PLAN NOTE
Referred for abnormal ECG, demonstrating ectopic atrial rhythm, no ischemic findings  Patient denies symptoms, no palpitations, pre syncope, syncope or exercise intolerance  Given upcoming Ketamine infusion, recommend echo for structural evaluation and Holter monitor  Incidentally, cannabis use can be associated with ectopic atrial rhythm, discussed with patient

## 2024-06-13 ENCOUNTER — TELEPHONE (OUTPATIENT)
Dept: CARDIOLOGY CLINIC | Age: 69
End: 2024-06-13

## 2024-06-13 NOTE — TELEPHONE ENCOUNTER
Pt states he spoke with [facility] about emailing the following test results.  EKG, Echo and holter monitor results. Please email to [email or web address]    Any questions please call pt.

## 2024-06-13 NOTE — TELEPHONE ENCOUNTER
We cannot send test results via email, (HIPAA)He cab sign up for mychart if he does not have it   Thanks

## 2024-06-14 NOTE — TELEPHONE ENCOUNTER
I spoke to pt and he stated that the medical record that he was requesting is for [facility] ketamine. Pt also stated that the clinic does not have a fax number. Please advise.

## 2024-06-18 NOTE — TELEPHONE ENCOUNTER
I spoke to pt regarding the message per MONCHO. Pt will come to office to sign medical release paperwork. Pt verbalized understanding.

## 2024-06-30 ASSESSMENT — SLEEP AND FATIGUE QUESTIONNAIRES
HOW LIKELY ARE YOU TO NOD OFF OR FALL ASLEEP WHILE SITTING AND TALKING TO SOMEONE: WOULD NEVER DOZE
HOW LIKELY ARE YOU TO NOD OFF OR FALL ASLEEP WHILE SITTING INACTIVE IN A PUBLIC PLACE: WOULD NEVER DOZE
HOW LIKELY ARE YOU TO NOD OFF OR FALL ASLEEP IN A CAR, WHILE STOPPED FOR A FEW MINUTES IN TRAFFIC: WOULD NEVER DOZE
HOW LIKELY ARE YOU TO NOD OFF OR FALL ASLEEP WHILE SITTING QUIETLY AFTER LUNCH WITHOUT ALCOHOL: WOULD NEVER DOZE
HOW LIKELY ARE YOU TO NOD OFF OR FALL ASLEEP WHILE LYING DOWN TO REST IN THE AFTERNOON WHEN CIRCUMSTANCES PERMIT: HIGH CHANCE OF DOZING
HOW LIKELY ARE YOU TO NOD OFF OR FALL ASLEEP WHILE SITTING AND READING: WOULD NEVER DOZE
HOW LIKELY ARE YOU TO NOD OFF OR FALL ASLEEP WHILE SITTING QUIETLY AFTER LUNCH WITHOUT ALCOHOL: WOULD NEVER DOZE
HOW LIKELY ARE YOU TO NOD OFF OR FALL ASLEEP WHILE WATCHING TV: WOULD NEVER DOZE
ESS TOTAL SCORE: 4
HOW LIKELY ARE YOU TO NOD OFF OR FALL ASLEEP WHEN YOU ARE A PASSENGER IN A CAR FOR AN HOUR WITHOUT A BREAK: SLIGHT CHANCE OF DOZING
HOW LIKELY ARE YOU TO NOD OFF OR FALL ASLEEP WHEN YOU ARE A PASSENGER IN A CAR FOR AN HOUR WITHOUT A BREAK: SLIGHT CHANCE OF DOZING
HOW LIKELY ARE YOU TO NOD OFF OR FALL ASLEEP WHILE SITTING INACTIVE IN A PUBLIC PLACE: WOULD NEVER DOZE
HOW LIKELY ARE YOU TO NOD OFF OR FALL ASLEEP WHILE WATCHING TV: WOULD NEVER DOZE
HOW LIKELY ARE YOU TO NOD OFF OR FALL ASLEEP IN A CAR, WHILE STOPPED FOR A FEW MINUTES IN TRAFFIC: WOULD NEVER DOZE
HOW LIKELY ARE YOU TO NOD OFF OR FALL ASLEEP WHILE SITTING AND TALKING TO SOMEONE: WOULD NEVER DOZE
HOW LIKELY ARE YOU TO NOD OFF OR FALL ASLEEP WHILE LYING DOWN TO REST IN THE AFTERNOON WHEN CIRCUMSTANCES PERMIT: HIGH CHANCE OF DOZING
HOW LIKELY ARE YOU TO NOD OFF OR FALL ASLEEP WHILE SITTING AND READING: WOULD NEVER DOZE

## 2024-07-01 ENCOUNTER — OFFICE VISIT (OUTPATIENT)
Dept: PULMONOLOGY | Age: 69
End: 2024-07-01
Payer: MEDICARE

## 2024-07-01 VITALS
OXYGEN SATURATION: 95 % | BODY MASS INDEX: 28.31 KG/M2 | HEART RATE: 77 BPM | SYSTOLIC BLOOD PRESSURE: 134 MMHG | DIASTOLIC BLOOD PRESSURE: 80 MMHG | WEIGHT: 220.6 LBS | HEIGHT: 74 IN

## 2024-07-01 DIAGNOSIS — J30.89 NON-SEASONAL ALLERGIC RHINITIS, UNSPECIFIED TRIGGER: ICD-10-CM

## 2024-07-01 DIAGNOSIS — G47.33 OSA (OBSTRUCTIVE SLEEP APNEA): Primary | ICD-10-CM

## 2024-07-01 DIAGNOSIS — I10 ESSENTIAL HYPERTENSION: ICD-10-CM

## 2024-07-01 DIAGNOSIS — E66.3 OVERWEIGHT (BMI 25.0-29.9): ICD-10-CM

## 2024-07-01 PROCEDURE — 3017F COLORECTAL CA SCREEN DOC REV: CPT | Performed by: INTERNAL MEDICINE

## 2024-07-01 PROCEDURE — 4004F PT TOBACCO SCREEN RCVD TLK: CPT | Performed by: INTERNAL MEDICINE

## 2024-07-01 PROCEDURE — 3079F DIAST BP 80-89 MM HG: CPT | Performed by: INTERNAL MEDICINE

## 2024-07-01 PROCEDURE — 99214 OFFICE O/P EST MOD 30 MIN: CPT | Performed by: INTERNAL MEDICINE

## 2024-07-01 PROCEDURE — G8428 CUR MEDS NOT DOCUMENT: HCPCS | Performed by: INTERNAL MEDICINE

## 2024-07-01 PROCEDURE — G8417 CALC BMI ABV UP PARAM F/U: HCPCS | Performed by: INTERNAL MEDICINE

## 2024-07-01 PROCEDURE — 3075F SYST BP GE 130 - 139MM HG: CPT | Performed by: INTERNAL MEDICINE

## 2024-07-01 PROCEDURE — 1123F ACP DISCUSS/DSCN MKR DOCD: CPT | Performed by: INTERNAL MEDICINE

## 2024-07-01 RX ORDER — TRAZODONE HYDROCHLORIDE 50 MG/1
50 TABLET ORAL DAILY
Qty: 90 TABLET | Refills: 1 | Status: SHIPPED | OUTPATIENT
Start: 2024-07-01

## 2024-07-01 NOTE — TELEPHONE ENCOUNTER
Medication:   Requested Prescriptions     Pending Prescriptions Disp Refills    traZODone (DESYREL) 50 MG tablet 90 tablet 0     Sig: Take 1 tablet by mouth daily        Last Filled:  04/02/2024 #90 0rf     Patient Phone Number: [phone] (home)     Last appt: 5/8/2024   Next appt: Visit date not found    Last OARRS:       2/22/2024     9:04 AM   RX Monitoring   Periodic Controlled Substance Monitoring Possible medication side effects, risk of tolerance/dependence & alternative treatments discussed.;No signs of potential drug abuse or diversion identified.

## 2024-07-01 NOTE — PROGRESS NOTES
not apply route 4 times daily Formula 3D Baclo Diclo 3% Peri 6% Lido 2% Prilo 2% 240 g 3    Prodigy Lancets 28G MISC Use twice daily with CGM Diagnosis Code: E11.65 150 each 5    pantoprazole (PROTONIX) 40 MG tablet TAKE 1 TABLET BY MOUTH EVERY DAY (Patient taking differently: as needed) 90 tablet 0    NONFORMULARY Compound Drug- 3fwd-baclo2/diclo3/gaba6/lido2/prilo2% grams- GEL for his back      albuterol sulfate HFA (VENTOLIN HFA) 108 (90 Base) MCG/ACT inhaler Inhale 2 puffs into the lungs 4 times daily as needed for Wheezing 18 g 0    VITAMIN D, ERGOCALCIFEROL, PO Take by mouth      B Complex Vitamins (VITAMIN B COMPLEX PO) Take by mouth      fluticasone (FLONASE) 50 MCG/ACT nasal spray 1 spray by Each Nostril route daily 16 g 3    azelastine (ASTELIN) 0.1 % nasal spray 1 spray by Nasal route 2 times daily Use in each nostril as directed 60 mL 3    Blood Glucose Monitoring Suppl (PRODIGY AUTOCODE BLOOD GLUCOSE) LILO Check sugar three times per day. Diagnosis code: E11.42 1 Device 0    blood glucose test strips (PRODIGY NO CODING BLOOD GLUC) strip Prodigy No Coding strips. Test BS three times daily. Diagnosis code: E11.42 100 each 5    medical marijuana MEDICAL MARIJUANA       No current facility-administered medications on file prior to visit.               ALLERGIES:   Allergies as of 07/01/2024 - Fully Reviewed 07/01/2024   Allergen Reaction Noted    Cymbalta [duloxetine hcl] Diarrhea 03/07/2018    Metformin and related  07/22/2019    Shellfish-derived products Nausea And Vomiting 04/12/2011      OBJECTIVE:   height is 1.88 m (6' 2\").            PHYSICAL EXAM:    CONSTITUTIONAL: He is a 68 y.o.-year-old who appears his stated age. He is alert and oriented x 3 and in no acute distress.   HEENT: PERRLA, EOMI. No scleral icterus. No thrush, atraumatic, normocephalic.  Mallampati class II airway.  NECK: Supple, without cervical or supraclavicular lymphadenopathy. No neck rigidity seen.   CARDIOVASCULAR: S1 S2 RRR.

## 2024-07-02 RX ORDER — TRAZODONE HYDROCHLORIDE 50 MG/1
50 TABLET ORAL DAILY
Qty: 90 TABLET | Refills: 0 | OUTPATIENT
Start: 2024-07-02

## 2024-07-02 NOTE — TELEPHONE ENCOUNTER
Medication:   Requested Prescriptions     Pending Prescriptions Disp Refills    traZODone (DESYREL) 50 MG tablet [Pharmacy Med Name: traZODone HCl Oral Tablet 50 MG] 90 tablet 0     Sig: TAKE 1 TABLET BY MOUTH EVERY DAY        Last Filled:  07/01/2024    Duplicate request

## 2024-07-15 DIAGNOSIS — K21.9 GASTROESOPHAGEAL REFLUX DISEASE WITHOUT ESOPHAGITIS: ICD-10-CM

## 2024-07-15 RX ORDER — PANTOPRAZOLE SODIUM 40 MG/1
40 TABLET, DELAYED RELEASE ORAL DAILY
Qty: 90 TABLET | Refills: 0 | OUTPATIENT
Start: 2024-07-15

## 2024-07-15 RX ORDER — PANTOPRAZOLE SODIUM 40 MG/1
40 TABLET, DELAYED RELEASE ORAL PRN
Qty: 90 TABLET | Refills: 0 | Status: SHIPPED | OUTPATIENT
Start: 2024-07-15

## 2024-07-15 NOTE — TELEPHONE ENCOUNTER
Medication:   Requested Prescriptions     Pending Prescriptions Disp Refills    pantoprazole (PROTONIX) 40 MG tablet [Pharmacy Med Name: Pantoprazole Sodium Oral Tablet Delayed Release 40 MG] 90 tablet 0     Sig: TAKE 1 TABLET BY MOUTH EVERY DAY        Last Filled:  12/7/2022    Patient Phone Number: [phone] (home)     Last appt: 5/8/2024   Next appt: 7/15/2024    Last OARRS:       2/22/2024     9:04 AM   RX Monitoring   Periodic Controlled Substance Monitoring Possible medication side effects, risk of tolerance/dependence & alternative treatments discussed.;No signs of potential drug abuse or diversion identified.

## 2024-07-26 DIAGNOSIS — G89.4 CHRONIC PAIN SYNDROME: ICD-10-CM

## 2024-07-26 DIAGNOSIS — M79.7 FIBROMYALGIA: ICD-10-CM

## 2024-07-27 DIAGNOSIS — G89.4 CHRONIC PAIN SYNDROME: ICD-10-CM

## 2024-07-27 DIAGNOSIS — M79.7 FIBROMYALGIA: ICD-10-CM

## 2024-07-27 DIAGNOSIS — G89.29 CHRONIC BACK PAIN, UNSPECIFIED BACK LOCATION, UNSPECIFIED BACK PAIN LATERALITY: ICD-10-CM

## 2024-07-27 DIAGNOSIS — M54.9 CHRONIC BACK PAIN, UNSPECIFIED BACK LOCATION, UNSPECIFIED BACK PAIN LATERALITY: ICD-10-CM

## 2024-07-29 RX ORDER — METHOCARBAMOL 750 MG/1
TABLET, FILM COATED ORAL
Qty: 540 TABLET | Refills: 0 | Status: SHIPPED | OUTPATIENT
Start: 2024-07-29

## 2024-07-29 NOTE — TELEPHONE ENCOUNTER
Medication:   Requested Prescriptions     Pending Prescriptions Disp Refills    methocarbamol (ROBAXIN) 750 MG tablet [Pharmacy Med Name: Methocarbamol Oral Tablet 750 MG] 540 tablet 0     Sig: TAKE 2 TABLETS BY MOUTH 3 TIMES A DAY        Last Filled:      Patient Phone Number: [phone] (home)     Last appt: 2/22/24  Next appt: Last OARRS:       2/22/2024     9:04 AM   RX Monitoring   Periodic Controlled Substance Monitoring Possible medication side effects, risk of tolerance/dependence & alternative treatments discussed.;No signs of potential drug abuse or diversion identified.

## 2024-07-30 RX ORDER — LIDOCAINE 50 MG/G
2 PATCH TOPICAL DAILY
Qty: 180 PATCH | Refills: 0 | OUTPATIENT
Start: 2024-07-30 | End: 2025-07-25

## 2024-07-30 RX ORDER — METHOCARBAMOL 750 MG/1
TABLET, FILM COATED ORAL
Qty: 540 TABLET | Refills: 0 | OUTPATIENT
Start: 2024-07-30

## 2024-07-30 NOTE — TELEPHONE ENCOUNTER
Medication:   Requested Prescriptions     Pending Prescriptions Disp Refills    lidocaine (LIDODERM) 5 % 180 patch 0     Sig: Place 2 patches onto the skin daily 12 hours on, 12 hours off.    methocarbamol (ROBAXIN) 750 MG tablet 540 tablet 0        Last Filled:  07/29/2024    Duplicate request     Patient Phone Number: [phone] (home)     Last appt: 5/8/2024   Next appt: Visit date not found    Last OARRS:       2/22/2024     9:04 AM   RX Monitoring   Periodic Controlled Substance Monitoring Possible medication side effects, risk of tolerance/dependence & alternative treatments discussed.;No signs of potential drug abuse or diversion identified.

## 2024-08-13 DIAGNOSIS — M79.7 FIBROMYALGIA: ICD-10-CM

## 2024-08-14 NOTE — TELEPHONE ENCOUNTER
Medication:   Requested Prescriptions     Pending Prescriptions Disp Refills    pregabalin (LYRICA) 200 MG capsule 270 capsule 0     Sig: Take 1 capsule by mouth in the morning, at noon, and at bedtime for 30 days. Max Daily Amount: 600 mg        Last Filled:  05/17/2024 #270 0rf     Patient Phone Number: [phone] (home)     Last appt: 5/8/2024   Next appt: none    Last OARRS:       2/22/2024     9:04 AM   RX Monitoring   Periodic Controlled Substance Monitoring Possible medication side effects, risk of tolerance/dependence & alternative treatments discussed.;No signs of potential drug abuse or diversion identified.

## 2024-08-14 NOTE — TELEPHONE ENCOUNTER
Medication:   Requested Prescriptions     Pending Prescriptions Disp Refills    pregabalin (LYRICA) 200 MG capsule [Pharmacy Med Name: Pregabalin Oral Capsule 200 MG] 270 capsule 0     Sig: TAKE 1 CAPSULE BY MOUTH 3 TIMES A DAY FOR 90 DAYS. MAX 600MG (3 CAPSULES) DAILY        Last Filled:  05/17/2024 #270 0rf     Patient Phone Number: [phone] (home)     Last appt: 5/8/2024   Next appt: Visit date not found    Last OARRS:       2/22/2024     9:04 AM   RX Monitoring   Periodic Controlled Substance Monitoring Possible medication side effects, risk of tolerance/dependence & alternative treatments discussed.;No signs of potential drug abuse or diversion identified.

## 2024-08-15 RX ORDER — PREGABALIN 200 MG/1
CAPSULE ORAL
Qty: 30 CAPSULE | Refills: 0 | Status: SHIPPED | OUTPATIENT
Start: 2024-08-15 | End: 2024-08-22

## 2024-08-15 RX ORDER — PREGABALIN 200 MG/1
CAPSULE ORAL
Qty: 270 CAPSULE | Refills: 0 | OUTPATIENT
Start: 2024-08-15 | End: 2024-09-14

## 2024-08-15 RX ORDER — PREGABALIN 200 MG/1
200 CAPSULE ORAL 3 TIMES DAILY
Qty: 270 CAPSULE | Refills: 0 | OUTPATIENT
Start: 2024-08-15 | End: 2024-09-14

## 2024-08-15 NOTE — TELEPHONE ENCOUNTER
Medication:   Requested Prescriptions     Pending Prescriptions Disp Refills    pregabalin (LYRICA) 200 MG capsule 270 capsule 0     Sig: Take 1 capsule by mouth in the morning, at noon, and at bedtime for 30 days. Max Daily Amount: 600 mg        Last Filled:      Patient Phone Number: [phone] (home)     Last appt: 2/22/24  Next appt: 8/13/2024    Last OARRS:       2/22/2024     9:04 AM   RX Monitoring   Periodic Controlled Substance Monitoring Possible medication side effects, risk of tolerance/dependence & alternative treatments discussed.;No signs of potential drug abuse or diversion identified.

## 2024-08-15 NOTE — TELEPHONE ENCOUNTER
Medication:   Requested Prescriptions     Pending Prescriptions Disp Refills    pregabalin (LYRICA) 200 MG capsule 270 capsule 0     Sig: Take 1 capsule by mouth in the morning, at noon, and at bedtime for 30 days. Max Daily Amount: 600 mg        Last Filled:      Patient Phone Number: [phone] (home)     Last appt: 5/8/2024   Next appt:      Last OARRS:       2/22/2024     9:04 AM   RX Monitoring   Periodic Controlled Substance Monitoring Possible medication side effects, risk of tolerance/dependence & alternative treatments discussed.;No signs of potential drug abuse or diversion identified.

## 2024-08-16 DIAGNOSIS — E78.5 DYSLIPIDEMIA ASSOCIATED WITH TYPE 2 DIABETES MELLITUS (HCC): ICD-10-CM

## 2024-08-16 DIAGNOSIS — I10 ESSENTIAL HYPERTENSION: ICD-10-CM

## 2024-08-16 DIAGNOSIS — E27.8 LEFT ADRENAL MASS (HCC): ICD-10-CM

## 2024-08-16 DIAGNOSIS — E11.69 DYSLIPIDEMIA ASSOCIATED WITH TYPE 2 DIABETES MELLITUS (HCC): ICD-10-CM

## 2024-08-16 DIAGNOSIS — Z79.4 CONTROLLED TYPE 2 DIABETES MELLITUS WITH DIABETIC POLYNEUROPATHY, WITH LONG-TERM CURRENT USE OF INSULIN (HCC): ICD-10-CM

## 2024-08-16 DIAGNOSIS — E11.42 CONTROLLED TYPE 2 DIABETES MELLITUS WITH DIABETIC POLYNEUROPATHY, WITH LONG-TERM CURRENT USE OF INSULIN (HCC): ICD-10-CM

## 2024-08-16 LAB
CHOLEST SERPL-MCNC: 150 MG/DL (ref 0–199)
HDLC SERPL-MCNC: 48 MG/DL (ref 40–60)
LDL CHOLESTEROL: 84 MG/DL
TRIGL SERPL-MCNC: 91 MG/DL (ref 0–150)
VLDLC SERPL CALC-MCNC: 18 MG/DL

## 2024-08-17 LAB
EST. AVERAGE GLUCOSE BLD GHB EST-MCNC: 99.7 MG/DL
HBA1C MFR BLD: 5.1 %

## 2024-08-20 DIAGNOSIS — M54.9 CHRONIC BACK PAIN, UNSPECIFIED BACK LOCATION, UNSPECIFIED BACK PAIN LATERALITY: ICD-10-CM

## 2024-08-20 DIAGNOSIS — G89.29 CHRONIC BACK PAIN, UNSPECIFIED BACK LOCATION, UNSPECIFIED BACK PAIN LATERALITY: ICD-10-CM

## 2024-08-20 RX ORDER — LIDOCAINE 50 MG/G
2 PATCH TOPICAL DAILY
Qty: 180 PATCH | Refills: 0 | Status: SHIPPED | OUTPATIENT
Start: 2024-08-20 | End: 2025-08-15

## 2024-08-20 NOTE — TELEPHONE ENCOUNTER
Medication:   Requested Prescriptions     Pending Prescriptions Disp Refills    lidocaine (LIDODERM) 5 % 180 patch 0     Sig: Place 2 patches onto the skin daily 12 hours on, 12 hours off.        Last Filled:  12/05/2023 #180 0rf     Patient Phone Number: [phone] (home)     Last appt: 5/8/2024   Next appt: 8/22/2024    Last OARRS:       2/22/2024     9:04 AM   RX Monitoring   Periodic Controlled Substance Monitoring Possible medication side effects, risk of tolerance/dependence & alternative treatments discussed.;No signs of potential drug abuse or diversion identified.

## 2024-08-21 ENCOUNTER — OFFICE VISIT (OUTPATIENT)
Dept: ENDOCRINOLOGY | Age: 69
End: 2024-08-21
Payer: MEDICARE

## 2024-08-21 VITALS
OXYGEN SATURATION: 96 % | SYSTOLIC BLOOD PRESSURE: 132 MMHG | HEIGHT: 74 IN | HEART RATE: 70 BPM | BODY MASS INDEX: 27.93 KG/M2 | DIASTOLIC BLOOD PRESSURE: 87 MMHG | WEIGHT: 217.6 LBS

## 2024-08-21 DIAGNOSIS — E11.42 CONTROLLED TYPE 2 DIABETES MELLITUS WITH DIABETIC POLYNEUROPATHY, WITH LONG-TERM CURRENT USE OF INSULIN (HCC): Primary | ICD-10-CM

## 2024-08-21 DIAGNOSIS — E78.5 DYSLIPIDEMIA ASSOCIATED WITH TYPE 2 DIABETES MELLITUS (HCC): ICD-10-CM

## 2024-08-21 DIAGNOSIS — E11.69 DYSLIPIDEMIA ASSOCIATED WITH TYPE 2 DIABETES MELLITUS (HCC): ICD-10-CM

## 2024-08-21 DIAGNOSIS — Z79.4 CONTROLLED TYPE 2 DIABETES MELLITUS WITH DIABETIC POLYNEUROPATHY, WITH LONG-TERM CURRENT USE OF INSULIN (HCC): Primary | ICD-10-CM

## 2024-08-21 DIAGNOSIS — I10 ESSENTIAL HYPERTENSION: ICD-10-CM

## 2024-08-21 PROCEDURE — 4004F PT TOBACCO SCREEN RCVD TLK: CPT | Performed by: INTERNAL MEDICINE

## 2024-08-21 PROCEDURE — 3017F COLORECTAL CA SCREEN DOC REV: CPT | Performed by: INTERNAL MEDICINE

## 2024-08-21 PROCEDURE — 3044F HG A1C LEVEL LT 7.0%: CPT | Performed by: INTERNAL MEDICINE

## 2024-08-21 PROCEDURE — G8427 DOCREV CUR MEDS BY ELIG CLIN: HCPCS | Performed by: INTERNAL MEDICINE

## 2024-08-21 PROCEDURE — G8417 CALC BMI ABV UP PARAM F/U: HCPCS | Performed by: INTERNAL MEDICINE

## 2024-08-21 PROCEDURE — 1123F ACP DISCUSS/DSCN MKR DOCD: CPT | Performed by: INTERNAL MEDICINE

## 2024-08-21 PROCEDURE — 3079F DIAST BP 80-89 MM HG: CPT | Performed by: INTERNAL MEDICINE

## 2024-08-21 PROCEDURE — 99214 OFFICE O/P EST MOD 30 MIN: CPT | Performed by: INTERNAL MEDICINE

## 2024-08-21 PROCEDURE — 3075F SYST BP GE 130 - 139MM HG: CPT | Performed by: INTERNAL MEDICINE

## 2024-08-21 PROCEDURE — G2211 COMPLEX E/M VISIT ADD ON: HCPCS | Performed by: INTERNAL MEDICINE

## 2024-08-21 PROCEDURE — 2022F DILAT RTA XM EVC RTNOPTHY: CPT | Performed by: INTERNAL MEDICINE

## 2024-08-21 NOTE — PROGRESS NOTES
Patient ID:   Raghu Mcnamara is a 68 y.o. male    Chief Complaint:   Raghu Mcnamara presents for an evaluation of Type 2 Diabetes Mellitus , Hyperlipidemia and hypertension.     Subjective:   Type 2 Diabetes Mellitus diagnosed in 2017  On insulin since April 2019   Metformin caused muscle soreness     Has received steroid shots for trigger finger back pain. None recent.     Has some muscle pains (diffuse). Back is really an issue. Saw ortho.      Weight is down 14 lbs in 6 months        Lantus 22 units daily at night. TTT of .   Jardiance 25mg daily   Ozempic 1 mg on Tuesdays. Tolerating well     No BS readings available today    Checks blood sugars once every 2 days. Reportedly 109-120's. Not checking during the day.     AM:   Lunch:  Supper:   HS:     Hypoglycemias: None     Meals: Three, may be protein shake for breakfast. Dinner is bigger. Late night snack (cup of mixed nuts). Soda once a day, diet. No juices.   Exercise: Limited due to back problems. Exercise with the bands.      Denies chest pain, exertional dyspnea.   Family history of CAD: Dad at age 56. Mother had stroke in her 70's.   Smokes 1 PPD. Counselled for smoking cessation.     Currently not on ASA, recommend baby aspirin if okay with gastroenterologist or PCP      The following portions of the patient's history were reviewed and updated as appropriate:         Family History   Problem Relation Age of Onset    Stroke Mother     Heart Disease Father     Other Paternal Grandmother            Social History     Socioeconomic History    Marital status:      Spouse name: Not on file    Number of children: Not on file    Years of education: Not on file    Highest education level: Not on file   Occupational History    Occupation: disability   Tobacco Use    Smoking status: Every Day     Current packs/day: 1.00     Average packs/day: 1 pack/day for 22.0 years (22.0 ttl pk-yrs)     Types: Cigarettes     Passive exposure: Past    Smokeless

## 2024-08-22 ENCOUNTER — TELEMEDICINE (OUTPATIENT)
Dept: FAMILY MEDICINE CLINIC | Age: 69
End: 2024-08-22

## 2024-08-22 DIAGNOSIS — E11.42 CONTROLLED TYPE 2 DIABETES MELLITUS WITH DIABETIC POLYNEUROPATHY, WITH LONG-TERM CURRENT USE OF INSULIN (HCC): ICD-10-CM

## 2024-08-22 DIAGNOSIS — I25.84 CORONARY ARTERY CALCIFICATION: ICD-10-CM

## 2024-08-22 DIAGNOSIS — Z79.4 CONTROLLED TYPE 2 DIABETES MELLITUS WITH DIABETIC POLYNEUROPATHY, WITH LONG-TERM CURRENT USE OF INSULIN (HCC): ICD-10-CM

## 2024-08-22 DIAGNOSIS — E27.8 LEFT ADRENAL MASS (HCC): ICD-10-CM

## 2024-08-22 DIAGNOSIS — I10 ESSENTIAL HYPERTENSION: ICD-10-CM

## 2024-08-22 DIAGNOSIS — I25.10 CORONARY ARTERY CALCIFICATION: ICD-10-CM

## 2024-08-22 DIAGNOSIS — M79.7 FIBROMYALGIA: ICD-10-CM

## 2024-08-22 DIAGNOSIS — G89.4 CHRONIC PAIN SYNDROME: Primary | ICD-10-CM

## 2024-08-22 PROBLEM — M89.9 SCAPULAR DYSFUNCTION: Status: RESOLVED | Noted: 2021-05-10 | Resolved: 2024-08-22

## 2024-08-22 PROBLEM — Z71.6 TOBACCO ABUSE COUNSELING: Status: RESOLVED | Noted: 2018-03-07 | Resolved: 2024-08-22

## 2024-08-22 RX ORDER — PREGABALIN 200 MG/1
CAPSULE ORAL
Qty: 270 CAPSULE | Refills: 1 | Status: SHIPPED | OUTPATIENT
Start: 2024-08-22 | End: 2025-02-22

## 2024-08-22 NOTE — PROGRESS NOTES
Encounter **          Social Determinants of Health     Financial Resource Strain: Low Risk  (2/22/2024)    Overall Financial Resource Strain (CARDIA)     Difficulty of Paying Living Expenses: Not hard at all   Food Insecurity: No Food Insecurity (2/22/2024)    Hunger Vital Sign     Worried About Running Out of Food in the Last Year: Never true     Ran Out of Food in the Last Year: Never true   Transportation Needs: Unknown (2/22/2024)    PRAPARE - Transportation     Lack of Transportation (Medical): Not on file     Lack of Transportation (Non-Medical): No   Physical Activity: Sufficiently Active (8/21/2023)    Exercise Vital Sign     Days of Exercise per Week: 5 days     Minutes of Exercise per Session: 70 min   Stress: No Stress Concern Present (2/1/2022)    [employer] [place] [employer] - Occupational Stress Questionnaire     Feeling of Stress : Not at all   Social Connections: Moderately Isolated (2/1/2022)    Social Connection and Isolation Panel [NHANES]     Frequency of Communication with Friends and Family: More than three times a week     Frequency of Social Gatherings with Friends and Family: More than three times a week     Attends Judaism Services: Never     Active Member of Clubs or Organizations: No     Attends Club or Organization Meetings: Never     Marital Status:    Intimate Partner Violence: Unknown (1/20/2024)    Received from [facility]     Interpersonal Safety     Feel physically or emotionally unsafe where currently live: Not on file     Harm by anyone: Not on file     Emotionally Harmed: Not on file   Housing Stability: Unknown (2/22/2024)    Housing Stability Vital Sign     Unable to Pay for Housing in the Last Year: Not on file     Number of Places Lived in the Last Year: Not on file     Unstable Housing in the Last Year: No         Review of Systems  As documented in the HPI. Currently no complaints of CP or RONI.     Physical Exam          Documentation was

## 2024-08-27 ENCOUNTER — TELEPHONE (OUTPATIENT)
Dept: ENDOCRINOLOGY | Age: 69
End: 2024-08-27

## 2024-08-28 DIAGNOSIS — M79.7 FIBROMYALGIA: ICD-10-CM

## 2024-08-28 RX ORDER — PREGABALIN 200 MG/1
CAPSULE ORAL
Qty: 270 CAPSULE | Refills: 1 | OUTPATIENT
Start: 2024-08-28 | End: 2025-02-28

## 2024-08-28 NOTE — TELEPHONE ENCOUNTER
Medication:   Requested Prescriptions     Pending Prescriptions Disp Refills    pregabalin (LYRICA) 200 MG capsule 270 capsule 1     Sig: TAKE 1 CAPSULE BY MOUTH 3 TIMES A DAY FOR 90 DAYS. MAX 600MG (3 CAPSULES) DAILY        Last Filled:  08/22/2024     Duplicate request     Patient Phone Number: [phone] (home)     Last appt: 8/22/2024   Next appt: Visit date not found    Last OARRS:       8/22/2024     9:19 AM   RX Monitoring   Periodic Controlled Substance Monitoring Possible medication side effects, risk of tolerance/dependence & alternative treatments discussed.;No signs of potential drug abuse or diversion identified.

## 2024-09-19 DIAGNOSIS — E11.42 CONTROLLED TYPE 2 DIABETES MELLITUS WITH DIABETIC POLYNEUROPATHY, WITH LONG-TERM CURRENT USE OF INSULIN (HCC): ICD-10-CM

## 2024-09-19 DIAGNOSIS — Z79.4 CONTROLLED TYPE 2 DIABETES MELLITUS WITH DIABETIC POLYNEUROPATHY, WITH LONG-TERM CURRENT USE OF INSULIN (HCC): ICD-10-CM

## 2024-09-30 RX ORDER — OXCARBAZEPINE 300 MG/1
TABLET, FILM COATED ORAL
Qty: 270 TABLET | Refills: 0 | OUTPATIENT
Start: 2024-09-30

## 2024-10-07 RX ORDER — ESCITALOPRAM OXALATE 20 MG/1
TABLET ORAL
Qty: 90 TABLET | Refills: 0 | Status: SHIPPED | OUTPATIENT
Start: 2024-10-07

## 2024-10-07 NOTE — TELEPHONE ENCOUNTER
Medication:   Requested Prescriptions     Pending Prescriptions Disp Refills    escitalopram (LEXAPRO) 20 MG tablet [Pharmacy Med Name: Escitalopram Oxalate Oral Tablet 20 MG] 90 tablet 0     Sig: TAKE 1 TABLET BY MOUTH EVERY DAY        Last Filled:  04/15/2024 #90 1rf    Patient Phone Number: [phone] (home)     Last appt: 8/22/2024   Next appt: Visit date not found    Last OARRS:       8/22/2024     9:19 AM   RX Monitoring   Periodic Controlled Substance Monitoring Possible medication side effects, risk of tolerance/dependence & alternative treatments discussed.;No signs of potential drug abuse or diversion identified.

## 2024-10-23 DIAGNOSIS — G89.4 CHRONIC PAIN SYNDROME: ICD-10-CM

## 2024-10-23 DIAGNOSIS — M79.7 FIBROMYALGIA: ICD-10-CM

## 2024-10-23 NOTE — TELEPHONE ENCOUNTER
Medication:   Requested Prescriptions     Pending Prescriptions Disp Refills    celecoxib (CELEBREX) 200 MG capsule 180 capsule 0     Sig: TAKE 1 CAPSULE BY MOUTH 2 TIMES A DAY    methocarbamol (ROBAXIN) 750 MG tablet 540 tablet 0     Sig: TAKE 2 TABLETS BY MOUTH 3 TIMES A DAY        Last Filled:  04/25/2024    Patient Phone Number: [phone] (home)     Last appt: 8/22/2024   Next appt: Visit date not found    Last OARRS:       8/22/2024     9:19 AM   RX Monitoring   Periodic Controlled Substance Monitoring Possible medication side effects, risk of tolerance/dependence & alternative treatments discussed.;No signs of potential drug abuse or diversion identified.

## 2024-10-24 RX ORDER — CELECOXIB 200 MG/1
CAPSULE ORAL
Qty: 180 CAPSULE | Refills: 0 | Status: SHIPPED | OUTPATIENT
Start: 2024-10-24

## 2024-10-24 RX ORDER — METHOCARBAMOL 750 MG/1
TABLET, FILM COATED ORAL
Qty: 540 TABLET | Refills: 0 | Status: SHIPPED | OUTPATIENT
Start: 2024-10-24

## 2024-11-26 DIAGNOSIS — E11.65 UNCONTROLLED TYPE 2 DIABETES MELLITUS WITH HYPERGLYCEMIA, WITH LONG-TERM CURRENT USE OF INSULIN (HCC): ICD-10-CM

## 2024-11-26 DIAGNOSIS — Z79.4 UNCONTROLLED TYPE 2 DIABETES MELLITUS WITH HYPERGLYCEMIA, WITH LONG-TERM CURRENT USE OF INSULIN (HCC): ICD-10-CM

## 2024-11-26 RX ORDER — PEN NEEDLE, DIABETIC 32GX 5/32"
NEEDLE, DISPOSABLE MISCELLANEOUS
Qty: 150 EACH | Refills: 3 | Status: SHIPPED | OUTPATIENT
Start: 2024-11-26

## 2024-11-26 NOTE — TELEPHONE ENCOUNTER
Requested Prescriptions     Pending Prescriptions Disp Refills    Insulin Pen Needle (BD PEN NEEDLE ROSAURA 2ND GEN) 32G X 4 MM MISC 150 each 3     Sig: use to inject twice a day     Last refilled: 10/30/2023 # 150 each with 3 refills    Lov:8/21/2024  Nov: 2/18/2025

## 2024-11-29 DIAGNOSIS — M79.7 FIBROMYALGIA: ICD-10-CM

## 2024-12-02 RX ORDER — PREGABALIN 200 MG/1
CAPSULE ORAL
Qty: 270 CAPSULE | Refills: 0 | Status: SHIPPED | OUTPATIENT
Start: 2024-12-02 | End: 2025-06-01

## 2024-12-02 NOTE — TELEPHONE ENCOUNTER
Patient scheduled.    Recent Visits  Date Type Provider Dept   05/08/24 Office Visit Makenzie Alvarado MD [facility]   02/22/24 Office Visit Nancy May MD [facility] [place] [facility]   01/17/24 Office Visit Makenzie Alvarado MD [facility]   01/11/24 Office Visit Makenzie Alvarado MD [facility] [place] [facility]   08/21/23 Office Visit Nancy May MD [facility]   Showing recent visits within past 540 days with a meds authorizing provider and meeting all other requirements  Future Appointments  Date Type Provider Dept   02/06/25 Appointment Nancy May MD [facility]   Showing future appointments within next 150 days with a meds authorizing provider and meeting all other requirements

## 2024-12-02 NOTE — TELEPHONE ENCOUNTER
Please call patient to schedule an appointment in a regular appointment slot- not a same day slot in FEB for AWV.

## 2024-12-16 ENCOUNTER — TELEPHONE (OUTPATIENT)
Dept: CASE MANAGEMENT | Age: 69
End: 2024-12-16

## 2024-12-16 DIAGNOSIS — Z72.0 TOBACCO ABUSE: Primary | ICD-10-CM

## 2024-12-16 NOTE — TELEPHONE ENCOUNTER
Nancy Dawn Dr., MD,    This is a friendly reminder that your patient is due for their annual lung screen on 1/8/2025.  For your convenience, I have pended the order for the scan.  If you do not agree with the need for the test, please cancel the order and let me know.      Patient will be mailed reminder letter to schedule.      Thank you,     Sallie Man  Lung Navigator  [facility]  [phone]    Future Appointments   Date Time Provider Department Center   2/6/2025  8:40 AM Nancy May MD [facility] DEP   2/18/2025 11:20 AM Martina Ewing MD [facility]   6/30/2025  8:30 AM Sebastien Son MD [facility]       Last PCP Appt: 5/8/2024     Lung Screen Criteria  Age 50-80  Current smoker or quit within the last 15 years  Has => 20 pack year history.

## 2024-12-17 RX ORDER — TRAZODONE HYDROCHLORIDE 50 MG/1
50 TABLET, FILM COATED ORAL NIGHTLY
Qty: 90 TABLET | Refills: 0 | Status: SHIPPED | OUTPATIENT
Start: 2024-12-17

## 2024-12-17 RX ORDER — TRAZODONE HYDROCHLORIDE 50 MG/1
50 TABLET, FILM COATED ORAL DAILY
Qty: 90 TABLET | Refills: 0 | OUTPATIENT
Start: 2024-12-17

## 2024-12-17 NOTE — TELEPHONE ENCOUNTER
Medication:   Requested Prescriptions     Pending Prescriptions Disp Refills    traZODone (DESYREL) 50 MG tablet 90 tablet 1     Sig: Take 1 tablet by mouth daily        Last Filled:  07/01/2024 #901rf    Patient Phone Number: [phone] (home)     Last appt: 8/22/2024   Next appt: 2/6/2025    Last OARRS:       8/22/2024     9:19 AM   RX Monitoring   Periodic Controlled Substance Monitoring Possible medication side effects, risk of tolerance/dependence & alternative treatments discussed.;No signs of potential drug abuse or diversion identified.

## 2024-12-17 NOTE — TELEPHONE ENCOUNTER
Medication:   Requested Prescriptions     Pending Prescriptions Disp Refills    traZODone (DESYREL) 50 MG tablet [Pharmacy Med Name: traZODone HCl Oral Tablet 50 MG] 90 tablet 0     Sig: TAKE 1 TABLET BY MOUTH EVERY DAY        Last Filled:  12/17/2024    Duplicate request     Patient Phone Number: [phone] (home)     Last appt: 8/22/2024   Next appt: 12/16/2024    Last OARRS:       8/22/2024     9:19 AM   RX Monitoring   Periodic Controlled Substance Monitoring Possible medication side effects, risk of tolerance/dependence & alternative treatments discussed.;No signs of potential drug abuse or diversion identified.

## 2024-12-19 DIAGNOSIS — M54.9 CHRONIC BACK PAIN, UNSPECIFIED BACK LOCATION, UNSPECIFIED BACK PAIN LATERALITY: ICD-10-CM

## 2024-12-19 DIAGNOSIS — G89.29 CHRONIC BACK PAIN, UNSPECIFIED BACK LOCATION, UNSPECIFIED BACK PAIN LATERALITY: ICD-10-CM

## 2024-12-20 NOTE — TELEPHONE ENCOUNTER
Medication:   Requested Prescriptions     Pending Prescriptions Disp Refills    lidocaine (LIDODERM) 5 % 180 patch 0     Sig: Place 2 patches onto the skin daily 12 hours on, 12 hours off.        Last Filled:  08/20/2024 #180 0rf     Patient Phone Number: [phone] (home)     Last appt: 8/22/2024   Next appt: 2/6/2025    Last OARRS:       8/22/2024     9:19 AM   RX Monitoring   Periodic Controlled Substance Monitoring Possible medication side effects, risk of tolerance/dependence & alternative treatments discussed.;No signs of potential drug abuse or diversion identified.

## 2024-12-23 RX ORDER — LIDOCAINE 50 MG/G
3 PATCH TOPICAL DAILY
Qty: 270 PATCH | Refills: 1 | Status: SHIPPED | OUTPATIENT
Start: 2024-12-23 | End: 2025-12-18

## 2025-01-06 RX ORDER — ESCITALOPRAM OXALATE 20 MG/1
20 TABLET ORAL DAILY
Qty: 90 TABLET | Refills: 0 | Status: SHIPPED | OUTPATIENT
Start: 2025-01-06

## 2025-01-07 DIAGNOSIS — E11.65 UNCONTROLLED TYPE 2 DIABETES MELLITUS WITH HYPERGLYCEMIA, WITH LONG-TERM CURRENT USE OF INSULIN (HCC): ICD-10-CM

## 2025-01-07 DIAGNOSIS — G89.29 CHRONIC BACK PAIN, UNSPECIFIED BACK LOCATION, UNSPECIFIED BACK PAIN LATERALITY: ICD-10-CM

## 2025-01-07 DIAGNOSIS — E11.42 CONTROLLED TYPE 2 DIABETES MELLITUS WITH DIABETIC POLYNEUROPATHY, WITH LONG-TERM CURRENT USE OF INSULIN (HCC): ICD-10-CM

## 2025-01-07 DIAGNOSIS — E78.5 DYSLIPIDEMIA ASSOCIATED WITH TYPE 2 DIABETES MELLITUS (HCC): ICD-10-CM

## 2025-01-07 DIAGNOSIS — G89.4 CHRONIC PAIN SYNDROME: ICD-10-CM

## 2025-01-07 DIAGNOSIS — M79.7 FIBROMYALGIA: ICD-10-CM

## 2025-01-07 DIAGNOSIS — E11.69 DYSLIPIDEMIA ASSOCIATED WITH TYPE 2 DIABETES MELLITUS (HCC): ICD-10-CM

## 2025-01-07 DIAGNOSIS — K21.9 GASTROESOPHAGEAL REFLUX DISEASE WITHOUT ESOPHAGITIS: ICD-10-CM

## 2025-01-07 DIAGNOSIS — Z79.4 UNCONTROLLED TYPE 2 DIABETES MELLITUS WITH HYPERGLYCEMIA, WITH LONG-TERM CURRENT USE OF INSULIN (HCC): ICD-10-CM

## 2025-01-07 DIAGNOSIS — Z79.4 CONTROLLED TYPE 2 DIABETES MELLITUS WITH DIABETIC POLYNEUROPATHY, WITH LONG-TERM CURRENT USE OF INSULIN (HCC): ICD-10-CM

## 2025-01-07 DIAGNOSIS — M54.9 CHRONIC BACK PAIN, UNSPECIFIED BACK LOCATION, UNSPECIFIED BACK PAIN LATERALITY: ICD-10-CM

## 2025-01-07 NOTE — TELEPHONE ENCOUNTER
Patient requesting refill of...  lidocaine (LIDODERM) 5 % [[id number]]    Order Details  Dose: 3 patch Route: TransDERmal Frequency: DAILY   Dispense Quantity: 270 patch Refills: 1          Sig: Place 3 patches onto the skin daily 12 hours on, 12 hours off.     traZODone (DESYREL) 50 MG tablet [[id number]]    Order Details    Dose: 50 mg Route: Oral Frequency: NIGHTLY   Dispense Quantity: 90 tablet Refills: 0          Sig: Take 1 tablet by mouth nightly           pregabalin (LYRICA) 200 MG capsule [[id number]]    Order Details  Dose, Route, Frequency: As Directed   Dispense Quantity: 270 capsule Refills: 0          Sig: TAKE 1 CAPSULE BY MOUTH 3 TIMES A DAY FOR 90 DAYS. MAX 600MG (3 CAPSULES) DAILY     Insulin Pen Needle (BD PEN NEEDLE ROSAURA 2ND GEN) 32G X 4 MM MISC [[id number]]    Order Details  Dose, Route, Frequency: As Directed   Dispense Quantity: 150 each Refills: 3          Sig: use to inject twice a day   celecoxib (CELEBREX) 200 MG capsule [[id number]]    Order Details  Dose, Route, Frequency: As Directed   Dispense Quantity: 180 capsule Refills: 0          Sig: TAKE 1 CAPSULE BY MOUTH 2 TIMES A DAY         Start Date: 10/24/24 End Date: --   Written Date: 10/24/24 Expiration Date: 1     methocarbamol (ROBAXIN) 750 MG tablet [[id number]]    Order Details  Dose, Route, Frequency: As Directed   Dispense Quantity: 540 tablet Refills: 0          Sig: TAKE 2 TABLETS BY MOUTH 3 TIMES A DAY         Start Date: 10/24/24 End Date: --   Written Date: 10/24/24 Expiration D   Semaglutide, 1 MG/DOSE, (OZEMPIC) 4 MG/3ML SOPN sc injection [[id number]]    Order Details  Dose, Route, Frequency: As Directed   Dispense Quantity: 9 mL Refills: 1          SiMg weekly-          Start Date: 24 End Date: --   Written Date: 24 Expiration Date:      Diclofenac Sodium POWD [[id number]]    Order Details    Dose: 2 g Route: Does not apply Frequency: 4 TIMES DAILY   Dispense Quantity: 240 g Refills: 3

## 2025-01-08 RX ORDER — INSULIN GLARGINE 100 [IU]/ML
22 INJECTION, SOLUTION SUBCUTANEOUS EVERY MORNING
Qty: 4 ADJUSTABLE DOSE PRE-FILLED PEN SYRINGE | Refills: 0 | Status: SHIPPED | OUTPATIENT
Start: 2025-01-08

## 2025-01-08 RX ORDER — METHOCARBAMOL 750 MG/1
TABLET, FILM COATED ORAL
Qty: 540 TABLET | Refills: 0 | Status: SHIPPED | OUTPATIENT
Start: 2025-01-08

## 2025-01-08 RX ORDER — CELECOXIB 200 MG/1
CAPSULE ORAL
Qty: 180 CAPSULE | Refills: 0 | Status: SHIPPED | OUTPATIENT
Start: 2025-01-08

## 2025-01-08 RX ORDER — PREGABALIN 200 MG/1
CAPSULE ORAL
Qty: 270 CAPSULE | Refills: 0 | Status: SHIPPED | OUTPATIENT
Start: 2025-01-08 | End: 2025-07-07

## 2025-01-08 RX ORDER — TRAZODONE HYDROCHLORIDE 50 MG/1
50 TABLET, FILM COATED ORAL NIGHTLY
Qty: 90 TABLET | Refills: 0 | Status: SHIPPED | OUTPATIENT
Start: 2025-01-08

## 2025-01-08 RX ORDER — ATORVASTATIN CALCIUM 80 MG/1
80 TABLET, FILM COATED ORAL NIGHTLY
Qty: 90 TABLET | Refills: 0 | Status: SHIPPED | OUTPATIENT
Start: 2025-01-08 | End: 2025-01-10 | Stop reason: SDUPTHER

## 2025-01-08 RX ORDER — PEN NEEDLE, DIABETIC 32GX 5/32"
NEEDLE, DISPOSABLE MISCELLANEOUS
Qty: 150 EACH | Refills: 0 | Status: SHIPPED | OUTPATIENT
Start: 2025-01-08

## 2025-01-08 RX ORDER — LIDOCAINE 50 MG/G
3 PATCH TOPICAL DAILY
Qty: 270 PATCH | Refills: 0 | Status: SHIPPED | OUTPATIENT
Start: 2025-01-08 | End: 2026-01-03

## 2025-01-08 RX ORDER — PANTOPRAZOLE SODIUM 40 MG/1
40 TABLET, DELAYED RELEASE ORAL PRN
Qty: 90 TABLET | Refills: 0 | Status: SHIPPED | OUTPATIENT
Start: 2025-01-08

## 2025-01-10 ENCOUNTER — HOSPITAL ENCOUNTER (OUTPATIENT)
Dept: CT IMAGING | Age: 70
Discharge: HOME | End: 2025-01-10
Attending: FAMILY MEDICINE
Payer: MEDICARE

## 2025-01-10 ENCOUNTER — TELEPHONE (OUTPATIENT)
Dept: FAMILY MEDICINE CLINIC | Age: 70
End: 2025-01-10

## 2025-01-10 DIAGNOSIS — Z72.0 TOBACCO ABUSE: ICD-10-CM

## 2025-01-10 DIAGNOSIS — E11.69 DYSLIPIDEMIA ASSOCIATED WITH TYPE 2 DIABETES MELLITUS (HCC): ICD-10-CM

## 2025-01-10 DIAGNOSIS — E78.5 DYSLIPIDEMIA ASSOCIATED WITH TYPE 2 DIABETES MELLITUS (HCC): ICD-10-CM

## 2025-01-10 PROCEDURE — 71271 CT THORAX LUNG CANCER SCR C-: CPT

## 2025-01-10 RX ORDER — ATORVASTATIN CALCIUM 80 MG/1
80 TABLET, FILM COATED ORAL NIGHTLY
Qty: 90 TABLET | Refills: 0 | Status: SHIPPED | OUTPATIENT
Start: 2025-01-10

## 2025-01-10 NOTE — TELEPHONE ENCOUNTER
Medication:   Requested Prescriptions     Pending Prescriptions Disp Refills    atorvastatin (LIPITOR) 80 MG tablet 90 tablet 0     Sig: Take 1 tablet by mouth nightly       Last Filled:  01/08/2025 #90 0rf     Patient Phone Number: [phone] (home)     Last appt: 8/22/2024   Next appt: 2/6/2025    Last Lipid:   Lab Results   Component Value Date/Time    CHOL 153 02/21/2023 10:02 AM    TRIG 57 02/21/2023 10:02 AM    HDL 48 08/16/2024 09:52 AM    HDL 32 03/24/2011 07:25 AM

## 2025-01-10 NOTE — TELEPHONE ENCOUNTER
Pharmacy states that most pharmacies do not compound medications anymore.    They are unable to do this for this RX     Disp Refills Start End    Diclofenac Sodium POWD 240 g 3 1/8/2025 --    Sig - Route: 2 g by Does not apply route 4 times daily Formula 3D Baclo Diclo 3% Peri 6% Lido 2% Prilo 2% - Does not apply    Sent to pharmacy as: Diclofenac Sodium Powder        Please advise

## 2025-01-13 NOTE — TELEPHONE ENCOUNTER
Please call biomed to see if they do any of these compounded topicals anymore as previously they had advertised them to us.  If they don't please call pt and let him know. He could do voltaren gel.

## 2025-01-15 DIAGNOSIS — G89.4 CHRONIC PAIN SYNDROME: ICD-10-CM

## 2025-01-15 DIAGNOSIS — Z79.4 CONTROLLED TYPE 2 DIABETES MELLITUS WITH DIABETIC POLYNEUROPATHY, WITH LONG-TERM CURRENT USE OF INSULIN (HCC): ICD-10-CM

## 2025-01-15 DIAGNOSIS — M79.7 FIBROMYALGIA: ICD-10-CM

## 2025-01-15 DIAGNOSIS — E11.42 CONTROLLED TYPE 2 DIABETES MELLITUS WITH DIABETIC POLYNEUROPATHY, WITH LONG-TERM CURRENT USE OF INSULIN (HCC): ICD-10-CM

## 2025-01-15 RX ORDER — CELECOXIB 200 MG/1
CAPSULE ORAL
Qty: 180 CAPSULE | Refills: 0 | OUTPATIENT
Start: 2025-01-15

## 2025-01-15 NOTE — TELEPHONE ENCOUNTER
Medication:   Requested Prescriptions     Pending Prescriptions Disp Refills    celecoxib (CELEBREX) 200 MG capsule 180 capsule 0     Sig: TAKE 1 CAPSULE BY MOUTH 2 TIMES A DAY    empagliflozin (JARDIANCE) 25 MG tablet 90 tablet 0     Sig: TAKE 1 TABLET BY MOUTH IN THE MORNING       Last Filled:  01/08/2025  Duplicate request     Patient Phone Number: [phone] (home)     Last appt: 8/22/2024   Next appt: 2/6/2025    Last Labs DM:   Lab Results   Component Value Date/Time    LABA1C 5.1 08/16/2024 09:52 AM

## 2025-01-16 RX ORDER — DICLOFENAC SODIUM 100 %
2 POWDER (GRAM) MISCELLANEOUS 4 TIMES DAILY
Qty: 240 G | Refills: 3 | Status: SHIPPED | OUTPATIENT
Start: 2025-01-16

## 2025-01-16 NOTE — TELEPHONE ENCOUNTER
It has been sent to the pharmacy before, I was unable to talk to the pharmacy.  I will keep an eye out in case they send paperwork on medication

## 2025-02-02 SDOH — HEALTH STABILITY: PHYSICAL HEALTH: ON AVERAGE, HOW MANY MINUTES DO YOU ENGAGE IN EXERCISE AT THIS LEVEL?: 30 MIN

## 2025-02-02 SDOH — HEALTH STABILITY: PHYSICAL HEALTH: ON AVERAGE, HOW MANY DAYS PER WEEK DO YOU ENGAGE IN MODERATE TO STRENUOUS EXERCISE (LIKE A BRISK WALK)?: 4 DAYS

## 2025-02-02 ASSESSMENT — PATIENT HEALTH QUESTIONNAIRE - PHQ9
1. LITTLE INTEREST OR PLEASURE IN DOING THINGS: NOT AT ALL
SUM OF ALL RESPONSES TO PHQ9 QUESTIONS 1 & 2: 0
SUM OF ALL RESPONSES TO PHQ QUESTIONS 1-9: 0
SUM OF ALL RESPONSES TO PHQ QUESTIONS 1-9: 0
2. FEELING DOWN, DEPRESSED OR HOPELESS: NOT AT ALL
SUM OF ALL RESPONSES TO PHQ QUESTIONS 1-9: 0
SUM OF ALL RESPONSES TO PHQ QUESTIONS 1-9: 0

## 2025-02-02 ASSESSMENT — LIFESTYLE VARIABLES
HOW MANY STANDARD DRINKS CONTAINING ALCOHOL DO YOU HAVE ON A TYPICAL DAY: PATIENT DOES NOT DRINK
HOW MANY STANDARD DRINKS CONTAINING ALCOHOL DO YOU HAVE ON A TYPICAL DAY: 0
HOW OFTEN DO YOU HAVE SIX OR MORE DRINKS ON ONE OCCASION: 1
HOW OFTEN DO YOU HAVE A DRINK CONTAINING ALCOHOL: NEVER
HOW OFTEN DO YOU HAVE A DRINK CONTAINING ALCOHOL: 1

## 2025-02-03 ENCOUNTER — TELEPHONE (OUTPATIENT)
Dept: FAMILY MEDICINE CLINIC | Age: 70
End: 2025-02-03

## 2025-02-03 DIAGNOSIS — E78.2 MIXED HYPERLIPIDEMIA: ICD-10-CM

## 2025-02-03 DIAGNOSIS — Z12.5 SCREENING PSA (PROSTATE SPECIFIC ANTIGEN): Primary | ICD-10-CM

## 2025-02-03 SDOH — ECONOMIC STABILITY: TRANSPORTATION INSECURITY
IN THE PAST 12 MONTHS, HAS THE LACK OF TRANSPORTATION KEPT YOU FROM MEDICAL APPOINTMENTS OR FROM GETTING MEDICATIONS?: PATIENT DECLINED

## 2025-02-03 SDOH — ECONOMIC STABILITY: INCOME INSECURITY: IN THE LAST 12 MONTHS, WAS THERE A TIME WHEN YOU WERE NOT ABLE TO PAY THE MORTGAGE OR RENT ON TIME?: PATIENT DECLINED

## 2025-02-03 SDOH — ECONOMIC STABILITY: TRANSPORTATION INSECURITY
IN THE PAST 12 MONTHS, HAS LACK OF TRANSPORTATION KEPT YOU FROM MEETINGS, WORK, OR FROM GETTING THINGS NEEDED FOR DAILY LIVING?: PATIENT DECLINED

## 2025-02-03 SDOH — ECONOMIC STABILITY: FOOD INSECURITY: WITHIN THE PAST 12 MONTHS, THE FOOD YOU BOUGHT JUST DIDN'T LAST AND YOU DIDN'T HAVE MONEY TO GET MORE.: PATIENT DECLINED

## 2025-02-03 SDOH — ECONOMIC STABILITY: FOOD INSECURITY: WITHIN THE PAST 12 MONTHS, YOU WORRIED THAT YOUR FOOD WOULD RUN OUT BEFORE YOU GOT MONEY TO BUY MORE.: PATIENT DECLINED

## 2025-02-03 NOTE — TELEPHONE ENCOUNTER
Pt. Is requesting bloodwork orders in advance so he can get it done tomorrow for his AW schedule Thurs. 02/06/25.    pt. is also requesting a PAS TEST to be ordered...    Per pt. orders in the system for release on 08/21/25 from   Martina Ewing MD      Microalbumin / Creatinine Urine Ratio       Hemoglobin A1C (

## 2025-02-04 ENCOUNTER — TELEPHONE (OUTPATIENT)
Dept: FAMILY MEDICINE CLINIC | Age: 70
End: 2025-02-04

## 2025-02-04 DIAGNOSIS — Z79.4 CONTROLLED TYPE 2 DIABETES MELLITUS WITH DIABETIC POLYNEUROPATHY, WITH LONG-TERM CURRENT USE OF INSULIN (HCC): ICD-10-CM

## 2025-02-04 DIAGNOSIS — E11.42 CONTROLLED TYPE 2 DIABETES MELLITUS WITH DIABETIC POLYNEUROPATHY, WITH LONG-TERM CURRENT USE OF INSULIN (HCC): ICD-10-CM

## 2025-02-04 DIAGNOSIS — E11.69 DYSLIPIDEMIA ASSOCIATED WITH TYPE 2 DIABETES MELLITUS (HCC): ICD-10-CM

## 2025-02-04 DIAGNOSIS — I10 ESSENTIAL HYPERTENSION: ICD-10-CM

## 2025-02-04 DIAGNOSIS — E78.2 MIXED HYPERLIPIDEMIA: ICD-10-CM

## 2025-02-04 DIAGNOSIS — Z12.5 SCREENING PSA (PROSTATE SPECIFIC ANTIGEN): ICD-10-CM

## 2025-02-04 DIAGNOSIS — E78.5 DYSLIPIDEMIA ASSOCIATED WITH TYPE 2 DIABETES MELLITUS (HCC): ICD-10-CM

## 2025-02-04 LAB
ALBUMIN SERPL-MCNC: 4.7 G/DL (ref 3.4–5)
ALBUMIN/GLOB SERPL: 2.2 {RATIO} (ref 1.1–2.2)
ALP SERPL-CCNC: 75 U/L (ref 40–129)
ALT SERPL-CCNC: 45 U/L (ref 10–40)
ANION GAP SERPL CALCULATED.3IONS-SCNC: 9 MMOL/L (ref 3–16)
AST SERPL-CCNC: 41 U/L (ref 15–37)
BILIRUB SERPL-MCNC: 0.5 MG/DL (ref 0–1)
BUN SERPL-MCNC: 22 MG/DL (ref 7–20)
CALCIUM SERPL-MCNC: 10.1 MG/DL (ref 8.3–10.6)
CHLORIDE SERPL-SCNC: 101 MMOL/L (ref 99–110)
CHOLEST SERPL-MCNC: 147 MG/DL (ref 0–199)
CO2 SERPL-SCNC: 29 MMOL/L (ref 21–32)
CREAT SERPL-MCNC: 0.9 MG/DL (ref 0.8–1.3)
EST. AVERAGE GLUCOSE BLD GHB EST-MCNC: 99.7 MG/DL
GFR SERPLBLD CREATININE-BSD FMLA CKD-EPI: >90 ML/MIN/{1.73_M2}
GLUCOSE SERPL-MCNC: 66 MG/DL (ref 70–99)
HBA1C MFR BLD: 5.1 %
HDLC SERPL-MCNC: 48 MG/DL (ref 40–60)
LDLC SERPL CALC-MCNC: 82 MG/DL
POTASSIUM SERPL-SCNC: 4.4 MMOL/L (ref 3.5–5.1)
PROT SERPL-MCNC: 6.8 G/DL (ref 6.4–8.2)
PSA SERPL DL<=0.01 NG/ML-MCNC: 2.15 NG/ML (ref 0–4)
SODIUM SERPL-SCNC: 139 MMOL/L (ref 136–145)
TRIGL SERPL-MCNC: 85 MG/DL (ref 0–150)
VLDLC SERPL CALC-MCNC: 17 MG/DL

## 2025-02-04 NOTE — TELEPHONE ENCOUNTER
Office has been notified that pt is requiring Prior Authorization for the following medication:  -- BD Pen Needle Sachi 2nd gen    Please initiate this request through CoverMyMeds, contacting the following Payor/Insurance:  -- Medicare     Please see below, or the documentation attached to this encounter for any additional information that may assist in processing PA:  --     Thank you!

## 2025-02-05 RX ORDER — INSULIN GLARGINE-YFGN 100 [IU]/ML
INJECTION, SOLUTION SUBCUTANEOUS
COMMUNITY
Start: 2025-01-09

## 2025-02-06 ENCOUNTER — OFFICE VISIT (OUTPATIENT)
Dept: FAMILY MEDICINE CLINIC | Age: 70
End: 2025-02-06

## 2025-02-06 VITALS
HEIGHT: 73 IN | OXYGEN SATURATION: 92 % | BODY MASS INDEX: 27.04 KG/M2 | WEIGHT: 204 LBS | HEART RATE: 71 BPM | SYSTOLIC BLOOD PRESSURE: 118 MMHG | DIASTOLIC BLOOD PRESSURE: 78 MMHG

## 2025-02-06 DIAGNOSIS — E78.2 MIXED HYPERLIPIDEMIA: ICD-10-CM

## 2025-02-06 DIAGNOSIS — F10.21 CHRONIC ALCOHOLISM IN REMISSION (HCC): ICD-10-CM

## 2025-02-06 DIAGNOSIS — Z79.4 CONTROLLED TYPE 2 DIABETES MELLITUS WITH DIABETIC POLYNEUROPATHY, WITH LONG-TERM CURRENT USE OF INSULIN (HCC): ICD-10-CM

## 2025-02-06 DIAGNOSIS — F33.1 MODERATE EPISODE OF RECURRENT MAJOR DEPRESSIVE DISORDER (HCC): ICD-10-CM

## 2025-02-06 DIAGNOSIS — Z00.00 MEDICARE ANNUAL WELLNESS VISIT, SUBSEQUENT: Primary | ICD-10-CM

## 2025-02-06 DIAGNOSIS — I10 ESSENTIAL HYPERTENSION: ICD-10-CM

## 2025-02-06 DIAGNOSIS — F17.200 CURRENT EVERY DAY SMOKER: ICD-10-CM

## 2025-02-06 DIAGNOSIS — E53.9 VITAMIN B DEFICIENCY: ICD-10-CM

## 2025-02-06 DIAGNOSIS — M54.42 CHRONIC LEFT-SIDED LOW BACK PAIN WITH LEFT-SIDED SCIATICA: ICD-10-CM

## 2025-02-06 DIAGNOSIS — F41.1 GAD (GENERALIZED ANXIETY DISORDER): ICD-10-CM

## 2025-02-06 DIAGNOSIS — E11.42 CONTROLLED TYPE 2 DIABETES MELLITUS WITH DIABETIC POLYNEUROPATHY, WITH LONG-TERM CURRENT USE OF INSULIN (HCC): ICD-10-CM

## 2025-02-06 DIAGNOSIS — G89.29 CHRONIC LEFT-SIDED LOW BACK PAIN WITH LEFT-SIDED SCIATICA: ICD-10-CM

## 2025-02-06 DIAGNOSIS — G47.33 OSA (OBSTRUCTIVE SLEEP APNEA): ICD-10-CM

## 2025-02-06 RX ORDER — BACLOFEN 10 MG/1
10 TABLET ORAL 3 TIMES DAILY
Qty: 270 TABLET | Refills: 1 | Status: SHIPPED | OUTPATIENT
Start: 2025-02-06

## 2025-02-06 SDOH — ECONOMIC STABILITY: FOOD INSECURITY: WITHIN THE PAST 12 MONTHS, YOU WORRIED THAT YOUR FOOD WOULD RUN OUT BEFORE YOU GOT MONEY TO BUY MORE.: PATIENT DECLINED

## 2025-02-06 SDOH — ECONOMIC STABILITY: FOOD INSECURITY: WITHIN THE PAST 12 MONTHS, THE FOOD YOU BOUGHT JUST DIDN'T LAST AND YOU DIDN'T HAVE MONEY TO GET MORE.: PATIENT DECLINED

## 2025-02-06 ASSESSMENT — PATIENT HEALTH QUESTIONNAIRE - PHQ9
7. TROUBLE CONCENTRATING ON THINGS, SUCH AS READING THE NEWSPAPER OR WATCHING TELEVISION: NOT AT ALL
10. IF YOU CHECKED OFF ANY PROBLEMS, HOW DIFFICULT HAVE THESE PROBLEMS MADE IT FOR YOU TO DO YOUR WORK, TAKE CARE OF THINGS AT HOME, OR GET ALONG WITH OTHER PEOPLE: NOT DIFFICULT AT ALL
4. FEELING TIRED OR HAVING LITTLE ENERGY: SEVERAL DAYS
1. LITTLE INTEREST OR PLEASURE IN DOING THINGS: NOT AT ALL
SUM OF ALL RESPONSES TO PHQ QUESTIONS 1-9: 4
5. POOR APPETITE OR OVEREATING: NOT AT ALL
9. THOUGHTS THAT YOU WOULD BE BETTER OFF DEAD, OR OF HURTING YOURSELF: NOT AT ALL
6. FEELING BAD ABOUT YOURSELF - OR THAT YOU ARE A FAILURE OR HAVE LET YOURSELF OR YOUR FAMILY DOWN: NOT AT ALL
SUM OF ALL RESPONSES TO PHQ QUESTIONS 1-9: 4
SUM OF ALL RESPONSES TO PHQ9 QUESTIONS 1 & 2: 0
8. MOVING OR SPEAKING SO SLOWLY THAT OTHER PEOPLE COULD HAVE NOTICED. OR THE OPPOSITE, BEING SO FIGETY OR RESTLESS THAT YOU HAVE BEEN MOVING AROUND A LOT MORE THAN USUAL: NOT AT ALL
SUM OF ALL RESPONSES TO PHQ QUESTIONS 1-9: 4
SUM OF ALL RESPONSES TO PHQ QUESTIONS 1-9: 4
2. FEELING DOWN, DEPRESSED OR HOPELESS: NOT AT ALL
3. TROUBLE FALLING OR STAYING ASLEEP: NEARLY EVERY DAY

## 2025-02-06 NOTE — TELEPHONE ENCOUNTER
The RX on file for PEN NEEDLE this does not have a JOANN.  The pharmacies will keep running different  PEN NEEDLES, until one is found that is on the formulary.  Please contact patient to confirm  A PEN NEEDLE was received.      If this requires a response please respond to the pool ( P MHCX PSC MEDICATION PRE-AUTH).      Thank you please advise patient.

## 2025-02-06 NOTE — PATIENT INSTRUCTIONS

## 2025-02-06 NOTE — PROGRESS NOTES
Medicare Annual Wellness Visit    Raghu Mcnamara is here for Medicare AWV and Medication Check (Insulin in system not covered: Lantus is covered /Robaxin, not covered /)    Assessment & Plan  1. Fibromyalgia: Managed with medical marijuana, Robaxin, Lyrica, Celebrex, and turmeric. Reduced cream and patch use. Requires change from Robaxin due to insurance. Considering baclofen. Prefers exercises, stretching, and yoga. Decreased work capacity. Difficulty sleeping due to pain, achieving 3 to 3.5 hours of sleep per night. Trazodone aids in falling asleep but not maintaining sleep. Discomfort sitting or lying on back, prefers leaning to the side.  - Baclofen 10 mg TID prescribed  - Consult sleep specialist for sleep improvement  - Consider reverting to Robaxin if baclofen ineffective    2. Diabetes Mellitus: Stable. Under endocrinologist care. Follow-up in 2 weeks.  - Treatment includes insulin, Jardiance, and Ozempic    3. Hypertension.    4. Hypercholesterolemia: On atorvastatin, tolerates well.    5. History of kidney cancer: Consulted urologist 2 weeks ago. Annual scans no longer necessary per urology as 5 years post-diagnosis.    6. Mood disorder: stable On Lexapro and trazodone.  - Continue current regimen    7. Sleep apnea: Uses CPAP machine. Under sleep specialist care.    8. Smoking cessation: Smokes one pack per day, reducing intake. Tried Chantix and nicotine patches. Abstains from alcohol.  - Encouraged to reduce smoking by one to two cigarettes daily  - Consider Chantix, Wellbutrin, or nicotine replacement therapy if interested    9. Muscle cramps: Experiences lower leg cramps, attributed to muscle relaxant. Hydrates with a couple of glasses of water daily.  - Advised to increase water intake to 60 ounces daily  - Consider magnesium supplements, multivitamin, or a banana daily    10. Health maintenance: Metabolic panel satisfactory, slight elevation in liver tests (ALT 45, AST 41), likely hepatic steatosis.

## 2025-02-10 ENCOUNTER — TELEPHONE (OUTPATIENT)
Dept: ENDOCRINOLOGY | Age: 70
End: 2025-02-10

## 2025-02-10 DIAGNOSIS — E11.65 UNCONTROLLED TYPE 2 DIABETES MELLITUS WITH HYPERGLYCEMIA, WITH LONG-TERM CURRENT USE OF INSULIN (HCC): Primary | ICD-10-CM

## 2025-02-10 DIAGNOSIS — Z79.4 UNCONTROLLED TYPE 2 DIABETES MELLITUS WITH HYPERGLYCEMIA, WITH LONG-TERM CURRENT USE OF INSULIN (HCC): Primary | ICD-10-CM

## 2025-02-10 NOTE — TELEPHONE ENCOUNTER
Patient called requesting a refill, pt stated that his insurance will no longer cover Rx   Insulin Pen Needle (BD PEN NEEDLE ROSAURA 2ND GEN) 32G X 4 MM    Rx- Tech lite pen needles 32G x 4mm      Pharmacy- [id number]    LOV-  NOV- 2/1825    Please advise

## 2025-02-18 ENCOUNTER — OFFICE VISIT (OUTPATIENT)
Dept: ENDOCRINOLOGY | Age: 70
End: 2025-02-18
Payer: MEDICARE

## 2025-02-18 VITALS
WEIGHT: 209 LBS | BODY MASS INDEX: 26.82 KG/M2 | HEIGHT: 74 IN | SYSTOLIC BLOOD PRESSURE: 126 MMHG | DIASTOLIC BLOOD PRESSURE: 82 MMHG | HEART RATE: 72 BPM | OXYGEN SATURATION: 94 %

## 2025-02-18 DIAGNOSIS — Z79.4 CONTROLLED TYPE 2 DIABETES MELLITUS WITH DIABETIC POLYNEUROPATHY, WITH LONG-TERM CURRENT USE OF INSULIN (HCC): Primary | ICD-10-CM

## 2025-02-18 DIAGNOSIS — E11.42 CONTROLLED TYPE 2 DIABETES MELLITUS WITH DIABETIC POLYNEUROPATHY, WITH LONG-TERM CURRENT USE OF INSULIN (HCC): Primary | ICD-10-CM

## 2025-02-18 DIAGNOSIS — E78.5 DYSLIPIDEMIA ASSOCIATED WITH TYPE 2 DIABETES MELLITUS (HCC): ICD-10-CM

## 2025-02-18 DIAGNOSIS — E11.69 DYSLIPIDEMIA ASSOCIATED WITH TYPE 2 DIABETES MELLITUS (HCC): ICD-10-CM

## 2025-02-18 DIAGNOSIS — I10 ESSENTIAL HYPERTENSION: ICD-10-CM

## 2025-02-18 PROCEDURE — G8417 CALC BMI ABV UP PARAM F/U: HCPCS | Performed by: INTERNAL MEDICINE

## 2025-02-18 PROCEDURE — 3044F HG A1C LEVEL LT 7.0%: CPT | Performed by: INTERNAL MEDICINE

## 2025-02-18 PROCEDURE — G2211 COMPLEX E/M VISIT ADD ON: HCPCS | Performed by: INTERNAL MEDICINE

## 2025-02-18 PROCEDURE — 3074F SYST BP LT 130 MM HG: CPT | Performed by: INTERNAL MEDICINE

## 2025-02-18 PROCEDURE — 1160F RVW MEDS BY RX/DR IN RCRD: CPT | Performed by: INTERNAL MEDICINE

## 2025-02-18 PROCEDURE — 2022F DILAT RTA XM EVC RTNOPTHY: CPT | Performed by: INTERNAL MEDICINE

## 2025-02-18 PROCEDURE — 1123F ACP DISCUSS/DSCN MKR DOCD: CPT | Performed by: INTERNAL MEDICINE

## 2025-02-18 PROCEDURE — 4004F PT TOBACCO SCREEN RCVD TLK: CPT | Performed by: INTERNAL MEDICINE

## 2025-02-18 PROCEDURE — 3079F DIAST BP 80-89 MM HG: CPT | Performed by: INTERNAL MEDICINE

## 2025-02-18 PROCEDURE — 1159F MED LIST DOCD IN RCRD: CPT | Performed by: INTERNAL MEDICINE

## 2025-02-18 PROCEDURE — 99214 OFFICE O/P EST MOD 30 MIN: CPT | Performed by: INTERNAL MEDICINE

## 2025-02-18 PROCEDURE — G8427 DOCREV CUR MEDS BY ELIG CLIN: HCPCS | Performed by: INTERNAL MEDICINE

## 2025-02-18 PROCEDURE — 3017F COLORECTAL CA SCREEN DOC REV: CPT | Performed by: INTERNAL MEDICINE

## 2025-02-18 NOTE — PROGRESS NOTES
Patient ID:   Raghu Mcnamara is a 69 y.o. male    Chief Complaint:   Raghu Mcnamara presents for an evaluation of Type 2 Diabetes Mellitus , Hyperlipidemia and hypertension.     Subjective:   Type 2 Diabetes Mellitus diagnosed in 2017  On insulin since April 2019   Metformin caused muscle soreness     Has received steroid shots for trigger finger back pain. None recent.     Has some muscle pains (diffuse). Back is really an issue. Saw ortho.      Weight is down another 7 lbs. Max weight 253 lbs Sep 2019     Lantus 20 units daily at night. TTT of .   Jardiance 25mg daily   Ozempic 1 mg on Tuesdays. Tolerating well     No BS readings available today    Checks blood sugars once a month. Reportedly 110 last time. Not checking during the day.     AM:   Lunch:  Supper:   HS:     Hypoglycemias: None     Meals: Three, may be protein shake for breakfast. Dinner is bigger. Late night snack (cup of mixed nuts). Soda once a day, diet. No juices.   Exercise: Limited due to back problems. Exercise with the bands.      Denies chest pain, exertional dyspnea.   Family history of CAD: Dad at age 56. Mother had stroke in her 70's.   Smokes 1 PPD. Counselled for smoking cessation.     Currently not on ASA, recommend baby aspirin if okay with gastroenterologist or PCP      The following portions of the patient's history were reviewed and updated as appropriate:         Family History   Problem Relation Age of Onset    Stroke Mother     Arthritis Mother     Heart Disease Father     Alcohol Abuse Father     Coronary Art Dis Father     Other Paternal Grandmother     Cancer Sister     Diabetes Sister     Cancer Brother     High Blood Pressure Brother     High Cholesterol Sister            Social History     Socioeconomic History    Marital status:      Spouse name: Not on file    Number of children: Not on file    Years of education: Not on file    Highest education level: Not on file   Occupational History    Occupation:

## 2025-02-18 NOTE — PATIENT INSTRUCTIONS
Need to check 2 readings per day atleast ( fasting, before dinner or bedtime)   Bring logs or meter on every visit     Continue Jardiance 25mg daily    Change Lantus to 16 units at bedtime   Use Treat to target basal insulin. If pre-breakfast sugar is above 130 on 2 consecutive days increase Lantus by 2 units.  If pre-breakfast sugar is below 90 on one day decrease Lantus by 2 units.

## 2025-02-23 DIAGNOSIS — M79.7 FIBROMYALGIA: ICD-10-CM

## 2025-02-24 RX ORDER — PREGABALIN 200 MG/1
CAPSULE ORAL
Qty: 270 CAPSULE | Refills: 0 | Status: SHIPPED | OUTPATIENT
Start: 2025-02-24 | End: 2025-08-22

## 2025-02-24 NOTE — TELEPHONE ENCOUNTER
Medication:   Requested Prescriptions     Pending Prescriptions Disp Refills    pregabalin (LYRICA) 200 MG capsule 270 capsule 0     Sig: TAKE 1 CAPSULE BY MOUTH 3 TIMES A DAY FOR 90 DAYS. MAX 600MG (3 CAPSULES) DAILY        Last Filled:  1/8/2025    Patient Phone Number: [phone] (home)     Last appt: 2/6/2025   Next appt: 8/11/2025    Last OARRS:       2/6/2025     9:05 AM   RX Monitoring   Periodic Controlled Substance Monitoring Possible medication side effects, risk of tolerance/dependence & alternative treatments discussed.;No signs of potential drug abuse or diversion identified.

## 2025-02-28 DIAGNOSIS — Z79.4 UNCONTROLLED TYPE 2 DIABETES MELLITUS WITH HYPERGLYCEMIA, WITH LONG-TERM CURRENT USE OF INSULIN (HCC): ICD-10-CM

## 2025-02-28 DIAGNOSIS — E11.65 UNCONTROLLED TYPE 2 DIABETES MELLITUS WITH HYPERGLYCEMIA, WITH LONG-TERM CURRENT USE OF INSULIN (HCC): ICD-10-CM

## 2025-02-28 NOTE — TELEPHONE ENCOUNTER
Medication:   Requested Prescriptions     Pending Prescriptions Disp Refills    Insulin Glargine-yfgn 100 UNIT/ML SOPN [Pharmacy Med Name: Insulin Glargine-yfgn 100 UNIT/ML Subcutaneous Solution Pen-injector]       Sig: Inject 20 Units into the skin every evening       Last Filled:  01/08/2025 #4 0rf     Patient Phone Number: [phone] (home)     Last appt: 2/6/2025   Next appt: 8/11/2025    Last Labs DM:   Lab Results   Component Value Date/Time    LABA1C 5.1 02/04/2025 08:36 AM

## 2025-02-28 NOTE — TELEPHONE ENCOUNTER
Patient called requesting a refill, pt stated that his insurance will only cover Lantus brand. They will not cover insulin glargine     Rx- Lantus     Pharmacy- [id number]    LOV-  NOV- 8/19/25    Please advise

## 2025-03-03 RX ORDER — INSULIN GLARGINE 100 [IU]/ML
20 INJECTION, SOLUTION SUBCUTANEOUS EVERY EVENING
Qty: 30 ML | Refills: 1 | Status: SHIPPED | OUTPATIENT
Start: 2025-03-03

## 2025-03-03 RX ORDER — INSULIN GLARGINE-YFGN 100 [IU]/ML
20 INJECTION, SOLUTION SUBCUTANEOUS EVERY EVENING
OUTPATIENT
Start: 2025-03-03

## 2025-03-04 ENCOUNTER — TELEMEDICINE (OUTPATIENT)
Dept: FAMILY MEDICINE CLINIC | Age: 70
End: 2025-03-04

## 2025-03-04 DIAGNOSIS — Z79.4 CONTROLLED TYPE 2 DIABETES MELLITUS WITH DIABETIC POLYNEUROPATHY, WITH LONG-TERM CURRENT USE OF INSULIN (HCC): Primary | ICD-10-CM

## 2025-03-04 DIAGNOSIS — E11.42 CONTROLLED TYPE 2 DIABETES MELLITUS WITH DIABETIC POLYNEUROPATHY, WITH LONG-TERM CURRENT USE OF INSULIN (HCC): ICD-10-CM

## 2025-03-04 DIAGNOSIS — G89.4 CHRONIC PAIN SYNDROME: ICD-10-CM

## 2025-03-04 DIAGNOSIS — M25.551 BILATERAL HIP PAIN: ICD-10-CM

## 2025-03-04 DIAGNOSIS — M79.7 FIBROMYALGIA: ICD-10-CM

## 2025-03-04 DIAGNOSIS — Z79.4 CONTROLLED TYPE 2 DIABETES MELLITUS WITH DIABETIC POLYNEUROPATHY, WITH LONG-TERM CURRENT USE OF INSULIN (HCC): ICD-10-CM

## 2025-03-04 DIAGNOSIS — E11.42 CONTROLLED TYPE 2 DIABETES MELLITUS WITH DIABETIC POLYNEUROPATHY, WITH LONG-TERM CURRENT USE OF INSULIN (HCC): Primary | ICD-10-CM

## 2025-03-04 DIAGNOSIS — M25.552 BILATERAL HIP PAIN: ICD-10-CM

## 2025-03-04 NOTE — TELEPHONE ENCOUNTER
Please have him ask his endocrinologist to manage and refill this medication for him (I saw him today and he said his endocrinologist plans to manage the refills).

## 2025-03-04 NOTE — PROGRESS NOTES
route 2 times daily Use in each nostril as directed (Patient taking differently: 1 spray by Nasal route as needed Use in each nostril as directed) 60 mL 3    Blood Glucose Monitoring Suppl (PRODIGY AUTOCODE BLOOD GLUCOSE) LILO Check sugar three times per day. Diagnosis code: E11.42 1 Device 0    blood glucose test strips (PRODIGY NO CODING BLOOD GLUC) strip Prodigy No Coding strips. Test BS three times daily. Diagnosis code: E11.42 100 each 5    medical marijuana MEDICAL MARIJUANA           There were no vitals filed for this visit.  There is no height or weight on file to calculate BMI.   There were no vitals taken for this visit.      3/4/2025     8:10 AM   Patient-Reported Vitals   Patient-Reported Weight 205lb   Patient-Reported Height 6'2        Wt Readings from Last 3 Encounters:   02/18/25 94.8 kg (209 lb)   02/06/25 92.5 kg (204 lb)   08/21/24 98.7 kg (217 lb 9.6 oz)     BP Readings from Last 3 Encounters:   02/18/25 126/82   02/06/25 118/78   08/21/24 132/87        Chief Complaint   Patient presents with    Referral - General     Dr. Mercado Rheumatologist        Assessment/Plan:    Assessment & Plan  Bilateral chronic hip pain  - Persistent pain in both hips  - Discussed options  - Referrals to rheumatology and Dr. Anderson in orthopedics to evaluate bone-related issues    Fibromyalgia  - Persistent fibromyalgia symptoms  - Referral to rheumatology for management    Diabetes  - Blood sugar levels running low (recent readings in the 60s)  - Current medications: Jardiance, Ozempic, and insulin  - Decreasing insulin under guidance of Dr. Faria in endocrinology  - Continue to reduce insulin dosage by 2 units as needed for low sugars  - Update Dr. Faria via MyChart with insulin dosing and blood sugar levels  - Knowledgeable about managing hypoglycemia episodes    Chronic pain  - Continue current regimen of baclofen, Lyrica, and Celebrex    Follow-up  - Patient to follow up on 08/11/2025    Raghu \"Delfin\" was

## 2025-03-04 NOTE — TELEPHONE ENCOUNTER
Medication:   Requested Prescriptions     Pending Prescriptions Disp Refills    Semaglutide, 1 MG/DOSE, (OZEMPIC) 4 MG/3ML SOPN sc injection 9 mL 0     Sig: Inject 1Mg SQ every 7 days- Tuesdays        Last Filled:  01/08/2025 #1 0rf     Patient Phone Number: [phone] (home)     Last appt: 3/4/2025   Next appt: 8/11/2025    Last OARRS:       2/6/2025     9:05 AM   RX Monitoring   Periodic Controlled Substance Monitoring Possible medication side effects, risk of tolerance/dependence & alternative treatments discussed.;No signs of potential drug abuse or diversion identified.

## 2025-03-06 NOTE — TELEPHONE ENCOUNTER
I called and spoke with patient and his endocrinologist is going to manage and refill this medication for patient

## 2025-03-18 RX ORDER — TRAZODONE HYDROCHLORIDE 50 MG/1
50 TABLET ORAL NIGHTLY
Qty: 90 TABLET | Refills: 1 | Status: SHIPPED | OUTPATIENT
Start: 2025-03-18

## 2025-03-18 NOTE — TELEPHONE ENCOUNTER
Medication:   Requested Prescriptions     Pending Prescriptions Disp Refills    traZODone (DESYREL) 50 MG tablet 90 tablet 0     Sig: Take 1 tablet by mouth nightly        Last Filled:  01/08/2025 #90 0rf     Patient Phone Number: [phone] (home)     Last appt: 3/4/2025   Next appt: 8/11/2025    Last OARRS:       2/6/2025     9:05 AM   RX Monitoring   Periodic Controlled Substance Monitoring Possible medication side effects, risk of tolerance/dependence & alternative treatments discussed.;No signs of potential drug abuse or diversion identified.

## 2025-03-24 SDOH — HEALTH STABILITY: PHYSICAL HEALTH: ON AVERAGE, HOW MANY DAYS PER WEEK DO YOU ENGAGE IN MODERATE TO STRENUOUS EXERCISE (LIKE A BRISK WALK)?: 4 DAYS

## 2025-03-24 SDOH — HEALTH STABILITY: PHYSICAL HEALTH: ON AVERAGE, HOW MANY MINUTES DO YOU ENGAGE IN EXERCISE AT THIS LEVEL?: 40 MIN

## 2025-03-27 ENCOUNTER — OFFICE VISIT (OUTPATIENT)
Dept: ORTHOPEDIC SURGERY | Age: 70
End: 2025-03-27

## 2025-03-27 VITALS — WEIGHT: 209 LBS | HEIGHT: 74 IN | BODY MASS INDEX: 26.82 KG/M2

## 2025-03-27 DIAGNOSIS — M70.61 TROCHANTERIC BURSITIS OF BOTH HIPS: ICD-10-CM

## 2025-03-27 DIAGNOSIS — M25.552 BILATERAL HIP PAIN: Primary | ICD-10-CM

## 2025-03-27 DIAGNOSIS — M25.551 BILATERAL HIP PAIN: Primary | ICD-10-CM

## 2025-03-27 DIAGNOSIS — M70.62 TROCHANTERIC BURSITIS OF BOTH HIPS: ICD-10-CM

## 2025-04-03 ENCOUNTER — OFFICE VISIT (OUTPATIENT)
Dept: ORTHOPEDIC SURGERY | Age: 70
End: 2025-04-03

## 2025-04-03 VITALS — WEIGHT: 209 LBS | BODY MASS INDEX: 26.82 KG/M2 | HEIGHT: 74 IN

## 2025-04-03 DIAGNOSIS — M25.552 LEFT HIP PAIN: Primary | ICD-10-CM

## 2025-04-03 RX ORDER — BUPIVACAINE HYDROCHLORIDE 5 MG/ML
3 INJECTION, SOLUTION PERINEURAL ONCE
Status: COMPLETED | OUTPATIENT
Start: 2025-04-03 | End: 2025-04-03

## 2025-04-03 RX ORDER — LIDOCAINE HYDROCHLORIDE 10 MG/ML
5 INJECTION, SOLUTION INFILTRATION; PERINEURAL ONCE
Status: COMPLETED | OUTPATIENT
Start: 2025-04-03 | End: 2025-04-03

## 2025-04-03 RX ORDER — TRIAMCINOLONE ACETONIDE 40 MG/ML
40 INJECTION, SUSPENSION INTRA-ARTICULAR; INTRAMUSCULAR ONCE
Status: COMPLETED | OUTPATIENT
Start: 2025-04-03 | End: 2025-04-03

## 2025-04-03 RX ADMIN — TRIAMCINOLONE ACETONIDE 40 MG: 40 INJECTION, SUSPENSION INTRA-ARTICULAR; INTRAMUSCULAR at 10:20

## 2025-04-03 RX ADMIN — BUPIVACAINE HYDROCHLORIDE 15 MG: 5 INJECTION, SOLUTION PERINEURAL at 10:19

## 2025-04-03 RX ADMIN — LIDOCAINE HYDROCHLORIDE 5 ML: 10 INJECTION, SOLUTION INFILTRATION; PERINEURAL at 10:20

## 2025-04-03 NOTE — PROGRESS NOTES
Patient: Raghu Mcnamara  : 1955    MRN: [id number]    Date of Visit: 4/3/25    Attending Physician: Maycol Jason MD    Patient is a pleasant 69-year-old male who presents to clinic for a left hip ultrasound-guided corticosteroid injection.  He has a history of chronic left hip pain which she localizes anteriorly.  He has never had an ultrasound-guided injection in the past.    Today we discussed the risks, benefits, and reasonable alternatives.  He has elected to proceed.    Ultrasound-guided intra-articular hip injection    The patient was positioned supine on examination table and the LEFT lower extremity was slightly abducted. The probe was then translated laterally and the anterior surface of the femoral head was visualized.  The probe was then rotated to coronal oblique position and the femoral acetabular joint and anterior joint recess at the femoral head/neck junction was visualized.  The femoral head neck junction was visualized at approximately 4-5 cm.     The position of the probe was marked.  Sterile preparation was performed.  The subcutaneous and muscular tissues were anesthetized using 5 cc 1% Lidocaine.  The needle was then exchanged for a 22-gauge spinal needle and this was advance under direct guidance using longitudinal technique to the anterior joint recess. Under direct guidance 1 mL of Kenalog 40mg and 3 mL of 0.5% Marcaine was injected into the anterior joint recess at the femoral acetabular joint.  The anterior capsule was visualized to hydrodissect with injectate.    Needle was withdrawn pressure applied to the puncture wound.    Images stored    Technically successful intra-articular injection of the hip    He will follow-up with Dr. Jaosn in 2-3 weeks to evaluate the effectiveness of the injection.    Jesus Cox PA-C

## 2025-04-07 RX ORDER — ESCITALOPRAM OXALATE 20 MG/1
20 TABLET ORAL DAILY
Qty: 90 TABLET | Refills: 0 | OUTPATIENT
Start: 2025-04-07

## 2025-04-07 RX ORDER — ESCITALOPRAM OXALATE 20 MG/1
20 TABLET ORAL DAILY
Qty: 90 TABLET | Refills: 0 | Status: SHIPPED | OUTPATIENT
Start: 2025-04-07

## 2025-04-07 NOTE — TELEPHONE ENCOUNTER
Medication:   Requested Prescriptions     Pending Prescriptions Disp Refills    escitalopram (LEXAPRO) 20 MG tablet 90 tablet 0     Sig: Take 1 tablet by mouth daily        Last Filled:  04/07/2025    Duplicate request     Patient Phone Number: [phone] (home)     Last appt: 3/4/2025   Next appt: 8/11/2025    Last OARRS:       2/6/2025     9:05 AM   RX Monitoring   Periodic Controlled Substance Monitoring Possible medication side effects, risk of tolerance/dependence & alternative treatments discussed.;No signs of potential drug abuse or diversion identified.

## 2025-04-07 NOTE — TELEPHONE ENCOUNTER
Medication:   Requested Prescriptions     Pending Prescriptions Disp Refills    escitalopram (LEXAPRO) 20 MG tablet [Pharmacy Med Name: Escitalopram Oxalate 20 MG Oral Tablet] 90 tablet 0     Sig: Take 1 tablet by mouth once daily        Last Filled:  01/06/2025 #90 0rf     Patient Phone Number: [phone] (home)     Last appt: 3/4/2025   Next appt: 08/11/2025    Last OARRS:       2/6/2025     9:05 AM   RX Monitoring   Periodic Controlled Substance Monitoring Possible medication side effects, risk of tolerance/dependence & alternative treatments discussed.;No signs of potential drug abuse or diversion identified.

## 2025-04-08 ENCOUNTER — HOSPITAL ENCOUNTER (OUTPATIENT)
Dept: PHYSICAL THERAPY | Age: 70
Setting detail: THERAPIES SERIES
Discharge: HOME | End: 2025-04-08
Attending: ORTHOPAEDIC SURGERY
Payer: MEDICARE

## 2025-04-08 DIAGNOSIS — M25.60 LIMITED JOINT RANGE OF MOTION (ROM): ICD-10-CM

## 2025-04-08 DIAGNOSIS — R29.898 WEAKNESS OF BOTH LOWER EXTREMITIES: Primary | ICD-10-CM

## 2025-04-08 PROCEDURE — 97530 THERAPEUTIC ACTIVITIES: CPT

## 2025-04-08 PROCEDURE — 97161 PT EVAL LOW COMPLEX 20 MIN: CPT

## 2025-04-08 PROCEDURE — 97110 THERAPEUTIC EXERCISES: CPT

## 2025-04-08 NOTE — PLAN OF CARE
of HEP    Plan: POC initiated as per evaluation    Electronically Signed by MERARY Terrazas  Date: 04/08/2025     Note: Portions of this note have been templated and/or copied from initial evaluation, reassessments and prior notes for documentation efficiency.    Note: If patient does not return for scheduled/recommended follow up visits, this note will serve as a discharge from care along with the most recent update on progress.

## 2025-04-14 DIAGNOSIS — Z79.4 CONTROLLED TYPE 2 DIABETES MELLITUS WITH DIABETIC POLYNEUROPATHY, WITH LONG-TERM CURRENT USE OF INSULIN (HCC): ICD-10-CM

## 2025-04-14 DIAGNOSIS — E11.42 CONTROLLED TYPE 2 DIABETES MELLITUS WITH DIABETIC POLYNEUROPATHY, WITH LONG-TERM CURRENT USE OF INSULIN (HCC): ICD-10-CM

## 2025-04-14 RX ORDER — EMPAGLIFLOZIN 25 MG/1
25 TABLET, FILM COATED ORAL EVERY MORNING
Qty: 90 TABLET | Refills: 0 | Status: SHIPPED | OUTPATIENT
Start: 2025-04-14

## 2025-04-14 NOTE — TELEPHONE ENCOUNTER
Medication:   Requested Prescriptions     Pending Prescriptions Disp Refills    JARDIANCE 25 MG tablet [Pharmacy Med Name: Jardiance 25 MG Oral Tablet] 90 tablet 0     Sig: TAKE 1 TABLET BY MOUTH IN THE MORNING       Last Filled:  01/08/2025 #90 0rf     Patient Phone Number: [phone] (home)     Last appt: 3/4/2025   Next appt: 8/11/2025    Last Labs DM:   Lab Results   Component Value Date/Time    LABA1C 5.1 02/04/2025 08:36 AM

## 2025-04-22 ENCOUNTER — HOSPITAL ENCOUNTER (OUTPATIENT)
Dept: PHYSICAL THERAPY | Age: 70
Setting detail: THERAPIES SERIES
Discharge: HOME | End: 2025-04-22
Attending: ORTHOPAEDIC SURGERY
Payer: MEDICARE

## 2025-04-22 PROCEDURE — 97530 THERAPEUTIC ACTIVITIES: CPT

## 2025-04-22 PROCEDURE — 97110 THERAPEUTIC EXERCISES: CPT

## 2025-04-22 PROCEDURE — 97140 MANUAL THERAPY 1/> REGIONS: CPT

## 2025-04-22 NOTE — FLOWSHEET NOTE
while providing explanation in regards to anatomical structures involved, healing time frames and relevant joint mechanics. Provided patient time for questions with answers provided when possible.    Home Exercise Program: Patient HEP program created electronically.  Refer to [employer] access code: [email or web address]  Access Code: [email or web address]  URL: [email or web address]  Date: 04/08/2025  Prepared by: Michelet Pereira    Exercises  - Supine Figure 4 Piriformis Stretch  - 1 x daily - 7 x weekly - 1 sets - 3 reps - 30 hold  - Supine Bridge with Resistance Band  - 1 x daily - 4-5 x weekly - 3 sets - 10 reps  - Prone Quad Stretch with Towel Roll and Strap  - 1 x daily - 7 x weekly - 1 sets - 3 reps - 30 hold    Date: 04/22/2025  - Hooklying hip abduction isometrics - 1 x daily - 7 x weekly  - STM at least 5 min daily along R glute medius/ITB    ASSESSMENT     Today's Assessment: Pt demonstrated early fatigue this session d/t increased pain levels from over activity last week. Also, d/t patient's presentation, pt seems to present with L hip OA and R hip gluteal tendinopathy. Therefore, long duration isometrics were performed with STM. Also, focused on stretching piriformis and adductors to promote hip ROM. VC provided for patient during sit to stands to adequately position trunk to correctly perform exercise. Patient also enters session with high pain levels (6/10) which pt reports is his \"normal\". Skilled therapy necessary to address pain management and pain education. Also, to address deficits in strength and ROM that prevent patient's ability to perform transfers, SLS, and stair negotiation.     Medical Necessity Documentation:  I certify that this patient meets the below criteria necessary for medical necessity for care and/or justification of therapy services:  The patient has functional impairments and/or activity limitations and would benefit from continued outpatient therapy services to address the deficits outlined in the

## 2025-04-24 ENCOUNTER — OFFICE VISIT (OUTPATIENT)
Dept: ORTHOPEDIC SURGERY | Age: 70
End: 2025-04-24
Payer: MEDICARE

## 2025-04-24 DIAGNOSIS — M25.552 LEFT HIP PAIN: Primary | ICD-10-CM

## 2025-04-24 PROCEDURE — 3017F COLORECTAL CA SCREEN DOC REV: CPT | Performed by: ORTHOPAEDIC SURGERY

## 2025-04-24 PROCEDURE — G8417 CALC BMI ABV UP PARAM F/U: HCPCS | Performed by: ORTHOPAEDIC SURGERY

## 2025-04-24 PROCEDURE — 99213 OFFICE O/P EST LOW 20 MIN: CPT | Performed by: ORTHOPAEDIC SURGERY

## 2025-04-24 PROCEDURE — 4004F PT TOBACCO SCREEN RCVD TLK: CPT | Performed by: ORTHOPAEDIC SURGERY

## 2025-04-24 PROCEDURE — G8428 CUR MEDS NOT DOCUMENT: HCPCS | Performed by: ORTHOPAEDIC SURGERY

## 2025-04-24 PROCEDURE — 1123F ACP DISCUSS/DSCN MKR DOCD: CPT | Performed by: ORTHOPAEDIC SURGERY

## 2025-04-24 NOTE — PROGRESS NOTES
Patient: Raghu Mcnamara  : 1955    MRN: [id number]    Date of Visit: 25    Attending Physician: Maycol Jason    History of Present Illness  Mr.. Mcnamara is a very pleasant 69 y.o. patient with a several year history of progressive bilateral hip pain. There is no precipitating event or trauma. The pain is located predominantly in the lateral hips and aggravated by weight bearing. Walking even short distances can be painful. However, it can also awaken the patient at night. Significant loss in range of motion making it difficult to cross legs and don socks/shoes.     The patient has tried the following interventions without sustained functional improvement:    Structured exercise/physical therapy program  NSAIDs and or tylenol   He is currently in PT    He underwent left hip injection reports 80% improvement    PMH/PSH:  Past Medical History:   Diagnosis Date    Binocular vision disorder with diplopia     Blood transfusion     30 yrs ago    Chronic back pain     Chronic pain syndrome     Depression     Diabetes mellitus (HCC)     Failed back surgical syndrome     FH: colonic polyps     Fibromyalgia     GERD (gastroesophageal reflux disease)     Hearing loss     History of duodenal ulcer     now gets peptic ulcer    Hyperlipidemia     Hypertension     Insomnia     Morbid obesity due to excess calories ([facility]) 2019    Neuropathy     Obesity     Osteoarthritis     Psychiatric problem     depression and anxiety    Renal carcinoma, left (HCC)     Sleep apnea     USES C-PAP    Substance abuse (HCC)     Type 2 diabetes mellitus without complication (HCC)     Ulcer, duodenum peptic      Patient Active Problem List   Diagnosis    CHIOMA (obstructive sleep apnea)    Chronic pain syndrome    Failed back surgical syndrome    Primary insomnia    Gastroesophageal reflux disease without esophagitis    Fibromyalgia    Cervical radiculitis    DDD (degenerative disc disease), cervical    ISMAEL (generalized

## 2025-04-30 ENCOUNTER — APPOINTMENT (OUTPATIENT)
Dept: PHYSICAL THERAPY | Age: 70
End: 2025-04-30
Attending: ORTHOPAEDIC SURGERY
Payer: MEDICARE

## 2025-05-05 DIAGNOSIS — M79.7 FIBROMYALGIA: ICD-10-CM

## 2025-05-05 DIAGNOSIS — G89.4 CHRONIC PAIN SYNDROME: ICD-10-CM

## 2025-05-05 RX ORDER — CELECOXIB 200 MG/1
CAPSULE ORAL
Qty: 180 CAPSULE | Refills: 3 | Status: SHIPPED | OUTPATIENT
Start: 2025-05-05

## 2025-05-05 NOTE — TELEPHONE ENCOUNTER
Medication:   Requested Prescriptions     Pending Prescriptions Disp Refills    celecoxib (CELEBREX) 200 MG capsule 180 capsule 0     Sig: TAKE 1 CAPSULE BY MOUTH 2 TIMES A DAY        Last Filled:  01/08/2025 #180 0rf     Patient Phone Number: [phone] (home)     Last appt: 3/4/2025   Next appt: 8/11/2025    Last OARRS:       2/6/2025     9:05 AM   RX Monitoring   Periodic Controlled Substance Monitoring Possible medication side effects, risk of tolerance/dependence & alternative treatments discussed.;No signs of potential drug abuse or diversion identified.

## 2025-05-06 ENCOUNTER — HOSPITAL ENCOUNTER (OUTPATIENT)
Dept: PHYSICAL THERAPY | Age: 70
Setting detail: THERAPIES SERIES
Discharge: HOME | End: 2025-05-06
Attending: ORTHOPAEDIC SURGERY
Payer: MEDICARE

## 2025-05-06 PROCEDURE — 97140 MANUAL THERAPY 1/> REGIONS: CPT

## 2025-05-06 PROCEDURE — 97110 THERAPEUTIC EXERCISES: CPT

## 2025-05-06 PROCEDURE — 97530 THERAPEUTIC ACTIVITIES: CPT

## 2025-05-06 NOTE — FLOWSHEET NOTE
[facility] - Outpatient Rehabilitation and Therapy: [address], [place] [address] office: [phone] fax: [phone]    Physical Therapy: TREATMENT/PROGRESS NOTE   Patient: Raghu Mcnamara (69 y.o. male)   Examination Date: 2025   :  1955 MRN: [id number]   Visit #: 3   Insurance Allowable Auth Needed   BMN []Yes    [x]No    Insurance: Payor: MEDICARE / Plan: MEDICARE PART A AND B / Product Type: *No Product type* /   Insurance ID: [id number] - (Medicare)  Secondary Insurance (if applicable): [facility]   Treatment Diagnosis:     ICD-10-CM    1. Weakness of both lower extremities  R29.898       2. Limited joint range of motion (ROM)  M25.60          Medical Diagnosis:  Bilateral hip pain [M25.551, M25.552]   Referring Physician: Maycol Jason MD  PCP: Nancy May MD     Plan of care signed (Y/N): TBD    Date of Patient follow up with Physician: 2025     Plan of Care Report: NO  POC update due: (10 visits /OR AUTH LIMITS, whichever is less)  2025                                             Medical History:  Comorbidities:  Diabetes (Type I or II)  Hypertension  Osteoarthritis  Depression  Relevant Medical History: Fibromyalgia, Hyperlipidemia, Neuropathy, GERD, hearing loss, Chronic LBP  See medial chart                                         Precautions/ Contra-indications:           Latex allergy:  NO  Pacemaker:    NO  Contraindications for Manipulation: long history of steroid use and unhealthy/ multiple comorbidities   Date of Surgery: NA  Other: 2x Back surgeries, L knee scope   See chart    Red Flags:  None    Suicide Screening:   The patient did not verbalize a primary behavioral concern, suicidal ideation, suicidal intent, or demonstrate suicidal behaviors.    Preferred Language for Healthcare:   [x] English       [] other:    SUBJECTIVE EXAMINATION     Patient stated complaint: Patient reports skipping PT last week

## 2025-05-13 ENCOUNTER — HOSPITAL ENCOUNTER (OUTPATIENT)
Dept: PHYSICAL THERAPY | Age: 70
Setting detail: THERAPIES SERIES
Discharge: HOME | End: 2025-05-13
Attending: ORTHOPAEDIC SURGERY
Payer: MEDICARE

## 2025-05-13 PROCEDURE — 97110 THERAPEUTIC EXERCISES: CPT

## 2025-05-13 PROCEDURE — 97140 MANUAL THERAPY 1/> REGIONS: CPT

## 2025-05-13 PROCEDURE — 97530 THERAPEUTIC ACTIVITIES: CPT

## 2025-05-13 NOTE — FLOWSHEET NOTE
adjustment due to lack of progress  [] Patient is not progressing as expected and requires additional follow up with physician  [] Other:     TREATMENT PLAN     Frequency/Duration: 1-2x/week for  10-12  weeks for the following treatment interventions:    Interventions:  Therapeutic Exercise (10123) including: strength training, ROM, and functional mobility  Therapeutic Activities (45398) including: functional mobility training and education.  Neuromuscular Re-education (56003) activation and proprioception, including postural re-education.    Gait Training (10096) for normalization of ambulation patterns and AD training.   Manual Therapy (39023) as indicated to include: Passive Range of Motion, Gr I-IV mobilizations, Soft Tissue Mobilization, Trigger Point Release, and Myofascial Release  Modalities as needed that may include: Cryotherapy, Electrical Stimulation, and Thermal Agents  Patient education on joint protection, postural re-education, activity modification, and progression of HEP    Plan:  Continue with current phase of prolonged isometrics, SLS exercises, and stretching without stretching R gluteal tendon at this time.    Electronically Signed by Michelet Pereira, PT  Date: 05/13/2025     Therapist was present, directed the patient's care, made skilled judgement, and was responsible for assessment and treatment of the patient.         Note: Portions of this note have been templated and/or copied from initial evaluation, reassessments and prior notes for documentation efficiency.    Note: If patient does not return for scheduled/recommended follow up visits, this note will serve as a discharge from care along with the most recent update on progress.

## 2025-05-20 ENCOUNTER — HOSPITAL ENCOUNTER (OUTPATIENT)
Dept: PHYSICAL THERAPY | Age: 70
Setting detail: THERAPIES SERIES
Discharge: HOME | End: 2025-05-20
Attending: ORTHOPAEDIC SURGERY
Payer: MEDICARE

## 2025-05-20 PROCEDURE — 97140 MANUAL THERAPY 1/> REGIONS: CPT

## 2025-05-20 PROCEDURE — 97110 THERAPEUTIC EXERCISES: CPT

## 2025-05-20 PROCEDURE — 97530 THERAPEUTIC ACTIVITIES: CPT

## 2025-05-26 DIAGNOSIS — M79.7 FIBROMYALGIA: ICD-10-CM

## 2025-05-27 RX ORDER — PREGABALIN 200 MG/1
CAPSULE ORAL
Qty: 270 CAPSULE | Refills: 0 | Status: SHIPPED | OUTPATIENT
Start: 2025-05-29 | End: 2025-11-21

## 2025-05-27 NOTE — TELEPHONE ENCOUNTER
Medication:   Requested Prescriptions     Pending Prescriptions Disp Refills    pregabalin (LYRICA) 200 MG capsule 270 capsule 0     Sig: TAKE 1 CAPSULE BY MOUTH 3 TIMES A DAY FOR 90 DAYS. MAX 600MG (3 CAPSULES) DAILY        Last Filled:  02/24/2025 #270 0rf     Patient Phone Number: [phone] (home)     Last appt: 3/4/2025   Next appt: 8/11/2025    Last OARRS:       2/6/2025     9:05 AM   RX Monitoring   Periodic Controlled Substance Monitoring Possible medication side effects, risk of tolerance/dependence & alternative treatments discussed.;No signs of potential drug abuse or diversion identified.

## 2025-05-28 ENCOUNTER — HOSPITAL ENCOUNTER (OUTPATIENT)
Dept: PHYSICAL THERAPY | Age: 70
Setting detail: THERAPIES SERIES
Discharge: HOME | End: 2025-05-28
Attending: ORTHOPAEDIC SURGERY
Payer: MEDICARE

## 2025-05-28 PROCEDURE — 97110 THERAPEUTIC EXERCISES: CPT

## 2025-05-28 PROCEDURE — 97530 THERAPEUTIC ACTIVITIES: CPT

## 2025-05-28 PROCEDURE — 97140 MANUAL THERAPY 1/> REGIONS: CPT

## 2025-05-29 ENCOUNTER — TELEPHONE (OUTPATIENT)
Dept: ENDOCRINOLOGY | Age: 70
End: 2025-05-29

## 2025-05-29 DIAGNOSIS — E11.69 DYSLIPIDEMIA ASSOCIATED WITH TYPE 2 DIABETES MELLITUS (HCC): Primary | ICD-10-CM

## 2025-05-29 DIAGNOSIS — E78.5 DYSLIPIDEMIA ASSOCIATED WITH TYPE 2 DIABETES MELLITUS (HCC): Primary | ICD-10-CM

## 2025-05-29 NOTE — TELEPHONE ENCOUNTER
Called Mr. Mcnamara stated he not having blood issues.  He stated he has followed Dr. Ewing instructions on insulin and he is now down to 2 units of  Lantus .    He would like to check cholesterol as he stopped taking the Atorvastatin due to leg cramps.

## 2025-05-29 NOTE — TELEPHONE ENCOUNTER
Patient is coming in on 6/3/25 having some blood issues asking would the dr like for him to get some blood work done please advise

## 2025-06-02 ENCOUNTER — TELEPHONE (OUTPATIENT)
Dept: ENDOCRINOLOGY | Age: 70
End: 2025-06-02

## 2025-06-02 DIAGNOSIS — Z79.4 CONTROLLED TYPE 2 DIABETES MELLITUS WITH DIABETIC POLYNEUROPATHY, WITH LONG-TERM CURRENT USE OF INSULIN (HCC): ICD-10-CM

## 2025-06-02 DIAGNOSIS — I10 ESSENTIAL HYPERTENSION: ICD-10-CM

## 2025-06-02 DIAGNOSIS — E78.5 DYSLIPIDEMIA ASSOCIATED WITH TYPE 2 DIABETES MELLITUS (HCC): ICD-10-CM

## 2025-06-02 DIAGNOSIS — E11.42 CONTROLLED TYPE 2 DIABETES MELLITUS WITH DIABETIC POLYNEUROPATHY, WITH LONG-TERM CURRENT USE OF INSULIN (HCC): ICD-10-CM

## 2025-06-02 DIAGNOSIS — E11.69 DYSLIPIDEMIA ASSOCIATED WITH TYPE 2 DIABETES MELLITUS (HCC): ICD-10-CM

## 2025-06-02 LAB
CHOLEST SERPL-MCNC: 219 MG/DL (ref 0–199)
CREAT UR-MCNC: 21.4 MG/DL (ref 39–259)
EST. AVERAGE GLUCOSE BLD GHB EST-MCNC: 105.4 MG/DL
HBA1C MFR BLD: 5.3 %
HDLC SERPL-MCNC: 60 MG/DL (ref 40–60)
LDL CHOLESTEROL: 145 MG/DL
MICROALBUMIN UR DL<=1MG/L-MCNC: <1.2 MG/DL
MICROALBUMIN/CREAT UR: ABNORMAL MG/G (ref 0–30)
TRIGL SERPL-MCNC: 72 MG/DL (ref 0–150)
VLDLC SERPL CALC-MCNC: 14 MG/DL

## 2025-06-02 NOTE — TELEPHONE ENCOUNTER
Patient states that he has an appointment tomorrow and he went to blood work and was told its to soon asking for new orders to be put in and a call please advise

## 2025-06-03 ENCOUNTER — OFFICE VISIT (OUTPATIENT)
Dept: ENDOCRINOLOGY | Age: 70
End: 2025-06-03
Payer: MEDICARE

## 2025-06-03 ENCOUNTER — RESULTS FOLLOW-UP (OUTPATIENT)
Dept: ENDOCRINOLOGY | Age: 70
End: 2025-06-03

## 2025-06-03 VITALS
HEIGHT: 74 IN | WEIGHT: 203 LBS | HEART RATE: 70 BPM | OXYGEN SATURATION: 91 % | BODY MASS INDEX: 26.05 KG/M2 | DIASTOLIC BLOOD PRESSURE: 91 MMHG | SYSTOLIC BLOOD PRESSURE: 141 MMHG

## 2025-06-03 DIAGNOSIS — E27.8 LEFT ADRENAL MASS: ICD-10-CM

## 2025-06-03 DIAGNOSIS — I10 ESSENTIAL HYPERTENSION: ICD-10-CM

## 2025-06-03 DIAGNOSIS — Z79.4 CONTROLLED TYPE 2 DIABETES MELLITUS WITH DIABETIC POLYNEUROPATHY, WITH LONG-TERM CURRENT USE OF INSULIN (HCC): ICD-10-CM

## 2025-06-03 DIAGNOSIS — E11.42 CONTROLLED TYPE 2 DIABETES MELLITUS WITH DIABETIC POLYNEUROPATHY, WITH LONG-TERM CURRENT USE OF INSULIN (HCC): ICD-10-CM

## 2025-06-03 DIAGNOSIS — E78.5 DYSLIPIDEMIA ASSOCIATED WITH TYPE 2 DIABETES MELLITUS (HCC): Primary | ICD-10-CM

## 2025-06-03 DIAGNOSIS — E11.69 DYSLIPIDEMIA ASSOCIATED WITH TYPE 2 DIABETES MELLITUS (HCC): Primary | ICD-10-CM

## 2025-06-03 PROCEDURE — 1160F RVW MEDS BY RX/DR IN RCRD: CPT | Performed by: INTERNAL MEDICINE

## 2025-06-03 PROCEDURE — 3074F SYST BP LT 130 MM HG: CPT | Performed by: INTERNAL MEDICINE

## 2025-06-03 PROCEDURE — 4004F PT TOBACCO SCREEN RCVD TLK: CPT | Performed by: INTERNAL MEDICINE

## 2025-06-03 PROCEDURE — 1123F ACP DISCUSS/DSCN MKR DOCD: CPT | Performed by: INTERNAL MEDICINE

## 2025-06-03 PROCEDURE — 3044F HG A1C LEVEL LT 7.0%: CPT | Performed by: INTERNAL MEDICINE

## 2025-06-03 PROCEDURE — G8427 DOCREV CUR MEDS BY ELIG CLIN: HCPCS | Performed by: INTERNAL MEDICINE

## 2025-06-03 PROCEDURE — 3080F DIAST BP >= 90 MM HG: CPT | Performed by: INTERNAL MEDICINE

## 2025-06-03 PROCEDURE — 99214 OFFICE O/P EST MOD 30 MIN: CPT | Performed by: INTERNAL MEDICINE

## 2025-06-03 PROCEDURE — 2022F DILAT RTA XM EVC RTNOPTHY: CPT | Performed by: INTERNAL MEDICINE

## 2025-06-03 PROCEDURE — G2211 COMPLEX E/M VISIT ADD ON: HCPCS | Performed by: INTERNAL MEDICINE

## 2025-06-03 PROCEDURE — G8417 CALC BMI ABV UP PARAM F/U: HCPCS | Performed by: INTERNAL MEDICINE

## 2025-06-03 PROCEDURE — 1159F MED LIST DOCD IN RCRD: CPT | Performed by: INTERNAL MEDICINE

## 2025-06-03 PROCEDURE — 3017F COLORECTAL CA SCREEN DOC REV: CPT | Performed by: INTERNAL MEDICINE

## 2025-06-03 RX ORDER — EZETIMIBE 10 MG/1
10 TABLET ORAL DAILY
Qty: 90 TABLET | Refills: 1 | Status: SHIPPED | OUTPATIENT
Start: 2025-06-03

## 2025-06-03 NOTE — PROGRESS NOTES
daily      Stop Lantus - June 2025       All instructions provided in written.   Check Blood sugars 3 times per day. Log them along with insulin and send them every 2 weeks. Call for blood sugars less than 60 or more than 400.     Eye exam: Last exam in Jan 2025. Azul WHITE. Azul ME. Has slight cataracts. He has double vision. Work up for MG negative and MRI orbits normal (reportedly). He reports negative work up for MG, MS.   Foot exam:  Aug 2024 Seeing podiatrist once  year   Deformity/amputation: absent  Skin lesions/pre-ulcerative calluses: present   Edema: right- negative, left- negative  Sensory exam: Monofilament sensation: normal   Pulses: normal, Vibration (128 Hz): severely reduced on right, moderately reduced on left     Renal screen: June 2025      Smoker: Counselled for smoking cessation   CDE visit in April 2019    2: HTN   Off meds    Asymptomatic     3: Hyperlipidemia    < 82, HDL 60, Tgs 72 - June 2025   Off atorva due to leg cramps   Recommend statins   He is not interested   Start zetia 10 mg        4: Left adrenal adenoma on MRI   1.3 cm x 1.1 cm   Functional work up normal Sep 2019   CT Nov 2019: No change in the mild left adrenal gland thickening.  Pancreatic cyst 0.7 x 0.9 cm cystic lesion     CT scan Nov 2020:   Pancreatic lesions not seen   Left adrenal looks stable-- I have images reviewed myself. There is no report on adrenal adenoma. It appears to be 1.3 x 1.5 cms.     CT scan Dec 2021: Mild adreniform thickening bilaterally, which can be seen with adrenal hyperplasia.      [facility] - CT abd/pelvis Jan 2023: Mild bilateral adrenal gland thickening, unchanged and can be seen with adrenal hyperplasia.    Now will get Ct scan every 3 years as per urology     No need to repeat CT scan unless urology wants it     6: Muscle pains  Normal CK, TSH, Vit D - Feb 2021     RTC in 3 months A1C, lipids      EDUCATION:   Greater than 50% of this follow-up visit was spent in general counseling regarding

## 2025-06-05 ENCOUNTER — HOSPITAL ENCOUNTER (OUTPATIENT)
Dept: PHYSICAL THERAPY | Age: 70
Setting detail: THERAPIES SERIES
Discharge: HOME | End: 2025-06-05
Attending: ORTHOPAEDIC SURGERY
Payer: MEDICARE

## 2025-06-05 PROCEDURE — 97530 THERAPEUTIC ACTIVITIES: CPT

## 2025-06-05 PROCEDURE — 97112 NEUROMUSCULAR REEDUCATION: CPT

## 2025-06-05 NOTE — PLAN OF CARE
include: Cryotherapy, Electrical Stimulation, and Thermal Agents  Patient education on joint protection, postural re-education, activity modification, and progression of HEP    Plan: continue to progress tolerance to functional activities.      Electronically Signed by Michelet Pereira PT  Date: 06/05/2025     Note: Portions of this note have been templated and/or copied from initial evaluation, reassessments and prior notes for documentation efficiency.    Note: If patient does not return for scheduled/recommended follow up visits, this note will serve as a discharge from care along with the most recent update on progress.

## 2025-06-12 ENCOUNTER — APPOINTMENT (OUTPATIENT)
Dept: PHYSICAL THERAPY | Age: 70
End: 2025-06-12
Attending: ORTHOPAEDIC SURGERY
Payer: MEDICARE

## 2025-06-19 ENCOUNTER — HOSPITAL ENCOUNTER (OUTPATIENT)
Dept: PHYSICAL THERAPY | Age: 70
Setting detail: THERAPIES SERIES
Discharge: HOME | End: 2025-06-19
Attending: ORTHOPAEDIC SURGERY
Payer: MEDICARE

## 2025-06-19 PROCEDURE — 97110 THERAPEUTIC EXERCISES: CPT

## 2025-06-19 PROCEDURE — 97112 NEUROMUSCULAR REEDUCATION: CPT

## 2025-06-19 NOTE — FLOWSHEET NOTE
[facility] - Outpatient Rehabilitation and Therapy: [address], [place] [address] office: [phone] fax: [phone]    Physical Therapy: TREATMENT/PROGRESS NOTE   Patient: Raghu Mcnamara (69 y.o. male)   Examination Date: 2025   :  1955 MRN: [id number]   Visit #: 8   Insurance Allowable Auth Needed   BMN []Yes    [x]No    Insurance: Payor: MEDICARE / Plan: MEDICARE PART A AND B / Product Type: *No Product type* /   Insurance ID: [id number] - (Medicare)  Secondary Insurance (if applicable): [facility]   Treatment Diagnosis:     ICD-10-CM    1. Weakness of both lower extremities  R29.898       2. Limited joint range of motion (ROM)  M25.60          Medical Diagnosis:  Bilateral hip pain [M25.551, M25.552]   Referring Physician: Maycol Jason MD  PCP: Nancy May MD     Plan of care signed (Y/N): TBD    Date of Patient follow up with Physician: 25 - PCP     Plan of Care Report: NO  POC update due: (10 visits /OR AUTH LIMITS, whichever is less)  2025                                             Medical History:  Comorbidities:  Diabetes (Type I or II)  Hypertension  Osteoarthritis  Depression  Relevant Medical History: Fibromyalgia, Hyperlipidemia, Neuropathy, GERD, hearing loss, Chronic LBP  See medial chart                                         Precautions/ Contra-indications:           Latex allergy:  NO  Pacemaker:    NO  Contraindications for Manipulation: long history of steroid use and unhealthy/ multiple comorbidities   Date of Surgery: NA  Other: 2x Back surgeries, L knee scope   See chart    Red Flags:  None    Suicide Screening:   The patient did not verbalize a primary behavioral concern, suicidal ideation, suicidal intent, or demonstrate suicidal behaviors.    Preferred Language for Healthcare:   [x] English       [] other:    SUBJECTIVE EXAMINATION     Patient stated complaint: Patient reports he has been working on

## 2025-06-25 ENCOUNTER — APPOINTMENT (OUTPATIENT)
Dept: PHYSICAL THERAPY | Age: 70
End: 2025-06-25
Attending: ORTHOPAEDIC SURGERY
Payer: MEDICARE

## 2025-06-26 ENCOUNTER — HOSPITAL ENCOUNTER (OUTPATIENT)
Dept: PHYSICAL THERAPY | Age: 70
Setting detail: THERAPIES SERIES
Discharge: HOME | End: 2025-06-26
Attending: ORTHOPAEDIC SURGERY
Payer: MEDICARE

## 2025-06-26 PROCEDURE — 97140 MANUAL THERAPY 1/> REGIONS: CPT | Performed by: PHYSICAL THERAPIST

## 2025-06-26 PROCEDURE — 97112 NEUROMUSCULAR REEDUCATION: CPT | Performed by: PHYSICAL THERAPIST

## 2025-06-26 PROCEDURE — 97110 THERAPEUTIC EXERCISES: CPT | Performed by: PHYSICAL THERAPIST

## 2025-06-26 NOTE — FLOWSHEET NOTE
coordination, kinesthetic sense, posture, and/or proprioception for sitting and/or standing activities. Provided HEP review and/or progression.  (21561) MANUAL THERAPY -  Manual therapy techniques, 1 or more regions, each 15 minutes (Mobilization/manipulation, manual lymphatic drainage, manual traction) for the purpose of modulating pain, promoting relaxation,  increasing ROM, reducing/eliminating soft tissue swelling/inflammation/restriction, improving soft tissue extensibility and allowing for proper ROM for normal function with self care, mobility, lifting and ambulation    GOALS     Patient stated goal: Patient to return to daily activities with minimal restrictions d/t R hip.  [] Progressing: [] Met: [] Not Met: [] Adjusted    Therapist goals for Patient:   Short Term Goals: To be achieved in: 2-4 weeks  1Independent in HEP and progression per patient tolerance, in order to prevent re-injury.   [] Progressing: [x] Met: [] Not Met: [] Adjusted  Patient will have a decrease in pain to <4/10 to facilitate improvement in movement, function, and ADLs as indicated by Functional Deficits.  [] Progressing: [x] Met: [] Not Met: [] Adjusted    Long Term Goals: To be achieved in: 10-12 weeks  Disability index score of 30% or less for the WOMAC to assist with reaching prior level of function with activities such as heavy domestic duties such as lifting items off the ground.  [x] Progressing: [] Met: [] Not Met: [] Adjusted  Patient will demonstrate increased AROM of R hip ER to within at least 5 deg of contralateral limb without pain to allow for proper joint functioning to enable patient to improve car transfers   [] Progressing: [x] Met: [] Not Met: [] Adjusted  Patient will demonstrate increased Strength of bilateral hip abd to at least 25lbs of force with HHD to allow for proper functional mobility to enable patient to return to stair negotiation at home.   [x] Progressing: [] Met: [] Not Met: [] Adjusted  Patient will

## 2025-07-01 ENCOUNTER — HOSPITAL ENCOUNTER (OUTPATIENT)
Dept: PHYSICAL THERAPY | Age: 70
Setting detail: THERAPIES SERIES
Discharge: HOME | End: 2025-07-01
Attending: ORTHOPAEDIC SURGERY
Payer: MEDICARE

## 2025-07-01 PROCEDURE — 97112 NEUROMUSCULAR REEDUCATION: CPT | Performed by: PHYSICAL THERAPIST

## 2025-07-01 PROCEDURE — 97110 THERAPEUTIC EXERCISES: CPT | Performed by: PHYSICAL THERAPIST

## 2025-07-01 PROCEDURE — 97140 MANUAL THERAPY 1/> REGIONS: CPT | Performed by: PHYSICAL THERAPIST

## 2025-07-01 NOTE — FLOWSHEET NOTE
patient to improve car transfers   [] Progressing: [x] Met: [] Not Met: [] Adjusted  Patient will demonstrate increased Strength of bilateral hip abd to at least 25lbs of force with HHD to allow for proper functional mobility to enable patient to return to stair negotiation at home.   [x] Progressing: [] Met: [] Not Met: [] Adjusted  Patient will return to walking at least 1/2 without increased symptoms or restriction to walk in neighborhood.  [x] Progressing: [] Met: [] Not Met: [] Adjusted  Patient will be able to lift weights targeting LE's without increased symptoms or restrictions to return to typical exercise routine patient enjoys.   [] Progressing: [x] Met: [] Not Met: [] Adjusted       Overall Progression Towards Functional goals/ Treatment Progress Update:  [] Patient is progressing as expected towards functional goals listed.    [] Progression is slowed due to complexities/Impairments listed.  [] Progression has been slowed due to co-morbidities.  [] Plan just implemented, too soon (<30days) to assess goals progression   [] Goals require adjustment due to lack of progress  [] Patient is not progressing as expected and requires additional follow up with physician  [x] Other: Progress is slow secondary to Fibromyalgia and chronic lower back pain     TREATMENT PLAN     Frequency/Duration: 1-2x/week for 4-6 weeks for the following treatment interventions: (Updated 6/5/25)    Interventions:  Therapeutic Exercise (83076) including: strength training, ROM, and functional mobility  Therapeutic Activities (89832) including: functional mobility training and education.  Neuromuscular Re-education (08480) activation and proprioception, including postural re-education.    Gait Training (88255) for normalization of ambulation patterns and AD training.   Manual Therapy (26834) as indicated to include: Passive Range of Motion, Gr I-IV mobilizations, Soft Tissue Mobilization, Trigger Point Release, and Myofascial

## 2025-07-08 RX ORDER — ESCITALOPRAM OXALATE 20 MG/1
20 TABLET ORAL DAILY
Qty: 90 TABLET | Refills: 3 | Status: SHIPPED | OUTPATIENT
Start: 2025-07-08

## 2025-07-08 NOTE — TELEPHONE ENCOUNTER
Medication:   Requested Prescriptions     Pending Prescriptions Disp Refills    escitalopram (LEXAPRO) 20 MG tablet 90 tablet 0     Sig: Take 1 tablet by mouth daily        Last Filled:  04/07/2025 #90 0rf     Patient Phone Number: [phone] (home)     Last appt: 3/4/2025   Next appt: 8/11/2025    Last OARRS:       2/6/2025     9:05 AM   RX Monitoring   Periodic Controlled Substance Monitoring Possible medication side effects, risk of tolerance/dependence & alternative treatments discussed.;No signs of potential drug abuse or diversion identified.

## 2025-07-09 ENCOUNTER — HOSPITAL ENCOUNTER (OUTPATIENT)
Dept: PHYSICAL THERAPY | Age: 70
Setting detail: THERAPIES SERIES
Discharge: HOME | End: 2025-07-09
Attending: ORTHOPAEDIC SURGERY
Payer: MEDICARE

## 2025-07-09 PROCEDURE — 97140 MANUAL THERAPY 1/> REGIONS: CPT | Performed by: PHYSICAL THERAPIST

## 2025-07-09 PROCEDURE — 97112 NEUROMUSCULAR REEDUCATION: CPT | Performed by: PHYSICAL THERAPIST

## 2025-07-09 PROCEDURE — 97110 THERAPEUTIC EXERCISES: CPT | Performed by: PHYSICAL THERAPIST

## 2025-07-09 NOTE — PLAN OF CARE
[facility] - Outpatient Rehabilitation and Therapy: [address], [place] [address] office: [phone] fax: [phone]    Physical Therapy Re-Certification Plan of Care    Dear Maycol Jason MD  ,    We had the pleasure of treating the following patient for physical therapy services at [facility]. A summary of our findings can be found in the updated assessment below.  This includes our plan of care.  If you have any questions or concerns regarding these findings, please do not hesitate to contact me at the office phone number checked above.  Thank you for the referral.     Physician Signature:________________________________Date:__________________  By signing above (or electronic signature), therapist's plan is approved by physician      Total Visits: 11     Overall Response to Treatment:  Pt. Demonstrates overall improved strength and mobility. Pt. Reports decreased pain with sleeping. Pt. Will continue to benefit from skilled therapy in order to restore functional strength and core muscle control for decreased pain with gardening and household management.     Recommendation:    [x] Continue PT 1-2x / wk for 4 weeks.   [] Hold PT, pending MD visit   [] Discharge to [facility]. Follow up with PT or MD PRN.       Physical Therapy: TREATMENT/PROGRESS NOTE   Patient: Raghu Mcnamara (69 y.o. male)   Examination Date: 2025   :  1955 MRN: [id number]   Visit #: 11   Insurance Allowable Auth Needed   BMN []Yes    [x]No    Insurance: Payor: MEDICARE / Plan: MEDICARE PART A AND B / Product Type: *No Product type* /   Insurance ID: [id number] - (Medicare)  Secondary Insurance (if applicable): [facility]   Treatment Diagnosis:     ICD-10-CM    1. Weakness of both lower extremities  R29.898       2. Limited joint range of motion (ROM)  M25.60          Medical Diagnosis:  Bilateral hip pain [M25.551, M25.552]   Referring Physician: Eren

## 2025-07-14 ENCOUNTER — HOSPITAL ENCOUNTER (OUTPATIENT)
Dept: PHYSICAL THERAPY | Age: 70
Setting detail: THERAPIES SERIES
Discharge: HOME | End: 2025-07-14
Attending: ORTHOPAEDIC SURGERY
Payer: MEDICARE

## 2025-07-14 DIAGNOSIS — E11.42 CONTROLLED TYPE 2 DIABETES MELLITUS WITH DIABETIC POLYNEUROPATHY, WITH LONG-TERM CURRENT USE OF INSULIN (HCC): ICD-10-CM

## 2025-07-14 DIAGNOSIS — Z79.4 CONTROLLED TYPE 2 DIABETES MELLITUS WITH DIABETIC POLYNEUROPATHY, WITH LONG-TERM CURRENT USE OF INSULIN (HCC): ICD-10-CM

## 2025-07-14 PROCEDURE — 97110 THERAPEUTIC EXERCISES: CPT | Performed by: PHYSICAL THERAPIST

## 2025-07-14 PROCEDURE — 97112 NEUROMUSCULAR REEDUCATION: CPT | Performed by: PHYSICAL THERAPIST

## 2025-07-14 PROCEDURE — 97140 MANUAL THERAPY 1/> REGIONS: CPT | Performed by: PHYSICAL THERAPIST

## 2025-07-14 NOTE — TELEPHONE ENCOUNTER
Medication:   Requested Prescriptions     Pending Prescriptions Disp Refills    empagliflozin (JARDIANCE) 25 MG tablet 90 tablet 0     Sig: Take 1 tablet by mouth every morning       Last Filled:  04/14/2025 #90 0rf     Patient Phone Number: [phone] (home)     Last appt: 3/4/2025   Next appt: 8/11/2025    Last Labs DM:   Lab Results   Component Value Date/Time    LABA1C 5.3 06/02/2025 09:51 AM

## 2025-07-14 NOTE — PLAN OF CARE
[facility] - Outpatient Rehabilitation and Therapy: [address], [place] [address] office: [phone] fax: [phone]     Physical Therapy Discharge Summary    Dear Maycol Jason MD   ,    We had the pleasure of treating the following patient for physical therapy services at [facility].  A summary of our findings can be found in the discharge summary below.  If you have any questions or concerns regarding these findings, please do not hesitate to contact me at the office phone number checked above.  Thank you for the referral.         Total Visits: 12     Recommendation:   [] Hold PT, pending MD visit   [x] Discharge to [facility]. Follow up with PT or MD PRN.     Reason for Discharge:  Pt. Reports significant improvement in pain levels and reduced limitations. Pt. Is ready for d/c to independent home program for continued strengthening.            Physical Therapy: TREATMENT/PROGRESS NOTE   Patient: Raghu Mcnamara (69 y.o. male)   Examination Date: 2025   :  1955 MRN: [id number]   Visit #: 12   Insurance Allowable Auth Needed   BMN []Yes    [x]No    Insurance: Payor: MEDICARE / Plan: MEDICARE PART A AND B / Product Type: *No Product type* /   Insurance ID: [id number] - (Medicare)  Secondary Insurance (if applicable): [facility]   Treatment Diagnosis:     ICD-10-CM    1. Weakness of both lower extremities  R29.898       2. Limited joint range of motion (ROM)  M25.60          Medical Diagnosis:  Bilateral hip pain [M25.551, M25.552]   Referring Physician: Maycol Jason MD  PCP: Nancy May MD     Plan of care signed (Y/N): TBD    Date of Patient follow up with Physician: 25 - PCP     Plan of Care Report: YES, Date Range for this report: 25 to 25  POC update due: (10 visits /OR AUTH LIMITS, whichever is less)  dc                                             Medical History:  Comorbidities:  Diabetes (Type I

## 2025-07-15 DIAGNOSIS — Z79.4 CONTROLLED TYPE 2 DIABETES MELLITUS WITH DIABETIC POLYNEUROPATHY, WITH LONG-TERM CURRENT USE OF INSULIN (HCC): ICD-10-CM

## 2025-07-15 DIAGNOSIS — E11.42 CONTROLLED TYPE 2 DIABETES MELLITUS WITH DIABETIC POLYNEUROPATHY, WITH LONG-TERM CURRENT USE OF INSULIN (HCC): ICD-10-CM

## 2025-07-15 RX ORDER — POLYETHYLENE GLYCOL 3350, SODIUM SULFATE, SODIUM CHLORIDE, POTASSIUM CHLORIDE, ASCORBIC ACID, SODIUM ASCORBATE 140-9-5.2G
KIT ORAL
COMMUNITY

## 2025-07-15 RX ORDER — ONDANSETRON 4 MG/1
TABLET, ORALLY DISINTEGRATING ORAL
COMMUNITY

## 2025-07-15 NOTE — TELEPHONE ENCOUNTER
Medication:   Requested Prescriptions     Pending Prescriptions Disp Refills    Semaglutide, 1 MG/DOSE, (OZEMPIC) 4 MG/3ML SOPN sc injection 9 mL 0     Sig: Inject 1Mg SQ every 7 days- Tuesdays       Last Filled:  01/08/2025 #1 0rf     Patient Phone Number: [phone] (home)     Last appt: 3/4/2025   Next appt: 8/11/2025    Last Labs DM:   Lab Results   Component Value Date/Time    LABA1C 5.3 06/02/2025 09:51 AM

## 2025-07-17 ENCOUNTER — TELEPHONE (OUTPATIENT)
Dept: ADMINISTRATIVE | Age: 70
End: 2025-07-17

## 2025-07-17 ENCOUNTER — OFFICE VISIT (OUTPATIENT)
Dept: PULMONOLOGY | Age: 70
End: 2025-07-17
Payer: MEDICARE

## 2025-07-17 VITALS
OXYGEN SATURATION: 94 % | HEART RATE: 78 BPM | HEIGHT: 74 IN | DIASTOLIC BLOOD PRESSURE: 72 MMHG | SYSTOLIC BLOOD PRESSURE: 126 MMHG | BODY MASS INDEX: 25.21 KG/M2 | WEIGHT: 196.4 LBS

## 2025-07-17 DIAGNOSIS — I10 ESSENTIAL HYPERTENSION: ICD-10-CM

## 2025-07-17 DIAGNOSIS — J30.89 NON-SEASONAL ALLERGIC RHINITIS, UNSPECIFIED TRIGGER: ICD-10-CM

## 2025-07-17 DIAGNOSIS — E66.3 OVERWEIGHT (BMI 25.0-29.9): ICD-10-CM

## 2025-07-17 DIAGNOSIS — G47.33 OSA (OBSTRUCTIVE SLEEP APNEA): Primary | ICD-10-CM

## 2025-07-17 PROCEDURE — 99214 OFFICE O/P EST MOD 30 MIN: CPT | Performed by: INTERNAL MEDICINE

## 2025-07-17 PROCEDURE — 3074F SYST BP LT 130 MM HG: CPT | Performed by: INTERNAL MEDICINE

## 2025-07-17 PROCEDURE — 1159F MED LIST DOCD IN RCRD: CPT | Performed by: INTERNAL MEDICINE

## 2025-07-17 PROCEDURE — G8417 CALC BMI ABV UP PARAM F/U: HCPCS | Performed by: INTERNAL MEDICINE

## 2025-07-17 PROCEDURE — 4004F PT TOBACCO SCREEN RCVD TLK: CPT | Performed by: INTERNAL MEDICINE

## 2025-07-17 PROCEDURE — 1123F ACP DISCUSS/DSCN MKR DOCD: CPT | Performed by: INTERNAL MEDICINE

## 2025-07-17 PROCEDURE — 3078F DIAST BP <80 MM HG: CPT | Performed by: INTERNAL MEDICINE

## 2025-07-17 PROCEDURE — 3017F COLORECTAL CA SCREEN DOC REV: CPT | Performed by: INTERNAL MEDICINE

## 2025-07-17 PROCEDURE — G2211 COMPLEX E/M VISIT ADD ON: HCPCS | Performed by: INTERNAL MEDICINE

## 2025-07-17 PROCEDURE — G8427 DOCREV CUR MEDS BY ELIG CLIN: HCPCS | Performed by: INTERNAL MEDICINE

## 2025-07-17 ASSESSMENT — SLEEP AND FATIGUE QUESTIONNAIRES
HOW LIKELY ARE YOU TO NOD OFF OR FALL ASLEEP WHILE SITTING AND TALKING TO SOMEONE: WOULD NEVER DOZE
HOW LIKELY ARE YOU TO NOD OFF OR FALL ASLEEP WHILE LYING DOWN TO REST IN THE AFTERNOON WHEN CIRCUMSTANCES PERMIT: MODERATE CHANCE OF DOZING
HOW LIKELY ARE YOU TO NOD OFF OR FALL ASLEEP IN A CAR, WHILE STOPPED FOR A FEW MINUTES IN TRAFFIC: WOULD NEVER DOZE
ESS TOTAL SCORE: 4
HOW LIKELY ARE YOU TO NOD OFF OR FALL ASLEEP WHILE SITTING AND READING: WOULD NEVER DOZE
HOW LIKELY ARE YOU TO NOD OFF OR FALL ASLEEP WHILE SITTING QUIETLY AFTER LUNCH WITHOUT ALCOHOL: WOULD NEVER DOZE
HOW LIKELY ARE YOU TO NOD OFF OR FALL ASLEEP WHILE WATCHING TV: MODERATE CHANCE OF DOZING
HOW LIKELY ARE YOU TO NOD OFF OR FALL ASLEEP WHILE SITTING INACTIVE IN A PUBLIC PLACE: WOULD NEVER DOZE
HOW LIKELY ARE YOU TO NOD OFF OR FALL ASLEEP WHEN YOU ARE A PASSENGER IN A CAR FOR AN HOUR WITHOUT A BREAK: WOULD NEVER DOZE

## 2025-07-17 NOTE — PROGRESS NOTES
PULMONARY OFFICE FOLLOW-UP VISIT    CONSULTING PHYSICIAN:  Nancy May MD     REASON FOR VISIT:   Chief Complaint   Patient presents with    Sleep Apnea       DATE OF VISIT: 7/17/2025    HISTORY OF PRESENT ILLNESS: 69 y.o. year old male comes into the pulmonary/sleep clinic for a follow-up.  Continues to use his CPAP therapy regularly and feels benefit from it.  Sleep remains disturbed because of pain in his legs and back.  He is using medical marijuana to control the pain.  Does not use any opioid medications.  Denies any new medical problems since last clinic visit.  Has lost weight and has come off of insulin for his diabetes.      Previously: Regularly using his auto CPAP therapy and finds benefit from it.  Denies any mask leakage or pressure intolerance.  Symptoms has nasal congestion in the morning.  Is able to blow it out.  Also has back issues which disturbs his sleep and makes him sleep in episodes.  He has adapted to the schedule.  Stays functional during the day.  Denies any sleepiness during the day.  Weight remains stable. No new symptoms since last clinic visit.  Uses a CPAP therapy regularly and feels benefited from it.  Few nights he has a lot of back pain which disturbs his sleep.  Has been using medical marijuana for pain control.  Denies any mask leakage or pressure intolerance.  Has been regularly changing his mask interface.  Still having some postnasal drip.  Has not been using his nasal spray regularly.  Weight remains stable. Patient reports that he has been using his CPAP therapy regularly and feels benefited from it.  Still has certain mornings where he would have increased postnasal drip and he has to blow his nose multiple times.  Postnasal drip does not bother him during the nighttime.  He has been using azelastine and fluticasone nasal spray sporadically.  Weight has been stable.  Denies any mask leakage or pressure intolerance.  Patient was diagnosed to moderate

## 2025-07-17 NOTE — TELEPHONE ENCOUNTER
For some reason it is saying it is not sending electronically. Please call in the rxn I just placed for it.

## 2025-07-17 NOTE — TELEPHONE ENCOUNTER
Submitted PA for Ozempic (1 MG/DOSE) 4MG/3ML pen-injectors  Via CMM Key: [id number] STATUS: NOT SENT    Request received thru cmm script in epic is incomplete. If this is needed will need a new script placed.    Please advise .    If this requires a response please respond to the pool ( P MHCX PSC MEDICATION PRE-AUTH).      Thank you please advise patient.

## 2025-07-17 NOTE — TELEPHONE ENCOUNTER
It was sent in on 7/15.  If something else I needed please pend to desired pharmacy and send back.

## 2025-07-18 ENCOUNTER — TELEPHONE (OUTPATIENT)
Dept: FAMILY MEDICINE CLINIC | Age: 70
End: 2025-07-18

## 2025-07-18 NOTE — TELEPHONE ENCOUNTER
Prior authorization request needs to be started for the following medication(s):     Semaglutide, 1 MG/DOSE, (OZEMPIC) 4 MG/3ML SOPN sc injection [[id number]]    Order Details  Dose: 1 mg Route: SubCUTAneous Frequency: EVERY 7 DAYS   Dispense Quantity: 3 mL Refills: 0             Sig: Inject 1 mg into the skin every 7 days               Please advise if there's anything else we need to do, thank you!

## 2025-07-18 NOTE — TELEPHONE ENCOUNTER
Following medication needs a PA.         Semaglutide, 1 MG/DOSE, (OZEMPIC) 4 MG/3ML SOPN sc injection [[id number]]    Order Details  Dose: 1 mg Route: SubCUTAneous Frequency: EVERY 7 DAYS   Dispense Quantity: 3 mL Refills: 0          Sig: Inject 1 mg into the skin every 7 days

## 2025-07-18 NOTE — TELEPHONE ENCOUNTER
Submitted PA for Ozempic (1 MG/DOSE) 4MG/3ML pen-injectors    Via CMM Key: [id number] STATUS: APPROVED     Your request has been approved  CaseId:[id number];Status:Approved;Review Type:Prior Auth;Coverage Start Date:06/18/2025;Coverage End Date:07/18/2026; .    If this requires a response please respond to the pool ( P MHCX PSC MEDICATION PRE-AUTH).      Thank you please advise patient.

## 2025-07-21 NOTE — TELEPHONE ENCOUNTER
There is an approval already for this drug.    Please see TE encounter from 7/17/2025.    If this requires a response please respond to the pool ( P MHCX PSC MEDICATION PRE-AUTH).      Thank you please advise patient.\

## 2025-07-25 ENCOUNTER — APPOINTMENT (OUTPATIENT)
Dept: PHYSICAL THERAPY | Age: 70
End: 2025-07-25
Attending: ORTHOPAEDIC SURGERY
Payer: MEDICARE

## 2025-07-31 ENCOUNTER — APPOINTMENT (OUTPATIENT)
Dept: PHYSICAL THERAPY | Age: 70
End: 2025-07-31
Attending: ORTHOPAEDIC SURGERY
Payer: MEDICARE

## 2025-08-04 RX ORDER — BACLOFEN 10 MG/1
10 TABLET ORAL 3 TIMES DAILY
Qty: 270 TABLET | Refills: 0 | Status: SHIPPED | OUTPATIENT
Start: 2025-08-04 | End: 2025-08-04

## 2025-08-04 RX ORDER — BACLOFEN 10 MG/1
10 TABLET ORAL 3 TIMES DAILY
Qty: 270 TABLET | Refills: 1 | Status: SHIPPED | OUTPATIENT
Start: 2025-08-04

## 2025-08-11 ENCOUNTER — OFFICE VISIT (OUTPATIENT)
Dept: FAMILY MEDICINE CLINIC | Age: 70
End: 2025-08-11

## 2025-08-11 VITALS
OXYGEN SATURATION: 95 % | DIASTOLIC BLOOD PRESSURE: 76 MMHG | BODY MASS INDEX: 25.54 KG/M2 | SYSTOLIC BLOOD PRESSURE: 124 MMHG | HEIGHT: 74 IN | WEIGHT: 199 LBS | HEART RATE: 80 BPM

## 2025-08-11 DIAGNOSIS — F41.1 GAD (GENERALIZED ANXIETY DISORDER): ICD-10-CM

## 2025-08-11 DIAGNOSIS — R74.01 TRANSAMINITIS: ICD-10-CM

## 2025-08-11 DIAGNOSIS — G47.33 OSA (OBSTRUCTIVE SLEEP APNEA): ICD-10-CM

## 2025-08-11 DIAGNOSIS — K21.9 GASTROESOPHAGEAL REFLUX DISEASE WITHOUT ESOPHAGITIS: ICD-10-CM

## 2025-08-11 DIAGNOSIS — M79.7 FIBROMYALGIA: ICD-10-CM

## 2025-08-11 DIAGNOSIS — E11.9 TYPE 2 DIABETES MELLITUS WITHOUT COMPLICATION, WITHOUT LONG-TERM CURRENT USE OF INSULIN (HCC): ICD-10-CM

## 2025-08-11 DIAGNOSIS — F17.200 CURRENT EVERY DAY SMOKER: ICD-10-CM

## 2025-08-11 DIAGNOSIS — M50.30 DDD (DEGENERATIVE DISC DISEASE), CERVICAL: ICD-10-CM

## 2025-08-11 DIAGNOSIS — G89.4 CHRONIC PAIN SYNDROME: Primary | ICD-10-CM

## 2025-08-18 DIAGNOSIS — E11.69 DYSLIPIDEMIA ASSOCIATED WITH TYPE 2 DIABETES MELLITUS (HCC): ICD-10-CM

## 2025-08-18 DIAGNOSIS — E78.5 DYSLIPIDEMIA ASSOCIATED WITH TYPE 2 DIABETES MELLITUS (HCC): ICD-10-CM

## 2025-08-18 DIAGNOSIS — Z79.4 CONTROLLED TYPE 2 DIABETES MELLITUS WITH DIABETIC POLYNEUROPATHY, WITH LONG-TERM CURRENT USE OF INSULIN (HCC): ICD-10-CM

## 2025-08-18 DIAGNOSIS — E11.42 CONTROLLED TYPE 2 DIABETES MELLITUS WITH DIABETIC POLYNEUROPATHY, WITH LONG-TERM CURRENT USE OF INSULIN (HCC): ICD-10-CM

## 2025-08-18 DIAGNOSIS — I10 ESSENTIAL HYPERTENSION: ICD-10-CM

## 2025-08-18 DIAGNOSIS — E27.8 LEFT ADRENAL MASS: ICD-10-CM

## 2025-08-18 DIAGNOSIS — R74.01 TRANSAMINITIS: ICD-10-CM

## 2025-08-18 LAB
ALBUMIN SERPL-MCNC: 4.1 G/DL (ref 3.4–5)
ALBUMIN/GLOB SERPL: 2.2 {RATIO} (ref 1.1–2.2)
ALP SERPL-CCNC: 53 U/L (ref 40–129)
ALT SERPL-CCNC: 30 U/L (ref 10–40)
ANION GAP SERPL CALCULATED.3IONS-SCNC: 12 MMOL/L (ref 3–16)
AST SERPL-CCNC: 29 U/L (ref 15–37)
BILIRUB SERPL-MCNC: 0.5 MG/DL (ref 0–1)
BUN SERPL-MCNC: 24 MG/DL (ref 7–20)
CALCIUM SERPL-MCNC: 9.3 MG/DL (ref 8.3–10.6)
CHLORIDE SERPL-SCNC: 104 MMOL/L (ref 99–110)
CHOLEST SERPL-MCNC: 186 MG/DL (ref 0–199)
CO2 SERPL-SCNC: 24 MMOL/L (ref 21–32)
CREAT SERPL-MCNC: 0.7 MG/DL (ref 0.8–1.3)
EST. AVERAGE GLUCOSE BLD GHB EST-MCNC: 111.2 MG/DL
GFR SERPLBLD CREATININE-BSD FMLA CKD-EPI: >90 ML/MIN/{1.73_M2}
GLUCOSE SERPL-MCNC: 79 MG/DL (ref 70–99)
HBA1C MFR BLD: 5.5 %
HDLC SERPL-MCNC: 52 MG/DL (ref 40–60)
LDLC SERPL CALC-MCNC: 120 MG/DL
POTASSIUM SERPL-SCNC: 4.4 MMOL/L (ref 3.5–5.1)
PROT SERPL-MCNC: 6 G/DL (ref 6.4–8.2)
SODIUM SERPL-SCNC: 140 MMOL/L (ref 136–145)
TRIGL SERPL-MCNC: 72 MG/DL (ref 0–150)
VLDLC SERPL CALC-MCNC: 14 MG/DL

## 2025-08-19 ENCOUNTER — OFFICE VISIT (OUTPATIENT)
Dept: ENDOCRINOLOGY | Age: 70
End: 2025-08-19
Payer: MEDICARE

## 2025-08-19 VITALS
WEIGHT: 198 LBS | DIASTOLIC BLOOD PRESSURE: 86 MMHG | HEART RATE: 68 BPM | BODY MASS INDEX: 25.41 KG/M2 | SYSTOLIC BLOOD PRESSURE: 135 MMHG | HEIGHT: 74 IN | OXYGEN SATURATION: 93 %

## 2025-08-19 DIAGNOSIS — E11.42 CONTROLLED TYPE 2 DIABETES MELLITUS WITH DIABETIC POLYNEUROPATHY, WITH LONG-TERM CURRENT USE OF INSULIN (HCC): Primary | ICD-10-CM

## 2025-08-19 DIAGNOSIS — E27.8 LEFT ADRENAL MASS: ICD-10-CM

## 2025-08-19 DIAGNOSIS — E11.69 DYSLIPIDEMIA ASSOCIATED WITH TYPE 2 DIABETES MELLITUS (HCC): ICD-10-CM

## 2025-08-19 DIAGNOSIS — Z79.4 CONTROLLED TYPE 2 DIABETES MELLITUS WITH DIABETIC POLYNEUROPATHY, WITH LONG-TERM CURRENT USE OF INSULIN (HCC): Primary | ICD-10-CM

## 2025-08-19 DIAGNOSIS — I10 ESSENTIAL HYPERTENSION: ICD-10-CM

## 2025-08-19 DIAGNOSIS — E78.5 DYSLIPIDEMIA ASSOCIATED WITH TYPE 2 DIABETES MELLITUS (HCC): ICD-10-CM

## 2025-08-19 PROCEDURE — 4004F PT TOBACCO SCREEN RCVD TLK: CPT | Performed by: INTERNAL MEDICINE

## 2025-08-19 PROCEDURE — G8417 CALC BMI ABV UP PARAM F/U: HCPCS | Performed by: INTERNAL MEDICINE

## 2025-08-19 PROCEDURE — 3044F HG A1C LEVEL LT 7.0%: CPT | Performed by: INTERNAL MEDICINE

## 2025-08-19 PROCEDURE — G2211 COMPLEX E/M VISIT ADD ON: HCPCS | Performed by: INTERNAL MEDICINE

## 2025-08-19 PROCEDURE — 1123F ACP DISCUSS/DSCN MKR DOCD: CPT | Performed by: INTERNAL MEDICINE

## 2025-08-19 PROCEDURE — 3017F COLORECTAL CA SCREEN DOC REV: CPT | Performed by: INTERNAL MEDICINE

## 2025-08-19 PROCEDURE — 99214 OFFICE O/P EST MOD 30 MIN: CPT | Performed by: INTERNAL MEDICINE

## 2025-08-19 PROCEDURE — 2022F DILAT RTA XM EVC RTNOPTHY: CPT | Performed by: INTERNAL MEDICINE

## 2025-08-19 PROCEDURE — 1159F MED LIST DOCD IN RCRD: CPT | Performed by: INTERNAL MEDICINE

## 2025-08-19 PROCEDURE — 3075F SYST BP GE 130 - 139MM HG: CPT | Performed by: INTERNAL MEDICINE

## 2025-08-19 PROCEDURE — 1160F RVW MEDS BY RX/DR IN RCRD: CPT | Performed by: INTERNAL MEDICINE

## 2025-08-19 PROCEDURE — 3079F DIAST BP 80-89 MM HG: CPT | Performed by: INTERNAL MEDICINE

## 2025-08-19 PROCEDURE — G8427 DOCREV CUR MEDS BY ELIG CLIN: HCPCS | Performed by: INTERNAL MEDICINE

## 2025-08-21 DIAGNOSIS — M79.7 FIBROMYALGIA: ICD-10-CM

## 2025-08-25 RX ORDER — PREGABALIN 200 MG/1
CAPSULE ORAL
Qty: 270 CAPSULE | Refills: 1 | Status: SHIPPED | OUTPATIENT
Start: 2025-08-25 | End: 2026-02-13

## (undated) DEVICE — BLADELESS OBTURATOR: Brand: WECK VISTA

## (undated) DEVICE — LIMB HOLDER, WRIST/ANKLE: Brand: DEROYAL

## (undated) DEVICE — NEEDLE SPNL 22GA L3.5IN BLK HUB S STL REG WALL FIT STYL W/

## (undated) DEVICE — ARM DRAPE

## (undated) DEVICE — MEDIA CONTRAST RX ISOVUE-300 61% 30ML VIALS

## (undated) DEVICE — CANNULA SEAL

## (undated) DEVICE — HYPODERMIC SAFETY NEEDLE: Brand: MAGELLAN

## (undated) DEVICE — STANDARD HYPODERMIC NEEDLE,POLYPROPYLENE HUB: Brand: MONOJECT

## (undated) DEVICE — PORT VLV 2 W NDL FREE SMRTSITE

## (undated) DEVICE — Device

## (undated) DEVICE — SUTURE V-LOC 90 3-0 L6IN ABSRB UD L26MM V-20 1/2 CIR TAPR VLOCM2004

## (undated) DEVICE — SOLUTION IV IRRIG POUR BRL 0.9% SODIUM CHL 2F7124

## (undated) DEVICE — NEEDLE HYPO 22GA L1.5IN BLK POLYPR HUB S STL REG BVL STR

## (undated) DEVICE — NEEDLE EPI L3.5IN OD20GA CLR POLYCARB HUB WNG N DEHP TUOHY

## (undated) DEVICE — SKIN AFFIX SURG ADHESIVE 72/CS 0.55ML: Brand: MEDLINE

## (undated) DEVICE — HEADREST POS OD9IN THICKNESS 2IN RASPBERRY FOAM RNG CUSH

## (undated) DEVICE — CHLORAPREP 26ML ORANGE

## (undated) DEVICE — STERILE POLYISOPRENE POWDER-FREE SURGICAL GLOVES: Brand: PROTEXIS

## (undated) DEVICE — TRAY ANES SUPP NO DRUG CUST

## (undated) DEVICE — SHEET,DRAPE,53X77,STERILE: Brand: MEDLINE

## (undated) DEVICE — SYRINGE MED 10ML TRNSLUC BRL PLUNG BLK MRK POLYPR CTRL

## (undated) DEVICE — Device: Brand: JELCO

## (undated) DEVICE — LIGHT HANDLE: Brand: DEVON

## (undated) DEVICE — MASTISOL ADHESIVE LIQ 2/3ML

## (undated) DEVICE — INSUFFLATION NEEDLE TO ESTABLISH PNEUMOPERITONEUM.: Brand: INSUFFLATION NEEDLE

## (undated) DEVICE — CLIP INT L12MM POLYMER DISP LAPRO-CLP

## (undated) DEVICE — Z INACTIVE USE 2641839 CLIP INT M L POLYMER LOK LIG HEM O LOK

## (undated) DEVICE — PAD GRND NEUT ELECTRD AD DISP

## (undated) DEVICE — PEN: MARKING STD 100/CS: Brand: MEDICAL ACTION INDUSTRIES

## (undated) DEVICE — DEVICE SECUREMENT AD W/ TRICOT ANCHR PD FOR F LTX SIL CATH

## (undated) DEVICE — 60 ML SYRINGE,REGULAR TIP: Brand: MONOJECT

## (undated) DEVICE — SURE SET SINGLE BASIN-LF: Brand: MEDLINE INDUSTRIES, INC.

## (undated) DEVICE — COLUMN DRAPE

## (undated) DEVICE — TRI-LUMEN FILTERED TUBE SET WITH ACTIVATED CHARCOAL FILTER: Brand: AIRSEAL

## (undated) DEVICE — SYRINGE MED 3ML CLR PLAS LUERLOCK CONN VI ACCS INTLNK 15GA

## (undated) DEVICE — PROTECTOR EYE PT SELF ADH NS OPT GRD LF

## (undated) DEVICE — SUTURE VCRL SZ 4-0 L18IN ABSRB UD L19MM PS-2 3/8 CIR PRIM J496H

## (undated) DEVICE — 6 ML SYRINGE LUER-LOCK TIP: Brand: MONOJECT

## (undated) DEVICE — SUTURE VCRL SZ 2-0 L18IN ABSRB UD POLYGLACTIN 910 BRAID TIE J111T

## (undated) DEVICE — SUTURE VCRL SZ 3-0 L18IN ABSRB UD L26MM SH 1/2 CIR J864D

## (undated) DEVICE — SUTURE SZ 0 27IN 5/8 CIR UR-6  TAPER PT VIOLET ABSRB VICRYL J603H

## (undated) DEVICE — SYRINGE, LUER LOCK, 10ML: Brand: MEDLINE

## (undated) DEVICE — STAPLER 30 RELOAD WHITE: Brand: ENDOWRIST

## (undated) DEVICE — SUTURE ETHLN SZ 4-0 L18IN NONABSORBABLE BLK L19MM PS-2 3/8 1667H

## (undated) DEVICE — MERCY FAIRFIELD TURNOVER KIT: Brand: MEDLINE INDUSTRIES, INC.

## (undated) DEVICE — FLEXIBLE ADHESIVE BANDAGE: Brand: CURITY

## (undated) DEVICE — SUTURE V-LOC 180 SZ 2-0 L9IN ABSRB GRN L27MM GS-22 1/2 CIR VLOCL2145

## (undated) DEVICE — SUTURE MCRYL SZ 4-0 L27IN ABSRB UD L19MM PS-2 1/2 CIR PRIM Y426H

## (undated) DEVICE — CARBIDE MATCH HEAD

## (undated) DEVICE — TISSUE RETRIEVAL SYSTEM: Brand: INZII RETRIEVAL SYSTEM

## (undated) DEVICE — TRAP SPEC RETRV CLR PLAS POLYP IN LN SUCT QUIK CTCH

## (undated) DEVICE — SUTURE PDS II SZ 0 L27IN ABSRB VLT L26MM CT-2 1/2 CIR Z334H

## (undated) DEVICE — TROCAR ENDOSCP L100MM DIA5MM BLDELSS STBL SL OBT RADLUC

## (undated) DEVICE — CLIP SURG L8MM M L POLYGLY ACID POLYGLY LAP ABSRB RAD

## (undated) DEVICE — RENTAL PROBE PRO ART ROBTIC ULTRASOUND

## (undated) DEVICE — PMI DISPOSABLE PUNCTURE CLOSURE DEVICE / SUTURE GRASPER: Brand: PMI

## (undated) DEVICE — PENCIL ES L3M BTTN SWCH S STL HEX LOK BLDE ELECTRD HOLSTER

## (undated) DEVICE — GOWN,AURORA,NONREINF,RAGLAN,XXL,STERILE: Brand: MEDLINE

## (undated) DEVICE — SOLUTION IV IRRIG WATER 500ML POUR BRL ST 2F7113

## (undated) DEVICE — TOWEL,OR,DSP,ST,BLUE,STD,4/PK,20PK/CS: Brand: MEDLINE

## (undated) DEVICE — KIT OR ROOM TURNOVER W/STRAP

## (undated) DEVICE — DRAPE MICSCP W132XL406CM LENS DIA68MM W VARI LENS2 FOR LEICA

## (undated) DEVICE — DRAPE 60X42IN RADIOLOGY C ARM ADHES 1013

## (undated) DEVICE — GLOVE ORANGE PI 8   MSG9080

## (undated) DEVICE — SEALER LAP L37CM MARYLAND JAW OPN NANO COAT MULTIFUNCTIONAL

## (undated) DEVICE — AIRSEAL 12 MM ACCESS PORT AND PALM GRIP OBTURATOR WITH BLADELESS OPTICAL TIP, 120 MM LENGTH: Brand: AIRSEAL

## (undated) DEVICE — 3M™ TEGADERM™ +PAD FILM DRESSING WITH NON-ADHERENT PAD, 3588, 6 IN X 6 IN (15 CM X 15 CM), 25/CAR, 4 CAR/CS: Brand: 3M™ TEGADERM™

## (undated) DEVICE — LOTION PREP REMV 5OZ IODO CLR TINC OF BENZ DURAPREP

## (undated) DEVICE — DYONICS 25 DAY TUBE SET MUST BE                                    USED WITH 7211008, 3 PER BOX

## (undated) DEVICE — CATHETER TRAY 16 FR 5 CC FOL ANTIREFLX SAMPLING PRT DOVER

## (undated) DEVICE — PROCEDURE KIT ENDOSCP CUST

## (undated) DEVICE — 3.5 MM FULL RADIUS ELITE STRAIGHT                                    DISPOSABLE BLADES, BEIGE,PACKAGED 6                                    PER BOX, STERILE

## (undated) DEVICE — 3M™ STERI-STRIP™ REINFORCED ADHESIVE SKIN CLOSURES, R1547, 1/2 IN X 4 IN (12 MM X 100 MM), 6 STRIPS/ENVELOPE: Brand: 3M™ STERI-STRIP™

## (undated) DEVICE — BW-412T DISP COMBO CLEANING BRUSH: Brand: SINGLE USE COMBINATION CLEANING BRUSH

## (undated) DEVICE — SPONGE LAP W18XL18IN WHT COT 4 PLY FLD STRUNG RADPQ DISP ST

## (undated) DEVICE — KARLIN BLADE, ULTRA KNIFE, SATIN, STAINLESS STEEL, 1.5 MM TIP, 4 IN, SINGLE USE, (10/PK): Brand: SYMMETRY SURGICAL

## (undated) DEVICE — LAPAROSCOPY PK

## (undated) DEVICE — 3M™ STERI-DRAPE™ INCISE DRAPE 1050 (60CM X 45CM): Brand: STERI-DRAPE™

## (undated) DEVICE — CASPAR DISTRACTION PIN 14MM: Brand: AESCULAP

## (undated) DEVICE — GAUZE,SPONGE,4"X4",8PLY,STRL,LF,10/TRAY: Brand: MEDLINE

## (undated) DEVICE — REDUCER: Brand: ENDOWRIST

## (undated) DEVICE — SYRINGE MED 50ML LUERLOCK TIP

## (undated) DEVICE — STERILE LATEX POWDER FREE SURGICAL GLOVES WITH HYDROGEL COATING: Brand: PROTEXIS

## (undated) DEVICE — COVER LT HNDL BLU PLAS

## (undated) DEVICE — ROBOTIC PK

## (undated) DEVICE — STERILE LATEX POWDER-FREE SURGICAL GLOVESWITH NITRILE AND EMOLLIENT COATINGS: Brand: PROTEXIS

## (undated) DEVICE — NEEDLE SPNL 25GA L3.5IN BLU HUB S STL REG WALL FIT STYL W/

## (undated) DEVICE — SET VLV 3 PC AWS DISPOSABLE GRDIAN SCOPEVALET

## (undated) DEVICE — 3M™ TEGADERM™ TRANSPARENT FILM DRESSING FRAME STYLE, 1626W, 4 IN X 4-3/4 IN (10 CM X 12 CM), 50/CT 4CT/CASE: Brand: 3M™ TEGADERM™

## (undated) DEVICE — DRAPE ADOLESCENT  LAPAROTOMY

## (undated) DEVICE — 7496-8Z MINI-PLUS KIT: Brand: DEVON

## (undated) DEVICE — APPLICATOR PREP 26ML 0.7% IOD POVACRYLEX 74% ISO ALC ST

## (undated) DEVICE — SYRINGE MED 10ML LUERLOCK TIP W/O SFTY DISP

## (undated) DEVICE — WEREWOLF FLOW 50 COBLATION WAND: Brand: COBLATION

## (undated) DEVICE — SUTURE V-LOC 90 3-0 L6IN ABSRB UD CV-23 L17MM 1/2 CIR VLOCM1904

## (undated) DEVICE — BLANKET WRM W40.2XL55.9IN IORT LO BODY + MISTRAL AIR

## (undated) DEVICE — SPONGES GAUZE X-RAY 4X4 16PLY

## (undated) DEVICE — RELOAD STPL H1-2.5X45MM VASC THN TISS WHT 6 ROW B FRM SGL

## (undated) DEVICE — NEURO HBO 85949

## (undated) DEVICE — DRAPE,LAP,CHOLE,W/TROUGHS,STERILE: Brand: MEDLINE

## (undated) DEVICE — TIP COVER ACCESSORY

## (undated) DEVICE — TOTAL TRAY, DB, 100% SILI FOLEY, 16FR 10: Brand: MEDLINE

## (undated) DEVICE — GLOVE ORANGE PI 7   MSG9070

## (undated) DEVICE — Device: Brand: DISPOSABLE ELECTROSURGICAL SNARE

## (undated) DEVICE — SOLUTION ANTIFOG VIS SYS CLEARIFY LAPSCP

## (undated) DEVICE — SUTURE PDS II SZ 0 L36IN ABSRB VLT L36MM CT-1 1/2 CIR Z346H

## (undated) DEVICE — CUTTER ENDOSCP L340MM LIN ARTC SGL STROKE FIRING ENDOPATH

## (undated) DEVICE — DYONICS 25 PATIENT TUBE SET MUST                                    BE USED WITH 7211007, 12 PER BOX

## (undated) DEVICE — MEDIUM-LARGE CLIP APPLIER: Brand: ENDOWRIST

## (undated) DEVICE — TROCARS: Brand: KII® BALLOON BLUNT TIP SYSTEM